# Patient Record
Sex: FEMALE | Race: WHITE | Employment: FULL TIME | URBAN - METROPOLITAN AREA
[De-identification: names, ages, dates, MRNs, and addresses within clinical notes are randomized per-mention and may not be internally consistent; named-entity substitution may affect disease eponyms.]

---

## 2017-05-04 ENCOUNTER — HOSPITAL ENCOUNTER (OUTPATIENT)
Dept: SPEECH THERAPY | Age: 57
Setting detail: THERAPIES SERIES
Discharge: HOME | End: 2017-05-04
Payer: COMMERCIAL

## 2017-05-04 ENCOUNTER — HOSPITAL ENCOUNTER (OUTPATIENT)
Dept: OCCUPATIONAL THERAPY | Age: 57
Setting detail: THERAPIES SERIES
Discharge: HOME | End: 2017-05-04
Payer: COMMERCIAL

## 2017-05-04 PROCEDURE — 96105 ASSESSMENT OF APHASIA: CPT

## 2017-05-04 PROCEDURE — 97166 OT EVAL MOD COMPLEX 45 MIN: CPT

## 2017-05-04 PROCEDURE — 97530 THERAPEUTIC ACTIVITIES: CPT

## 2017-05-04 ASSESSMENT — PAIN DESCRIPTION - DESCRIPTORS: DESCRIPTORS: SQUEEZING

## 2017-05-04 ASSESSMENT — PAIN DESCRIPTION - ORIENTATION: ORIENTATION: LEFT

## 2017-05-04 ASSESSMENT — 9 HOLE PEG TEST
TESTTIME_SECONDS: 38
TEST_RESULT: FUNCTIONAL
TEST_RESULT: IMPAIRED
TESTTIME_SECONDS: 25

## 2017-05-04 ASSESSMENT — PAIN SCALES - GENERAL: PAINLEVEL_OUTOF10: 2

## 2017-05-04 ASSESSMENT — PAIN DESCRIPTION - LOCATION: LOCATION: ARM

## 2017-05-04 ASSESSMENT — PAIN DESCRIPTION - PAIN TYPE: TYPE: ACUTE PAIN

## 2017-05-09 ENCOUNTER — HOSPITAL ENCOUNTER (OUTPATIENT)
Dept: OCCUPATIONAL THERAPY | Age: 57
Setting detail: THERAPIES SERIES
Discharge: HOME | End: 2017-05-09
Payer: COMMERCIAL

## 2017-05-09 ENCOUNTER — HOSPITAL ENCOUNTER (OUTPATIENT)
Dept: PHYSICAL THERAPY | Age: 57
Setting detail: THERAPIES SERIES
Discharge: HOME | End: 2017-05-09
Payer: COMMERCIAL

## 2017-05-09 ENCOUNTER — HOSPITAL ENCOUNTER (OUTPATIENT)
Dept: SPEECH THERAPY | Age: 57
Setting detail: THERAPIES SERIES
Discharge: HOME | End: 2017-05-09
Payer: COMMERCIAL

## 2017-05-09 PROCEDURE — 97162 PT EVAL MOD COMPLEX 30 MIN: CPT

## 2017-05-09 PROCEDURE — 92507 TX SP LANG VOICE COMM INDIV: CPT

## 2017-05-09 PROCEDURE — 97110 THERAPEUTIC EXERCISES: CPT

## 2017-05-09 PROCEDURE — 97116 GAIT TRAINING THERAPY: CPT

## 2017-05-09 PROCEDURE — 97530 THERAPEUTIC ACTIVITIES: CPT

## 2017-05-09 ASSESSMENT — PAIN SCALES - GENERAL: PAINLEVEL_OUTOF10: 8

## 2017-05-09 ASSESSMENT — PAIN DESCRIPTION - PAIN TYPE: TYPE: NEUROPATHIC PAIN

## 2017-05-11 ENCOUNTER — HOSPITAL ENCOUNTER (OUTPATIENT)
Dept: OCCUPATIONAL THERAPY | Age: 57
Setting detail: THERAPIES SERIES
Discharge: HOME | End: 2017-05-11
Payer: COMMERCIAL

## 2017-05-11 ENCOUNTER — HOSPITAL ENCOUNTER (OUTPATIENT)
Dept: PHYSICAL THERAPY | Age: 57
Setting detail: THERAPIES SERIES
Discharge: HOME | End: 2017-05-11
Payer: COMMERCIAL

## 2017-05-11 PROCEDURE — 97110 THERAPEUTIC EXERCISES: CPT

## 2017-05-11 PROCEDURE — 97116 GAIT TRAINING THERAPY: CPT

## 2017-05-11 PROCEDURE — 97530 THERAPEUTIC ACTIVITIES: CPT

## 2017-05-11 PROCEDURE — 97112 NEUROMUSCULAR REEDUCATION: CPT

## 2017-05-16 ENCOUNTER — HOSPITAL ENCOUNTER (OUTPATIENT)
Dept: PHYSICAL THERAPY | Age: 57
Setting detail: THERAPIES SERIES
Discharge: HOME | End: 2017-05-16
Payer: COMMERCIAL

## 2017-05-16 ENCOUNTER — HOSPITAL ENCOUNTER (OUTPATIENT)
Dept: SPEECH THERAPY | Age: 57
Setting detail: THERAPIES SERIES
Discharge: HOME | End: 2017-05-16
Payer: COMMERCIAL

## 2017-05-16 ENCOUNTER — HOSPITAL ENCOUNTER (OUTPATIENT)
Dept: OCCUPATIONAL THERAPY | Age: 57
Setting detail: THERAPIES SERIES
Discharge: HOME | End: 2017-05-16
Payer: COMMERCIAL

## 2017-05-16 PROCEDURE — 97530 THERAPEUTIC ACTIVITIES: CPT

## 2017-05-16 PROCEDURE — 97116 GAIT TRAINING THERAPY: CPT

## 2017-05-16 PROCEDURE — 97110 THERAPEUTIC EXERCISES: CPT

## 2017-05-16 PROCEDURE — 97112 NEUROMUSCULAR REEDUCATION: CPT

## 2017-05-16 PROCEDURE — 92507 TX SP LANG VOICE COMM INDIV: CPT

## 2017-05-16 ASSESSMENT — PAIN DESCRIPTION - PAIN TYPE: TYPE: ACUTE PAIN

## 2017-05-16 ASSESSMENT — PAIN DESCRIPTION - LOCATION: LOCATION: BACK

## 2017-05-16 ASSESSMENT — PAIN SCALES - GENERAL: PAINLEVEL_OUTOF10: 4

## 2017-05-18 ENCOUNTER — HOSPITAL ENCOUNTER (OUTPATIENT)
Dept: PHYSICAL THERAPY | Age: 57
Setting detail: THERAPIES SERIES
Discharge: HOME | End: 2017-05-18
Payer: COMMERCIAL

## 2017-05-18 ENCOUNTER — HOSPITAL ENCOUNTER (OUTPATIENT)
Dept: OCCUPATIONAL THERAPY | Age: 57
Setting detail: THERAPIES SERIES
Discharge: HOME | End: 2017-05-18
Payer: COMMERCIAL

## 2017-05-18 PROCEDURE — 97530 THERAPEUTIC ACTIVITIES: CPT

## 2017-05-18 PROCEDURE — 97110 THERAPEUTIC EXERCISES: CPT

## 2017-05-18 PROCEDURE — 97112 NEUROMUSCULAR REEDUCATION: CPT

## 2017-05-18 ASSESSMENT — PAIN SCALES - GENERAL: PAINLEVEL_OUTOF10: 7

## 2017-05-30 ENCOUNTER — HOSPITAL ENCOUNTER (OUTPATIENT)
Dept: OCCUPATIONAL THERAPY | Age: 57
Setting detail: THERAPIES SERIES
Discharge: HOME | End: 2017-05-30
Payer: COMMERCIAL

## 2017-05-30 ENCOUNTER — HOSPITAL ENCOUNTER (OUTPATIENT)
Dept: SPEECH THERAPY | Age: 57
Setting detail: THERAPIES SERIES
Discharge: HOME | End: 2017-05-30
Payer: COMMERCIAL

## 2017-05-30 ENCOUNTER — HOSPITAL ENCOUNTER (OUTPATIENT)
Dept: PHYSICAL THERAPY | Age: 57
Setting detail: THERAPIES SERIES
Discharge: HOME | End: 2017-05-30
Payer: COMMERCIAL

## 2017-05-30 PROCEDURE — 97110 THERAPEUTIC EXERCISES: CPT

## 2017-05-30 PROCEDURE — 92507 TX SP LANG VOICE COMM INDIV: CPT

## 2017-05-30 PROCEDURE — 97116 GAIT TRAINING THERAPY: CPT

## 2017-05-30 PROCEDURE — 97112 NEUROMUSCULAR REEDUCATION: CPT

## 2017-05-30 PROCEDURE — 97530 THERAPEUTIC ACTIVITIES: CPT

## 2017-05-30 ASSESSMENT — PAIN SCALES - GENERAL: PAINLEVEL_OUTOF10: 1

## 2017-06-01 ENCOUNTER — HOSPITAL ENCOUNTER (OUTPATIENT)
Dept: OCCUPATIONAL THERAPY | Age: 57
Setting detail: THERAPIES SERIES
Discharge: HOME | End: 2017-06-01
Payer: COMMERCIAL

## 2017-06-01 ENCOUNTER — HOSPITAL ENCOUNTER (OUTPATIENT)
Dept: PHYSICAL THERAPY | Age: 57
Setting detail: THERAPIES SERIES
Discharge: HOME | End: 2017-06-01
Payer: COMMERCIAL

## 2017-06-01 PROCEDURE — 97110 THERAPEUTIC EXERCISES: CPT

## 2017-06-01 PROCEDURE — 97116 GAIT TRAINING THERAPY: CPT

## 2017-06-01 PROCEDURE — 97112 NEUROMUSCULAR REEDUCATION: CPT

## 2017-06-01 PROCEDURE — 97530 THERAPEUTIC ACTIVITIES: CPT

## 2017-06-06 ENCOUNTER — HOSPITAL ENCOUNTER (OUTPATIENT)
Dept: PHYSICAL THERAPY | Age: 57
Setting detail: THERAPIES SERIES
Discharge: HOME | End: 2017-06-06
Payer: COMMERCIAL

## 2017-06-06 ENCOUNTER — HOSPITAL ENCOUNTER (OUTPATIENT)
Dept: OCCUPATIONAL THERAPY | Age: 57
Setting detail: THERAPIES SERIES
Discharge: HOME | End: 2017-06-06
Payer: COMMERCIAL

## 2017-06-06 ENCOUNTER — HOSPITAL ENCOUNTER (OUTPATIENT)
Dept: SPEECH THERAPY | Age: 57
Setting detail: THERAPIES SERIES
Discharge: HOME | End: 2017-06-06
Payer: COMMERCIAL

## 2017-06-06 PROCEDURE — 97530 THERAPEUTIC ACTIVITIES: CPT

## 2017-06-06 PROCEDURE — 97110 THERAPEUTIC EXERCISES: CPT

## 2017-06-06 PROCEDURE — 97035 APP MDLTY 1+ULTRASOUND EA 15: CPT

## 2017-06-06 PROCEDURE — 92507 TX SP LANG VOICE COMM INDIV: CPT

## 2017-06-06 PROCEDURE — 97112 NEUROMUSCULAR REEDUCATION: CPT

## 2017-06-06 ASSESSMENT — PAIN DESCRIPTION - LOCATION
LOCATION: GENERALIZED
LOCATION: GENERALIZED

## 2017-06-06 ASSESSMENT — PAIN DESCRIPTION - DESCRIPTORS: DESCRIPTORS: ACHING

## 2017-06-06 ASSESSMENT — PAIN DESCRIPTION - PAIN TYPE
TYPE: ACUTE PAIN
TYPE: ACUTE PAIN

## 2017-06-06 ASSESSMENT — 9 HOLE PEG TEST
TESTTIME_SECONDS: 20
TESTTIME_SECONDS: 26

## 2017-06-08 ENCOUNTER — HOSPITAL ENCOUNTER (OUTPATIENT)
Dept: OCCUPATIONAL THERAPY | Age: 57
Setting detail: THERAPIES SERIES
Discharge: HOME | End: 2017-06-08
Payer: COMMERCIAL

## 2017-06-08 ENCOUNTER — HOSPITAL ENCOUNTER (OUTPATIENT)
Dept: PHYSICAL THERAPY | Age: 57
Setting detail: THERAPIES SERIES
Discharge: HOME | End: 2017-06-08
Payer: COMMERCIAL

## 2017-06-08 PROCEDURE — 97530 THERAPEUTIC ACTIVITIES: CPT

## 2017-06-08 PROCEDURE — 97116 GAIT TRAINING THERAPY: CPT

## 2017-06-08 PROCEDURE — 97110 THERAPEUTIC EXERCISES: CPT

## 2017-06-08 PROCEDURE — 97112 NEUROMUSCULAR REEDUCATION: CPT

## 2017-06-08 PROCEDURE — 97035 APP MDLTY 1+ULTRASOUND EA 15: CPT

## 2017-06-13 ENCOUNTER — HOSPITAL ENCOUNTER (OUTPATIENT)
Dept: OCCUPATIONAL THERAPY | Age: 57
Setting detail: THERAPIES SERIES
Discharge: HOME | End: 2017-06-13
Payer: COMMERCIAL

## 2017-06-13 ENCOUNTER — HOSPITAL ENCOUNTER (OUTPATIENT)
Dept: SPEECH THERAPY | Age: 57
Setting detail: THERAPIES SERIES
Discharge: HOME | End: 2017-06-13
Payer: COMMERCIAL

## 2017-06-13 ENCOUNTER — HOSPITAL ENCOUNTER (OUTPATIENT)
Dept: PHYSICAL THERAPY | Age: 57
Setting detail: THERAPIES SERIES
Discharge: HOME | End: 2017-06-13
Payer: COMMERCIAL

## 2017-06-13 PROCEDURE — 97035 APP MDLTY 1+ULTRASOUND EA 15: CPT

## 2017-06-13 PROCEDURE — 92507 TX SP LANG VOICE COMM INDIV: CPT

## 2017-06-13 PROCEDURE — 97110 THERAPEUTIC EXERCISES: CPT

## 2017-06-13 PROCEDURE — 97530 THERAPEUTIC ACTIVITIES: CPT

## 2017-06-13 PROCEDURE — 97116 GAIT TRAINING THERAPY: CPT

## 2017-06-13 ASSESSMENT — PAIN SCALES - GENERAL: PAINLEVEL_OUTOF10: 2

## 2017-06-15 ENCOUNTER — HOSPITAL ENCOUNTER (OUTPATIENT)
Dept: OCCUPATIONAL THERAPY | Age: 57
Setting detail: THERAPIES SERIES
Discharge: HOME | End: 2017-06-15
Payer: COMMERCIAL

## 2017-06-15 ENCOUNTER — HOSPITAL ENCOUNTER (OUTPATIENT)
Dept: PHYSICAL THERAPY | Age: 57
Setting detail: THERAPIES SERIES
Discharge: HOME | End: 2017-06-15
Payer: COMMERCIAL

## 2017-06-15 PROCEDURE — 97035 APP MDLTY 1+ULTRASOUND EA 15: CPT

## 2017-06-15 PROCEDURE — 97110 THERAPEUTIC EXERCISES: CPT

## 2017-06-15 PROCEDURE — 97116 GAIT TRAINING THERAPY: CPT

## 2017-06-15 PROCEDURE — 97530 THERAPEUTIC ACTIVITIES: CPT

## 2017-06-15 PROCEDURE — 97112 NEUROMUSCULAR REEDUCATION: CPT

## 2017-06-15 ASSESSMENT — 9 HOLE PEG TEST: TEST_RESULT: IMPAIRED

## 2017-06-15 ASSESSMENT — PAIN SCALES - GENERAL: PAINLEVEL_OUTOF10: 2

## 2017-06-20 ENCOUNTER — HOSPITAL ENCOUNTER (OUTPATIENT)
Dept: OCCUPATIONAL THERAPY | Age: 57
Setting detail: THERAPIES SERIES
Discharge: HOME | End: 2017-06-20
Payer: COMMERCIAL

## 2017-06-20 ENCOUNTER — HOSPITAL ENCOUNTER (OUTPATIENT)
Dept: PHYSICAL THERAPY | Age: 57
Setting detail: THERAPIES SERIES
Discharge: HOME | End: 2017-06-20
Payer: COMMERCIAL

## 2017-06-20 PROCEDURE — 97110 THERAPEUTIC EXERCISES: CPT

## 2017-06-20 PROCEDURE — 97035 APP MDLTY 1+ULTRASOUND EA 15: CPT

## 2017-06-20 PROCEDURE — 97112 NEUROMUSCULAR REEDUCATION: CPT

## 2017-06-20 PROCEDURE — 97530 THERAPEUTIC ACTIVITIES: CPT

## 2017-06-20 ASSESSMENT — PAIN SCALES - GENERAL
PAINLEVEL_OUTOF10: 1
PAINLEVEL_OUTOF10: 3

## 2017-06-20 ASSESSMENT — PAIN DESCRIPTION - ORIENTATION: ORIENTATION: LEFT

## 2017-06-27 ENCOUNTER — HOSPITAL ENCOUNTER (OUTPATIENT)
Dept: SPEECH THERAPY | Age: 57
Setting detail: THERAPIES SERIES
Discharge: HOME | End: 2017-06-27
Payer: COMMERCIAL

## 2017-06-27 ENCOUNTER — HOSPITAL ENCOUNTER (OUTPATIENT)
Dept: OCCUPATIONAL THERAPY | Age: 57
Setting detail: THERAPIES SERIES
Discharge: HOME | End: 2017-06-27
Payer: COMMERCIAL

## 2017-06-27 ENCOUNTER — HOSPITAL ENCOUNTER (OUTPATIENT)
Dept: PHYSICAL THERAPY | Age: 57
Setting detail: THERAPIES SERIES
Discharge: HOME | End: 2017-06-27
Payer: COMMERCIAL

## 2017-06-27 PROCEDURE — 97035 APP MDLTY 1+ULTRASOUND EA 15: CPT

## 2017-06-27 PROCEDURE — 97110 THERAPEUTIC EXERCISES: CPT

## 2017-06-27 PROCEDURE — 97112 NEUROMUSCULAR REEDUCATION: CPT

## 2017-06-27 PROCEDURE — 97530 THERAPEUTIC ACTIVITIES: CPT

## 2017-06-27 PROCEDURE — 97116 GAIT TRAINING THERAPY: CPT

## 2017-06-27 PROCEDURE — 92507 TX SP LANG VOICE COMM INDIV: CPT

## 2017-06-27 ASSESSMENT — PAIN SCALES - GENERAL
PAINLEVEL_OUTOF10: 4
PAINLEVEL_OUTOF10: 0

## 2017-06-29 ENCOUNTER — HOSPITAL ENCOUNTER (OUTPATIENT)
Dept: PHYSICAL THERAPY | Age: 57
Setting detail: THERAPIES SERIES
Discharge: HOME | End: 2017-06-29
Payer: COMMERCIAL

## 2017-06-29 ENCOUNTER — HOSPITAL ENCOUNTER (OUTPATIENT)
Dept: OCCUPATIONAL THERAPY | Age: 57
Setting detail: THERAPIES SERIES
Discharge: HOME | End: 2017-06-29
Payer: COMMERCIAL

## 2017-06-29 PROCEDURE — 97530 THERAPEUTIC ACTIVITIES: CPT

## 2017-06-29 PROCEDURE — 97035 APP MDLTY 1+ULTRASOUND EA 15: CPT

## 2017-06-29 PROCEDURE — 97110 THERAPEUTIC EXERCISES: CPT

## 2017-06-29 PROCEDURE — 97116 GAIT TRAINING THERAPY: CPT

## 2017-07-03 ENCOUNTER — HOSPITAL ENCOUNTER (OUTPATIENT)
Dept: OCCUPATIONAL THERAPY | Age: 57
Setting detail: THERAPIES SERIES
Discharge: HOME | End: 2017-07-03
Payer: COMMERCIAL

## 2017-07-03 ENCOUNTER — HOSPITAL ENCOUNTER (OUTPATIENT)
Dept: PHYSICAL THERAPY | Age: 57
Setting detail: THERAPIES SERIES
Discharge: HOME | End: 2017-07-03
Payer: COMMERCIAL

## 2017-07-03 PROCEDURE — 97530 THERAPEUTIC ACTIVITIES: CPT

## 2017-07-03 PROCEDURE — 97110 THERAPEUTIC EXERCISES: CPT

## 2017-07-03 PROCEDURE — 97116 GAIT TRAINING THERAPY: CPT

## 2017-07-03 ASSESSMENT — 9 HOLE PEG TEST
TESTTIME_SECONDS: 17
TEST_RESULT: IMPAIRED
TESTTIME_SECONDS: 24

## 2017-07-03 ASSESSMENT — PAIN SCALES - GENERAL
PAINLEVEL_OUTOF10: 0
PAINLEVEL_OUTOF10: 1

## 2017-07-06 ENCOUNTER — HOSPITAL ENCOUNTER (OUTPATIENT)
Dept: SPEECH THERAPY | Age: 57
Setting detail: THERAPIES SERIES
Discharge: HOME | End: 2017-07-06
Payer: COMMERCIAL

## 2017-07-06 ENCOUNTER — HOSPITAL ENCOUNTER (OUTPATIENT)
Dept: OCCUPATIONAL THERAPY | Age: 57
Setting detail: THERAPIES SERIES
Discharge: HOME | End: 2017-07-06
Payer: COMMERCIAL

## 2017-07-06 ENCOUNTER — HOSPITAL ENCOUNTER (OUTPATIENT)
Dept: PHYSICAL THERAPY | Age: 57
Setting detail: THERAPIES SERIES
Discharge: HOME | End: 2017-07-06
Payer: COMMERCIAL

## 2017-07-06 PROCEDURE — 97110 THERAPEUTIC EXERCISES: CPT

## 2017-07-06 PROCEDURE — 97116 GAIT TRAINING THERAPY: CPT

## 2017-07-06 PROCEDURE — 97112 NEUROMUSCULAR REEDUCATION: CPT

## 2017-07-06 PROCEDURE — 92507 TX SP LANG VOICE COMM INDIV: CPT

## 2017-07-06 PROCEDURE — 97530 THERAPEUTIC ACTIVITIES: CPT

## 2017-07-06 PROCEDURE — 97035 APP MDLTY 1+ULTRASOUND EA 15: CPT

## 2017-07-06 ASSESSMENT — PAIN SCALES - GENERAL: PAINLEVEL_OUTOF10: 0

## 2017-07-11 ENCOUNTER — HOSPITAL ENCOUNTER (OUTPATIENT)
Dept: PHYSICAL THERAPY | Age: 57
Setting detail: THERAPIES SERIES
Discharge: HOME | End: 2017-07-11
Payer: COMMERCIAL

## 2017-07-11 ENCOUNTER — HOSPITAL ENCOUNTER (OUTPATIENT)
Dept: OCCUPATIONAL THERAPY | Age: 57
Setting detail: THERAPIES SERIES
Discharge: HOME | End: 2017-07-11
Payer: COMMERCIAL

## 2017-07-11 PROCEDURE — 97110 THERAPEUTIC EXERCISES: CPT

## 2017-07-11 PROCEDURE — 97112 NEUROMUSCULAR REEDUCATION: CPT

## 2017-07-11 PROCEDURE — 97116 GAIT TRAINING THERAPY: CPT

## 2017-07-11 PROCEDURE — 97035 APP MDLTY 1+ULTRASOUND EA 15: CPT

## 2017-07-11 PROCEDURE — 97530 THERAPEUTIC ACTIVITIES: CPT

## 2017-07-11 ASSESSMENT — PAIN SCALES - GENERAL: PAINLEVEL_OUTOF10: 0

## 2017-07-13 ENCOUNTER — HOSPITAL ENCOUNTER (OUTPATIENT)
Dept: SPEECH THERAPY | Age: 57
Setting detail: THERAPIES SERIES
Discharge: HOME | End: 2017-07-13
Payer: COMMERCIAL

## 2017-07-13 ENCOUNTER — HOSPITAL ENCOUNTER (OUTPATIENT)
Dept: OCCUPATIONAL THERAPY | Age: 57
Setting detail: THERAPIES SERIES
Discharge: HOME | End: 2017-07-13
Payer: COMMERCIAL

## 2017-07-13 ENCOUNTER — HOSPITAL ENCOUNTER (OUTPATIENT)
Dept: PHYSICAL THERAPY | Age: 57
Setting detail: THERAPIES SERIES
Discharge: HOME | End: 2017-07-13
Payer: COMMERCIAL

## 2017-07-13 PROCEDURE — 97035 APP MDLTY 1+ULTRASOUND EA 15: CPT

## 2017-07-13 PROCEDURE — 92507 TX SP LANG VOICE COMM INDIV: CPT

## 2017-07-13 PROCEDURE — 97530 THERAPEUTIC ACTIVITIES: CPT

## 2017-07-13 PROCEDURE — 97110 THERAPEUTIC EXERCISES: CPT

## 2017-07-13 PROCEDURE — 97112 NEUROMUSCULAR REEDUCATION: CPT

## 2017-07-13 PROCEDURE — 97116 GAIT TRAINING THERAPY: CPT

## 2017-07-13 ASSESSMENT — PAIN SCALES - GENERAL: PAINLEVEL_OUTOF10: 3

## 2017-07-18 ENCOUNTER — HOSPITAL ENCOUNTER (OUTPATIENT)
Dept: PHYSICAL THERAPY | Age: 57
Setting detail: THERAPIES SERIES
Discharge: HOME | End: 2017-07-18
Payer: COMMERCIAL

## 2017-07-18 ENCOUNTER — HOSPITAL ENCOUNTER (OUTPATIENT)
Dept: OCCUPATIONAL THERAPY | Age: 57
Setting detail: THERAPIES SERIES
Discharge: HOME | End: 2017-07-18
Payer: COMMERCIAL

## 2017-07-18 PROCEDURE — 97530 THERAPEUTIC ACTIVITIES: CPT

## 2017-07-18 PROCEDURE — 97112 NEUROMUSCULAR REEDUCATION: CPT

## 2017-07-18 PROCEDURE — 97116 GAIT TRAINING THERAPY: CPT

## 2017-07-18 PROCEDURE — 97110 THERAPEUTIC EXERCISES: CPT

## 2017-07-18 PROCEDURE — 97035 APP MDLTY 1+ULTRASOUND EA 15: CPT

## 2017-07-18 ASSESSMENT — PAIN SCALES - GENERAL: PAINLEVEL_OUTOF10: 4

## 2017-07-20 ENCOUNTER — HOSPITAL ENCOUNTER (OUTPATIENT)
Dept: SPEECH THERAPY | Age: 57
Setting detail: THERAPIES SERIES
Discharge: HOME | End: 2017-07-20
Payer: COMMERCIAL

## 2017-07-20 ENCOUNTER — APPOINTMENT (OUTPATIENT)
Dept: PHYSICAL THERAPY | Age: 57
End: 2017-07-20
Payer: COMMERCIAL

## 2017-07-20 ENCOUNTER — HOSPITAL ENCOUNTER (OUTPATIENT)
Dept: OCCUPATIONAL THERAPY | Age: 57
Setting detail: THERAPIES SERIES
Discharge: HOME | End: 2017-07-20
Payer: COMMERCIAL

## 2017-07-20 PROCEDURE — 97140 MANUAL THERAPY 1/> REGIONS: CPT

## 2017-07-20 PROCEDURE — 92507 TX SP LANG VOICE COMM INDIV: CPT

## 2017-07-20 PROCEDURE — 97530 THERAPEUTIC ACTIVITIES: CPT

## 2017-07-20 PROCEDURE — 97035 APP MDLTY 1+ULTRASOUND EA 15: CPT

## 2017-07-20 PROCEDURE — 97110 THERAPEUTIC EXERCISES: CPT

## 2017-07-25 ENCOUNTER — HOSPITAL ENCOUNTER (OUTPATIENT)
Dept: SPEECH THERAPY | Age: 57
Setting detail: THERAPIES SERIES
Discharge: HOME | End: 2017-07-25
Payer: COMMERCIAL

## 2017-07-25 ENCOUNTER — HOSPITAL ENCOUNTER (OUTPATIENT)
Dept: OCCUPATIONAL THERAPY | Age: 57
Setting detail: THERAPIES SERIES
Discharge: HOME | End: 2017-07-25
Payer: COMMERCIAL

## 2017-07-25 ENCOUNTER — APPOINTMENT (OUTPATIENT)
Dept: PHYSICAL THERAPY | Age: 57
End: 2017-07-25
Payer: COMMERCIAL

## 2017-07-25 PROCEDURE — 97110 THERAPEUTIC EXERCISES: CPT

## 2017-07-25 PROCEDURE — 97530 THERAPEUTIC ACTIVITIES: CPT

## 2017-07-25 PROCEDURE — 92507 TX SP LANG VOICE COMM INDIV: CPT

## 2017-07-25 PROCEDURE — 97035 APP MDLTY 1+ULTRASOUND EA 15: CPT

## 2017-07-25 ASSESSMENT — PAIN SCALES - GENERAL: PAINLEVEL_OUTOF10: 3

## 2017-07-27 ENCOUNTER — APPOINTMENT (OUTPATIENT)
Dept: PHYSICAL THERAPY | Age: 57
End: 2017-07-27
Payer: COMMERCIAL

## 2017-07-27 ENCOUNTER — HOSPITAL ENCOUNTER (OUTPATIENT)
Dept: OCCUPATIONAL THERAPY | Age: 57
Setting detail: THERAPIES SERIES
Discharge: HOME | End: 2017-07-27
Payer: COMMERCIAL

## 2017-07-27 PROCEDURE — 97530 THERAPEUTIC ACTIVITIES: CPT

## 2017-07-27 PROCEDURE — 97110 THERAPEUTIC EXERCISES: CPT

## 2017-07-27 PROCEDURE — 97035 APP MDLTY 1+ULTRASOUND EA 15: CPT

## 2017-07-31 ENCOUNTER — HOSPITAL ENCOUNTER (OUTPATIENT)
Dept: OCCUPATIONAL THERAPY | Age: 57
Setting detail: THERAPIES SERIES
Discharge: HOME | End: 2017-07-31
Payer: COMMERCIAL

## 2017-07-31 PROCEDURE — 97110 THERAPEUTIC EXERCISES: CPT

## 2017-07-31 PROCEDURE — 97530 THERAPEUTIC ACTIVITIES: CPT

## 2022-08-01 ENCOUNTER — INITIAL CONSULT (OUTPATIENT)
Dept: PAIN MANAGEMENT | Age: 62
End: 2022-08-01
Payer: COMMERCIAL

## 2022-08-01 VITALS
BODY MASS INDEX: 34.96 KG/M2 | HEIGHT: 62 IN | TEMPERATURE: 97.6 F | WEIGHT: 190 LBS | SYSTOLIC BLOOD PRESSURE: 118 MMHG | DIASTOLIC BLOOD PRESSURE: 68 MMHG | HEART RATE: 103 BPM | OXYGEN SATURATION: 97 %

## 2022-08-01 DIAGNOSIS — G81.94 LEFT HEMIPARESIS (HCC): Primary | ICD-10-CM

## 2022-08-01 DIAGNOSIS — M46.1 SACROILIITIS, NOT ELSEWHERE CLASSIFIED (HCC): ICD-10-CM

## 2022-08-01 DIAGNOSIS — M47.817 LUMBOSACRAL SPONDYLOSIS WITHOUT MYELOPATHY: ICD-10-CM

## 2022-08-01 PROCEDURE — 99203 OFFICE O/P NEW LOW 30 MIN: CPT | Performed by: PAIN MEDICINE

## 2022-08-01 RX ORDER — INSULIN HUMAN 500 [IU]/ML
INJECTION, SOLUTION SUBCUTANEOUS
COMMUNITY
Start: 2022-07-22

## 2022-08-01 RX ORDER — BLOOD-GLUCOSE TRANSMITTER
EACH MISCELLANEOUS
COMMUNITY
Start: 2022-07-05

## 2022-08-01 RX ORDER — SEMAGLUTIDE 2.68 MG/ML
INJECTION, SOLUTION SUBCUTANEOUS
COMMUNITY
Start: 2022-05-19

## 2022-08-01 RX ORDER — BLOOD-GLUCOSE SENSOR
EACH MISCELLANEOUS
COMMUNITY
Start: 2022-07-05

## 2022-08-01 RX ORDER — BACLOFEN 10 MG/1
TABLET ORAL
COMMUNITY
Start: 2022-07-01

## 2022-08-01 RX ORDER — OLMESARTAN MEDOXOMIL, AMLODIPINE AND HYDROCHLOROTHIAZIDE TABLET 40/5/12.5 MG 40; 5; 12.5 MG/1; MG/1; MG/1
TABLET ORAL
COMMUNITY
Start: 2022-01-11

## 2022-08-01 RX ORDER — BIOTIN 10 MG
1 TABLET ORAL
COMMUNITY

## 2022-08-01 RX ORDER — IBUPROFEN 200 MG
200 TABLET ORAL EVERY 6 HOURS PRN
COMMUNITY

## 2022-08-01 RX ORDER — DOXEPIN HYDROCHLORIDE 10 MG/1
CAPSULE ORAL
COMMUNITY
Start: 2022-06-29

## 2022-08-01 RX ORDER — EVOLOCUMAB 140 MG/ML
INJECTION, SOLUTION SUBCUTANEOUS
COMMUNITY
Start: 2022-07-14

## 2022-08-01 RX ORDER — PREGABALIN 150 MG/1
CAPSULE ORAL
COMMUNITY
End: 2022-09-26 | Stop reason: SDUPTHER

## 2022-08-01 RX ORDER — ALBUTEROL SULFATE 90 UG/1
AEROSOL, METERED RESPIRATORY (INHALATION)
COMMUNITY
Start: 2022-06-02

## 2022-08-01 RX ORDER — CLOPIDOGREL BISULFATE 75 MG/1
75 TABLET ORAL DAILY
COMMUNITY
Start: 2022-03-28

## 2022-08-01 RX ORDER — ACETAMINOPHEN 500 MG
1500 TABLET ORAL DAILY PRN
COMMUNITY

## 2022-08-01 ASSESSMENT — ENCOUNTER SYMPTOMS
ALLERGIC/IMMUNOLOGIC NEGATIVE: 1
GASTROINTESTINAL NEGATIVE: 1
RESPIRATORY NEGATIVE: 1
EYES NEGATIVE: 1

## 2022-08-01 NOTE — PROGRESS NOTES
History of Present Illness     Patient Identification  Ace Nicole is a 63 y.o. female. Patient information was obtained from patient. Chief Complaint   Chief Complaint   Patient presents with    Back Pain       Patient presents with complaint of back pain left leg pain with h/o CVA has weakness and loss of Bladder controll difficult to say if this is from weakness to run to toilet. Had a fusion in Neck 2017, This is a result of no known injury. Onset of pain was 4 years ago and has been gradually worsening since. The pain is located in left lower back, described as aching and dull and rated as mild and moderate, with radiation, to the right foot. Symptoms include weakness. The patient denies numbness, incontinence, dysuria, hematuria. The patient denies other injuries. Care prior to arrival consisted of PT 2 years ago  with no relief. Past Medical History:   Diagnosis Date    Cerebrovascular disease     Stroke 2/2017 and 4/2017    Neuropathy     Type 2 diabetes mellitus without complication ([facility] [address]      History reviewed. No pertinent family history. Current Outpatient Medications   Medication Sig Dispense Refill    acetaminophen (TYLENOL) 500 MG tablet Take 1,500 mg by mouth daily as needed      albuterol sulfate HFA (PROVENTIL;VENTOLIN;PROAIR) 108 (90 Base) MCG/ACT inhaler       baclofen (LIORESAL) 10 MG tablet       Biotin 10 MG tablet Take 1 tablet by mouth      clopidogrel (PLAVIX) 75 MG tablet Take 75 mg by mouth in the morning. Continuous Blood Gluc Sensor (DEXCOM G6 SENSOR) MISC       Continuous Blood Gluc Transmit (DEXCOM G6 TRANSMITTER) MISC       cyanocobalamin 1000 MCG tablet Take 1,000 mcg by mouth in the morning. doxepin (SINEQUAN) 10 MG capsule TAKE 1 CAPSULE BY MOUTH AT BEDTIME FOR 30 DAYS      REPATHA SURECLICK 974 MG/ML SOAJ Inject 140 mg subcutaneously every 2 weeks.  called the ins she can only get so many pens in an allocted time she is over the limit--next available fill is 4/7      Evolocumab 140 MG/ML SOAJ Inject 140 mg into the skin      ibuprofen (ADVIL;MOTRIN) 200 MG tablet Take 200 mg by mouth every 6 hours as needed      insulin regular human (HUMULIN R U-500 KWIKPEN) 500 UNIT/ML SOPN concentrated injection pen Inject 55 units with breakfast and 45 units with dinner (max 100 units/day)      olmesartan-amLODIPine-HCTZ 40-5-12.5 MG TABS       OZEMPIC, 2 MG/DOSE, 8 MG/3ML SOPN       pregabalin (LYRICA) 150 MG capsule Take by mouth. No current facility-administered medications for this visit. Allergies   Allergen Reactions    Meperidine Shortness Of Breath     nausea      2,4-D Dimethylamine (Amisol)     Atorvastatin Hives and Other (See Comments)     fatigue  Patient takes Pravastatin at home      Benazepril Other (See Comments)    Clonidine Other (See Comments)    Dapagliflozin Other (See Comments)     palpatations    Egg White     Ezetimibe      Other reaction(s): GI Upset    Meperidine Hcl      Other reaction(s): Vomiting    Propoxyphene Hives    Canagliflozin Rash     yeast infection    Morphine Nausea And Vomiting and Hives     Nausea. Patient takes Vicodin at home with no problem         Review of Systems  Review of Systems   Constitutional: Negative. HENT: Negative. Eyes: Negative. Respiratory: Negative. Asthama   Cardiovascular: Negative. High  blood pressure   Gastrointestinal: Negative. Endocrine: Negative. Genitourinary: Negative. Musculoskeletal: Negative. Skin: Negative. Allergic/Immunologic: Negative. Neurological: Negative. 3 TIAS and Left hemiparesis. Hematological: Negative. Psychiatric/Behavioral: Negative. All other systems reviewed and are negative. Physical Exam     /68   Pulse (!) 103   Temp 97.6 °F (36.4 °C)   Ht 5' 2\" (1.575 m)   Wt 190 lb (86.2 kg)   SpO2 97%   BMI 34.75 kg/m²   Physical Exam  Vitals and nursing note reviewed.    Constitutional:       Appearance: Normal appearance. HENT:      Head: Normocephalic. Right Ear: Ear canal normal.      Left Ear: Ear canal normal.      Nose: Nose normal.      Mouth/Throat:      Mouth: Mucous membranes are moist.   Eyes:      Extraocular Movements: Extraocular movements intact. Conjunctiva/sclera: Conjunctivae normal.      Pupils: Pupils are equal, round, and reactive to light. Cardiovascular:      Rate and Rhythm: Normal rate and regular rhythm. Pulses: Normal pulses. Heart sounds: Normal heart sounds. Pulmonary:      Effort: Pulmonary effort is normal.      Breath sounds: Normal breath sounds. Abdominal:      General: Abdomen is flat. Bowel sounds are normal.      Palpations: Abdomen is soft. Musculoskeletal:         General: Normal range of motion. Cervical back: Normal range of motion and neck supple. Comments: Good Gait able to walk on Toes and heels, Tender facets ans severe tenderness on left SI Joint concordant with her pain, Mild pain on SLRs pain on Left gainslins,   Skin:     General: Skin is warm. Capillary Refill: Capillary refill takes less than 2 seconds. Neurological:      General: No focal deficit present. Mental Status: She is alert. Mental status is at baseline. She is disoriented. Psychiatric:         Mood and Affect: Mood normal.         Behavior: Behavior normal.         Thought Content: Thought content normal.         Judgment: Judgment normal.         Plan     Patient presents with complaint of back pain left leg pain with h/o CVA has weakness and loss of Bladder controll difficult to say if this is from weakness to run to toilet. Had a fusion in Neck 2017, This is a result of no known injury. Onset of pain was 4 years ago and has been gradually worsening since. The pain is located in left lower back, described as aching and dull and rated as mild and moderate, with radiation, to the right foot. Symptoms include weakness.   The patient denies numbness, incontinence, dysuria, hematuria. The patient denies other injuries. Care prior to arrival consisted of PT 2 years ago  with no relief. Plan on PT and Xrays show sclerosis of posterior elements of Lumbar spine and SI Joints. and possible SI Joint injection left side.

## 2022-08-22 ENCOUNTER — HOSPITAL ENCOUNTER (OUTPATIENT)
Dept: PHYSICAL THERAPY | Age: 62
Setting detail: THERAPIES SERIES
Discharge: HOME | End: 2022-08-22
Payer: COMMERCIAL

## 2022-08-22 PROCEDURE — 97162 PT EVAL MOD COMPLEX 30 MIN: CPT

## 2022-08-22 ASSESSMENT — PAIN SCALES - GENERAL: PAINLEVEL_OUTOF10: 5

## 2022-08-22 ASSESSMENT — PAIN DESCRIPTION - ORIENTATION: ORIENTATION: LEFT;RIGHT

## 2022-08-22 ASSESSMENT — PAIN DESCRIPTION - DESCRIPTORS: DESCRIPTORS: SHARP;SHOOTING;THROBBING

## 2022-08-22 ASSESSMENT — PAIN - FUNCTIONAL ASSESSMENT: PAIN_FUNCTIONAL_ASSESSMENT: PREVENTS OR INTERFERES WITH ALL ACTIVE AND SOME PASSIVE ACTIVITIES

## 2022-08-22 ASSESSMENT — PAIN DESCRIPTION - LOCATION: LOCATION: BACK

## 2022-08-22 NOTE — PROGRESS NOTES
[facility] PHYSICAL [facility]ERAP[facility]   EVALUATION            Physical Therapy: Initial Evaluation    Patient: Edin Dominguez (50 y.o.     female)   Examination Date: 2022   :  1960 ;    Confirmed: Yes MRN: [id number]  CSN: [id number]   Insurance: Payor: [facility] / Plan: BCBS - [place] PPO / Product Type: *No Product type* /   Insurance ID: [id number] - ([facility]) Secondary Insurance (if applicable):    Referring Physician: Colleen Palma MD      PCP: Ana Mitchell MD Visits to Date/Visits Approved:  /  (BMN)    No Show/Cancelled Appts: 0 / 0     Medical Diagnosis: Hemiplegia, unspecified affecting left nondominant side [G81.94]  Sacroiliitis, not elsewhere classified [M46.1]  Spondylosis without myelopathy or radiculopathy, lumbosacral region [M47.817]    Treatment Diagnosis: LBP with functional strength and balance deficits.      PERTINENT MEDICAL HISTORY   Patient Assessed for Rehabilitation Services: Yes       Medical History: Chart Reviewed: Yes   Past Medical History:   Diagnosis Date    Cerebrovascular disease     Stroke 2017 and 2017    Chronic pain     Depression     Diabetes ([facility] [address]     Headache     Hypertension     Neuropathy     Osteoarthritis     Type 2 diabetes mellitus without complication ([facility] [address]      Surgical History:   Past Surgical History:   Procedure Laterality Date    SPINE SURGERY         Medications:   Current Outpatient Medications:     acetaminophen (TYLENOL) 500 MG tablet, Take 1,500 mg by mouth daily as needed, Disp: , Rfl:     albuterol sulfate HFA (PROVENTIL;VENTOLIN;PROAIR) 108 (90 Base) MCG/ACT inhaler, , Disp: , Rfl:     baclofen (LIORESAL) 10 MG tablet, , Disp: , Rfl:     Biotin 10 MG tablet, Take 1 tablet by mouth, Disp: , Rfl:     clopidogrel (PLAVIX) 75 MG tablet, Take 75 mg by mouth in the morning., Disp: , Rfl:     Continuous Blood Gluc Sensor (DEXCOM G6 SENSOR) MISC, , Disp: , Rfl:     Continuous Blood Gluc Transmit (DEXCOM G6 TRANSMITTER) MISC, , Disp: , Rfl:     cyanocobalamin 1000 MCG tablet, Take 1,000 mcg by mouth in the morning., Disp: , Rfl:     doxepin (SINEQUAN) 10 MG capsule, TAKE 1 CAPSULE BY MOUTH AT BEDTIME FOR 30 DAYS, Disp: , Rfl:     REPATHA SURECLICK 667 MG/ML SOAJ, Inject 140 mg subcutaneously every 2 weeks. called the ins she can only get so many pens in an allocted time she is over the limit--next available fill is 4/7, Disp: , Rfl:     Evolocumab 140 MG/ML SOAJ, Inject 140 mg into the skin, Disp: , Rfl:     ibuprofen (ADVIL;MOTRIN) 200 MG tablet, Take 200 mg by mouth every 6 hours as needed, Disp: , Rfl:     insulin regular human (HUMULIN R U-500 KWIKPEN) 500 UNIT/ML SOPN concentrated injection pen, Inject 55 units with breakfast and 45 units with dinner (max 100 units/day), Disp: , Rfl:     olmesartan-amLODIPine-HCTZ 40-5-12.5 MG TABS, , Disp: , Rfl:     OZEMPIC, 2 MG/DOSE, 8 MG/3ML SOPN, , Disp: , Rfl:     pregabalin (LYRICA) 150 MG capsule, Take by mouth., Disp: , Rfl:   Allergies: Meperidine; 2,4-d dimethylamine (amisol); Atorvastatin; Benazepril; Clonidine; Dapagliflozin; Egg white; Ezetimibe; Meperidine hcl; Propoxyphene; Canagliflozin; and Morphine      SUBJECTIVE EXAMINATION     History obtained from[de-identified] Patient, Chart Review,      Family/Caregiver Present: No    Subjective History: Onset Date: 08/05/22  Subjective: Pt reports never recovering to the functional extent she would like, Was told she was at her max function and she switched doctors. Had injections with some improvements. Uses a rollator at times and wheelchair for vacation. Does not use cane due to shoulder pain. Not using rollator this date into clinic. Pt reports wanting to have a \"transmitor\" stimulator in spine (something she found on the internet) and needs therapy to qualify. Pt reports getting a chair lift installed recently due to crawling up the stairs and unable to move to a one level environment. Pt reports recently recovering from covid.   Additional Pertinent Hx (if applicable): CVA 8792, HTN, OA, DM, asthma, cervical fusion 2014, R RC 2009, R bicep tendon repair 2011,  MI 2016   Prior diagnostic testing[de-identified] X-ray (7/22 arthritis)  Previous treatments prior to current episode?: Outpatient PT, Injections  Any changes to allergies, medications, or have you had any medical procedures/ER visits since your last visit?: No       Pain Screening    Pain Screening  Patient Currently in Pain: Yes  Pain Assessment: 0-10  Pain Level: 5  Best Pain Level: 2  Worst Pain Level: 10  Pain Location: Back  Pain Orientation: Left, Right  Pain Radiating Towards: radiates to B LE alternating, middle and big toes. NT and neuropathy.   Pain Descriptors: Ancil Guardian, Shooting, Throbbing  Functional Pain Assessment: Prevents or interferes with all active and some passive activities  Aggravating factors: Standing, Sitting  Pain Management/Relieving Factors: Ice, Rest, Laying supine    Functional Status    Social History:    Social History  Lives With: Spouse  Home Layout: Two level, Multi-level  Home Access: Stairs to enter with rails  Entrance Stairs - Number of Steps: 3 nonrecip with B handrails  Bathroom Shower/Tub: Tub/Shower unit, Shower chair without back  Home Equipment: [employer], Cane, Rollator (stair chair)    Occupation/Interests:   Occupation: Retired  Type of Occupation: [address] [place] [address] Street: [employer]    Prior Level of Function:   75% 2019 was able to work          Current Level of Function:   50% General   Sleeping Tolerance: disturbed Limited t couple hours  Bed Mobility: WFL  Picking Up Light Object: guarded with supervision  Driving: limited  Recreational Activities: limited, pool    ADL Assistance: Independent ( usually stands by for showering for needs)  Homemaking Assistance: Independent  Homemaking Responsibilities: Yes  Ambulation Assistance: Independent  Transfer Assistance: Independent  Active : Yes ( usually drives)  Mode of Transportation: Car         OBJECTIVE EXAMINATION     Review of Systems:  Vision: Within Functional Limits (glasses)  Hearing: Within functional limits    VBI Screening / Lumbar Screening:    Bowel/bladder disturbances: Yes (DM related per pt urgency urine and bowel)  Saddle anesthesia: No  Unexplained weight loss: No  Severe motor weakness: No  Stumbling or giving way while walking: Yes  Unrelenting pain at night: Yes (neuropathy pain)    Observations:   General Observations  Description: Fwd head, flexed trunk, Rounded shoulders, genu recurvatum    Palpation:   Lumbar Spine Palpation: hypersensitive L>R lumbar paraspinal    Mobility:       Ambulation  Surface: carpet  Device: No Device  Assistance: Independent  Quality of Gait: furniture walks  Gait Deviations: Slow Linda, Decreased step length  Distance: 40' fatigues quickly  Stairs/Curb  Stairs?: Yes  Stairs  # Steps : 1  Stairs Height: 6\"  Device: No Device  Assistance: Supervision      Balance Screen:   Balance  Sitting - Static: Good  Sitting - Dynamic: Fair  Standing - Static: Fair  Standing - Dynamic: Poor  Single Stance R Leg: unalbe without holding on  Single Stance L Leg: unable without holding on  Comments: falls and near falls reported    Neuro Screen: Sensation  Overall Sensation Status: WFL (Mild decrease on L LE medial calf)  Lower Quarter Deep Tendon Reflex (DTR)  Right Quadriceps (L3-4):  Diminished  Left Quadriceps (L3-4):  Diminished    Left AROM  Right AROM         AROM LLE (degrees)  L Hip Flexion 0-125: 40  L Hip Extension 0-10: 5  L Hip ABduction 0-45: 20  L Hip External Rotation 0-45: 30  L Hip Internal Rotation 0-45: 25    AROM RLE (degrees)  R Hip Flexion 0-125: 40  R Hip Extension 0-10: 5  R Hip ABduction 0-45: 20  R Hip External Rotation 0-45: 25  R Hip Internal Rotation 0-45: 30          Left Strength  Right Strength         Strength LLE  L Hip Flexion: 3/5  L Hip Extension: 3/5  L Hip ABduction: 3/5  L Hip Internal Rotation: 3-/5, 3/5  L Hip External Rotation: 3-/5, 3/5  L Knee Flexion: 3-/5  L Knee Extension: 3/5    Strength RLE  R Hip Flexion: 3+/5, 3/5  R Hip Extension: 3+/5  R Hip ABduction: 3+/5  R Hip Internal Rotation: 3-/5, 3/5  R Hip External Rotation: 3-/5, 3/5  R Knee Flexion: 3-/5  R Knee Extension: 3/5       Lumbar Assessment     AROM Lumbar Spine   Lumbar spine general AROM: Flex 25%, Ext 25% with pain, SB 50%        Trunk Strength     Trunk Strength  Lower abdominals: Poor     Muscle Length/Flexibility:   Muscle Length LE  90/90 SLR (Hamstring Tightness): Hamstring flexibility -12°R, -15°L at 90/90 hip/knee position. Joint Mobility:        Special Tests:   Special Tests Lumbar Spine  BRITTA Test: R (+), L (-)  Prone Knee Bend Test: R (+), L (+)  Slump Test: L (+), R (-)  Forward Bend Test: (+)  Special Tests for Hip  Scour (OA, Labrum): R (+), L (+)    Outcomes Score:  Exam: Smith balance score 25/56 and Mod Oswestry 22/50=56% functional       Treatment:    Exercises:   Exercises  Exercise 1: ham curl with band or prone*  Exercise 2: hip ext prone*  Exercise 3: bridge*  Exercise 4: bike*  Exercise 5: abdominal machine*  Exercise 6: balance/gait drills*  Treatment Reasoning  Limitations addressed: Mobility, Strength, Flexibility, Posture, Pain modulation  Functional ability(s) targeted: Bed mobility, Ambulating community distances  Modalities:Modalities:  (CP/HP, estim if tolerated*)        *Indicates exercise,modality, or manual techniques to be initiated when appropriate       ASSESSMENT     Impression: Assessment: The pt's impairments currently limit functional abilities by 44% including her abilities to walk, balance, perform recreational activities, and perform household/work related duties without pain or limitations. Skilled PT required to address about deficits to improve over function and return to prior level of function.     Body Structures, Functions, Activity Limitations Requiring Skilled Therapeutic Intervention: Decreased functional mobility , Decreased ADL status, Decreased ROM, Decreased balance, Decreased posture, Increased pain, Decreased strength, Decreased sensation    Statement of Medical Necessity: Physical Therapy is both indicated and medically necessary as outlined in the POC to increase the likelihood of meeting the functionally related goals stated below. Patient's Activity Tolerance: Patient tolerated evaluation without incident      Patient's rehabilitation potential/prognosis is considered to be: Fair    Factors which may impact rehabilitation potential include: Medical co-morbidities, Impaired previous level of function     Patient Education: Goals, PT Role, Plan of Care, Evaluative findings, Insurance, Home Exercise Program      GOALS   Patient Goal(s): Patient goals : Decrease pain and improve function    Short Term Goals Completed by 3 weeks Goal Status   The pt will demonstrate improved postural awareness requiring <25% VC's with exercises New   Decrease Lumbar pain 50% to assist with improved functional gains. New     Long Term Goals Completed by 6 weeks Goal Status   Indep HEP for symptom management New   Pt demo improved overall function by reporting greater than 70% per functional survey score New   Pain-free Lumbar AROM to WNL allowing an increase in ADL tolerance.  New   Improve B LE strength 4-/5 to allow patient to improve stair climbing and bed mobility with less UE assist. New   Pt demo improved overall balance by improving greater than 5-10 points on Smith score New        TREATMENT PLAN       Requires PT Follow-Up: Yes    Treatment may include any combination of the following: Strengthening, ROM, Balance training, Functional mobility training, Transfer training, Manual Therapy - Joint Manipulation, Manual Therapy - Soft Tissue Mobilization, Neuromuscular re-education, Stair training, Gait training, Modalities, Home exercise program     Frequency / Duration:  Patient to be seen 2 times per week for 4-6 weeks  Plan Comment:               Eval Complexity:   Decision Making: Medium Complexity  History: Personal Factors and/or Comorbidities Impacting POC: Medium  History: CVA 2017, HTN, OA, DM, asthma, cervical fusion 2014, R RC 2009, R bicep tendon repair 2011, MI 2016  Examination of body system(s) including body structures and functions, activity limitations, and/or participation restrictions: Medium  Exam: Smith balance score 25/56 and Mod Oswestry 22/50=56% functional  Clinical Presentation: High  Clinical Presentation: unstable    POST-PAIN     Pain Rating (0-10 pain scale):   no change/10  Location and pain description same as pre-treatment unless indicated. Action: [x] NA  [] Call Physician  [] Perform HEP  [] Meds as prescribed    Evaluation and patient rights have been reviewed and patient agrees with plan of care. Yes  [x]  No  []   Explain:     Fernanda Fall Risk Assessment  Risk Factor Scale  Score   History of Falls [] Yes  [x] No 25  0 25   Secondary Diagnosis [x] Yes  [] No 15  0 15   Ambulatory Aid [] Furniture  [x] Crutches/cane/walker  [] None/bedrest/wheelchair/nurse 30  15  0 15   IV/Heparin Lock [] Yes  [x] No 20  0 0   Gait/Transferring [x] Impaired  [] Weak  [] Normal/bedrest/immobile 20  10  0 20   Mental Status [] Forgets limitations  [x] Oriented to own ability 15  0 0      Total:75     Based on the Assessment score: check the appropriate box.   []  No intervention needed   Low =   Score of 0-24  []  Use standard prevention interventions Moderate =  Score of 24-44   [] Discuss fall prevention strategies   [] Indicate moderate falls risk on eval  [x]  Use high risk prevention interventions High = Score of 45 and higher   [x] Discuss fall prevention strategies   [x] Provide supervision during treatment time      Minutes:  PT Individual Minutes  Time In: 0915  Time Out: 0950  Minutes: 35     Procedure Minutes:35 min Eval   Electronically signed by Tom Dunn PT on 8/22/22 at 4:31 PM

## 2022-08-22 NOTE — PROGRESS NOTES
[address]  [address]      ZGVIZ:[phone]    [] Certification  [] Recertification [x]  Plan of Care  [] Progress Note [] Discharge      Referring Provider: Wilfred Vargas MD       From:  Tasneem Wasserman, PT   Patient: Dave Payne (63 y.o. female) : 1960 Date: 2022   Medical Diagnosis: Hemiplegia, unspecified affecting left nondominant side [G81.94]  Sacroiliitis, not elsewhere classified [M46.1]  Spondylosis without myelopathy or radiculopathy, lumbosacral region [M47.817]    Treatment Diagnosis: LBP with functional strength and balance deficits. Progress Report Period from:  2022  to 2022    Visits to Date: 1 No Show: 0 Cancelled Appts: 0    OBJECTIVE:   Short Term Goals - Time Frame for Short term goals: 3 weeks    Goals Current/Discharge status  Status   Short term goal 1: The pt will demonstrate improved postural awareness requiring <25% VC's with exercises  General Observations  Description: Fwd head, flexed trunk, Rounded shoulders, genu recurvatum  STG Goal 1 Status[de-identified] New   Short term goal 2: Decrease Lumbar pain 50% to assist with improved functional gains. Pain Screening  Patient Currently in Pain: Yes  Pain Assessment: 0-10  Pain Level: 5  Best Pain Level: 2  Worst Pain Level: 10  Pain Location: Back  Pain Orientation: Left, Right  Pain Radiating Towards: radiates to B LE alternating, middle and big toes. NT and neuropathy.   Pain Descriptors: Saba Ends, Shooting, Throbbing  Functional Pain Assessment: Prevents or interferes with all active and some passive activities  Aggravating factors: Standing, Sitting  Pain Management/Relieving Factors: Ice, Rest, Laying supine   STG Goal 2 Status[de-identified] New        Long Term Goals - Time Frame for Long term goals : 6 weeks  Goals Current/ Discharge status Met   Long term goal 1: Indep HEP for symptom management Needs Written HEP initiated  for Bend Test: (+)  Special Tests for Hip  Scour (OA, Labrum): R (+), L (+)     PT Education: Goals;PT Role;Plan of Care;Evaluative findings; Insurance;Home Exercise Program    PLAN: [x] Evaluate and Treat  Frequency/Duration:  Plan Frequency: 2  Plan weeks: 4-6  Current Treatment Recommendations: Strengthening, ROM, Balance training, Functional mobility training, Transfer training, Manual Therapy - Joint Manipulation, Manual Therapy - Soft Tissue Mobilization, Neuromuscular re-education, Stair training, Gait training, Modalities, Home exercise program            Precautions: High Risk falls                  Patient Status:[x] Continue/ Initiate plan of Care     [] Discharge PT. Recommend pt continue with HEP. [] Additional visits requested, Please re-certify for additional visits:        Signature: Electronically signed by Nicolas Andrade PT on 8/22/22 at 10:18 AM EDT      If you have any questions or concerns, please don't hesitate to call. Thank you for your referral.    I have reviewed this plan of care and certify a need for medically necessary rehabilitation services.     Physician Signature:__________________________________________________________  Date:  Please sign and return

## 2022-08-25 ENCOUNTER — HOSPITAL ENCOUNTER (OUTPATIENT)
Dept: PHYSICAL THERAPY | Age: 62
Setting detail: THERAPIES SERIES
Discharge: HOME | End: 2022-08-25
Payer: COMMERCIAL

## 2022-08-25 PROCEDURE — 97110 THERAPEUTIC EXERCISES: CPT

## 2022-08-25 ASSESSMENT — PAIN SCALES - GENERAL: PAINLEVEL_OUTOF10: 2

## 2022-08-25 ASSESSMENT — PAIN DESCRIPTION - ORIENTATION: ORIENTATION: LEFT;RIGHT

## 2022-08-25 ASSESSMENT — PAIN DESCRIPTION - LOCATION: LOCATION: BACK

## 2022-08-25 ASSESSMENT — PAIN DESCRIPTION - DESCRIPTORS: DESCRIPTORS: SHARP;SHOOTING

## 2022-08-29 ENCOUNTER — OFFICE VISIT (OUTPATIENT)
Dept: PAIN MANAGEMENT | Age: 62
End: 2022-08-29
Payer: COMMERCIAL

## 2022-08-29 VITALS
OXYGEN SATURATION: 96 % | DIASTOLIC BLOOD PRESSURE: 72 MMHG | HEART RATE: 98 BPM | TEMPERATURE: 98 F | WEIGHT: 185 LBS | BODY MASS INDEX: 34.04 KG/M2 | SYSTOLIC BLOOD PRESSURE: 126 MMHG | HEIGHT: 62 IN

## 2022-08-29 DIAGNOSIS — M47.817 LUMBOSACRAL SPONDYLOSIS WITHOUT MYELOPATHY: ICD-10-CM

## 2022-08-29 DIAGNOSIS — G81.94 LEFT HEMIPARESIS (HCC): Primary | ICD-10-CM

## 2022-08-29 DIAGNOSIS — M46.1 SACROILIITIS, NOT ELSEWHERE CLASSIFIED (HCC): ICD-10-CM

## 2022-08-29 PROCEDURE — 99213 OFFICE O/P EST LOW 20 MIN: CPT | Performed by: PAIN MEDICINE

## 2022-08-29 NOTE — PROGRESS NOTES
History of Present Illness     Patient Identification  Arabella Ayala is a 63 y.o. female. Patient information was obtained from patient. Chief Complaint   Chief Complaint   Patient presents with    Back Pain       Patient presents with complaint of back pain left leg pain with h/o CVA has weakness and loss of Bladder controll difficult to say if this is from weakness to run to toilet. Had a fusion in Neck 2017, This is a result of no known injury. Onset of pain was 4 years ago and has been gradually worsening since. The pain is located in left lower back, described as aching and dull and rated as mild and moderate, with radiation, to the right foot. Symptoms include weakness. The patient denies numbness, incontinence, dysuria, hematuria. The patient denies other injuries. Care prior to arrival consisted of PT 2 years ago  with no relief. Past Medical History:   Diagnosis Date    Cerebrovascular disease     Stroke 2/2017 and 4/2017    Neuropathy     Type 2 diabetes mellitus without complication ([facility] [address]      History reviewed. No pertinent family history. Current Outpatient Medications   Medication Sig Dispense Refill    acetaminophen (TYLENOL) 500 MG tablet Take 1,500 mg by mouth daily as needed      albuterol sulfate HFA (PROVENTIL;VENTOLIN;PROAIR) 108 (90 Base) MCG/ACT inhaler       baclofen (LIORESAL) 10 MG tablet       Biotin 10 MG tablet Take 1 tablet by mouth      clopidogrel (PLAVIX) 75 MG tablet Take 75 mg by mouth in the morning. Continuous Blood Gluc Sensor (DEXCOM G6 SENSOR) MISC       Continuous Blood Gluc Transmit (DEXCOM G6 TRANSMITTER) MISC       cyanocobalamin 1000 MCG tablet Take 1,000 mcg by mouth in the morning. doxepin (SINEQUAN) 10 MG capsule TAKE 1 CAPSULE BY MOUTH AT BEDTIME FOR 30 DAYS      REPATHA SURECLICK 502 MG/ML SOAJ Inject 140 mg subcutaneously every 2 weeks.  called the ins she can only get so many pens in an allocted time she is over the limit--next available fill is 4/7      Evolocumab 140 MG/ML SOAJ Inject 140 mg into the skin      ibuprofen (ADVIL;MOTRIN) 200 MG tablet Take 200 mg by mouth every 6 hours as needed      insulin regular human (HUMULIN R U-500 KWIKPEN) 500 UNIT/ML SOPN concentrated injection pen Inject 55 units with breakfast and 45 units with dinner (max 100 units/day)      olmesartan-amLODIPine-HCTZ 40-5-12.5 MG TABS       OZEMPIC, 2 MG/DOSE, 8 MG/3ML SOPN       pregabalin (LYRICA) 150 MG capsule Take by mouth. No current facility-administered medications for this visit. Allergies   Allergen Reactions    Meperidine Shortness Of Breath     nausea      2,4-D Dimethylamine (Amisol)     Atorvastatin Hives and Other (See Comments)     fatigue  Patient takes Pravastatin at home      Benazepril Other (See Comments)    Clonidine Other (See Comments)    Dapagliflozin Other (See Comments)     palpatations    Egg White     Ezetimibe      Other reaction(s): GI Upset    Meperidine Hcl      Other reaction(s): Vomiting    Propoxyphene Hives    Canagliflozin Rash     yeast infection    Morphine Nausea And Vomiting and Hives     Nausea. Patient takes Vicodin at home with no problem         Review of Systems  Review of Systems   Constitutional: Negative. HENT: Negative. Eyes: Negative. Respiratory: Negative. Asthama   Cardiovascular: Negative. High  blood pressure   Gastrointestinal: Negative. Endocrine: Negative. Genitourinary: Negative. Musculoskeletal: Negative. Skin: Negative. Allergic/Immunologic: Negative. Neurological: Negative. 3 TIAS and Left hemiparesis. Hematological: Negative. Psychiatric/Behavioral: Negative. All other systems reviewed and are negative. Physical Exam     /68   Pulse (!) 103   Temp 97.6 °F (36.4 °C)   Ht 5' 2\" (1.575 m)   Wt 190 lb (86.2 kg)   SpO2 97%   BMI 34.75 kg/m²   Physical Exam  Vitals and nursing note reviewed.    Constitutional:       Appearance: Normal appearance. HENT:      Head: Normocephalic. Right Ear: Ear canal normal.      Left Ear: Ear canal normal.      Nose: Nose normal.      Mouth/Throat:      Mouth: Mucous membranes are moist.   Eyes:      Extraocular Movements: Extraocular movements intact. Conjunctiva/sclera: Conjunctivae normal.      Pupils: Pupils are equal, round, and reactive to light. Cardiovascular:      Rate and Rhythm: Normal rate and regular rhythm. Pulses: Normal pulses. Heart sounds: Normal heart sounds. Pulmonary:      Effort: Pulmonary effort is normal.      Breath sounds: Normal breath sounds. Abdominal:      General: Abdomen is flat. Bowel sounds are normal.      Palpations: Abdomen is soft. Musculoskeletal:         General: Normal range of motion. Cervical back: Normal range of motion and neck supple. Comments: Good Gait able to walk on Toes and heels, Tender facets ans severe tenderness on left SI Joint concordant with her pain, Mild pain on SLRs pain on Left gainslins,   Skin:     General: Skin is warm. Capillary Refill: Capillary refill takes less than 2 seconds. Neurological:      General: No focal deficit present. Mental Status: She is alert. Mental status is at baseline. She is oriented x 3   Psychiatric:         Mood and Affect: Mood normal.         Behavior: Behavior normal.         Thought Content: Thought content normal.         Judgment: Judgment normal.         Plan     Patient presents with complaint of back pain left leg pain with h/o CVA has weakness and loss of Bladder controll difficult to say if this is from weakness to run to toilet. Had a fusion in Neck 2017, This is a result of no known injury. Onset of pain was 4 years ago and has been gradually worsening since. The pain is located in left lower back, described as aching and dull and rated as mild and moderate, with radiation, to the right foot. Symptoms include weakness.   The patient denies numbness, incontinence, dysuria, hematuria. The patient denies other injuries. Care prior to arrival consisted of PT 2 years ago  with no relief. Plan on PT and Xrays show sclerosis of posterior elements of Lumbar spine and SI Joints. Had Covid last 2 weeks not able to Continue PT. Has schedule for tomorrow and Friday, Will plan on SI Joint injection.

## 2022-08-30 ENCOUNTER — HOSPITAL ENCOUNTER (OUTPATIENT)
Dept: PHYSICAL THERAPY | Age: 62
Setting detail: THERAPIES SERIES
Discharge: HOME | End: 2022-08-30
Payer: COMMERCIAL

## 2022-08-30 PROCEDURE — 97110 THERAPEUTIC EXERCISES: CPT

## 2022-08-30 PROCEDURE — G0283 ELEC STIM OTHER THAN WOUND: HCPCS

## 2022-08-30 ASSESSMENT — PAIN SCALES - GENERAL: PAINLEVEL_OUTOF10: 5

## 2022-08-30 ASSESSMENT — PAIN DESCRIPTION - DESCRIPTORS: DESCRIPTORS: SHARP;SHOOTING

## 2022-08-30 ASSESSMENT — PAIN DESCRIPTION - LOCATION: LOCATION: BACK

## 2022-08-30 ASSESSMENT — PAIN DESCRIPTION - ORIENTATION: ORIENTATION: RIGHT;LEFT

## 2022-08-30 NOTE — PROGRESS NOTES
[address]:[phone]  Treatment Note        Date: 2022  Patient: Phil Garcia  : 1960   Confirmed: Yes  MRN: [id number]  Referring Provider: Isma Aguilar MD  Medical Diagnosis: Hemiplegia, unspecified affecting left nondominant side [G81.94]  Sacroiliitis, not elsewhere classified [M46.1]  Spondylosis without myelopathy or radiculopathy, lumbosacral region [M47.817]    Treatment Diagnosis: LBP with functional strength and balance deficits. Visit Information:  Insurance: Payor: [employer] / Plan: BCBS - [place] PPO / Product Type: *No Product type* /   PT Visit Information  Onset Date: 22  Total # of Visits Approved:  (BMN)  Total # of Visits to Date: 3  No Show: 0  Canceled Appointment: 0  Progress Note Counter: 3/12    Subjective Information:  Subjective: Pt reports increased pain after last visit and has lasted through today. She wants to decrease to 1x/week because she feels she cannot handle twice a week. Wasn't able to do her exercises at home because she was in so much pain. HEP Compliance:  [x] Good [] Fair [] Poor [] Reports not doing due to:    Pain Screening  Patient Currently in Pain: Yes  Pain Assessment: 0-10  Pain Level: 5  Pain Location: Back  Pain Orientation: Right, Left  Pain Radiating Towards: L>R hip  Pain Descriptors: [place] Josselyn, Shooting    Treatment:  Exercises:  Exercises  Exercise 1: seated ex's: ham curl with RTB, hip abd with RTB, hip add with ball, TA iso, TA march, hip ext into small ball, TA LAQ x 10 ea (2 sets of 5 reps with march and LAQ)  Exercise 20: HEP: Bridges, prone hamstring curls, hip extension  Treatment Reasoning  Limitations addressed:  Mobility, Strength, Flexibility, Posture, Pain modulation  Functional ability(s) targeted: Bed mobility, Ambulating community distances      Modalities:  Electric stimulation, unattended (CPT L2770973) /  (Medicare)  Patient Position: Seated  E-stim location: Right, Left, Low back, Hip (3 pads on L LB/Hip, 1 on R)  E-stim type: Interferential (IFC)  Untimed (minutes): 10      *Indicates exercise, modality, or manual techniques to be initiated when appropriate    Strength: [x] NT  [] MMT completed:      ROM: [x] NT  [] ROM measurements:    Assessment: Body Structures, Functions, Activity Limitations Requiring Skilled Therapeutic Intervention: Decreased functional mobility , Decreased ADL status, Decreased ROM, Decreased balance, Decreased posture, Increased pain, Decreased strength, Decreased sensation  Assessment: Modified therex due to poor tolerance post first visit. Performed all ex's in seated position today with good tolerance overall. More challenged with ex's on L LE vs R LE. Applied E-stim to offer pain relief. Pt left clinic with less pain than when she arrived. Pt would like to keep her PT appointment with us on Thursday. Treatment Diagnosis: LBP with functional strength and balance deficits. Therapy Prognosis: Fair      Post-Pain Assessment:       Pain Rating (0-10 pain scale):   \"definitely better\"/10   Location and pain description same as pre-treatment unless indicated. Action: [] NA   [] Perform HEP  [x] Meds as prescribed  [x] Modalities as prescribed   [] Call Physician     GOALS   Patient Goal(s): Patient goals : Decrease pain and improve function    Short Term Goals Completed by 3 weeks Goal Status   STG 1 The pt will demonstrate improved postural awareness requiring <25% VC's with exercises In progress   STG 2 Decrease Lumbar pain 50% to assist with improved functional gains. In progress       Long Term Goals Completed by 6 weeks Goal Status   LTG 1 Indep HEP for symptom management In progress   LTG 2 Pt demo improved overall function by reporting greater than 70% per functional survey score In progress   LTG 3 Pain-free Lumbar AROM to WNL allowing an increase in ADL tolerance.  In progress   LTG 4 Improve B LE strength 4-/5 to allow patient to improve stair climbing and bed mobility with less UE assist. In progress   LTG 5 Pt demo improved overall balance by improving greater than 5-10 points on Smith score In progress       Plan:  Frequency/Duration:  Plan  Plan Frequency: 2  Plan weeks: 4-6  Current Treatment Recommendations: Strengthening, ROM, Balance training, Functional mobility training, Transfer training, Manual Therapy - Joint Manipulation, Manual Therapy - Soft Tissue Mobilization, Neuromuscular re-education, Stair training, Gait training, Modalities, Home exercise program  Pt to continue current HEP. See objective section for any therapeutic exercise changes, additions or modifications this date.     Therapy Time:  PT Individual Minutes  Time In: 1500  Time Out: 0937  Minutes: 45  Timed Code Treatment Minutes: 30 Minutes  Procedure Minutes: E-stim x 10'   Timed Activity Minutes Units   Ther Ex 30 2     Electronically signed by Karan Mckeon PTA on 8/30/22 at 3:22 PM EDT

## 2022-09-01 ENCOUNTER — APPOINTMENT (OUTPATIENT)
Dept: PHYSICAL THERAPY | Age: 62
End: 2022-09-01
Payer: COMMERCIAL

## 2022-09-02 ENCOUNTER — TELEPHONE (OUTPATIENT)
Dept: PAIN MANAGEMENT | Age: 62
End: 2022-09-02

## 2022-09-02 NOTE — TELEPHONE ENCOUNTER
BENEFITS: BILATERAL SI JOINT INJ      Insurance: [place]  Phone: [phone]  Contact Name: Atif Merchant  Effective Date: 10/1/2021     Plan year: YES-CALENDAR  Deductible: 569.00      Deductible Met: 569.00  Allowed/benefits paid at: 80% AFTER DEDUCTIBLE  OOP: 2284.00 MET $1178.01  Freq Limits: 27096--BASED ON MEDICAL NECESSITY  Prior Auth Requirement: NO AUTH REQUIRED    Notes: NO PRE-EX CLAUSE    Call Reference #: [id number]    Time of call: 2:00PM

## 2022-09-02 NOTE — TELEPHONE ENCOUNTER
BENEFITS: BILATERAL SI JOINT INJ    Insurance: [place]  Phone: [phone]  Contact Name: Avery Mullen  Effective Date: 10.1.2021     Plan year: YES-CALENDAR  Deductible: 579.00      Deductible Met: 579.00  Allowed/benefits paid at: 80% AFTER DEDUCTIBLE  OOP: 2325.00 MET $1178.01  Freq Limits: 27096--BASED ON MEDICAL NECESSITY  Prior Auth Requirement: NO AUTH REQUIRED    Notes: NO PRE-EX CLAUSE    Call Reference #: [id number]    Time of call: 11:05AM

## 2022-09-07 ENCOUNTER — HOSPITAL ENCOUNTER (OUTPATIENT)
Dept: PHYSICAL THERAPY | Age: 62
Setting detail: THERAPIES SERIES
Discharge: HOME | End: 2022-09-07
Payer: COMMERCIAL

## 2022-09-07 PROCEDURE — G0283 ELEC STIM OTHER THAN WOUND: HCPCS

## 2022-09-07 PROCEDURE — 97110 THERAPEUTIC EXERCISES: CPT

## 2022-09-07 ASSESSMENT — PAIN SCALES - GENERAL: PAINLEVEL_OUTOF10: 5

## 2022-09-07 ASSESSMENT — PAIN DESCRIPTION - ORIENTATION: ORIENTATION: RIGHT;LEFT;LOWER

## 2022-09-07 ASSESSMENT — PAIN DESCRIPTION - DESCRIPTORS: DESCRIPTORS: SHARP;SHOOTING

## 2022-09-07 ASSESSMENT — PAIN DESCRIPTION - LOCATION: LOCATION: BACK

## 2022-09-07 NOTE — PROGRESS NOTES
[address]  WUYFL:[phone]  Treatment Note        Date: 2022  Patient: Clive Hewitt  : 1960   Confirmed: Yes  MRN: [id number]  Referring Provider: Mable Razo MD  Medical Diagnosis: Hemiplegia, unspecified affecting left nondominant side [G81.94]  Sacroiliitis, not elsewhere classified [M46.1]  Spondylosis without myelopathy or radiculopathy, lumbosacral region [M47.817]    Treatment Diagnosis: LBP with functional strength and balance deficits. Visit Information:  Insurance: Payor: [employer] / Plan: BCBS - [place] PPO / Product Type: *No Product type* /   PT Visit Information  Onset Date: 22  PT Insurance Information: BCBS  Total # of Visits Approved:  (BMN)  Total # of Visits to Date: 4  No Show: 0  Canceled Appointment: 0  Progress Note Counter:     Subjective Information:  Subjective: Pt reports increased shooting pains down both legs for two days after the last therapy visit even with modifying the exercises. Notes swelling in both feet/ankles. Getting a lumbar injection tomorrow. HEP Compliance:  [x] Good [] Fair [] Poor [] Reports not doing due to:    Pain Screening  Patient Currently in Pain: Yes  Pain Assessment: 0-10  Pain Level: 5  Pain Location: Back  Pain Orientation: Right, Left, Lower  Pain Descriptors: Sharp, Shooting    Treatment:  Exercises:  Exercises  Exercise 1: seated ex's: ham curl with RTB, hip abd with RTB, hip add with ball, TA march, hip ext into small ball, TA LAQ x 10 ea (2 sets of 5 reps with march and LAQ)  Exercise 20: HEP: Bridges, prone hamstring curls, hip extension  Treatment Reasoning  Limitations addressed:  Mobility, Strength, Flexibility, Posture, Pain modulation  Functional ability(s) targeted: Bed mobility, Ambulating community distances    Modalities:  Electric stimulation, unattended (CPT 22 [id number]) /  (Medicare)  Patient Position: Seated  E-stim location: Right, Left, Low back, Hip (3 pads on L LB/Hip, 1 on R)  E-stim type: Interferential (IFC)  Untimed (minutes): 10      *Indicates exercise, modality, or manual techniques to be initiated when appropriate    Strength: [x] NT  [] MMT completed:      ROM: [x] NT  [] ROM measurements:      Assessment: Body Structures, Functions, Activity Limitations Requiring Skilled Therapeutic Intervention: Decreased functional mobility , Decreased ADL status, Decreased ROM, Decreased balance, Decreased posture, Increased pain, Decreased strength, Decreased sensation  Assessment: Performed all ex's in seated position with good tolerance overall. More challenged with ex's on L LE vs R LE. Applied E-stim to offer pain relief. Treatment Diagnosis: LBP with functional strength and balance deficits. Therapy Prognosis: Fair      Post-Pain Assessment:       Pain Rating (0-10 pain scale):   3/10   Location and pain description same as pre-treatment unless indicated. Action: [] NA   [x] Perform HEP  [x] Meds as prescribed  [x] Modalities as prescribed   [] Call Physician     GOALS   Patient Goal(s): Patient goals : Decrease pain and improve function    Short Term Goals Completed by 3 weeks Goal Status   STG 1 The pt will demonstrate improved postural awareness requiring <25% VC's with exercises In progress   STG 2 Decrease Lumbar pain 50% to assist with improved functional gains. In progress       Long Term Goals Completed by 6 weeks Goal Status   LTG 1 Indep HEP for symptom management In progress   LTG 2 Pt demo improved overall function by reporting greater than 70% per functional survey score In progress   LTG 3 Pain-free Lumbar AROM to WNL allowing an increase in ADL tolerance.  In progress   LTG 4 Improve B LE strength 4-/5 to allow patient to improve stair climbing and bed mobility with less UE assist. In progress   LTG 5 Pt demo improved overall balance by improving greater than 5-10 points on Smith score In progress Plan:  Frequency/Duration:  Plan  Plan Frequency: 2  Plan weeks: 4-6  Current Treatment Recommendations: Strengthening, ROM, Balance training, Functional mobility training, Transfer training, Manual Therapy - Joint Manipulation, Manual Therapy - Soft Tissue Mobilization, Neuromuscular re-education, Stair training, Gait training, Modalities, Home exercise program  Plan Comment: Pt requests to decrease to 1x/wk due to poor tolreance to 2x/wk  Pt to continue current HEP. See objective section for any therapeutic exercise changes, additions or modifications this date.     Therapy Time:  PT Individual Minutes  Time In: 4948  Time Out: 8704  Minutes: 37  Timed Code Treatment Minutes: 24 Minutes  Procedure Minutes: E-stim x 10'   Timed Activity Minutes Units   Ther Ex 24 2     Electronically signed by Amena Barrios PTA on 9/7/22 at 3:30 PM EDT

## 2022-09-08 ENCOUNTER — PROCEDURE VISIT (OUTPATIENT)
Dept: PAIN MANAGEMENT | Age: 62
End: 2022-09-08
Payer: COMMERCIAL

## 2022-09-08 DIAGNOSIS — M46.1 SACROILIITIS, NOT ELSEWHERE CLASSIFIED (HCC): ICD-10-CM

## 2022-09-08 PROCEDURE — 27096 INJECT SACROILIAC JOINT: CPT | Performed by: PAIN MEDICINE

## 2022-09-08 RX ORDER — LIDOCAINE HYDROCHLORIDE 10 MG/ML
10 INJECTION, SOLUTION EPIDURAL; INFILTRATION; INTRACAUDAL; PERINEURAL ONCE
Status: COMPLETED | OUTPATIENT
Start: 2022-09-08 | End: 2022-09-08

## 2022-09-08 RX ORDER — BUPIVACAINE HYDROCHLORIDE 5 MG/ML
30 INJECTION, SOLUTION EPIDURAL; INTRACAUDAL ONCE
Status: COMPLETED | OUTPATIENT
Start: 2022-09-08 | End: 2022-09-08

## 2022-09-08 RX ORDER — TRIAMCINOLONE ACETONIDE 40 MG/ML
40 INJECTION, SUSPENSION INTRA-ARTICULAR; INTRAMUSCULAR ONCE
Status: COMPLETED | OUTPATIENT
Start: 2022-09-08 | End: 2022-09-08

## 2022-09-08 RX ADMIN — BUPIVACAINE HYDROCHLORIDE 150 MG: 5 INJECTION, SOLUTION EPIDURAL; INTRACAUDAL at 14:39

## 2022-09-08 RX ADMIN — LIDOCAINE HYDROCHLORIDE 10 MG: 10 INJECTION, SOLUTION EPIDURAL; INFILTRATION; INTRACAUDAL; PERINEURAL at 14:39

## 2022-09-08 RX ADMIN — TRIAMCINOLONE ACETONIDE 40 MG: 40 INJECTION, SUSPENSION INTRA-ARTICULAR; INTRAMUSCULAR at 14:38

## 2022-09-08 NOTE — PROGRESS NOTES
History of Present Illness     Patient Identification  Heaven Love is a 63 y.o. female. Patient information was obtained from patient. Chief Complaint   Chief Complaint   Patient presents with    Back Pain       Patient presents with complaint of back pain left leg pain with h/o CVA has weakness and loss of Bladder controll difficult to say if this is from weakness to run to toilet. Here for bilateral SI Joint injections. Had a fusion in Neck 2017, This is a result of no known injury. Onset of pain was 4 years ago and has been gradually worsening since. The pain is located in left lower back, described as aching and dull and rated as mild and moderate, with radiation, to the right foot. Symptoms include weakness. The patient denies numbness, incontinence, dysuria, hematuria. The patient denies other injuries. Care prior to arrival consisted of PT 2 years ago  with no relief. Past Medical History:   Diagnosis Date    Cerebrovascular disease     Stroke 2/2017 and 4/2017    Neuropathy     Type 2 diabetes mellitus without complication ([facility] [address]      History reviewed. No pertinent family history. Current Outpatient Medications   Medication Sig Dispense Refill    acetaminophen (TYLENOL) 500 MG tablet Take 1,500 mg by mouth daily as needed      albuterol sulfate HFA (PROVENTIL;VENTOLIN;PROAIR) 108 (90 Base) MCG/ACT inhaler       baclofen (LIORESAL) 10 MG tablet       Biotin 10 MG tablet Take 1 tablet by mouth      clopidogrel (PLAVIX) 75 MG tablet Take 75 mg by mouth in the morning. Continuous Blood Gluc Sensor (DEXCOM G6 SENSOR) MISC       Continuous Blood Gluc Transmit (DEXCOM G6 TRANSMITTER) MISC       cyanocobalamin 1000 MCG tablet Take 1,000 mcg by mouth in the morning. doxepin (SINEQUAN) 10 MG capsule TAKE 1 CAPSULE BY MOUTH AT BEDTIME FOR 30 DAYS      REPATHA SURECLICK 582 MG/ML SOAJ Inject 140 mg subcutaneously every 2 weeks.  called the ins she can only get so many pens in an allocted time she is over the limit--next available fill is 4/7      Evolocumab 140 MG/ML SOAJ Inject 140 mg into the skin      ibuprofen (ADVIL;MOTRIN) 200 MG tablet Take 200 mg by mouth every 6 hours as needed      insulin regular human (HUMULIN R U-500 KWIKPEN) 500 UNIT/ML SOPN concentrated injection pen Inject 55 units with breakfast and 45 units with dinner (max 100 units/day)      olmesartan-amLODIPine-HCTZ 40-5-12.5 MG TABS       OZEMPIC, 2 MG/DOSE, 8 MG/3ML SOPN       pregabalin (LYRICA) 150 MG capsule Take by mouth. No current facility-administered medications for this visit. Allergies   Allergen Reactions    Meperidine Shortness Of Breath     nausea      2,4-D Dimethylamine (Amisol)     Atorvastatin Hives and Other (See Comments)     fatigue  Patient takes Pravastatin at home      Benazepril Other (See Comments)    Clonidine Other (See Comments)    Dapagliflozin Other (See Comments)     palpatations    Egg White     Ezetimibe      Other reaction(s): GI Upset    Meperidine Hcl      Other reaction(s): Vomiting    Propoxyphene Hives    Canagliflozin Rash     yeast infection    Morphine Nausea And Vomiting and Hives     Nausea. Patient takes Vicodin at home with no problem         Review of Systems  Review of Systems   Constitutional: Negative. HENT: Negative. Eyes: Negative. Respiratory: Negative. Asthama   Cardiovascular: Negative. High  blood pressure   Gastrointestinal: Negative. Endocrine: Negative. Genitourinary: Negative. Musculoskeletal: Negative. Skin: Negative. Allergic/Immunologic: Negative. Neurological: Negative. 3 TIAS and Left hemiparesis. Hematological: Negative. Psychiatric/Behavioral: Negative. All other systems reviewed and are negative.      Physical Exam     /68   Pulse (!) 103   Temp 97.6 °F (36.4 °C)   Ht 5' 2\" (1.575 m)   Wt 190 lb (86.2 kg)   SpO2 97%   BMI 34.75 kg/m²   Physical Exam  Vitals and nursing note reviewed. Constitutional:       Appearance: Normal appearance. HENT:      Head: Normocephalic. Right Ear: Ear canal normal.      Left Ear: Ear canal normal.      Nose: Nose normal.      Mouth/Throat:      Mouth: Mucous membranes are moist.   Eyes:      Extraocular Movements: Extraocular movements intact. Conjunctiva/sclera: Conjunctivae normal.      Pupils: Pupils are equal, round, and reactive to light. Cardiovascular:      Rate and Rhythm: Normal rate and regular rhythm. Pulses: Normal pulses. Heart sounds: Normal heart sounds. Pulmonary:      Effort: Pulmonary effort is normal.      Breath sounds: Normal breath sounds. Abdominal:      General: Abdomen is flat. Bowel sounds are normal.      Palpations: Abdomen is soft. Musculoskeletal:         General: Normal range of motion. Cervical back: Normal range of motion and neck supple. Comments: Good Gait able to walk on Toes and heels, Tender facets ans severe tenderness on left SI Joint concordant with her pain, Mild pain on SLRs pain on Left gainslins,   Skin:     General: Skin is warm. Capillary Refill: Capillary refill takes less than 2 seconds. Neurological:      General: No focal deficit present. Mental Status: She is alert. Mental status is at baseline. She is oriented x 3   Psychiatric:         Mood and Affect: Mood normal.         Behavior: Behavior normal.         Thought Content: Thought content normal.         Judgment: Judgment normal.         Plan     Bilateral Sacroiliac Joint injections: Discussed Sacroiliac joint injection procedure, risks and complications, Questions and concerns addressed, informed consent obtained.  Prone pojistion on Edita table, Skin sites marked for needle placement using Fluroscopy, Skin prepped with Betadine, anesthetised each side with 1cc 1% Lidocaine, 23 G styletted needle advanced into inferior aspect of SI Joints both sides, Injected 1cc 0.5% Bupivacaine and 20mg Kenalog

## 2022-09-14 ENCOUNTER — HOSPITAL ENCOUNTER (OUTPATIENT)
Dept: PHYSICAL THERAPY | Age: 62
Setting detail: THERAPIES SERIES
Discharge: HOME | End: 2022-09-14
Payer: COMMERCIAL

## 2022-09-14 PROCEDURE — 97110 THERAPEUTIC EXERCISES: CPT

## 2022-09-14 PROCEDURE — G0283 ELEC STIM OTHER THAN WOUND: HCPCS

## 2022-09-14 ASSESSMENT — PAIN SCALES - GENERAL: PAINLEVEL_OUTOF10: 2

## 2022-09-14 ASSESSMENT — PAIN DESCRIPTION - DESCRIPTORS: DESCRIPTORS: ACHING;SHARP;SHOOTING

## 2022-09-14 ASSESSMENT — PAIN DESCRIPTION - LOCATION: LOCATION: BACK

## 2022-09-14 ASSESSMENT — PAIN DESCRIPTION - ORIENTATION: ORIENTATION: RIGHT;LEFT;LOWER

## 2022-09-14 NOTE — PROGRESS NOTES
[address] [facility], [facility] [address]  EFSRN:[id number]  Treatment Note        Date: 2022  Patient: Gustavo Clarke  : 1960   Confirmed: Yes  MRN: [id number]  Referring Provider: Everardo Rogers MD  Medical Diagnosis: Hemiplegia, unspecified affecting left nondominant side [G81.94]  Sacroiliitis, not elsewhere classified [M46.1]  Spondylosis without myelopathy or radiculopathy, lumbosacral region [M47.817]   Treatment Diagnosis: LBP with functional strength and balance deficits. Visit Information:  Insurance: Payor: Rod Baca / Plan: BCBS - [place] PPO / Product Type: *No Product type* /   PT Visit Information  Onset Date: 22  PT Insurance Information: BCBS  Total # of Visits Approved:  (BMN)  Total # of Visits to Date: 5  No Show: 0  Canceled Appointment: 0  Progress Note Counter:     Subjective Information:  Subjective: Pt states that her pain is much more tolerable this week since recent lumbar injections. Was able to tolerate sitting through [facility]. Still deals with the neuropathy but knows she will always have that. HEP Compliance:  [x] Good [] Fair [] Poor [] Reports not doing due to:    Pain Screening  Patient Currently in Pain: Yes  Pain Assessment: 0-10  Pain Level: 2  Pain Location: Back  Pain Orientation: Right, Left, Lower  Pain Descriptors: Aching, Sharp, Shooting    Treatment:  Exercises:  Exercises  Exercise 1: seated ex's: ham curl with RTB, hip abd with RTB, hip add with ball, TA march R x 10 / L x 7, hip ext into small ball, TA LAQ x 15 ea (2 sets of 5 reps with march and LAQ)  Exercise 2: seated TA shl ext/rows with RTB x 10 ea  Exercise 20: HEP: Bridges, prone hamstring curls, hip extension  Treatment Reasoning  Limitations addressed:  Mobility, Strength, Flexibility, Posture, Pain modulation  Functional ability(s) targeted: Bed mobility, Ambulating community distances    Modalities:  Electric stimulation, unattended (CPT [id number]) /  (Medicare)  Patient Position: Seated  E-stim location: Right, Left, Low back, Hip (3 pads on L LB/Hip, 1 on R)  E-stim type: Interferential (IFC)  Untimed (minutes): 10      *Indicates exercise, modality, or manual techniques to be initiated when appropriate      Strength: [x] NT  [] MMT completed:      ROM: [x] NT  [] ROM measurements:      Assessment: Body Structures, Functions, Activity Limitations Requiring Skilled Therapeutic Intervention: Decreased functional mobility , Decreased ADL status, Decreased ROM, Decreased balance, Decreased posture, Increased pain, Decreased strength, Decreased sensation  Assessment: Pt performed all ex's in seated position with good tolerance overall. More challenged with ex's on L LE vs R LE. Added seated tband rows/ext this visit. Treatment Diagnosis: LBP with functional strength and balance deficits. Therapy Prognosis: Fair      Post-Pain Assessment:       Pain Rating (0-10 pain scale):   1/10   Location and pain description same as pre-treatment unless indicated. Action: [x] NA   [] Perform HEP  [] Meds as prescribed  [] Modalities as prescribed   [] Call Physician     GOALS   Patient Goal(s): Patient goals : Decrease pain and improve function    Short Term Goals Completed by 3 weeks Goal Status   STG 1 The pt will demonstrate improved postural awareness requiring <25% VC's with exercises In progress   STG 2 Decrease Lumbar pain 50% to assist with improved functional gains. In progress       Long Term Goals Completed by 6 weeks Goal Status   LTG 1 Indep HEP for symptom management In progress   LTG 2 Pt demo improved overall function by reporting greater than 70% per functional survey score In progress   LTG 3 Pain-free Lumbar AROM to WNL allowing an increase in ADL tolerance.  In progress   LTG 4 Improve B LE strength 4-/5 to allow patient to improve stair climbing and bed mobility with less UE assist. In progress   LTG 5 Pt demo improved overall balance by improving greater than 5-10 points on Smith score In progress       Plan:  Frequency/Duration:  Plan  Plan Frequency: 2  Plan weeks: 4-6  Current Treatment Recommendations: Strengthening, ROM, Balance training, Functional mobility training, Transfer training, Manual Therapy - Joint Manipulation, Manual Therapy - Soft Tissue Mobilization, Neuromuscular re-education, Stair training, Gait training, Modalities, Home exercise program  Plan Comment: Pt requests to decrease to 1x/wk due to poor tolerance to 2x/wk  Pt to continue current HEP. See objective section for any therapeutic exercise changes, additions or modifications this date.     Therapy Time:  PT Individual Minutes  Time In: 4613  Time Out: 2397  Minutes: 42  Timed Code Treatment Minutes: 32 Minutes  Procedure Minutes: E-stim x 10'   Timed Activity Minutes Units   Ther Ex 32 2     Electronically signed by Jesi Plascencia PTA on 9/14/22 at 3:07 PM EDT

## 2022-09-21 ENCOUNTER — HOSPITAL ENCOUNTER (OUTPATIENT)
Dept: PHYSICAL THERAPY | Age: 62
Setting detail: THERAPIES SERIES
Discharge: HOME | End: 2022-09-21
Payer: COMMERCIAL

## 2022-09-21 PROCEDURE — G0283 ELEC STIM OTHER THAN WOUND: HCPCS

## 2022-09-21 PROCEDURE — 97110 THERAPEUTIC EXERCISES: CPT

## 2022-09-21 ASSESSMENT — PAIN DESCRIPTION - ORIENTATION: ORIENTATION: RIGHT;LEFT;LOWER

## 2022-09-21 ASSESSMENT — PAIN DESCRIPTION - LOCATION: LOCATION: BACK

## 2022-09-21 ASSESSMENT — PAIN DESCRIPTION - DESCRIPTORS: DESCRIPTORS: SHARP;SHOOTING

## 2022-09-21 ASSESSMENT — PAIN SCALES - GENERAL: PAINLEVEL_OUTOF10: 4

## 2022-09-21 NOTE — PROGRESS NOTES
[address]  SRRKF:[phone]  Treatment Note        Date: 2022  [facility]  : 1960   Confirmed: Yes  MRN: [id number]  Referring Provider: Mable Razo MD  Medical Diagnosis: Hemiplegia, unspecified affecting left nondominant side [G81.94]  Sacroiliitis, not elsewhere classified [M46.1]  Spondylosis without myelopathy or radiculopathy, lumbosacral region [M47.817]   Treatment Diagnosis: LBP with functional strength and balance deficits. Visit Information:  Insurance: Payor: Correlsenselalo Bhandari / Plan: BCBS - [place] PPO / Product Type: *No Product type* /   PT Visit Information  Onset Date: 22  PT Insurance Information: BCBS  Total # of Visits Approved:  (BMN)  Total # of Visits to Date: 6  No Show: 0  Canceled Appointment: 0  Progress Note Counter:     Subjective Information:  Subjective: Pt was a little more sore after the last visit but not too bad. The neuropathy in her feet is worse than her back right now. No longer gettting the constant ache. It's now an occasional sharp, shooting electical shock pain. HEP Compliance:  [x] Good [] Fair [] Poor [] Reports not doing due to:    Pain Screening  Patient Currently in Pain: Yes  Pain Assessment: 0-10  Pain Level: 4  Pain Location: Back  Pain Orientation: Right, Left, Lower  Pain Descriptors: Sharp, Shooting (occasional sharp shoot electrical pain)    Treatment:  Exercises:  Exercises  Exercise 1: seated ex's: ham curl with RTB, hip abd with RTB, hip add with ball, TA march R x 10 / L x 7, hip ext into small ball, TA LAQ x 15 ea  Exercise 2: seated TA shl ext/rows with RTB x 10 ea  Exercise 20: HEP: Bridges, prone hamstring curls, hip extension  Treatment Reasoning  Limitations addressed:  Mobility, Strength, Flexibility, Posture, Pain modulation  Functional ability(s) targeted: Bed mobility, Ambulating community distances      Modalities:  Electric stimulation, unattended (CPT R1497833) /  (Medicare)  Patient Position: Seated  E-stim location: Right, Left, Low back, Hip (3 pads on L LB/Hip, 1 on R)  E-stim type: Interferential (IFC)  Untimed (minutes): 10      *Indicates exercise, modality, or manual techniques to be initiated when appropriate      Strength: [x] NT  [] MMT completed:      ROM: [x] NT  [] ROM measurements:      Assessment: Body Structures, Functions, Activity Limitations Requiring Skilled Therapeutic Intervention: Decreased functional mobility , Decreased ADL status, Decreased ROM, Decreased balance, Decreased posture, Increased pain, Decreased strength, Decreased sensation  Assessment: Pt performed all ex's in seated position with good tolerance overall. More challenged with ex's on L LE vs R LE. Added seated tband rows/ext this visit. Treatment Diagnosis: LBP with functional strength and balance deficits. Therapy Prognosis: Fair      Post-Pain Assessment:       Pain Rating (0-10 pain scale):   2/10   Location and pain description same as pre-treatment unless indicated. Action: [x] NA   [] Perform HEP  [] Meds as prescribed  [] Modalities as prescribed   [] Call Physician     GOALS   Patient Goal(s): Patient goals : Decrease pain and improve function    Short Term Goals Completed by 3 weeks Goal Status   STG 1 The pt will demonstrate improved postural awareness requiring <25% VC's with exercises In progress   STG 2 Decrease Lumbar pain 50% to assist with improved functional gains. In progress       Long Term Goals Completed by 6 weeks Goal Status   LTG 1 Indep HEP for symptom management In progress   LTG 2 Pt demo improved overall function by reporting greater than 70% per functional survey score In progress   LTG 3 Pain-free Lumbar AROM to WNL allowing an increase in ADL tolerance.  In progress   LTG 4 Improve B LE strength 4-/5 to allow patient to improve stair climbing and bed mobility with less UE assist. In progress   LTG 5 Pt demo improved overall balance by improving greater than 5-10 points on Smith score In progress       Plan:  Frequency/Duration:  Plan  Plan Frequency: 2  Plan weeks: 4-6  Current Treatment Recommendations: Strengthening, ROM, Balance training, Functional mobility training, Transfer training, Manual Therapy - Joint Manipulation, Manual Therapy - Soft Tissue Mobilization, Neuromuscular re-education, Stair training, Gait training, Modalities, Home exercise program  Plan Comment: Pt requests to decrease to 1x/wk due to poor tolerance to 2x/wk  Pt to continue current HEP. See objective section for any therapeutic exercise changes, additions or modifications this date.     Therapy Time:  PT Individual Minutes  Time In: 0071  Time Out: 2235  Minutes: 50  Timed Code Treatment Minutes: 38 Minutes  Procedure Minutes: E-stim x 10'   Timed Activity Minutes Units   Ther Ex 38 3     Electronically signed by Ruben Boyd PTA on 9/21/22 at 3:16 PM EDT

## 2022-09-26 ENCOUNTER — OFFICE VISIT (OUTPATIENT)
Dept: PAIN MANAGEMENT | Age: 62
End: 2022-09-26
Payer: COMMERCIAL

## 2022-09-26 VITALS
WEIGHT: 185 LBS | DIASTOLIC BLOOD PRESSURE: 70 MMHG | BODY MASS INDEX: 34.04 KG/M2 | SYSTOLIC BLOOD PRESSURE: 118 MMHG | TEMPERATURE: 98.1 F | HEIGHT: 62 IN

## 2022-09-26 DIAGNOSIS — M47.817 LUMBOSACRAL SPONDYLOSIS WITHOUT MYELOPATHY: ICD-10-CM

## 2022-09-26 DIAGNOSIS — M46.1 SACROILIITIS, NOT ELSEWHERE CLASSIFIED (HCC): Primary | ICD-10-CM

## 2022-09-26 DIAGNOSIS — G81.94 LEFT HEMIPARESIS (HCC): ICD-10-CM

## 2022-09-26 PROCEDURE — 99213 OFFICE O/P EST LOW 20 MIN: CPT | Performed by: PAIN MEDICINE

## 2022-09-26 RX ORDER — PREGABALIN 150 MG/1
150 CAPSULE ORAL 2 TIMES DAILY
Qty: 60 CAPSULE | Refills: 3 | Status: SHIPPED | OUTPATIENT
Start: 2022-09-26 | End: 2022-11-02

## 2022-09-26 NOTE — PROGRESS NOTES
History of Present Illness     Patient Identification  David Calix is a 63 y.o. female. Patient information was obtained from patient. Chief Complaint   Chief Complaint   Patient presents with    Back Pain       Patient presents with complaint of back pain left leg pain with h/o CVA has weakness and loss of Bladder controll difficult to say if this is from weakness to run to toilet. Had bilateral SI Joint injections has \" A Lot Of Relief\" 70% relief, has shooting pain going to her Toes. .Had a fusion in Neck 2017, This is a result of no known injury. Onset of pain was 4 years ago and has been gradually worsening since. The pain is located in left lower back, described as aching and dull and rated as mild and moderate, with radiation, to the right foot. Symptoms include weakness. The patient denies numbness, incontinence, dysuria, hematuria. The patient denies other injuries. Care prior to arrival consisted of PT 2 years ago  with no relief. Past Medical History:   Diagnosis Date    Cerebrovascular disease     Stroke 2/2017 and 4/2017    Neuropathy     Type 2 diabetes mellitus without complication ([facility] [address]      History reviewed. No pertinent family history. Current Outpatient Medications   Medication Sig Dispense Refill    acetaminophen (TYLENOL) 500 MG tablet Take 1,500 mg by mouth daily as needed      albuterol sulfate HFA (PROVENTIL;VENTOLIN;PROAIR) 108 (90 Base) MCG/ACT inhaler       baclofen (LIORESAL) 10 MG tablet       Biotin 10 MG tablet Take 1 tablet by mouth      clopidogrel (PLAVIX) 75 MG tablet Take 75 mg by mouth in the morning. Continuous Blood Gluc Sensor (DEXCOM G6 SENSOR) MISC       Continuous Blood Gluc Transmit (DEXCOM G6 TRANSMITTER) MISC       cyanocobalamin 1000 MCG tablet Take 1,000 mcg by mouth in the morning.       doxepin (SINEQUAN) 10 MG capsule TAKE 1 CAPSULE BY MOUTH AT BEDTIME FOR 30 DAYS      REPATHA SURECLICK 278 MG/ML SOAJ Inject 140 mg subcutaneously every 2 weeks. called the ins she can only get so many pens in an allocted time she is over the limit--next available fill is 4/7      Evolocumab 140 MG/ML SOAJ Inject 140 mg into the skin      ibuprofen (ADVIL;MOTRIN) 200 MG tablet Take 200 mg by mouth every 6 hours as needed      insulin regular human (HUMULIN R U-500 KWIKPEN) 500 UNIT/ML SOPN concentrated injection pen Inject 55 units with breakfast and 45 units with dinner (max 100 units/day)      olmesartan-amLODIPine-HCTZ 40-5-12.5 MG TABS       OZEMPIC, 2 MG/DOSE, 8 MG/3ML SOPN       pregabalin (LYRICA) 150 MG capsule Take by mouth. No current facility-administered medications for this visit. Allergies   Allergen Reactions    Meperidine Shortness Of Breath     nausea      2,4-D Dimethylamine (Amisol)     Atorvastatin Hives and Other (See Comments)     fatigue  Patient takes Pravastatin at home      Benazepril Other (See Comments)    Clonidine Other (See Comments)    Dapagliflozin Other (See Comments)     palpatations    Egg White     Ezetimibe      Other reaction(s): GI Upset    Meperidine Hcl      Other reaction(s): Vomiting    Propoxyphene Hives    Canagliflozin Rash     yeast infection    Morphine Nausea And Vomiting and Hives     Nausea. Patient takes Vicodin at home with no problem         Review of Systems  Review of Systems   Constitutional: Negative. HENT: Negative. Eyes: Negative. Respiratory: Negative. Asthama   Cardiovascular: Negative. High  blood pressure   Gastrointestinal: Negative. Endocrine: Negative. Genitourinary: Negative. Musculoskeletal: Negative. Skin: Negative. Allergic/Immunologic: Negative. Neurological: Negative. 3 TIAS and Left hemiparesis. Hematological: Negative. Psychiatric/Behavioral: Negative. All other systems reviewed and are negative.      Physical Exam     /68   Pulse (!) 103   Temp 97.6 °F (36.4 °C)   Ht 5' 2\" (1.575 m)   Wt 190 lb (86.2 kg)   SpO2 97%   BMI 34.75 kg/m²   Physical Exam  Vitals and nursing note reviewed. Constitutional:       Appearance: Normal appearance. HENT:      Head: Normocephalic. Right Ear: Ear canal normal.      Left Ear: Ear canal normal.      Nose: Nose normal.      Mouth/Throat:      Mouth: Mucous membranes are moist.   Eyes:      Extraocular Movements: Extraocular movements intact. Conjunctiva/sclera: Conjunctivae normal.      Pupils: Pupils are equal, round, and reactive to light. Cardiovascular:      Rate and Rhythm: Normal rate and regular rhythm. Pulses: Normal pulses. Heart sounds: Normal heart sounds. Pulmonary:      Effort: Pulmonary effort is normal.      Breath sounds: Normal breath sounds. Abdominal:      General: Abdomen is flat. Bowel sounds are normal.      Palpations: Abdomen is soft. Musculoskeletal:         General: Normal range of motion. Cervical back: Normal range of motion and neck supple. Comments: Good Gait able to walk on Toes and heels, Tender facets and  moderate tenderness on bilateral SI Joints concordant with her pain, Severe pain on SLRs pain on Left gainslins,   Skin:     General: Skin is warm. Capillary Refill: Capillary refill takes less than 2 seconds. Neurological:      General: No focal deficit present. Mental Status: She is alert. Mental status is at baseline. She is oriented x 3   Psychiatric:         Mood and Affect: Mood normal.         Behavior: Behavior normal.         Thought Content: Thought content normal.         Judgment: Judgment normal.         Plan     Patient presents with complaint of back pain left leg pain with h/o CVA has weakness and loss of Bladder controll difficult to say if this is from weakness to run to toilet. Had bilateral SI Joint injections has \" A Lot Of Relief\" 70% relief, has shooting pain going to her Toes. .Had a fusion in Neck 2017, This is a result of no known injury.  Onset of pain was 4 years ago and has been gradually worsening since. The pain is located in left lower back, described as aching and dull and rated as mild and moderate, with radiation, to the right foot. Symptoms include weakness. The patient denies numbness, incontinence, dysuria, hematuria. The patient denies other injuries. Care prior to arrival consisted of PT 2 years ago  with no relief.

## 2022-09-27 ENCOUNTER — HOSPITAL ENCOUNTER (OUTPATIENT)
Dept: PHYSICAL THERAPY | Age: 62
Setting detail: THERAPIES SERIES
Discharge: HOME | End: 2022-09-27
Payer: COMMERCIAL

## 2022-09-27 ENCOUNTER — TELEPHONE (OUTPATIENT)
Dept: PAIN MANAGEMENT | Age: 62
End: 2022-09-27

## 2022-09-27 PROCEDURE — 97110 THERAPEUTIC EXERCISES: CPT

## 2022-09-27 PROCEDURE — G0283 ELEC STIM OTHER THAN WOUND: HCPCS

## 2022-09-27 ASSESSMENT — PAIN SCALES - GENERAL: PAINLEVEL_OUTOF10: 4

## 2022-09-27 ASSESSMENT — PAIN DESCRIPTION - ORIENTATION: ORIENTATION: RIGHT;LEFT;LOWER

## 2022-09-27 ASSESSMENT — PAIN DESCRIPTION - LOCATION: LOCATION: BACK

## 2022-09-27 ASSESSMENT — PAIN DESCRIPTION - DESCRIPTORS: DESCRIPTORS: SHARP;SHOOTING;ACHING

## 2022-09-27 NOTE — PROGRESS NOTES
[address] [place] [address]  SOFCV:[phone]  Treatment Note        Date: 2022  Patient: Tigre Fuentes  : 1960   Confirmed: Yes  MRN: [id number]  Referring Provider: Naveen Madrigal MD  Medical Diagnosis: Hemiplegia, unspecified affecting left nondominant side [G81.94]  Sacroiliitis, not elsewhere classified [M46.1]  Spondylosis without myelopathy or radiculopathy, lumbosacral region [M47.817]   Treatment Diagnosis: LBP with functional strength and balance deficits. Visit Information:  Insurance: Payor: Kermit Core / Plan: BCBS - [place] PPO / Product Type: *No Product type* /   PT Visit Information  Onset Date: 22  PT Insurance Information: BCBS  Total # of Visits Approved:  (BMN)  Total # of Visits to Date: 7  No Show: 0  Canceled Appointment: 0  Progress Note Counter:     Subjective Information:  Subjective: Pt was doing really good since the injection but had a flare up of pain after the doctor was checking her legs. Still gets occasional sharp, shooting electical shock pain down both legs into feet. HEP Compliance:  [x] Good [] Fair [] Poor [] Reports not doing due to:    Pain Screening  Patient Currently in Pain: Yes  Pain Assessment: 0-10  Pain Level: 4  Pain Location: Back  Pain Orientation: Right, Left, Lower  Pain Descriptors: Sharp, Shooting, Aching (constant ache in back, occasional shootnig down legs)    Treatment:  Exercises:  Exercises  Exercise 1: seated ex's: ham curl with RTB, hip abd with RTB, hip add with ball, TA march x 10, hip ext into small ball, TA LAQ x 15 ea (L LAQ x 10)  Exercise 2: seated TA shl ext/rows with RTB x 10 ea  Exercise 3: seated TA diagonal pulls with RTB x 10 ea  Exercise 20: HEP: Bridges, prone hamstring curls, hip extension  Treatment Reasoning  Limitations addressed:  Mobility, Strength, Flexibility, Posture, Pain modulation  Functional ability(s) targeted: Bed mobility, Ambulating community distances    Modalities:  Electric stimulation, unattended (CPT [id number]) /  (Medicare)  Patient Position: Seated  E-stim location: Right, Left, Low back, Hip (3 pads on L LB/Hip, 1 on R)  E-stim type: Interferential (IFC)  Untimed (minutes): 10      *Indicates exercise, modality, or manual techniques to be initiated when appropriate      Strength: [x] NT  [] MMT completed:      ROM: [x] NT  [] ROM measurements:    Assessment: Body Structures, Functions, Activity Limitations Requiring Skilled Therapeutic Intervention: Decreased functional mobility , Decreased ADL status, Decreased ROM, Decreased balance, Decreased posture, Increased pain, Decreased strength, Decreased sensation  Assessment: Pt performed gentle seated LE/core stab therex. Unable to complete LAQ on L LE d/t increased shooting pain in L LE into arch of foot. Added TA diagonal pulls with tband to work core. Treatment Diagnosis: LBP with functional strength and balance deficits. Therapy Prognosis: Fair      Post-Pain Assessment:       Pain Rating (0-10 pain scale):   0/10   Location and pain description same as pre-treatment unless indicated. Action: [x] NA   [] Perform HEP  [] Meds as prescribed  [] Modalities as prescribed   [] Call Physician     GOALS   Patient Goal(s): Patient goals : Decrease pain and improve function    Short Term Goals Completed by 3 weeks Goal Status   STG 1 The pt will demonstrate improved postural awareness requiring <25% VC's with exercises In progress   STG 2 Decrease Lumbar pain 50% to assist with improved functional gains. In progress       Long Term Goals Completed by 6 weeks Goal Status   LTG 1 Indep HEP for symptom management In progress   LTG 2 Pt demo improved overall function by reporting greater than 70% per functional survey score In progress   LTG 3 Pain-free Lumbar AROM to WNL allowing an increase in ADL tolerance.  In progress   LTG 4 Improve B LE strength 4-/5 to allow patient to improve stair climbing and bed mobility with less UE assist. In progress   LTG 5 Pt demo improved overall balance by improving greater than 5-10 points on Smith score In progress       Plan:  Frequency/Duration:  Plan  Plan Frequency: 2  Plan weeks: 4-6  Current Treatment Recommendations: Strengthening, ROM, Balance training, Functional mobility training, Transfer training, Manual Therapy - Joint Manipulation, Manual Therapy - Soft Tissue Mobilization, Neuromuscular re-education, Stair training, Gait training, Modalities, Home exercise program  Plan Comment: Pt requests to decrease to 1x/wk due to poor tolerance to 2x/wk  Pt to continue current HEP. See objective section for any therapeutic exercise changes, additions or modifications this date.     Therapy Time:  PT Individual Minutes  Time In: 0132  Time Out: 1510  Minutes: 35  Timed Code Treatment Minutes: 25 Minutes  Procedure Minutes: E-stim x 10'   Timed Activity Minutes Units   Ther Ex 25 2     Electronically signed by Larissa Duron PTA on 9/27/22 at 3:07 PM EDT

## 2022-10-05 ENCOUNTER — APPOINTMENT (OUTPATIENT)
Dept: PHYSICAL THERAPY | Age: 62
End: 2022-10-05
Payer: COMMERCIAL

## 2022-10-12 ENCOUNTER — HOSPITAL ENCOUNTER (OUTPATIENT)
Dept: PHYSICAL THERAPY | Age: 62
Setting detail: THERAPIES SERIES
Discharge: HOME | End: 2022-10-12
Payer: COMMERCIAL

## 2022-10-12 PROCEDURE — 97110 THERAPEUTIC EXERCISES: CPT

## 2022-10-12 PROCEDURE — G0283 ELEC STIM OTHER THAN WOUND: HCPCS

## 2022-10-12 ASSESSMENT — PAIN SCALES - GENERAL: PAINLEVEL_OUTOF10: 5

## 2022-10-12 ASSESSMENT — PAIN DESCRIPTION - DESCRIPTORS: DESCRIPTORS: ACHING;SHARP;SHOOTING

## 2022-10-12 ASSESSMENT — PAIN DESCRIPTION - ORIENTATION: ORIENTATION: LEFT;RIGHT;LOWER

## 2022-10-12 ASSESSMENT — PAIN DESCRIPTION - LOCATION: LOCATION: BACK

## 2022-10-12 NOTE — PROGRESS NOTES
[address]  XDVIK:[phone]  Treatment Note        Date: 10/12/2022  Patient: Savana Proctor  : 1960   Confirmed: Yes  MRN: [id number]  Referring Provider: Rosa Weber MD  Medical Diagnosis: Hemiplegia, unspecified affecting left nondominant side [G81.94]  Sacroiliitis, not elsewhere classified [M46.1]  Spondylosis without myelopathy or radiculopathy, lumbosacral region [M47.817]   Treatment Diagnosis: LBP with functional strength and balance deficits. Visit Information:  Insurance: Payor: Lm Chiang / Plan: BC - [place] PPO / Product Type: *No Product type* /   PT Visit Information  Onset Date: 22  PT Insurance Information: BCBS  Total # of Visits Approved:  (BMN)  Total # of Visits to Date: 8  No Show: 0  Canceled Appointment: 0  Progress Note Counter:     Subjective Information:  Subjective: Pt was on vacation last week and had to be pushed in the wheelchair a lot because walking was too painful. Reports increased LBP today. HEP Compliance:  [x] Good [] Fair [] Poor [] Reports not doing due to:    Pain Screening  Patient Currently in Pain: Yes  Pain Assessment: 0-10  Pain Level: 5  Pain Location: Back  Pain Orientation: Left, Right, Lower  Pain Radiating Towards: L>R hip  Pain Descriptors: Aching, Sharp, Shooting    Treatment:  Exercises:  Exercises  Exercise 1: seated ex's: ham curl with RTB x 15 (L x 6), hip abd RTB x 15, hip add with ball x 15 , TA march x 15 (L x 12), hip ext into small ball x 15, TA LAQ x 15 ea (LBx 10)  Exercise 2: seated TA shl ext/rows with RTB x 10 ea  Exercise 3: seated TA diagonal pulls with RTB x 10 (L x 4)  Exercise 20: HEP: Bridges, prone hamstring curls, hip extension  Treatment Reasoning  Limitations addressed:  Mobility, Strength, Flexibility, Posture, Pain modulation  Functional ability(s) targeted: Bed mobility, Ambulating community distances      Modalities:  Electric stimulation, unattended (CPT [id number]) /  (Medicare)  Patient Position: Seated  E-stim location: Right, Left, Low back, Hip (3 pads on L LB/Hip, 1 on R)  E-stim type: Interferential (IFC)  Untimed (minutes): 10      *Indicates exercise, modality, or manual techniques to be initiated when appropriate      Assessment: Body Structures, Functions, Activity Limitations Requiring Skilled Therapeutic Intervention: Decreased functional mobility , Decreased ADL status, Decreased ROM, Decreased balance, Decreased posture, Increased pain, Decreased strength, Decreased sensation  Assessment: Pt performed gentle seated LE/core stab therex. Modified with decreased reps on L LE due to pain. Treatment Diagnosis: LBP with functional strength and balance deficits. Therapy Prognosis: Fair      Post-Pain Assessment:       Pain Rating (0-10 pain scale):   5/10 (L hip/LE)  Location and pain description same as pre-treatment unless indicated. Action: [] NA   [x] Perform HEP  [x] Meds as prescribed  [x] Modalities as prescribed   [] Call Physician     GOALS   Patient Goal(s): Patient Goals : Decrease pain and improve function    Short Term Goals Completed by 3 weeks Goal Status   STG 1 The pt will demonstrate improved postural awareness requiring <25% VC's with exercises In progress   STG 2 Decrease Lumbar pain 50% to assist with improved functional gains. In progress       Long Term Goals Completed by 6 weeks Goal Status   LTG 1 Indep HEP for symptom management In progress   LTG 2 Pt demo improved overall function by reporting greater than 70% per functional survey score In progress   LTG 3 Pain-free Lumbar AROM to WNL allowing an increase in ADL tolerance.  In progress   LTG 4 Improve B LE strength 4-/5 to allow patient to improve stair climbing and bed mobility with less UE assist. In progress   LTG 5 Pt demo improved overall balance by improving greater than 5-10 points on Smith score In progress       Plan:  Frequency/Duration:  Plan  Plan Frequency: 2  Plan weeks: 4-6  Current Treatment Recommendations: Strengthening, ROM, Balance training, Functional mobility training, Transfer training, Manual Therapy - Joint Manipulation, Manual Therapy - Soft Tissue Mobilization, Neuromuscular re-education, Stair training, Gait training, Modalities, Home exercise program  Additional Comments: Pt requests to decrease to 1x/wk due to poor tolerance to 2x/wk  Pt to continue current HEP. See objective section for any therapeutic exercise changes, additions or modifications this date.     Therapy Time:  PT Individual Minutes  Time In: 1978  Time Out: 6206  Minutes: 40  Timed Code Treatment Minutes: 30 Minutes  Procedure Minutes: E-stim x 10'   Timed Activity Minutes Units   Ther Ex 30 2     Electronically signed by David Mcnally PTA on 10/12/22 at 3:14 PM EDT

## 2022-10-17 ENCOUNTER — TELEPHONE (OUTPATIENT)
Dept: PAIN MANAGEMENT | Age: 62
End: 2022-10-17

## 2022-10-17 NOTE — TELEPHONE ENCOUNTER
BENEFITS: RIGHT L4-5, LEFT S1 TFESI    Insurance: [employer]  Phone: [phone]  Contact Name: Gertrude Duverney  Effective Date: 10.1.2021     Plan year: YES-CALENDAR  Deductible: 5779.00      Deductible Met: 579.00  Allowed/benefits paid at: 80% AFTER DEDUCTIBLE  OOP: 2325.00 MET $717.56  Freq Limits: 80277 & 64484--BASED ON MEDICAL NECESSITY  Prior Auth Requirement: NO AUTH REQUIRED    Notes: NO PRE-EX CLAUSE    Call Reference #: [id number]    Time of call: 2:20PM

## 2022-10-19 ENCOUNTER — HOSPITAL ENCOUNTER (OUTPATIENT)
Dept: PHYSICAL THERAPY | Age: 62
Setting detail: THERAPIES SERIES
Discharge: HOME | End: 2022-10-19
Payer: COMMERCIAL

## 2022-10-19 PROCEDURE — G0283 ELEC STIM OTHER THAN WOUND: HCPCS

## 2022-10-19 PROCEDURE — 97110 THERAPEUTIC EXERCISES: CPT

## 2022-10-19 ASSESSMENT — PAIN DESCRIPTION - ORIENTATION: ORIENTATION: LEFT;RIGHT;LOWER

## 2022-10-19 ASSESSMENT — PAIN DESCRIPTION - DESCRIPTORS: DESCRIPTORS: ACHING;SHARP;SHOOTING

## 2022-10-19 ASSESSMENT — PAIN DESCRIPTION - LOCATION: LOCATION: BACK

## 2022-10-19 ASSESSMENT — PAIN SCALES - GENERAL: PAINLEVEL_OUTOF10: 5

## 2022-10-19 NOTE — PROGRESS NOTES
[address], [address] [place] [address]  MLAWF:[phone]  Treatment Note        Date: 10/19/2022  Patient: Rosamaria Vasquez  : 1960   Confirmed: Yes  MRN: [id number]  Referring Provider: Samson Caller, MD  Medical Diagnosis: Hemiplegia, unspecified affecting left nondominant side [G81.94]  Sacroiliitis, not elsewhere classified [M46.1]  Spondylosis without myelopathy or radiculopathy, lumbosacral region [M47.817]   Treatment Diagnosis: LBP with functional strength and balance deficits. Visit Information:  Insurance: Payor: Diane Stoddard / Plan: BCBS - [place] PPO / Product Type: *No Product type* /   PT Visit Information  Onset Date: 22  PT Insurance Information: BCBS  Total # of Visits Approved:  (BMN)  Total # of Visits to Date: 9  No Show: 0  Canceled Appointment: 0  Progress Note Counter:     Subjective Information:  Subjective: Pt having a lot of low back pain today. Getting the nerve block tomorrow. HEP Compliance:  [x] Good [] Fair [] Poor [] Reports not doing due to:    Pain Screening  Patient Currently in Pain: Yes  Pain Assessment: 0-10  Pain Level: 5 (with meds and icy-hot)  Pain Location: Back  Pain Orientation: Left, Right, Lower  Pain Descriptors: Aching, Sharp, Shooting    Treatment:  Exercises:  Exercises  Exercise 1: seated ex's: ham curl with RTB x 15 (L x 6), hip abd RTB x 15, hip add with ball x 15 , TA march x 15 (L x 10), hip ext into small ball x 15, TA LAQ x 10 (L x 6)  Exercise 2: seated TA shl ext/rows with RTB x 10 ea  Exercise 3: seated TA diagonal pulls with RTB L 7 , R 5  Exercise 4: bike x 5 minutes (seat 1)  Exercise 20: HEP: Bridges, prone hamstring curls, hip extension  Treatment Reasoning  Limitations addressed:  Mobility, Strength, Flexibility, Posture, Pain modulation  Functional ability(s) targeted: Bed mobility, Ambulating community distances      Modalities:  Electric stimulation, unattended (CPT 22 791612) /  (Medicare)  Patient Position: Seated  E-stim location: Right, Left, Low back, Hip (3 pads on L LB/Hip, 1 on R)  E-stim type: Interferential (IFC)  Untimed (minutes): 10      *Indicates exercise, modality, or manual techniques to be initiated when appropriate    Objective Measures:   LTG 1 Current Status[de-identified] Pt reports compliance with HEP      Assessment: Body Structures, Functions, Activity Limitations Requiring Skilled Therapeutic Intervention: Decreased functional mobility , Decreased ADL status, Decreased ROM, Decreased balance, Decreased posture, Increased pain, Decreased strength, Decreased sensation  Assessment: Pt continues to be very limited on reps and modified as needed. Resumed stationary bike with good tolerance. Concluded with E-stim for pain relief. Treatment Diagnosis: LBP with functional strength and balance deficits. Therapy Prognosis: Fair      Post-Pain Assessment:       Pain Rating (0-10 pain scale):   2/10   Location and pain description same as pre-treatment unless indicated. Action: [x] NA   [] Perform HEP  [] Meds as prescribed  [] Modalities as prescribed   [] Call Physician     GOALS   Patient Goal(s): Patient Goals : Decrease pain and improve function    Short Term Goals Completed by 3 weeks Goal Status   STG 1 The pt will demonstrate improved postural awareness requiring <25% VC's with exercises In progress   STG 2 Decrease Lumbar pain 50% to assist with improved functional gains. In progress       Long Term Goals Completed by 6 weeks Goal Status   LTG 1 Indep HEP for symptom management In progress   LTG 2 Pt demo improved overall function by reporting greater than 70% per functional survey score In progress   LTG 3 Pain-free Lumbar AROM to WNL allowing an increase in ADL tolerance.  In progress   LTG 4 Improve B LE strength 4-/5 to allow patient to improve stair climbing and bed mobility with less UE assist. In progress   LTG 5 Pt demo improved overall balance by improving greater than 5-10 points on Smith score In progress       Plan:  Frequency/Duration:  Plan  Plan Frequency: 2  Plan weeks: 4-6  Current Treatment Recommendations: Strengthening, ROM, Balance training, Functional mobility training, Transfer training, Manual Therapy - Joint Manipulation, Manual Therapy - Soft Tissue Mobilization, Neuromuscular re-education, Stair training, Gait training, Modalities, Home exercise program  Additional Comments: Pt requests to decrease to 1x/wk due to poor tolerance to 2x/wk  Pt to continue current HEP. See objective section for any therapeutic exercise changes, additions or modifications this date.     Therapy Time:  PT Individual Minutes  Time In: 3550  Time Out: 1526  Minutes: 50  Timed Code Treatment Minutes: 38 Minutes  Procedure Minutes: E-stim x 10'   Timed Activity Minutes Units   Ther Ex 38 3     Electronically signed by Mati Rosa PTA on 10/19/22 at 3:12 PM EDT

## 2022-10-20 ENCOUNTER — PROCEDURE VISIT (OUTPATIENT)
Dept: PAIN MANAGEMENT | Age: 62
End: 2022-10-20
Payer: COMMERCIAL

## 2022-10-20 DIAGNOSIS — M54.16 LUMBAR RADICULOPATHY: Primary | ICD-10-CM

## 2022-10-20 PROCEDURE — 64484 NJX AA&/STRD TFRM EPI L/S EA: CPT | Performed by: PAIN MEDICINE

## 2022-10-20 PROCEDURE — 64483 NJX AA&/STRD TFRM EPI L/S 1: CPT | Performed by: PAIN MEDICINE

## 2022-10-20 RX ORDER — TRIAMCINOLONE ACETONIDE 40 MG/ML
40 INJECTION, SUSPENSION INTRA-ARTICULAR; INTRAMUSCULAR ONCE
Status: COMPLETED | OUTPATIENT
Start: 2022-10-20 | End: 2022-10-20

## 2022-10-20 RX ORDER — LIDOCAINE HYDROCHLORIDE 10 MG/ML
10 INJECTION, SOLUTION EPIDURAL; INFILTRATION; INTRACAUDAL; PERINEURAL ONCE
Status: COMPLETED | OUTPATIENT
Start: 2022-10-20 | End: 2022-10-20

## 2022-10-20 RX ORDER — BUPIVACAINE HYDROCHLORIDE 2.5 MG/ML
30 INJECTION, SOLUTION INFILTRATION; PERINEURAL ONCE
Status: COMPLETED | OUTPATIENT
Start: 2022-10-20 | End: 2022-10-20

## 2022-10-20 RX ADMIN — BUPIVACAINE HYDROCHLORIDE 75 MG: 2.5 INJECTION, SOLUTION INFILTRATION; PERINEURAL at 11:19

## 2022-10-20 RX ADMIN — LIDOCAINE HYDROCHLORIDE 10 MG: 10 INJECTION, SOLUTION EPIDURAL; INFILTRATION; INTRACAUDAL; PERINEURAL at 11:20

## 2022-10-20 RX ADMIN — TRIAMCINOLONE ACETONIDE 40 MG: 40 INJECTION, SUSPENSION INTRA-ARTICULAR; INTRAMUSCULAR at 11:20

## 2022-10-20 NOTE — PROGRESS NOTES
History of Present Illness     Patient Identification  Prachi Garcia is a 63 y.o. female. Patient information was obtained from patient. Chief Complaint   Chief Complaint   Patient presents with    Back Pain       Patient presents For bilateral TFESIs for complaint of back pain left leg pain with h/o CVA has weakness and loss of Bladder controll difficult to say if this is from weakness to run to toilet. Stopped Plavix 2 weeks ago, Had bilateral SI Joint injections has \" A Lot Of Relief\" 70% relief, has shooting pain going to her Toes. Had a fusion in Neck 2017, This is a result of no known injury. Onset of pain was 4 years ago and has been gradually worsening since. The pain is located in left lower back, described as aching and dull and rated as mild and moderate, with radiation, to the right foot. Symptoms include weakness. The patient denies numbness, incontinence, dysuria, hematuria. The patient denies other injuries. Care prior to arrival consisted of PT 2 years ago  with no relief. Past Medical History:   Diagnosis Date    Cerebrovascular disease     Stroke 2/2017 and 4/2017    Neuropathy     Type 2 diabetes mellitus without complication ([facility] [address]      History reviewed. No pertinent family history. Current Outpatient Medications   Medication Sig Dispense Refill    acetaminophen (TYLENOL) 500 MG tablet Take 1,500 mg by mouth daily as needed      albuterol sulfate HFA (PROVENTIL;VENTOLIN;PROAIR) 108 (90 Base) MCG/ACT inhaler       baclofen (LIORESAL) 10 MG tablet       Biotin 10 MG tablet Take 1 tablet by mouth      clopidogrel (PLAVIX) 75 MG tablet Take 75 mg by mouth in the morning. Continuous Blood Gluc Sensor (DEXCOM G6 SENSOR) MISC       Continuous Blood Gluc Transmit (DEXCOM G6 TRANSMITTER) MISC       cyanocobalamin 1000 MCG tablet Take 1,000 mcg by mouth in the morning.       doxepin (SINEQUAN) 10 MG capsule TAKE 1 CAPSULE BY MOUTH AT BEDTIME FOR 30 DAYS      REPATHA SURECLICK 250 MG/ML SOAJ Inject 140 mg subcutaneously every 2 weeks. called the ins she can only get so many pens in an allocted time she is over the limit--next available fill is 4/7      Evolocumab 140 MG/ML SOAJ Inject 140 mg into the skin      ibuprofen (ADVIL;MOTRIN) 200 MG tablet Take 200 mg by mouth every 6 hours as needed      insulin regular human (HUMULIN R U-500 KWIKPEN) 500 UNIT/ML SOPN concentrated injection pen Inject 55 units with breakfast and 45 units with dinner (max 100 units/day)      olmesartan-amLODIPine-HCTZ 40-5-12.5 MG TABS       OZEMPIC, 2 MG/DOSE, 8 MG/3ML SOPN       pregabalin (LYRICA) 150 MG capsule Take by mouth. No current facility-administered medications for this visit. Allergies   Allergen Reactions    Meperidine Shortness Of Breath     nausea      2,4-D Dimethylamine (Amisol)     Atorvastatin Hives and Other (See Comments)     fatigue  Patient takes Pravastatin at home      Benazepril Other (See Comments)    Clonidine Other (See Comments)    Dapagliflozin Other (See Comments)     palpatations    Egg White     Ezetimibe      Other reaction(s): GI Upset    Meperidine Hcl      Other reaction(s): Vomiting    Propoxyphene Hives    Canagliflozin Rash     yeast infection    Morphine Nausea And Vomiting and Hives     Nausea. Patient takes Vicodin at home with no problem         Review of Systems  Review of Systems   Constitutional: Negative. HENT: Negative. Eyes: Negative. Respiratory: Negative. Asthama   Cardiovascular: Negative. High  blood pressure   Gastrointestinal: Negative. Endocrine: Negative. Genitourinary: Negative. Musculoskeletal: Negative. Skin: Negative. Allergic/Immunologic: Negative. Neurological: Negative. 3 TIAS and Left hemiparesis. Hematological: Negative. Psychiatric/Behavioral: Negative. All other systems reviewed and are negative.      Physical Exam     /68   Pulse (!) 103   Temp 97.6 °F (36.4 °C)   Ht 5' 2\" (1.575 m)   Wt 190 lb (86.2 kg)   SpO2 97%   BMI 34.75 kg/m²   Physical Exam  Vitals and nursing note reviewed. Constitutional:       Appearance: Normal appearance. HENT:      Head: Normocephalic. Right Ear: Ear canal normal.      Left Ear: Ear canal normal.      Nose: Nose normal.      Mouth/Throat:      Mouth: Mucous membranes are moist.   Eyes:      Extraocular Movements: Extraocular movements intact. Conjunctiva/sclera: Conjunctivae normal.      Pupils: Pupils are equal, round, and reactive to light. Cardiovascular:      Rate and Rhythm: Normal rate and regular rhythm. Pulses: Normal pulses. Heart sounds: Normal heart sounds. Pulmonary:      Effort: Pulmonary effort is normal.      Breath sounds: Normal breath sounds. Abdominal:      General: Abdomen is flat. Bowel sounds are normal.      Palpations: Abdomen is soft. Musculoskeletal:         General: Normal range of motion. Cervical back: Normal range of motion and neck supple. Comments: Good Gait able to walk on Toes and heels, Tender facets and  moderate tenderness on bilateral SI Joints concordant with her pain, Severe pain on SLRs pain on Left gainslins,   Skin:     General: Skin is warm. Capillary Refill: Capillary refill takes less than 2 seconds. Neurological:      General: No focal deficit present. Mental Status: She is alert. Mental status is at baseline. She is oriented x 3   Psychiatric:         Mood and Affect: Mood normal.         Behavior: Behavior normal.         Thought Content: Thought content normal.         Judgment: Judgment normal.         Plan      Procedure: Left LS1 Right L4-5 Transforaminal Epidural Steroid Injection Under Fluroscopic Guidance:. After Obtaining Consent, Pt was placed on Fluro table in Prone pojistion and Skin was prepped with Betadine,   Fluroscopy was used to yany the skin for needle placement , Skin was anesthetised with 3cc 1% Lidocaine.   25G 3,5inch was advanced towards Left S1 Right L4-5 Foramin, injection of 0.5ccs of omnipaque 180 highliterd the Left S1 and Rt L4-5 nerve roots with no paraesthesias, 20mg kenalog with 1cc 0.25% Bupivacaine was injected here with good relief. Pt allowed to recover and discharged home in stable condition.

## 2022-10-26 ENCOUNTER — HOSPITAL ENCOUNTER (OUTPATIENT)
Dept: PHYSICAL THERAPY | Age: 62
Setting detail: THERAPIES SERIES
Discharge: HOME | End: 2022-10-26
Payer: COMMERCIAL

## 2022-10-26 PROCEDURE — G0283 ELEC STIM OTHER THAN WOUND: HCPCS

## 2022-10-26 PROCEDURE — 97110 THERAPEUTIC EXERCISES: CPT

## 2022-10-26 ASSESSMENT — PAIN DESCRIPTION - ORIENTATION: ORIENTATION: RIGHT;LEFT;LOWER

## 2022-10-26 ASSESSMENT — PAIN SCALES - GENERAL: PAINLEVEL_OUTOF10: 7

## 2022-10-26 ASSESSMENT — PAIN DESCRIPTION - DESCRIPTORS: DESCRIPTORS: ACHING;SHARP;SHOOTING

## 2022-10-26 ASSESSMENT — PAIN DESCRIPTION - LOCATION: LOCATION: BACK

## 2022-10-26 NOTE — PROGRESS NOTES
[address] [place]  VJWWH:[phone]  Treatment Note        Date: 10/26/2022  Patient: Butch Bravo  : 1960   Confirmed: Yes  MRN: [id number]  Referring Provider: Charissa Steele MD  Medical Diagnosis: Hemiplegia, unspecified affecting left nondominant side [G81.94]  Sacroiliitis, not elsewhere classified [M46.1]  Spondylosis without myelopathy or radiculopathy, lumbosacral region [M47.817]   Treatment Diagnosis: LBP with functional strength and balance deficits. Visit Information:  Insurance: Payor: Mike Ochoa / Plan: BCBS - [place] PPO / Product Type: *No Product type* /   PT Visit Information  Onset Date: 22  PT Insurance Information: BCBS  Total # of Visits Approved:  (BMN)  Total # of Visits to Date: 10  No Show: 0  Canceled Appointment: 0  Progress Note Counter: 10/12    Subjective Information:  Subjective: Pt slipped in the shower an hour ago. She caught herself but tweaked her back. HEP Compliance:  [x] Good [] Fair [] Poor [] Reports not doing due to:    Pain Screening  Patient Currently in Pain: Yes  Pain Assessment: 0-10  Pain Level: 7  Pain Location: Back  Pain Orientation: Right, Left, Lower  Pain Descriptors: Aching, Sharp, Shooting    Treatment:  Exercises:  Exercises  Exercise 1: seated ex's: ham curl with RTB x 15 (L x 6), hip abd RTB x 10, hip add with ball x 10 , TA march x 10, hip ext into small ball x 10, TA LAQ x 10  Exercise 2: seated TA shl ext/rows with RTB x 10 ea  Exercise 3: seated TA diagonal pulls with RTB L 7 , R 5  Exercise 20: HEP: Bridges, prone hamstring curls, hip extension  Treatment Reasoning  Limitations addressed:  Mobility, Strength, Flexibility, Posture, Pain modulation  Functional ability(s) targeted: Bed mobility, Ambulating community distances    Modalities:  Electric stimulation, unattended (CPT 22 274985) /  (Medicare)  Patient Position: Seated  E-stim location: Right, Left, Low back, Hip (3 Recommendations: Strengthening, ROM, Balance training, Functional mobility training, Transfer training, Manual Therapy - Joint Manipulation, Manual Therapy - Soft Tissue Mobilization, Neuromuscular re-education, Stair training, Gait training, Modalities, Home exercise program  Additional Comments: Pt requests to decrease to 1x/wk due to poor tolerance to 2x/wk  Pt to continue current HEP. See objective section for any therapeutic exercise changes, additions or modifications this date.     Therapy Time:  PT Individual Minutes  Time In: 7602  Time Out: 1525  Minutes: 39  Timed Code Treatment Minutes: 29 Minutes  Procedure Minutes: E-stim x 10'   Timed Activity Minutes Units   Ther Ex 29 2     Electronically signed by Alivia Bond PTA on 10/26/22 at 4:06 PM EDT

## 2022-11-02 ENCOUNTER — HOSPITAL ENCOUNTER (OUTPATIENT)
Dept: PHYSICAL THERAPY | Age: 62
Setting detail: THERAPIES SERIES
Discharge: HOME | End: 2022-11-02
Payer: COMMERCIAL

## 2022-11-02 ENCOUNTER — OFFICE VISIT (OUTPATIENT)
Dept: PAIN MANAGEMENT | Age: 62
End: 2022-11-02
Payer: COMMERCIAL

## 2022-11-02 VITALS
WEIGHT: 185 LBS | HEART RATE: 87 BPM | HEIGHT: 62 IN | SYSTOLIC BLOOD PRESSURE: 88 MMHG | BODY MASS INDEX: 34.04 KG/M2 | DIASTOLIC BLOOD PRESSURE: 62 MMHG | OXYGEN SATURATION: 99 % | TEMPERATURE: 96.9 F

## 2022-11-02 DIAGNOSIS — M47.817 LUMBOSACRAL SPONDYLOSIS WITHOUT MYELOPATHY: ICD-10-CM

## 2022-11-02 PROCEDURE — 97140 MANUAL THERAPY 1/> REGIONS: CPT

## 2022-11-02 PROCEDURE — 97110 THERAPEUTIC EXERCISES: CPT

## 2022-11-02 PROCEDURE — 99213 OFFICE O/P EST LOW 20 MIN: CPT | Performed by: PAIN MEDICINE

## 2022-11-02 PROCEDURE — G0283 ELEC STIM OTHER THAN WOUND: HCPCS

## 2022-11-02 ASSESSMENT — PAIN SCALES - GENERAL: PAINLEVEL_OUTOF10: 4

## 2022-11-02 ASSESSMENT — PAIN DESCRIPTION - DESCRIPTORS: DESCRIPTORS: ACHING;SHARP;SHOOTING

## 2022-11-02 ASSESSMENT — PAIN DESCRIPTION - ORIENTATION: ORIENTATION: RIGHT;LEFT;LOWER

## 2022-11-02 ASSESSMENT — PAIN DESCRIPTION - LOCATION: LOCATION: BACK

## 2022-11-02 NOTE — PROGRESS NOTES
[address] [place]  UVZWQ:[phone]  Treatment Note        Date: 2022  Patient: Shun Stevensusing  : 1960   Confirmed: Yes  MRN: [id number]  Referring Provider: Georgie Chacon MD  Medical Diagnosis: Hemiplegia, unspecified affecting left nondominant side [G81.94]  Sacroiliitis, not elsewhere classified [M46.1]  Spondylosis without myelopathy or radiculopathy, lumbosacral region [M47.817]   Treatment Diagnosis: LBP with functional strength and balance deficits. Visit Information:  Insurance: Payor: Yanira Covarrubias / Plan: BC - [place] PPO / Product Type: *No Product type* /   PT Visit Information  Onset Date: 22  PT Insurance Information: BCBS  Total # of Visits Approved:  (BMN)  Total # of Visits to Date: 11  No Show: 0  Canceled Appointment: 0  Progress Note Counter:     Subjective Information:  Subjective: Pt had a f/u with pain mangement this morning and was given info on a spinal cord stimulator which pt has been wanting for a while now. Pt feels much better since starting therapy with imporved balance, strength, and overall decreased pain. Pt traveling to [place] for the next two weeks and feels safer now. HEP Compliance:  [x] Good [] Fair [] Poor [] Reports not doing due to:    Pain Screening  Patient Currently in Pain: Yes  Pain Assessment: 0-10  Pain Level: 4  Pain Location: Back  Pain Orientation: Right, Left, Lower  Pain Descriptors: Aching, Sharp, Shooting    Treatment:  Exercises:  Exercises  Exercise 1: seated ex's: ham curl with RTB x 15 (L x 10), hip abd RTB x 15 hip add with ball x 15, TA march x 10, hip ext into small ball x 15 TA LAQ x 15  Exercise 2: seated TA shl ext/rows with RTB x 15 ea  Exercise 3: seated TA diagonal pulls with RTB L 15 , R 12  Exercise 20: HEP: Bridges, prone hamstring curls, hip extension  Treatment Reasoning  Limitations addressed:  Mobility, Strength, Flexibility, Posture, Pain modulation  Functional ability(s) targeted: Bed mobility, Ambulating community distances    Manual:   Manual Therapy  Other: ROM / MMT / Smith test x 15 min      Modalities:  Electric stimulation, unattended (CPT 22 002814) /  (Medicare)  Patient Position: Seated  E-stim location: Right, Left, Low back, Hip (3 pads on L LB/Hip, 1 on R)  E-stim type: Interferential (IFC)  Untimed (minutes): 10      *Indicates exercise, modality, or manual techniques to be initiated when appropriate    Objective Measures:   STG 1 Current Status[de-identified] Pt demonstrates good postural awareness with exercises with no VC's needed  STG 2 Current Status[de-identified] Pt reports improved lumbar pain by 50%    LTG 1 Current Status[de-identified] Written HEP provided for symptom management and is independent  LTG 2 Current Status[de-identified] MODI15/50=70% functional  LTG 3 Current Status[de-identified] Lumbar AROM WNL in all directions  LTG 4 Current Status[de-identified] L LE strength 4-/5 , R LE strength 4/5  LTG 5 Current Status[de-identified] Smith score = 53 (28 point improvement)      Assessment: Body Structures, Functions, Activity Limitations Requiring Skilled Therapeutic Intervention: Decreased functional mobility , Decreased ADL status, Decreased ROM, Decreased balance, Decreased posture, Increased pain, Decreased strength, Decreased sensation  Assessment: Pt has benefited from lumbar therapy with improvements in ROM, strength, balance, and function. Pt is doing well with daily activities and is independent with HEP. Treatment Diagnosis: LBP with functional strength and balance deficits. Therapy Prognosis: Fair      Post-Pain Assessment:       Pain Rating (0-10 pain scale):   0/10   Location and pain description same as pre-treatment unless indicated.    Action: [x] NA   [] Perform HEP  [] Meds as prescribed  [] Modalities as prescribed   [] Call Physician     GOALS   Patient Goal(s): Patient Goals : Decrease pain and improve function    Short Term Goals Completed by 3 weeks Goal Status   STG 1 The pt will demonstrate improved postural awareness requiring <25% VC's with exercises Met   STG 2 Decrease Lumbar pain 50% to assist with improved functional gains. Met       Long Term Goals Completed by 6 weeks Goal Status   LTG 1 Indep HEP for symptom management Met   LTG 2 Pt demo improved overall function by reporting greater than 70% per functional survey score Met   LTG 3 Pain-free Lumbar AROM to WNL allowing an increase in ADL tolerance. Met   LTG 4 Improve B LE strength 4-/5 to allow patient to improve stair climbing and bed mobility with less UE assist. Met   LTG 5 Pt demo improved overall balance by improving greater than 5-10 points on Smith score Met       Plan:  Frequency/Duration:  Plan  Additional Comments: DC from PT  Pt to continue current HEP. See objective section for any therapeutic exercise changes, additions or modifications this date.     Therapy Time:  PT Individual Minutes  Time In: 6605  Time Out: 1150  Minutes: 53  Timed Code Treatment Minutes: 43 Minutes  Procedure Minutes: E-stim x 10'   Timed Activity Minutes Units   Ther Ex 28 2   Manual  15 1     Electronically signed by Delvis Zhao PTA on 11/2/22 at 11:58 AM EDT

## 2022-11-02 NOTE — PROGRESS NOTES
[address]  [address]      JZWLN:[phone]    [] Certification  [] Recertification []  Plan of Care  [] Progress Note [x] Discharge      Referring Provider: Carlos Alberto Marlow MD    From:  Jaswant Fall, PT     Patient: Darin Officer (63 y.o. female) : 1960 Date: 2022   Medical Diagnosis: Hemiplegia, unspecified affecting left nondominant side [G81.94]  Sacroiliitis, not elsewhere classified [M46.1]  Spondylosis without myelopathy or radiculopathy, lumbosacral region [M47.817]    Treatment Diagnosis: LBP with functional strength and balance deficits. Progress Report Period from: 2022  to 2022    Visits to Date: 11 No Show: 0 Cancelled Appts: 0    OBJECTIVE:   Short Term Goals - Time Frame for Short Term Goals: 3 weeks    Goals Current/Discharge status  Status   Short Term Goal 1: The pt will demonstrate improved postural awareness requiring <25% VC's with exercises  STG 1 Current Status[de-identified] Pt demonstrates good postural awareness with exercises with no VC's needed   Met   Short Term Goal 2: Decrease Lumbar pain 50% to assist with improved functional gains. STG 2 Current Status[de-identified] Pt reports improved lumbar pain by 50%   Met       Long Term Goals - Time Frame for Long Term Goals : 6 weeks  Goals Current/ Discharge status Status   Long Term Goal 1: Indep HEP for symptom management LTG 1 Current Status[de-identified] Written HEP provided for symptom management and is independent   Met   Long Term Goal 2: Pt demo improved overall function by reporting greater than 70% per functional survey score LTG 2 Current Status[de-identified] MODI15/50=70% functional   Met   Long Term Goal 3: Pain-free Lumbar AROM to WNL allowing an increase in ADL tolerance.  LTG 3 Current Status[de-identified] Lumbar AROM WNL in all directions   Met   Long Term Goal 4: Improve B LE strength 4-/5 to allow patient to improve stair climbing and bed mobility with less UE assist. LTG 4 Current Status[de-identified] L LE strength 4-/5 , R LE strength 4/5   Met   Long Term Goal 5: Pt demo improved overall balance by improving greater than 5-10 points on Smith score LTG 5 Current Status[de-identified] Smith score = 53 (28 point improvement)   Met       Body Structures, Functions, Activity Limitations Requiring Skilled Therapeutic Intervention: Decreased functional mobility , Decreased ADL status, Decreased ROM, Decreased balance, Decreased posture, Increased pain, Decreased strength, Decreased sensation  Assessment: Pt has benefited from lumbar therapy with improvements in ROM, strength, balance, and function. Pt has met all goals. Pt is doing well with daily activities and is independent with HEP. Therapy Prognosis: Fair      PLAN: [x] Discharge   Frequency/Duration:  Additional Comments: DC from PT                         Patient Status:[] Continue/ Initiate plan of Care     [x] Discharge PT. Recommend pt continue with HEP. [] Additional visits requested, Please re-certify for additional visits:     [] Hold     Objective information provided by: Electronically signed by Francis Bear PTA on 11/2/22 at 12:00 PM EDT        Signature: Electronically signed by Deb Wagner PT on 11/2/2022 at 3:31 PM      If you have any questions or concerns, please don't hesitate to call. Thank you for your referral.    I have reviewed this plan of care and certify a need for medically necessary rehabilitation services.     Physician Signature:__________________________________________________________  Date:  Please sign and return

## 2022-11-02 NOTE — PROGRESS NOTES
History of Present Illness     Patient Identification  Marysol King is a 63 y.o. female. Patient information was obtained from patient. Chief Complaint   Chief Complaint   Patient presents with    Back Pain       Patient presents For follow bilateral TFESIs for complaint of back pain left leg pain had good relief Rt side had good relief with first shot on left , today over all she is feeling about 50% better,  with h/o CVA has weakness and loss of Bladder controll difficult to say if this is from weakness to run to toilet. Stopped Plavix 2 weeks ago, Had bilateral SI Joint injections has \" A Lot Of Relief\" 70% relief, has shooting pain going to her Toes. Had a fusion in Neck 2017, This is a result of no known injury. Onset of pain was 4 years ago and has been gradually worsening since. The pain is located in left lower back, described as aching and dull and rated as mild and moderate, with radiation, to the right foot. Symptoms include weakness. The patient denies numbness, incontinence, dysuria, hematuria. The patient denies other injuries. Care prior to arrival consisted of PT 2 years ago  with no relief. Past Medical History:   Diagnosis Date    Cerebrovascular disease     Stroke 2/2017 and 4/2017    Neuropathy     Type 2 diabetes mellitus without complication ([facility] [address]      History reviewed. No pertinent family history. Current Outpatient Medications   Medication Sig Dispense Refill    acetaminophen (TYLENOL) 500 MG tablet Take 1,500 mg by mouth daily as needed      albuterol sulfate HFA (PROVENTIL;VENTOLIN;PROAIR) 108 (90 Base) MCG/ACT inhaler       baclofen (LIORESAL) 10 MG tablet       Biotin 10 MG tablet Take 1 tablet by mouth      clopidogrel (PLAVIX) 75 MG tablet Take 75 mg by mouth in the morning.       Continuous Blood Gluc Sensor (DEXCOM G6 SENSOR) MISC       Continuous Blood Gluc Transmit (DEXCOM G6 TRANSMITTER) MISC       cyanocobalamin 1000 MCG tablet Take 1,000 mcg by mouth in the morning. doxepin (SINEQUAN) 10 MG capsule TAKE 1 CAPSULE BY MOUTH AT BEDTIME FOR 30 DAYS      REPATHA SURECLICK 313 MG/ML SOAJ Inject 140 mg subcutaneously every 2 weeks. called the ins she can only get so many pens in an allocted time she is over the limit--next available fill is 4/7      Evolocumab 140 MG/ML SOAJ Inject 140 mg into the skin      ibuprofen (ADVIL;MOTRIN) 200 MG tablet Take 200 mg by mouth every 6 hours as needed      insulin regular human (HUMULIN R U-500 KWIKPEN) 500 UNIT/ML SOPN concentrated injection pen Inject 55 units with breakfast and 45 units with dinner (max 100 units/day)      olmesartan-amLODIPine-HCTZ 40-5-12.5 MG TABS       OZEMPIC, 2 MG/DOSE, 8 MG/3ML SOPN       pregabalin (LYRICA) 150 MG capsule Take by mouth. No current facility-administered medications for this visit. Allergies   Allergen Reactions    Meperidine Shortness Of Breath     nausea      2,4-D Dimethylamine (Amisol)     Atorvastatin Hives and Other (See Comments)     fatigue  Patient takes Pravastatin at home      Benazepril Other (See Comments)    Clonidine Other (See Comments)    Dapagliflozin Other (See Comments)     palpatations    Egg White     Ezetimibe      Other reaction(s): GI Upset    Meperidine Hcl      Other reaction(s): Vomiting    Propoxyphene Hives    Canagliflozin Rash     yeast infection    Morphine Nausea And Vomiting and Hives     Nausea. Patient takes Vicodin at home with no problem         Review of Systems  Review of Systems   Constitutional: Negative. HENT: Negative. Eyes: Negative. Respiratory: Negative. Asthama   Cardiovascular: Negative. High  blood pressure   Gastrointestinal: Negative. Endocrine: Negative. Genitourinary: Negative. Musculoskeletal: Negative. Skin: Negative. Allergic/Immunologic: Negative. Neurological: Negative. 3 TIAS and Left hemiparesis. Hematological: Negative. Psychiatric/Behavioral: Negative.      All other systems reviewed and are negative. Physical Exam     /68   Pulse (!) 103   Temp 97.6 °F (36.4 °C)   Ht 5' 2\" (1.575 m)   Wt 190 lb (86.2 kg)   SpO2 97%   BMI 34.75 kg/m²   Physical Exam  Vitals and nursing note reviewed. Constitutional:       Appearance: Normal appearance. HENT:      Head: Normocephalic. Right Ear: Ear canal normal.      Left Ear: Ear canal normal.      Nose: Nose normal.      Mouth/Throat:      Mouth: Mucous membranes are moist.   Eyes:      Extraocular Movements: Extraocular movements intact. Conjunctiva/sclera: Conjunctivae normal.      Pupils: Pupils are equal, round, and reactive to light. Cardiovascular:      Rate and Rhythm: Normal rate and regular rhythm. Pulses: Normal pulses. Heart sounds: Normal heart sounds. Pulmonary:      Effort: Pulmonary effort is normal.      Breath sounds: Normal breath sounds. Abdominal:      General: Abdomen is flat. Bowel sounds are normal.      Palpations: Abdomen is soft. Musculoskeletal:         General: Normal range of motion. Cervical back: Normal range of motion and neck supple. Comments: Good Gait able to walk on Toes and heels, Tender facets and  moderate tenderness on bilateral SI Joints concordant with her pain, Severe pain on SLRs pain on Left gainslins,   Skin:     General: Skin is warm. Capillary Refill: Capillary refill takes less than 2 seconds. Neurological:      General: No focal deficit present. Mental Status: She is alert. Mental status is at baseline. She is oriented x 3   Psychiatric:         Mood and Affect: Mood normal.         Behavior: Behavior normal.         Thought Content: Thought content normal.         Judgment: Judgment normal.         Plan     Discussed a stim Trial will follow up with her decision.

## 2022-11-21 ENCOUNTER — OFFICE VISIT (OUTPATIENT)
Dept: PAIN MANAGEMENT | Age: 62
End: 2022-11-21
Payer: COMMERCIAL

## 2022-11-21 VITALS
TEMPERATURE: 98.2 F | SYSTOLIC BLOOD PRESSURE: 88 MMHG | HEIGHT: 62 IN | BODY MASS INDEX: 34.04 KG/M2 | WEIGHT: 185 LBS | DIASTOLIC BLOOD PRESSURE: 68 MMHG

## 2022-11-21 DIAGNOSIS — M54.16 LUMBAR RADICULOPATHY: ICD-10-CM

## 2022-11-21 DIAGNOSIS — M46.1 SACROILIITIS, NOT ELSEWHERE CLASSIFIED (HCC): ICD-10-CM

## 2022-11-21 DIAGNOSIS — G81.94 LEFT HEMIPARESIS (HCC): Primary | ICD-10-CM

## 2022-11-21 DIAGNOSIS — M47.817 LUMBOSACRAL SPONDYLOSIS WITHOUT MYELOPATHY: ICD-10-CM

## 2022-11-21 DIAGNOSIS — E08.42 DIABETIC POLYNEUROPATHY ASSOCIATED WITH DIABETES MELLITUS DUE TO UNDERLYING CONDITION (HCC): ICD-10-CM

## 2022-11-21 PROCEDURE — 99213 OFFICE O/P EST LOW 20 MIN: CPT | Performed by: PAIN MEDICINE

## 2022-11-21 NOTE — PROGRESS NOTES
History of Present Illness     Patient Identification  Juaquin Sellers is a 63 y.o. female. Patient information was obtained from patient. Chief Complaint   Chief Complaint   Patient presents with    Back Pain       Patient presents For follow bilateral TFESIs for complaint of back pain left leg pain had good relief Rt side had good relief with first shot on left , today over all she is feeling about 50% better,  with h/o CVA has weakness and loss of Bladder controll difficult to say if this is from weakness to run to toilet. Stopped Plavix 2 weeks ago, Had bilateral SI Joint injections has \" A Lot Of Relief\" 70% relief, has shooting pain going to her Toes. Had a fusion in Neck 2017, This is a result of no known injury. Onset of pain was 4 years ago and has been gradually worsening since. The pain is located in left lower back, described as aching and dull and rated as mild and moderate, with radiation, to the right foot. Symptoms include weakness. The patient denies numbness, incontinence, dysuria, hematuria. The patient denies other injuries. Care prior to arrival consisted of PT 2 years ago  with no relief. Pt is asking  for a stim trial.    Past Medical History:   Diagnosis Date    Cerebrovascular disease     Stroke 2/2017 and 4/2017    Neuropathy     Type 2 diabetes mellitus without complication ([facility] [address]      History reviewed. No pertinent family history. Current Outpatient Medications   Medication Sig Dispense Refill    acetaminophen (TYLENOL) 500 MG tablet Take 1,500 mg by mouth daily as needed      albuterol sulfate HFA (PROVENTIL;VENTOLIN;PROAIR) 108 (90 Base) MCG/ACT inhaler       baclofen (LIORESAL) 10 MG tablet       Biotin 10 MG tablet Take 1 tablet by mouth      clopidogrel (PLAVIX) 75 MG tablet Take 75 mg by mouth in the morning.       Continuous Blood Gluc Sensor (DEXCOM G6 SENSOR) MISC       Continuous Blood Gluc Transmit (DEXCOM G6 TRANSMITTER) MISC       cyanocobalamin 1000 MCG tablet Take 1,000 mcg by mouth in the morning. doxepin (SINEQUAN) 10 MG capsule TAKE 1 CAPSULE BY MOUTH AT BEDTIME FOR 30 DAYS      REPATHA SURECLICK 998 MG/ML SOAJ Inject 140 mg subcutaneously every 2 weeks. called the ins she can only get so many pens in an allocted time she is over the limit--next available fill is 4/7      Evolocumab 140 MG/ML SOAJ Inject 140 mg into the skin      ibuprofen (ADVIL;MOTRIN) 200 MG tablet Take 200 mg by mouth every 6 hours as needed      insulin regular human (HUMULIN R U-500 KWIKPEN) 500 UNIT/ML SOPN concentrated injection pen Inject 55 units with breakfast and 45 units with dinner (max 100 units/day)      olmesartan-amLODIPine-HCTZ 40-5-12.5 MG TABS       OZEMPIC, 2 MG/DOSE, 8 MG/3ML SOPN       pregabalin (LYRICA) 150 MG capsule Take by mouth. No current facility-administered medications for this visit. Allergies   Allergen Reactions    Meperidine Shortness Of Breath     nausea      2,4-D Dimethylamine (Amisol)     Atorvastatin Hives and Other (See Comments)     fatigue  Patient takes Pravastatin at home      Benazepril Other (See Comments)    Clonidine Other (See Comments)    Dapagliflozin Other (See Comments)     palpatations    Egg White     Ezetimibe      Other reaction(s): GI Upset    Meperidine Hcl      Other reaction(s): Vomiting    Propoxyphene Hives    Canagliflozin Rash     yeast infection    Morphine Nausea And Vomiting and Hives     Nausea. Patient takes Vicodin at home with no problem         Review of Systems  Review of Systems   Constitutional: Negative. HENT: Negative. Eyes: Negative. Respiratory: Negative. Asthama   Cardiovascular: Negative. High  blood pressure   Gastrointestinal: Negative. Endocrine: Negative. Genitourinary: Negative. Musculoskeletal: Negative. Skin: Negative. Allergic/Immunologic: Negative. Neurological: Negative. 3 TIAS and Left hemiparesis. Hematological: Negative. Psychiatric/Behavioral: Negative. All other systems reviewed and are negative. Physical Exam     /68   Pulse (!) 103   Temp 97.6 °F (36.4 °C)   Ht 5' 2\" (1.575 m)   Wt 190 lb (86.2 kg)   SpO2 97%   BMI 34.75 kg/m²   Physical Exam  Vitals and nursing note reviewed. Constitutional:       Appearance: Normal appearance. HENT:      Head: Normocephalic. Right Ear: Ear canal normal.      Left Ear: Ear canal normal.      Nose: Nose normal.      Mouth/Throat:      Mouth: Mucous membranes are moist.   Eyes:      Extraocular Movements: Extraocular movements intact. Conjunctiva/sclera: Conjunctivae normal.      Pupils: Pupils are equal, round, and reactive to light. Cardiovascular:      Rate and Rhythm: Normal rate and regular rhythm. Pulses: Normal pulses. Heart sounds: Normal heart sounds. Pulmonary:      Effort: Pulmonary effort is normal.      Breath sounds: Normal breath sounds. Abdominal:      General: Abdomen is flat. Bowel sounds are normal.      Palpations: Abdomen is soft. Musculoskeletal:         General: Normal range of motion. Cervical back: Normal range of motion and neck supple. Comments: Good Gait able to walk on Toes and heels, Tender facets and  moderate tenderness on bilateral SI Joints concordant with her pain, Severe pain on SLRs pain on Left gainslins,   Skin:     General: Skin is warm. Capillary Refill: Capillary refill takes less than 2 seconds. Neurological:      General: No focal deficit present. Mental Status: She is alert. Mental status is at baseline. She is oriented x 3   Psychiatric:         Mood and Affect: Mood normal.         Behavior: Behavior normal.         Thought Content: Thought content normal.         Judgment: Judgment normal.         Plan     Discussed a stim Trial wants the same will get a psych eval and follow.

## 2022-12-07 ENCOUNTER — TELEPHONE (OUTPATIENT)
Dept: NEUROSURGERY | Age: 62
End: 2022-12-07

## 2022-12-07 DIAGNOSIS — M54.16 LUMBAR RADICULITIS: Primary | ICD-10-CM

## 2022-12-07 NOTE — TELEPHONE ENCOUNTER
PT ALREADY HAD THE EVALUATION. IT IS SCANNED INTO [facility] AND IS READY FOR REVIEW WHEN NEEDED. NO ADDITIONAL EVAL NEEDS TO BE COMPLETED.

## 2022-12-07 NOTE — TELEPHONE ENCOUNTER
PATIENTS PSYCH EVAL WAS RECEIVED FROM [facility]. SCANNED INTO MED[facility] IN CHART.

## 2022-12-19 ENCOUNTER — OFFICE VISIT (OUTPATIENT)
Dept: PAIN MANAGEMENT | Age: 62
End: 2022-12-19
Payer: COMMERCIAL

## 2022-12-19 VITALS
SYSTOLIC BLOOD PRESSURE: 118 MMHG | DIASTOLIC BLOOD PRESSURE: 78 MMHG | WEIGHT: 187 LBS | TEMPERATURE: 97.6 F | HEIGHT: 62 IN | BODY MASS INDEX: 34.41 KG/M2

## 2022-12-19 DIAGNOSIS — M46.1 SACROILIITIS, NOT ELSEWHERE CLASSIFIED (HCC): ICD-10-CM

## 2022-12-19 DIAGNOSIS — E08.42 DIABETIC POLYNEUROPATHY ASSOCIATED WITH DIABETES MELLITUS DUE TO UNDERLYING CONDITION (HCC): ICD-10-CM

## 2022-12-19 DIAGNOSIS — G81.94 LEFT HEMIPARESIS (HCC): Primary | ICD-10-CM

## 2022-12-19 DIAGNOSIS — M54.16 LUMBAR RADICULOPATHY: ICD-10-CM

## 2022-12-19 DIAGNOSIS — M47.817 LUMBOSACRAL SPONDYLOSIS WITHOUT MYELOPATHY: ICD-10-CM

## 2022-12-19 PROCEDURE — 99213 OFFICE O/P EST LOW 20 MIN: CPT | Performed by: PAIN MEDICINE

## 2022-12-19 RX ORDER — PREGABALIN 150 MG/1
150 CAPSULE ORAL 2 TIMES DAILY
Qty: 60 CAPSULE | Refills: 3 | Status: SHIPPED | OUTPATIENT
Start: 2022-12-19 | End: 2023-01-16

## 2022-12-19 NOTE — PROGRESS NOTES
History of Present Illness     Patient Identification  Elana Maldonado is a 63 y.o. female. Patient information was obtained from patient. Chief Complaint   Chief Complaint   Patient presents with    Back Pain       Patient presents For follow bilateral TFESIs for complaint of back pain left leg pain had good relief Rt side had good relief with first shot on left , today over all she is feeling about 50% better,  with h/o CVA has weakness and loss of Bladder controll difficult to say if this is from weakness to run to toilet. Stopped Plavix 2 weeks ago, Had bilateral SI Joint injections has \" A Lot Of Relief\" 70% relief, has shooting pain going to her Toes. Had a fusion in Neck 2017, This is a result of no known injury. Onset of pain was 4 years ago and has been gradually worsening since. The pain is located in left lower back, described as aching and dull and rated as mild and moderate, with radiation, to the right foot. Symptoms include weakness. The patient denies numbness, incontinence, dysuria, hematuria. The patient denies other injuries. Care prior to arrival consisted of PT 2 years ago  with no relief. Pt is asking  for a stim trial.    Past Medical History:   Diagnosis Date    Cerebrovascular disease     Stroke 2/2017 and 4/2017    Neuropathy     Type 2 diabetes mellitus without complication ([facility] [address]      History reviewed. No pertinent family history. Current Outpatient Medications   Medication Sig Dispense Refill    acetaminophen (TYLENOL) 500 MG tablet Take 1,500 mg by mouth daily as needed      albuterol sulfate HFA (PROVENTIL;VENTOLIN;PROAIR) 108 (90 Base) MCG/ACT inhaler       baclofen (LIORESAL) 10 MG tablet       Biotin 10 MG tablet Take 1 tablet by mouth      clopidogrel (PLAVIX) 75 MG tablet Take 75 mg by mouth in the morning.       Continuous Blood Gluc Sensor (DEXCOM G6 SENSOR) MISC       Continuous Blood Gluc Transmit (DEXCOM G6 TRANSMITTER) MISC       cyanocobalamin 1000 MCG tablet Take 1,000 mcg by mouth in the morning. doxepin (SINEQUAN) 10 MG capsule TAKE 1 CAPSULE BY MOUTH AT BEDTIME FOR 30 DAYS      REPATHA SURECLICK 439 MG/ML SOAJ Inject 140 mg subcutaneously every 2 weeks. called the ins she can only get so many pens in an allocted time she is over the limit--next available fill is 4/7      Evolocumab 140 MG/ML SOAJ Inject 140 mg into the skin      ibuprofen (ADVIL;MOTRIN) 200 MG tablet Take 200 mg by mouth every 6 hours as needed      insulin regular human (HUMULIN R U-500 KWIKPEN) 500 UNIT/ML SOPN concentrated injection pen Inject 55 units with breakfast and 45 units with dinner (max 100 units/day)      olmesartan-amLODIPine-HCTZ 40-5-12.5 MG TABS       OZEMPIC, 2 MG/DOSE, 8 MG/3ML SOPN       pregabalin (LYRICA) 150 MG capsule Take by mouth. No current facility-administered medications for this visit. Allergies   Allergen Reactions    Meperidine Shortness Of Breath     nausea      2,4-D Dimethylamine (Amisol)     Atorvastatin Hives and Other (See Comments)     fatigue  Patient takes Pravastatin at home      Benazepril Other (See Comments)    Clonidine Other (See Comments)    Dapagliflozin Other (See Comments)     palpatations    Egg White     Ezetimibe      Other reaction(s): GI Upset    Meperidine Hcl      Other reaction(s): Vomiting    Propoxyphene Hives    Canagliflozin Rash     yeast infection    Morphine Nausea And Vomiting and Hives     Nausea. Patient takes Vicodin at home with no problem         Review of Systems  Review of Systems   Constitutional: Negative. HENT: Negative. Eyes: Negative. Respiratory: Negative. Asthama   Cardiovascular: Negative. High  blood pressure   Gastrointestinal: Negative. Endocrine: Negative. Genitourinary: Negative. Musculoskeletal: Negative. Skin: Negative. Allergic/Immunologic: Negative. Neurological: Negative. 3 TIAS and Left hemiparesis. Hematological: Negative. Psychiatric/Behavioral: Negative. All other systems reviewed and are negative. Physical Exam     /68   Pulse (!) 103   Temp 97.6 °F (36.4 °C)   Ht 5' 2\" (1.575 m)   Wt 190 lb (86.2 kg)   SpO2 97%   BMI 34.75 kg/m²   Physical Exam  Vitals and nursing note reviewed. Constitutional:       Appearance: Normal appearance. HENT:      Head: Normocephalic. Right Ear: Ear canal normal.      Left Ear: Ear canal normal.      Nose: Nose normal.      Mouth/Throat:      Mouth: Mucous membranes are moist.   Eyes:      Extraocular Movements: Extraocular movements intact. Conjunctiva/sclera: Conjunctivae normal.      Pupils: Pupils are equal, round, and reactive to light. Cardiovascular:      Rate and Rhythm: Normal rate and regular rhythm. Pulses: Normal pulses. Heart sounds: Normal heart sounds. Pulmonary:      Effort: Pulmonary effort is normal.      Breath sounds: Normal breath sounds. Abdominal:      General: Abdomen is flat. Bowel sounds are normal.      Palpations: Abdomen is soft. Musculoskeletal:         General: Normal range of motion. Cervical back: Normal range of motion and neck supple. Comments: Good Gait able to walk on Toes and heels, Tender facets and  moderate tenderness on bilateral SI Joints concordant with her pain, Severe pain on SLRs pain on Left gainslins,   Skin:     General: Skin is warm. Capillary Refill: Capillary refill takes less than 2 seconds. Neurological:      General: No focal deficit present. Mental Status: She is alert. Mental status is at baseline. She is oriented x 3   Psychiatric:         Mood and Affect: Mood normal.         Behavior: Behavior normal.         Thought Content:  Thought content normal.         Judgment: Judgment normal.         Plan     Discussed a stim Trial wants the same Has a favourable psych eval Plan on stim trial.

## 2023-01-11 ENCOUNTER — PREP FOR PROCEDURE (OUTPATIENT)
Dept: NEUROSURGERY | Age: 63
End: 2023-01-11

## 2023-01-11 DIAGNOSIS — M47.817 LUMBOSACRAL SPONDYLOSIS WITHOUT MYELOPATHY: Primary | ICD-10-CM

## 2023-01-12 PROBLEM — M47.817 LUMBOSACRAL SPONDYLOSIS WITHOUT MYELOPATHY: Status: ACTIVE | Noted: 2023-01-12

## 2023-02-06 RX ORDER — ACETAMINOPHEN 325 MG/1
1000 TABLET ORAL ONCE
Status: CANCELLED | OUTPATIENT
Start: 2023-02-06 | End: 2023-02-06

## 2023-02-07 ENCOUNTER — TELEPHONE (OUTPATIENT)
Dept: NEUROSURGERY | Age: 63
End: 2023-02-07

## 2023-02-07 ENCOUNTER — HOSPITAL ENCOUNTER (OUTPATIENT)
Dept: PREADMISSION TESTING | Age: 63
Discharge: HOME | End: 2023-02-11
Payer: COMMERCIAL

## 2023-02-07 VITALS
OXYGEN SATURATION: 98 % | WEIGHT: 188.6 LBS | HEART RATE: 88 BPM | BODY MASS INDEX: 33.42 KG/M2 | SYSTOLIC BLOOD PRESSURE: 122 MMHG | RESPIRATION RATE: 16 BRPM | HEIGHT: 63 IN | DIASTOLIC BLOOD PRESSURE: 62 MMHG | TEMPERATURE: 98.3 F

## 2023-02-07 LAB
ANION GAP SERPL CALCULATED.3IONS-SCNC: 11 MEQ/L (ref 9–15)
APTT: 26.8 SEC (ref 24.4–36.8)
BASOPHILS ABSOLUTE: 0 K/UL (ref 0–0.2)
BASOPHILS RELATIVE PERCENT: 0.4 %
BUN BLDV-MCNC: 11 MG/DL (ref 8–23)
CALCIUM SERPL-MCNC: 9.6 MG/DL (ref 8.5–9.9)
CHLORIDE BLD-SCNC: 95 MEQ/L (ref 95–107)
CO2: 30 MEQ/L (ref 20–31)
CREAT SERPL-MCNC: 0.53 MG/DL (ref 0.5–0.9)
EOSINOPHILS ABSOLUTE: 0.3 K/UL (ref 0–0.7)
EOSINOPHILS RELATIVE PERCENT: 3.3 %
GFR SERPL CREATININE-BSD FRML MDRD: >60 ML/MIN/{1.73_M2}
GLUCOSE BLD-MCNC: 127 MG/DL (ref 70–99)
HCT VFR BLD CALC: 41.1 % (ref 37–47)
HEMOGLOBIN: 14.3 G/DL (ref 12–16)
INR BLD: 1
LYMPHOCYTES ABSOLUTE: 3.1 K/UL (ref 1–4.8)
LYMPHOCYTES RELATIVE PERCENT: 31.4 %
MCH RBC QN AUTO: 31.2 PG (ref 27–31.3)
MCHC RBC AUTO-ENTMCNC: 34.8 % (ref 33–37)
MCV RBC AUTO: 89.8 FL (ref 79.4–94.8)
MONOCYTES ABSOLUTE: 0.7 K/UL (ref 0.2–0.8)
MONOCYTES RELATIVE PERCENT: 7.4 %
NEUTROPHILS ABSOLUTE: 5.7 K/UL (ref 1.4–6.5)
NEUTROPHILS RELATIVE PERCENT: 57.5 %
PDW BLD-RTO: 12.7 % (ref 11.5–14.5)
PLATELET # BLD: 384 K/UL (ref 130–400)
POTASSIUM SERPL-SCNC: 4 MEQ/L (ref 3.4–4.9)
PROTHROMBIN TIME: 12.8 SEC (ref 12.3–14.9)
RBC # BLD: 4.57 M/UL (ref 4.2–5.4)
SODIUM BLD-SCNC: 136 MEQ/L (ref 135–144)
WBC # BLD: 9.8 K/UL (ref 4.8–10.8)

## 2023-02-07 PROCEDURE — 85025 COMPLETE CBC W/AUTO DIFF WBC: CPT

## 2023-02-07 PROCEDURE — 85610 PROTHROMBIN TIME: CPT

## 2023-02-07 PROCEDURE — 80048 BASIC METABOLIC PNL TOTAL CA: CPT

## 2023-02-07 PROCEDURE — 85730 THROMBOPLASTIN TIME PARTIAL: CPT

## 2023-02-07 PROCEDURE — 93005 ELECTROCARDIOGRAM TRACING: CPT | Performed by: FAMILY MEDICINE

## 2023-02-07 RX ORDER — LANOLIN ALCOHOL/MO/W.PET/CERES
3 CREAM (GRAM) TOPICAL DAILY
COMMUNITY

## 2023-02-07 ASSESSMENT — ENCOUNTER SYMPTOMS
COUGH: 0
DIARRHEA: 0
WHEEZING: 0
PHOTOPHOBIA: 0
SHORTNESS OF BREATH: 0
CHEST TIGHTNESS: 0
APNEA: 0
EYE DISCHARGE: 0
NAUSEA: 0
BLOOD IN STOOL: 0
EYE ITCHING: 0
ABDOMINAL DISTENTION: 0
BACK PAIN: 1
TROUBLE SWALLOWING: 0
CHOKING: 0
SINUS PRESSURE: 0
EYE PAIN: 0
FACIAL SWELLING: 0
ABDOMINAL PAIN: 0
CONSTIPATION: 0
ALLERGIC/IMMUNOLOGIC NEGATIVE: 1
SINUS PAIN: 0
VOMITING: 0
SORE THROAT: 0

## 2023-02-07 NOTE — TELEPHONE ENCOUNTER
SURGERY DATE:  2/14 - SPINAL CORD STIM TRIAL     CALL RECD @ 1:05PM FROM PAT NURSE LETTING US KNOW THAT \" pt does not want blood or blood products in an emergency\" during the surgery. This is documented in her chart, but they wanted Dr. Iman Multani to be aware.

## 2023-02-07 NOTE — H&P
PRE ADMISSION TESTING    HISTORY AND PHYSICAL EXAM    PATIENT NAME:  Thierry Sullivan    YOB: 1960  MRN:  [id number]  SERVICE DATE:  2/7/2023     Primary Care Physician: Alan Curran MD   Surgeon: Dr. Rochelle Tirado:  lumbosacral spondylosis without myelopathy    The patient is a 63 y.o. female with significant past medical history of DM, HLD, HTN, hx of several strokes who presents for a preoperative consultation at the request of surgeon, Dr. Sandi Vargas, who plans on performing spinal cord stimulator trial under MAC anesthesia on 2/14/2023 at [facility] [place]. The current problem began approximately in 2016 when she went to the physician due to back pain and she couldn't move well. She had an MRI and it showed arthritis but no other dx was made nor any further tx plans. She later had a stroke and it affected her left side. She then went back to the physician who tried multiple medications but nothing worked and she was told that she would have to live with it. She was referred to pain management after going to a new doctor (Dr. Jaswant Jarrett) who tried injections and therapy but it did not work. She had some injections with pain management  when she was established which did not relieve any pain. Now she is going to trial the SCS. Patient has hx of strokes, started in Feb 2017 when her left leg was numb and she fell when she completely lost control of her leg and couldn't hold her weight. Her  started noticing some facial changes so he took her to the ER where they administered TPA. She had another major stroke in April 2017. She reports some minor ones occurring before the initial major one and then after. Approx a total of 5 strokes. She does have some mild deficits to her left arm/leg which does get worse as the day progresses. She has previously seen cardiology, Dr. Radha Davidson at [place].  Patient has appointment 2/9/23 for regular follow up and she requested a copy of her EKG to take to her appointment. She is under the care of endocrinology due to her DM dx. Known Anesthesia Problems: None    Patient Objection to Receiving Blood Products: YES-pt does not want blood or blood products in an emergency  *blood bank, surgery schedulers and Dr. Baljit Marie of  of DVT/PE: No    Medical/Cardiac Clearance Needed: Not Required    ALLERGIES: Meperidine; 2,4-d dimethylamine (amisol); Atorvastatin; Benazepril; Clonidine; Dapagliflozin; Darvon [propoxyphene]; Egg white; Ezetimibe;  Meperidine hcl; Canagliflozin; and Morphine    PAST MEDICAL HISTORY:    Past Medical History:   Diagnosis Date    Asthma     CAD (coronary artery disease)     previous MI    Cerebral artery occlusion with cerebral infarction ([facility] [address]     Cerebrovascular disease     Stroke 2/2017 and 4/2017    Chronic pain     Depression     Diabetes ([facility] [address]     Headache     Hyperlipidemia     Hypertension     Neuropathy     Osteoarthritis     Type 2 diabetes mellitus without complication ([facility] [address]      PAST SURGICAL HISTORY:    Past Surgical History:   Procedure Laterality Date    APPENDECTOMY      removed at age 27    BACK SURGERY      neck fusion w/placement of disc in december 2014    BICEPS TENDON REPAIR Right     BLADDER SUSPENSION      bladder sling and oopherectomy (unilateral) approx 15 yrs ago    COLONOSCOPY      ENDOSCOPY, COLON, DIAGNOSTIC      ROTATOR CUFF REPAIR Right     SPINE SURGERY       FAMILY HISTORY:    Family History   Problem Relation Age of Onset    Hypertension Mother     Cancer Mother     Diabetes Mother     Heart Disease Mother     Diabetes Father     Arthritis Father      SOCIAL HISTORY:    Social History     Socioeconomic History    Marital status:      Spouse name: Not on file    Number of children: Not on file    Years of education: Not on file    Highest education level: Not on file   Occupational History    Not on file   Tobacco Use    Smoking status: Former     Packs/day: 1.00 Years: 20.00     Pack years: 20.00     Types: Cigarettes     Quit date: 26     Years since quittin.1    Smokeless tobacco: Never   Vaping Use    Vaping Use: Never used   Substance and Sexual Activity    Alcohol use: Yes     Comment: maybe 1x week    Drug use: Never    Sexual activity: Not on file   Other Topics Concern    Not on file   Social History Narrative    Not on file     Social Determinants of Health     Financial Resource Strain: Not on file   Food Insecurity: Not on file   Transportation Needs: Not on file   Physical Activity: Not on file   Stress: Not on file   Social Connections: Not on file   Intimate Partner Violence: Not on file   Housing Stability: Not on file     MEDICATIONS:   Prior to Admission medications    Medication Sig Start Date End Date Taking? Authorizing Provider   Multiple Vitamin (MULTIVITAMIN ADULT PO) Take by mouth   Yes Historical Provider, MD   APPLE CIDER VINEGAR PO Take by mouth   Yes Historical Provider, MD   Nutritional Supplements (KETO PO) Take by mouth supplement   Yes Historical Provider, MD   melatonin 3 MG TABS tablet Take 3 mg by mouth daily   Yes Historical Provider, MD   pregabalin (LYRICA) 150 MG capsule Take 1 capsule by mouth 2 times daily for 28 days.  22  Yehuda Menjivar MD   acetaminophen (TYLENOL) 500 MG tablet Take 1,500 mg by mouth daily as needed    Historical Provider, MD   albuterol sulfate HFA (PROVENTIL;VENTOLIN;PROAIR) 108 (90 Base) MCG/ACT inhaler  22   Historical Provider, MD   baclofen (LIORESAL) 10 MG tablet  22   Historical Provider, MD   Biotin 10 MG tablet Take 1 tablet by mouth    Historical Provider, MD   clopidogrel (PLAVIX) 75 MG tablet Take 75 mg by mouth in the morning. 3/28/22   Historical Provider, MD   Continuous Blood Gluc Sensor (DEXCOM G6 SENSOR) [address] [facility]  22   Historical Provider, MD   Continuous Blood Gluc Transmit (DEXCOM G6 TRANSMITTER) [address] [facility]  22   Historical Provider, MD   cyanocobalamin 1000 MCG tablet Take 1,000 mcg by mouth in the morning. Historical Provider, MD   doxepin (SINEQUAN) 10 MG capsule TAKE 1 CAPSULE BY MOUTH AT BEDTIME FOR 30 DAYS 6/29/22   Historical Provider, MD GABINO ELLIOTT 338 MG/ML SOAJ Inject 140 mg subcutaneously every 2 weeks. called the ins she can only get so many pens in an allocted time she is over the limit--next available fill is 4/7 7/14/22   Historical Provider, MD   Evolocumab 140 MG/ML SOAJ Inject 140 mg into the skin  Patient not taking: Reported on 2/7/2023 3/28/22 3/28/23  Historical Provider, MD   ibuprofen (ADVIL;MOTRIN) 200 MG tablet Take 200 mg by mouth every 6 hours as needed    Historical Provider, MD   insulin regular human (HUMULIN R U-500 KWIKPEN) 500 UNIT/ML SOPN concentrated injection pen Inject 55 units with breakfast and 45 units with dinner (max 100 units/day) 7/22/22   Historical Provider, MD   olmesartan-amLODIPine-HCTZ 40-5-12.5 MG TABS  1/11/22   Historical Provider, MD   OZEMPIC, 2 MG/DOSE, 8 MG/3ML SOPN  5/19/22   Historical Provider, MD         REVIEW OF SYSTEM:   Review of Systems   Constitutional:  Negative for activity change, appetite change, chills, fatigue and fever. HENT:  Positive for congestion (hx of congestion). Negative for dental problem, ear discharge, ear pain, facial swelling, hearing loss, mouth sores, nosebleeds, sinus pressure, sinus pain, sneezing, sore throat, tinnitus and trouble swallowing. Eyes:  Negative for photophobia, pain, discharge, itching and visual disturbance. Wears prescription glasses   Respiratory:  Negative for apnea, cough, choking, chest tightness, shortness of breath and wheezing. Hx of asthma   Cardiovascular:  Negative for chest pain, palpitations and leg swelling. Gastrointestinal:  Negative for abdominal distention, abdominal pain, blood in stool, constipation, diarrhea, nausea and vomiting. Endocrine: Negative for cold intolerance and heat intolerance.    Genitourinary:  Negative for decreased urine volume, difficulty urinating, dysuria, flank pain, frequency, hematuria, pelvic pain, urgency and vaginal pain. Musculoskeletal:  Positive for arthralgias, back pain, gait problem (on occasion will have to walk with an assistive device) and neck stiffness (hx of fusion). Negative for joint swelling, myalgias and neck pain. Skin:  Negative for rash and wound. Allergic/Immunologic: Negative. Neurological:  Negative for dizziness, tremors, seizures, syncope, speech difficulty, weakness, light-headedness, numbness and headaches. Hematological:  Bruises/bleeds easily. Psychiatric/Behavioral: Negative. OBJECTIVE  PHYSICAL EXAM:   Physical Exam  Vitals reviewed. Constitutional:       General: She is not in acute distress. Appearance: Normal appearance. She is not ill-appearing, toxic-appearing or diaphoretic. HENT:      Head: Normocephalic. Right Ear: Tympanic membrane, ear canal and external ear normal. There is no impacted cerumen. Left Ear: Tympanic membrane, ear canal and external ear normal. There is no impacted cerumen. Nose: Nose normal. No congestion or rhinorrhea. Mouth/Throat:      Mouth: Mucous membranes are moist.      Pharynx: Oropharynx is clear. No oropharyngeal exudate or posterior oropharyngeal erythema. Eyes:      General:         Right eye: No discharge. Left eye: No discharge. Extraocular Movements: Extraocular movements intact. Conjunctiva/sclera: Conjunctivae normal.      Pupils: Pupils are equal, round, and reactive to light. Comments: Wears prescription glasses. Cardiovascular:      Rate and Rhythm: Normal rate and regular rhythm. Pulses: Normal pulses. Heart sounds: Normal heart sounds. Pulmonary:      Effort: Pulmonary effort is normal. No respiratory distress. Breath sounds: Normal breath sounds. No stridor. No wheezing, rhonchi or rales. Chest:      Chest wall: No tenderness. Abdominal:      General: Abdomen is flat. Bowel sounds are normal. There is no distension. Palpations: Abdomen is soft. There is no mass. Tenderness: There is no abdominal tenderness. There is no guarding or rebound. Hernia: No hernia is present. Genitourinary:     Rectum: Normal.   Musculoskeletal:         General: No swelling, tenderness, deformity or signs of injury. Normal range of motion. Cervical back: Normal range of motion and neck supple. No rigidity or tenderness. Right lower leg: No edema. Left lower leg: No edema. Comments: Some weakness to left upper and lower extremity from past stroke   Lymphadenopathy:      Cervical: No cervical adenopathy. Skin:     General: Skin is warm and dry. Capillary Refill: Capillary refill takes less than 2 seconds. Coloration: Skin is not jaundiced or pale. Findings: No bruising, erythema, lesion or rash. Neurological:      General: No focal deficit present. Mental Status: She is alert and oriented to person, place, and time. Cranial Nerves: No cranial nerve deficit. Sensory: No sensory deficit. Motor: No weakness. Coordination: Coordination normal.      Gait: Gait normal.   Psychiatric:         Mood and Affect: Mood normal.         Behavior: Behavior normal.         Thought Content: Thought content normal.         Judgment: Judgment normal.        /62   Pulse 88   Temp 98.3 °F (36.8 °C) (Temporal)   Resp 16   Ht 5' 2.5\" (1.588 m)   Wt 188 lb 9.6 oz (85.5 kg)   SpO2 98%   BMI 33.95 kg/m²       Diagnostic tests reviewed for today's visit:      IMAGING:  No results found.     ASSESSMENT  Patient Active Problem List   Diagnosis    Lumbosacral spondylosis without myelopathy         Plan:  Preoperative workup as follows: CBC w/diff, BMP, PT/INR, EKG  2.   Scheduled for: spinal cord stimulator trial    SIGNATURE: PAM Soler - CNP  DATE: February 7, 2023

## 2023-02-08 LAB
EKG ATRIAL RATE: 87 BPM
EKG P AXIS: 50 DEGREES
EKG P-R INTERVAL: 160 MS
EKG Q-T INTERVAL: 406 MS
EKG QRS DURATION: 106 MS
EKG QTC CALCULATION (BAZETT): 488 MS
EKG R AXIS: -28 DEGREES
EKG T AXIS: 62 DEGREES
EKG VENTRICULAR RATE: 87 BPM

## 2023-02-08 PROCEDURE — 93010 ELECTROCARDIOGRAM REPORT: CPT | Performed by: INTERNAL MEDICINE

## 2023-02-09 RX ORDER — SODIUM CHLORIDE 0.9 % (FLUSH) 0.9 %
5-40 SYRINGE (ML) INJECTION PRN
Status: CANCELLED | OUTPATIENT
Start: 2023-02-14

## 2023-02-09 RX ORDER — SODIUM CHLORIDE 0.9 % (FLUSH) 0.9 %
5-40 SYRINGE (ML) INJECTION EVERY 12 HOURS SCHEDULED
Status: CANCELLED | OUTPATIENT
Start: 2023-02-14

## 2023-02-09 RX ORDER — SODIUM CHLORIDE 9 MG/ML
INJECTION, SOLUTION INTRAVENOUS PRN
Status: CANCELLED | OUTPATIENT
Start: 2023-02-14

## 2023-02-13 ENCOUNTER — ANESTHESIA EVENT (OUTPATIENT)
Dept: OPERATING ROOM | Age: 63
End: 2023-02-13
Payer: COMMERCIAL

## 2023-02-14 ENCOUNTER — APPOINTMENT (OUTPATIENT)
Dept: GENERAL RADIOLOGY | Age: 63
End: 2023-02-14
Attending: PAIN MEDICINE
Payer: COMMERCIAL

## 2023-02-14 ENCOUNTER — HOSPITAL ENCOUNTER (OUTPATIENT)
Age: 63
Setting detail: OUTPATIENT SURGERY
Discharge: HOME | End: 2023-02-14
Attending: PAIN MEDICINE | Admitting: PAIN MEDICINE
Payer: COMMERCIAL

## 2023-02-14 ENCOUNTER — ANESTHESIA (OUTPATIENT)
Dept: OPERATING ROOM | Age: 63
End: 2023-02-14
Payer: COMMERCIAL

## 2023-02-14 VITALS
RESPIRATION RATE: 20 BRPM | BODY MASS INDEX: 33.42 KG/M2 | OXYGEN SATURATION: 100 % | HEIGHT: 63 IN | SYSTOLIC BLOOD PRESSURE: 139 MMHG | HEART RATE: 94 BPM | WEIGHT: 188.6 LBS | TEMPERATURE: 98 F | DIASTOLIC BLOOD PRESSURE: 74 MMHG

## 2023-02-14 DIAGNOSIS — M47.817 LUMBOSACRAL SPONDYLOSIS WITHOUT MYELOPATHY: ICD-10-CM

## 2023-02-14 LAB
GLUCOSE BLD-MCNC: 204 MG/DL (ref 70–99)
PERFORMED ON: ABNORMAL

## 2023-02-14 PROCEDURE — 2580000003 HC RX 258: Performed by: PAIN MEDICINE

## 2023-02-14 PROCEDURE — 3600000004 HC SURGERY LEVEL 4 BASE: Performed by: PAIN MEDICINE

## 2023-02-14 PROCEDURE — 3209999900 FLUORO FOR SURGICAL PROCEDURES

## 2023-02-14 PROCEDURE — 7100000010 HC PHASE II RECOVERY - FIRST 15 MIN: Performed by: PAIN MEDICINE

## 2023-02-14 PROCEDURE — 7100000011 HC PHASE II RECOVERY - ADDTL 15 MIN: Performed by: PAIN MEDICINE

## 2023-02-14 PROCEDURE — C1897 LEAD, NEUROSTIM TEST KIT: HCPCS | Performed by: PAIN MEDICINE

## 2023-02-14 PROCEDURE — 2709999900 HC NON-CHARGEABLE SUPPLY: Performed by: PAIN MEDICINE

## 2023-02-14 PROCEDURE — 2720000010 HC SURG SUPPLY STERILE: Performed by: PAIN MEDICINE

## 2023-02-14 PROCEDURE — 3700000000 HC ANESTHESIA ATTENDED CARE: Performed by: PAIN MEDICINE

## 2023-02-14 PROCEDURE — 3600000014 HC SURGERY LEVEL 4 ADDTL 15MIN: Performed by: PAIN MEDICINE

## 2023-02-14 PROCEDURE — 6360000002 HC RX W HCPCS: Performed by: PAIN MEDICINE

## 2023-02-14 PROCEDURE — 3700000001 HC ADD 15 MINUTES (ANESTHESIA): Performed by: PAIN MEDICINE

## 2023-02-14 PROCEDURE — 2500000003 HC RX 250 WO HCPCS: Performed by: PAIN MEDICINE

## 2023-02-14 DEVICE — KIT LD L60CM 1X8 COMP TRL SCRN FOR SACR NRV STIM VECTRIS: Type: IMPLANTABLE DEVICE | Site: BACK | Status: FUNCTIONAL

## 2023-02-14 RX ORDER — SODIUM CHLORIDE 0.9 % (FLUSH) 0.9 %
5-40 SYRINGE (ML) INJECTION PRN
Status: CANCELLED | OUTPATIENT
Start: 2023-02-14

## 2023-02-14 RX ORDER — BACLOFEN 10 MG/1
TABLET ORAL
COMMUNITY

## 2023-02-14 RX ORDER — DIPHENHYDRAMINE HYDROCHLORIDE 50 MG/ML
12.5 INJECTION INTRAMUSCULAR; INTRAVENOUS
Status: CANCELLED | OUTPATIENT
Start: 2023-02-14 | End: 2023-02-15

## 2023-02-14 RX ORDER — ONDANSETRON 2 MG/ML
4 INJECTION INTRAMUSCULAR; INTRAVENOUS
Status: CANCELLED | OUTPATIENT
Start: 2023-02-14 | End: 2023-02-15

## 2023-02-14 RX ORDER — METOCLOPRAMIDE HYDROCHLORIDE 5 MG/ML
10 INJECTION INTRAMUSCULAR; INTRAVENOUS
Status: CANCELLED | OUTPATIENT
Start: 2023-02-14 | End: 2023-02-15

## 2023-02-14 RX ORDER — LIDOCAINE HYDROCHLORIDE 10 MG/ML
INJECTION, SOLUTION EPIDURAL; INFILTRATION; INTRACAUDAL; PERINEURAL PRN
Status: DISCONTINUED | OUTPATIENT
Start: 2023-02-14 | End: 2023-02-14 | Stop reason: ALTCHOICE

## 2023-02-14 RX ORDER — SODIUM CHLORIDE 0.9 % (FLUSH) 0.9 %
5-40 SYRINGE (ML) INJECTION EVERY 12 HOURS SCHEDULED
Status: DISCONTINUED | OUTPATIENT
Start: 2023-02-14 | End: 2023-02-14 | Stop reason: HOSPADM

## 2023-02-14 RX ORDER — SODIUM CHLORIDE 9 MG/ML
INJECTION, SOLUTION INTRAVENOUS PRN
Status: DISCONTINUED | OUTPATIENT
Start: 2023-02-14 | End: 2023-02-14 | Stop reason: HOSPADM

## 2023-02-14 RX ORDER — SODIUM CHLORIDE 0.9 % (FLUSH) 0.9 %
SYRINGE (ML) INJECTION PRN
Status: DISCONTINUED | OUTPATIENT
Start: 2023-02-14 | End: 2023-02-14 | Stop reason: ALTCHOICE

## 2023-02-14 RX ORDER — OXCARBAZEPINE 150 MG/1
150 TABLET, FILM COATED ORAL 2 TIMES DAILY
COMMUNITY

## 2023-02-14 RX ORDER — SODIUM CHLORIDE 0.9 % (FLUSH) 0.9 %
5-40 SYRINGE (ML) INJECTION EVERY 12 HOURS SCHEDULED
Status: CANCELLED | OUTPATIENT
Start: 2023-02-14

## 2023-02-14 RX ORDER — ESOMEPRAZOLE MAGNESIUM 40 MG/1
CAPSULE, DELAYED RELEASE ORAL
COMMUNITY
Start: 2013-04-24

## 2023-02-14 RX ORDER — SODIUM CHLORIDE 0.9 % (FLUSH) 0.9 %
5-40 SYRINGE (ML) INJECTION PRN
Status: DISCONTINUED | OUTPATIENT
Start: 2023-02-14 | End: 2023-02-14 | Stop reason: HOSPADM

## 2023-02-14 RX ORDER — LIDOCAINE HYDROCHLORIDE 10 MG/ML
1 INJECTION, SOLUTION EPIDURAL; INFILTRATION; INTRACAUDAL; PERINEURAL
Status: DISCONTINUED | OUTPATIENT
Start: 2023-02-14 | End: 2023-02-14 | Stop reason: HOSPADM

## 2023-02-14 RX ORDER — CEFAZOLIN SODIUM IN 0.9 % NACL 2 G/100 ML
2000 PLASTIC BAG, INJECTION (ML) INTRAVENOUS ONCE
Status: COMPLETED | OUTPATIENT
Start: 2023-02-14 | End: 2023-02-14

## 2023-02-14 RX ORDER — SODIUM CHLORIDE 9 MG/ML
25 INJECTION, SOLUTION INTRAVENOUS PRN
Status: CANCELLED | OUTPATIENT
Start: 2023-02-14

## 2023-02-14 RX ADMIN — SODIUM CHLORIDE: 9 INJECTION, SOLUTION INTRAVENOUS at 08:44

## 2023-02-14 RX ADMIN — Medication 2000 MG: at 07:28

## 2023-02-14 RX ADMIN — SODIUM CHLORIDE 1000 ML: 9 INJECTION, SOLUTION INTRAVENOUS at 06:47

## 2023-02-14 ASSESSMENT — PAIN DESCRIPTION - DESCRIPTORS: DESCRIPTORS: PATIENT UNABLE TO DESCRIBE

## 2023-02-14 ASSESSMENT — PAIN - FUNCTIONAL ASSESSMENT
PAIN_FUNCTIONAL_ASSESSMENT: PREVENTS OR INTERFERES WITH ALL ACTIVE AND SOME PASSIVE ACTIVITIES
PAIN_FUNCTIONAL_ASSESSMENT: 0-10

## 2023-02-14 NOTE — PROGRESS NOTES
Disch inst given and explained to pt, up to bathroom voided QS steady gait, stimulator rep done with pt , son here to

## 2023-02-14 NOTE — OP NOTE
Operative Note      Patient: Clive Hewitt  YOB: 1960  MRN: [id number]    Date of Procedure: 2/14/2023    Pre-Op Diagnosis: Lumbosacral spondylosis without myelopathy [M47.817]    Post-Op Diagnosis: Same       Procedure(s):  SPINAL CORD STIMULATOR TRIAL 2 leads    Surgeon(s):  Cornelius Zayas MD    Assistant:   First Assistant: Laxmi Alexander    Anesthesia: Monitor Anesthesia Care    Estimated Blood Loss (mL): Minimal    Complications: None    Specimens:   * No specimens in log *    Implants:  Implant Name Type Inv. Item Serial No.  Lot No. LRB No. Used Action   KIT LD L60CM 1X8 COMP TRL SCRN FOR [employer] NRV STIM VECTRIS - [id number]  KIT LD L60CM 1X8 COMP TRL SCRN FOR [employer] NRV STIM [address] [employer] [id number] N/A 1 Implanted   KIT LD L60CM 1X8 COMP TRL SCRN FOR [employer]V STIM [employer] - [id number]  KIT LD L60CM 1X8 COMP TRL SCRN FOR [employer]V STIM [address] [employer]WD [id number] N/A 1 Implanted         Drains: * No LDAs found *    Findings: None    Detailed Description of Procedure:   Patient explained of procedure, Risks and complications explained. Questions and concerns addressed. Pt taken to OR3 and placed in Prone pojistion on OR table,Skin prepped with Chloraprep and surgically drapped. Fluroscope surgically drapped and brought on to the field. Skin marked for needle placement with Fluoroscopy and anesthetized with about 15cc 1% lidocaine in 2 spots. An extra long Tuhoy needle advanced under fluoroscopy towards L2-3, 1.2 interlaminar epidural spaces in AP and Lateral views. 2 8 lead electrodes advanced unde fluroscopy towards T7 level and after stimulation pulled down up to T8 level, Needles withdrawn leaving the leads in place under fluroscopy, leads connected to external stimulator and attached to skin using Steristrips and opsite, She was then taken to pacu and trained to use the stim and discharged home in stable condition.     Electronically signed by Truman Felty Rose Durbin MD on 2/14/2023 at 9:07 AM

## 2023-02-14 NOTE — DISCHARGE INSTRUCTIONS
Follow Dr. Janie Colbert instructions, Diet, activity and Meds as Before. Follow up in Office in 1 week  Please call and make appointment. .  Please come to office or ER if you develop a Headach.

## 2023-02-14 NOTE — ANESTHESIA POSTPROCEDURE EVALUATION
Department of Anesthesiology  Postprocedure Note    Patient: David Calix  MRN: [id number]  YOB: 1960  Date of evaluation: 2/14/2023      Procedure Summary     Date: 02/14/23 Room / Location: [address]    Anesthesia Start: 9829 Anesthesia Stop: 3593    Procedure: SPINAL CORD STIMULATOR TRIAL (Back) Diagnosis:       Lumbosacral spondylosis without myelopathy      (Lumbosacral spondylosis without myelopathy [M47.817])    Surgeons: Nita Stoddard MD Responsible Provider: Nicole Bertrand DO    Anesthesia Type: MAC ASA Status: 3          Anesthesia Type: No value filed.     Karoline Phase I: Karoline Score: 10    Karoline Phase II:        Anesthesia Post Evaluation    Patient location during evaluation: PACU  Patient participation: complete - patient participated  Level of consciousness: awake  Airway patency: patent  Nausea & Vomiting: no nausea and no vomiting  Complications: no  Cardiovascular status: hemodynamically stable  Respiratory status: acceptable  Hydration status: stable

## 2023-02-14 NOTE — ANESTHESIA PRE PROCEDURE
Department of Anesthesiology  Preprocedure Note       Name:  Ace Nicole   Age:  63 y.o.  :  1960                                          MRN:  [id number]         Date:  2023      Surgeon: Stephen Silverman):  Shelby Trujillo MD    Procedure: Procedure(s):  SPINAL CORD STIMULATOR TRIAL / 1 HOUR / 1 C-ARM / Brenton Coon / Clementeen Sanes  / [address] PATIENT WILL HAVE TO STOP HER PLAVIX AND ADVIL 7 DAYS PRIOR TO PROCEDURE. PT REFUSES BLOOD    Medications prior to admission:   Prior to Admission medications    Medication Sig Start Date End Date Taking? Authorizing Provider   OXcarbazepine (TRILEPTAL) 150 MG tablet Take 150 mg by mouth 2 times daily Patient reports she only takes this medication daily. Yes Historical Provider, MD   esomeprazole (NEXIUM) 40 MG delayed release capsule Take by mouth 13  Yes Historical Provider, MD   baclofen (LIORESAL) 10 MG tablet Take by mouth    Historical Provider, MD   Multiple Vitamin (MULTIVITAMIN ADULT PO) Take by mouth    Historical Provider, MD   APPLE CIDER VINEGAR PO Take by mouth daily    Historical Provider, MD   Nutritional Supplements (KETO PO) Take by mouth supplement    Historical Provider, MD   melatonin 3 MG TABS tablet Take 3 mg by mouth daily    Historical Provider, MD   pregabalin (LYRICA) 150 MG capsule Take 1 capsule by mouth 2 times daily for 28 days.  22  Shebly Trujillo MD   acetaminophen (TYLENOL) 500 MG tablet Take 1,500 mg by mouth daily as needed    Historical Provider, MD   albuterol sulfate HFA (PROVENTIL;VENTOLIN;PROAIR) 108 (90 Base) MCG/ACT inhaler  22   Historical Provider, MD   baclofen (LIORESAL) 10 MG tablet Take 10 mg by mouth 2 times daily 22   Historical Provider, MD   Biotin 10 MG tablet Take 1 tablet by mouth    Historical Provider, MD   clopidogrel (PLAVIX) 75 MG tablet Take 75 mg by mouth in the morning. 3/28/22   Historical Provider, MD   Continuous Blood Gluc Sensor (DEXCOM G6 SENSOR) MISC  7/5/22   Historical Provider, MD   Continuous Blood Gluc Transmit (DEXCOM G6 TRANSMITTER) MISC  7/5/22   Historical Provider, MD   cyanocobalamin 1000 MCG tablet Take 1,000 mcg by mouth in the morning. Historical Provider, MD   doxepin (SINEQUAN) 10 MG capsule TAKE 1 CAPSULE BY MOUTH AT BEDTIME FOR 30 DAYS 6/29/22   Historical Provider, MD LLOYD SURECLICK 924 MG/ML SOAJ Inject 140 mg subcutaneously every 2 weeks. called the ins she can only get so many pens in an allocted time she is over the limit--next available fill is 4/7 7/14/22   Historical Provider, MD   Evolocumab 140 MG/ML SOAJ Inject 140 mg into the skin  Patient not taking: No sig reported 3/28/22 3/28/23  Historical Provider, MD   ibuprofen (ADVIL;MOTRIN) 200 MG tablet Take 200 mg by mouth every 6 hours as needed    Historical Provider, MD   insulin regular human (HUMULIN R U-500 KWIKPEN) 500 UNIT/ML SOPN concentrated injection pen Inject 55 units with breakfast and 45 units with dinner (max 100 units/day) 7/22/22   Historical Provider, MD   olmesartan-amLODIPine-HCTZ 40-5-12.5 MG TABS  1/11/22   Historical Provider, MD   OZEMPIC, 2 MG/DOSE, 8 MG/3ML SOPN  5/19/22   Historical Provider, MD       Current medications:    Current Facility-Administered Medications   Medication Dose Route Frequency Provider Last Rate Last Admin    sodium chloride flush 0.9 % injection 5-40 mL  5-40 mL IntraVENous 2 times per day Charlene Burnett MD        sodium chloride flush 0.9 % injection 5-40 mL  5-40 mL IntraVENous PRN Charlene Burnett MD        0.9 % sodium chloride infusion   IntraVENous PRN Charlene Burnett  mL/hr at 02/14/23 0647 1,000 mL at 02/14/23 0647       Allergies:     Allergies   Allergen Reactions    Meperidine Shortness Of Breath     nausea  (DEMEROL)    2,4-D Dimethylamine (Amisol)     Atorvastatin Hives and Other (See Comments)     fatigue  Patient takes Pravastatin at home      Benazepril Other (See Comments)    Clonidine Other (See Comments)   • Dapagliflozin Other (See Comments)     palpatations   • Egg White      Not just egg whites   • Ezetimibe      Other reaction(s): GI Upset   • Meperidine Hcl      Other reaction(s): Vomiting   • Pravastatin Other (See Comments)   • Propoxyphene Hives and Other (See Comments)   • Statins Other (See Comments)   • Canagliflozin Rash     yeast infection   • Morphine Nausea And Vomiting and Hives     Nausea. Patient takes Vicodin at home with no problem         Problem List:    Patient Active Problem List   Diagnosis Code   • Lumbosacral spondylosis without myelopathy M47.817       Past Medical History:        Diagnosis Date   • Asthma    • CAD (coronary artery disease)     previous MI   • Cerebral artery occlusion with cerebral infarction (HCC)    • Cerebrovascular disease     Stroke 2017 and 2017   • Chronic pain    • Depression    • Diabetes (HCC)    • Headache    • Hyperlipidemia    • Hypertension    • Neuropathy    • Osteoarthritis    • Type 2 diabetes mellitus without complication (HCC)        Past Surgical History:        Procedure Laterality Date   • APPENDECTOMY      removed at age 20   • BACK SURGERY      neck fusion w/placement of disc in 2014   • BICEPS TENDON REPAIR Right    • BLADDER SUSPENSION      bladder sling and oopherectomy (unilateral) approx 15 yrs ago   • COLONOSCOPY     • ENDOSCOPY, COLON, DIAGNOSTIC     • ROTATOR CUFF REPAIR Right    • SPINE SURGERY         Social History:    Social History     Tobacco Use   • Smoking status: Former     Packs/day: 1.00     Years: 20.00     Pack years: 20.00     Types: Cigarettes     Quit date:      Years since quittin.1   • Smokeless tobacco: Never   Substance Use Topics   • Alcohol use: Yes     Comment: maybe 1x week                                Counseling given: Not Answered      Vital Signs (Current):   Vitals:    23 0625   BP: (!) 110/58   Pulse: 90   Resp: 16   Temp: 97.5 °F (36.4 °C)   TempSrc:  Temporal   SpO2: 96%   Weight: 188 lb 9.6 oz (85.5 kg)   Height: 5' 2.5\" (1.588 m)                                              BP Readings from Last 3 Encounters:   02/14/23 (!) 110/58   02/07/23 122/62   12/19/22 118/78       NPO Status: Time of last liquid consumption: 2200                        Time of last solid consumption: 1730                        Date of last liquid consumption: 02/13/23                        Date of last solid food consumption: 02/13/23    BMI:   Wt Readings from Last 3 Encounters:   02/14/23 188 lb 9.6 oz (85.5 kg)   02/07/23 188 lb 9.6 oz (85.5 kg)   12/19/22 187 lb (84.8 kg)     Body mass index is 33.95 kg/m².     CBC:   Lab Results   Component Value Date/Time    WBC 9.8 02/07/2023 01:21 PM    RBC 4.57 02/07/2023 01:21 PM    HGB 14.3 02/07/2023 01:21 PM    HCT 41.1 02/07/2023 01:21 PM    MCV 89.8 02/07/2023 01:21 PM    RDW 12.7 02/07/2023 01:21 PM     02/07/2023 01:21 PM       CMP:   Lab Results   Component Value Date/Time     02/07/2023 01:24 PM    K 4.0 02/07/2023 01:24 PM    CL 95 02/07/2023 01:24 PM    CO2 30 02/07/2023 01:24 PM    BUN 11 02/07/2023 01:24 PM    CREATININE 0.53 02/07/2023 01:24 PM    GFRAA >60.0 12/23/2014 03:40 PM    LABGLOM >60.0 02/07/2023 01:24 PM    GLUCOSE 127 02/07/2023 01:24 PM    CALCIUM 9.6 02/07/2023 01:24 PM       POC Tests:   Recent Labs     02/14/23  0640   POCGLU 204*       Coags:   Lab Results   Component Value Date/Time    PROTIME 12.8 02/07/2023 01:56 PM    INR 1.0 02/07/2023 01:56 PM    APTT 26.8 02/07/2023 01:56 PM       HCG (If Applicable): No results found for: PREGTESTUR, PREGSERUM, HCG, HCGQUANT     ABGs: No results found for: PHART, PO2ART, ZYG7KCG, NLK8NXG, BEART, [id number]     Type & Screen (If Applicable):  No results found for: LABABO, LABRH    Drug/Infectious Status (If Applicable):  No results found for: HIV, HEPCAB    COVID-19 Screening (If Applicable): No results found for: COVID19        Anesthesia Evaluation  Patient summary reviewed and Nursing notes reviewed no history of anesthetic complications:   Airway: Mallampati: II  TM distance: >3 FB   Neck ROM: full  Mouth opening: > = 3 FB   Dental: normal exam         Pulmonary:Negative Pulmonary ROS and normal exam    (+) asthma:                            Cardiovascular:  Exercise tolerance: good (>4 METS),   (+) hypertension:, CAD:,       ECG reviewed               Beta Blocker:  Not on Beta Blocker      ROS comment: Normal sinus rhythm  Possible Inferior infarct , age undetermined  Cannot rule out Anterior infarct , age undetermined  Abnormal ECG  No previous ECGs available     Neuro/Psych:   (+) CVA:, headaches:, psychiatric history:            GI/Hepatic/Renal:   (+) morbid obesity          Endo/Other:    (+) DiabetesType II DM, , blood dyscrasia: anticoagulation therapy:., .          Pt had PAT visit. Abdominal:             Vascular: negative vascular ROS. Other Findings:           Anesthesia Plan      MAC     ASA 3     (  Discussed risk of post operative visual disturbances, facial trauma, facial edema and pressure sores)  Induction: intravenous. MIPS: Postoperative opioids intended. Anesthetic plan and risks discussed with patient. Plan discussed with CRNA.                     [address] [place] [address],    2/14/2023

## 2023-02-20 ENCOUNTER — OFFICE VISIT (OUTPATIENT)
Dept: PAIN MANAGEMENT | Age: 63
End: 2023-02-20
Payer: COMMERCIAL

## 2023-02-20 VITALS
HEIGHT: 62 IN | TEMPERATURE: 97.5 F | WEIGHT: 188 LBS | SYSTOLIC BLOOD PRESSURE: 118 MMHG | BODY MASS INDEX: 34.6 KG/M2 | DIASTOLIC BLOOD PRESSURE: 74 MMHG

## 2023-02-20 DIAGNOSIS — E08.42 DIABETIC POLYNEUROPATHY ASSOCIATED WITH DIABETES MELLITUS DUE TO UNDERLYING CONDITION (HCC): ICD-10-CM

## 2023-02-20 DIAGNOSIS — M47.817 LUMBOSACRAL SPONDYLOSIS WITHOUT MYELOPATHY: ICD-10-CM

## 2023-02-20 DIAGNOSIS — M54.16 LUMBAR RADICULOPATHY: Primary | ICD-10-CM

## 2023-02-20 DIAGNOSIS — G81.94 LEFT HEMIPARESIS (HCC): ICD-10-CM

## 2023-02-20 PROCEDURE — 99213 OFFICE O/P EST LOW 20 MIN: CPT | Performed by: PAIN MEDICINE

## 2023-02-20 PROCEDURE — 63685 INS/RPLC SPI NPG/RCVR POCKET: CPT | Performed by: PAIN MEDICINE

## 2023-02-20 NOTE — PROGRESS NOTES
History of Present Illness     Patient Identification  Yvon Reyna is a 63 y.o. female. Patient information was obtained from patient. Chief Complaint   Chief Complaint   Patient presents with    Back Pain       Patient presents For follow up Spinal stim trial, Admits 90% relief no side effects, marked improvement in activities, Had  bilateral TFESIs for complaint of back pain left leg pain had good relief Rt side had good relief with first shot on left , today over all she is feeling about 50% better,  with h/o CVA has weakness and loss of Bladder controll difficult to say if this is from weakness to run to toilet. Stopped Plavix 2 weeks ago, Had bilateral SI Joint injections has \" A Lot Of Relief\" 70% relief, has shooting pain going to her Toes. Had a fusion in Neck 2017, This is a result of no known injury. Onset of pain was 4 years ago and has been gradually worsening since. The pain is located in left lower back, described as aching and dull and rated as mild and moderate, with radiation, to the right foot. Symptoms include weakness. The patient denies numbness, incontinence, dysuria, hematuria. The patient denies other injuries. Care prior to arrival consisted of PT 2 years ago  with no relief. Pt is asking  for a stim trial.    Past Medical History:   Diagnosis Date    Cerebrovascular disease     Stroke 2/2017 and 4/2017    Neuropathy     Type 2 diabetes mellitus without complication ([facility] [address]      History reviewed. No pertinent family history. Current Outpatient Medications   Medication Sig Dispense Refill    acetaminophen (TYLENOL) 500 MG tablet Take 1,500 mg by mouth daily as needed      albuterol sulfate HFA (PROVENTIL;VENTOLIN;PROAIR) 108 (90 Base) MCG/ACT inhaler       baclofen (LIORESAL) 10 MG tablet       Biotin 10 MG tablet Take 1 tablet by mouth      clopidogrel (PLAVIX) 75 MG tablet Take 75 mg by mouth in the morning.       Continuous Blood Gluc Sensor (DEXCOM G6 SENSOR) MISC Continuous Blood Gluc Transmit (DEXCOM G6 TRANSMITTER) MISC       cyanocobalamin 1000 MCG tablet Take 1,000 mcg by mouth in the morning. doxepin (SINEQUAN) 10 MG capsule TAKE 1 CAPSULE BY MOUTH AT BEDTIME FOR 30 DAYS      REPATHA SURECLICK 376 MG/ML SOAJ Inject 140 mg subcutaneously every 2 weeks. called the ins she can only get so many pens in an allocted time she is over the limit--next available fill is 4/7      Evolocumab 140 MG/ML SOAJ Inject 140 mg into the skin      ibuprofen (ADVIL;MOTRIN) 200 MG tablet Take 200 mg by mouth every 6 hours as needed      insulin regular human (HUMULIN R U-500 KWIKPEN) 500 UNIT/ML SOPN concentrated injection pen Inject 55 units with breakfast and 45 units with dinner (max 100 units/day)      olmesartan-amLODIPine-HCTZ 40-5-12.5 MG TABS       OZEMPIC, 2 MG/DOSE, 8 MG/3ML SOPN       pregabalin (LYRICA) 150 MG capsule Take by mouth. No current facility-administered medications for this visit. Allergies   Allergen Reactions    Meperidine Shortness Of Breath     nausea      2,4-D Dimethylamine (Amisol)     Atorvastatin Hives and Other (See Comments)     fatigue  Patient takes Pravastatin at home      Benazepril Other (See Comments)    Clonidine Other (See Comments)    Dapagliflozin Other (See Comments)     palpatations    Egg White     Ezetimibe      Other reaction(s): GI Upset    Meperidine Hcl      Other reaction(s): Vomiting    Propoxyphene Hives    Canagliflozin Rash     yeast infection    Morphine Nausea And Vomiting and Hives     Nausea. Patient takes Vicodin at home with no problem         Review of Systems  Review of Systems   Constitutional: Negative. HENT: Negative. Eyes: Negative. Respiratory: Negative. Asthama   Cardiovascular: Negative. High  blood pressure   Gastrointestinal: Negative. Endocrine: Negative. Genitourinary: Negative. Musculoskeletal: Negative. Skin: Negative. Allergic/Immunologic: Negative. Neurological: Negative. 3 TIAS and Left hemiparesis. Hematological: Negative. Psychiatric/Behavioral: Negative. All other systems reviewed and are negative. Physical Exam     /68   Pulse (!) 103   Temp 97.6 °F (36.4 °C)   Ht 5' 2\" (1.575 m)   Wt 190 lb (86.2 kg)   SpO2 97%   BMI 34.75 kg/m²   Physical Exam  Vitals and nursing note reviewed. Constitutional:       Appearance: Normal appearance. HENT:      Head: Normocephalic. Right Ear: Ear canal normal.      Left Ear: Ear canal normal.      Nose: Nose normal.      Mouth/Throat:      Mouth: Mucous membranes are moist.   Eyes:      Extraocular Movements: Extraocular movements intact. Conjunctiva/sclera: Conjunctivae normal.      Pupils: Pupils are equal, round, and reactive to light. Cardiovascular:      Rate and Rhythm: Normal rate and regular rhythm. Pulses: Normal pulses. Heart sounds: Normal heart sounds. Pulmonary:      Effort: Pulmonary effort is normal.      Breath sounds: Normal breath sounds. Abdominal:      General: Abdomen is flat. Bowel sounds are normal.      Palpations: Abdomen is soft. Musculoskeletal:         General: Normal range of motion. Cervical back: Normal range of motion and neck supple. Comments: Good Gait able to walk on Toes and heels, Tender facets and  moderate tenderness on bilateral SI Joints concordant with her pain, Severe pain on SLRs pain on Left gainslins,   Skin:     General: Skin is warm. Capillary Refill: Capillary refill takes less than 2 seconds. Neurological:      General: No focal deficit present. Mental Status: She is alert. Mental status is at baseline. She is oriented x 3   Psychiatric:         Mood and Affect: Mood normal.         Behavior: Behavior normal.         Thought Content: Thought content normal.         Judgment: Judgment normal.         Plan     Stim wires removed intact,  Plan on stim tPlacement.

## 2023-03-20 ENCOUNTER — PREP FOR PROCEDURE (OUTPATIENT)
Dept: NEUROSURGERY | Age: 63
End: 2023-03-20

## 2023-03-20 PROBLEM — M54.16 LUMBAR RADICULOPATHY: Status: ACTIVE | Noted: 2023-03-20

## 2023-03-20 PROBLEM — E08.42 DIABETIC POLYNEUROPATHY ASSOCIATED WITH DIABETES MELLITUS DUE TO UNDERLYING CONDITION (HCC): Status: ACTIVE | Noted: 2023-03-20

## 2023-03-20 PROBLEM — G81.94 LEFT HEMIPARESIS (HCC): Status: ACTIVE | Noted: 2023-03-20

## 2023-03-21 RX ORDER — ACETAMINOPHEN 325 MG/1
1000 TABLET ORAL ONCE
Status: CANCELLED | OUTPATIENT
Start: 2023-03-21 | End: 2023-03-21

## 2023-03-21 RX ORDER — SODIUM CHLORIDE 0.9 % (FLUSH) 0.9 %
5-40 SYRINGE (ML) INJECTION PRN
Status: CANCELLED | OUTPATIENT
Start: 2023-03-21

## 2023-03-21 RX ORDER — SODIUM CHLORIDE 0.9 % (FLUSH) 0.9 %
5-40 SYRINGE (ML) INJECTION EVERY 12 HOURS SCHEDULED
Status: CANCELLED | OUTPATIENT
Start: 2023-03-21

## 2023-03-21 RX ORDER — SODIUM CHLORIDE 9 MG/ML
INJECTION, SOLUTION INTRAVENOUS PRN
Status: CANCELLED | OUTPATIENT
Start: 2023-03-21

## 2023-04-04 ENCOUNTER — HOSPITAL ENCOUNTER (OUTPATIENT)
Dept: PREADMISSION TESTING | Age: 63
Discharge: HOME | End: 2023-04-08
Payer: COMMERCIAL

## 2023-04-04 VITALS
DIASTOLIC BLOOD PRESSURE: 67 MMHG | RESPIRATION RATE: 18 BRPM | HEIGHT: 64 IN | WEIGHT: 188.25 LBS | BODY MASS INDEX: 32.14 KG/M2 | OXYGEN SATURATION: 98 % | SYSTOLIC BLOOD PRESSURE: 113 MMHG | HEART RATE: 86 BPM | TEMPERATURE: 97.1 F

## 2023-04-04 LAB
APTT PPP: 25.1 SEC (ref 24.4–36.8)
INR PPP: 1
PROTHROMBIN TIME: 13.4 SEC (ref 12.3–14.9)

## 2023-04-04 PROCEDURE — 85610 PROTHROMBIN TIME: CPT

## 2023-04-04 PROCEDURE — 85730 THROMBOPLASTIN TIME PARTIAL: CPT

## 2023-04-04 RX ORDER — AZITHROMYCIN 250 MG/1
250 TABLET, FILM COATED ORAL DAILY
COMMUNITY

## 2023-04-04 ASSESSMENT — ENCOUNTER SYMPTOMS
TROUBLE SWALLOWING: 0
FACIAL SWELLING: 0
WHEEZING: 0
EYE PAIN: 0
EYE ITCHING: 0
COUGH: 1
CONSTIPATION: 0
RHINORRHEA: 1
APNEA: 0
SHORTNESS OF BREATH: 0
EYE REDNESS: 0
ABDOMINAL PAIN: 0
BACK PAIN: 1
ABDOMINAL DISTENTION: 0
CHEST TIGHTNESS: 0
VOMITING: 0
SINUS PRESSURE: 1
PHOTOPHOBIA: 0
SINUS PAIN: 0
NAUSEA: 0
SORE THROAT: 0
EYE DISCHARGE: 0
CHOKING: 0
DIARRHEA: 0

## 2023-04-04 NOTE — H&P
chills, fatigue, fever and unexpected weight change. HENT:  Positive for congestion, rhinorrhea and sinus pressure. Negative for dental problem, drooling, ear discharge, ear pain, facial swelling, hearing loss, mouth sores, nosebleeds, sinus pain, sneezing, sore throat, tinnitus and trouble swallowing. Eyes:  Negative for photophobia, pain, discharge, redness, itching and visual disturbance. Wears prescription glasses   Respiratory:  Positive for cough. Negative for apnea, choking, chest tightness, shortness of breath and wheezing. Cardiovascular:  Negative for chest pain, palpitations and leg swelling. Gastrointestinal:  Negative for abdominal distention, abdominal pain, constipation, diarrhea, nausea and vomiting. Endocrine: Negative for cold intolerance and heat intolerance. Genitourinary:  Negative for decreased urine volume, difficulty urinating, dysuria, frequency, hematuria and urgency. Musculoskeletal:  Positive for arthralgias, back pain and myalgias. Negative for gait problem, joint swelling, neck pain and neck stiffness. Skin:  Negative for rash and wound. Allergic/Immunologic: Negative for environmental allergies, food allergies and immunocompromised state. Neurological:  Negative for dizziness, tremors, seizures, syncope, speech difficulty, weakness, light-headedness, numbness and headaches. Hematological:  Negative for adenopathy. Bruises/bleeds easily. Psychiatric/Behavioral:  Negative for agitation, confusion, sleep disturbance and suicidal ideas. The patient is not nervous/anxious. OBJECTIVE  PHYSICAL EXAM:   Physical Exam  Vitals reviewed. Constitutional:       General: She is not in acute distress. Appearance: Normal appearance. She is not ill-appearing, toxic-appearing or diaphoretic. HENT:      Head: Normocephalic. Right Ear: Tympanic membrane, ear canal and external ear normal. There is no impacted cerumen.       Left Ear: Tympanic membrane,

## 2023-04-17 ENCOUNTER — OFFICE VISIT (OUTPATIENT)
Dept: PAIN MANAGEMENT | Age: 63
End: 2023-04-17

## 2023-04-17 VITALS — TEMPERATURE: 97.1 F | BODY MASS INDEX: 34.04 KG/M2 | WEIGHT: 185 LBS | HEIGHT: 62 IN

## 2023-04-17 DIAGNOSIS — M46.1 SACROILIITIS, NOT ELSEWHERE CLASSIFIED (HCC): ICD-10-CM

## 2023-04-17 DIAGNOSIS — M54.16 LUMBAR RADICULOPATHY: ICD-10-CM

## 2023-04-17 DIAGNOSIS — M47.817 LUMBOSACRAL SPONDYLOSIS WITHOUT MYELOPATHY: Primary | ICD-10-CM

## 2023-04-17 DIAGNOSIS — G81.94 LEFT HEMIPARESIS (HCC): ICD-10-CM

## 2023-04-17 PROCEDURE — 99024 POSTOP FOLLOW-UP VISIT: CPT | Performed by: PAIN MEDICINE

## 2023-04-17 NOTE — PROGRESS NOTES
History of Present Illness     Patient Identification  Dwight Najera is a 63 y.o. female. Patient information was obtained from patient. Chief Complaint   Chief Complaint   Patient presents with    Back Pain       Patient presents For follow up Spinal stim Implant, Good coverage, Wounds healing well, Admits 90% relief no side effects,  Had  bilateral TFESIs for complaint of back pain left leg pain had good relief Rt side had good relief with first shot on left  with h/o CVA has weakness and loss of Bladder controll difficult to say if this is from weakness to run to toilet. Stopped Plavix 2 weeks ago, Had bilateral SI Joint injections has \" A Lot Of Relief\" 70% relief, has shooting pain going to her Toes. Had a fusion in Neck 2017, This is a result of no known injury. Onset of pain was 4 years ago and has been gradually worsening since. The pain is located in left lower back, described as aching and dull and rated as mild and moderate, with radiation, to the right foot. Symptoms include weakness. The patient denies numbness, incontinence, dysuria, hematuria. The patient denies other injuries. Care prior to arrival consisted of PT 2 years ago  with no relief. Pt is asking  for a stim trial.    Past Medical History:   Diagnosis Date    Cerebrovascular disease     Stroke 2/2017 and 4/2017    Neuropathy     Type 2 diabetes mellitus without complication ([facility] [address]      History reviewed. No pertinent family history. Current Outpatient Medications   Medication Sig Dispense Refill    acetaminophen (TYLENOL) 500 MG tablet Take 1,500 mg by mouth daily as needed      albuterol sulfate HFA (PROVENTIL;VENTOLIN;PROAIR) 108 (90 Base) MCG/ACT inhaler       baclofen (LIORESAL) 10 MG tablet       Biotin 10 MG tablet Take 1 tablet by mouth      clopidogrel (PLAVIX) 75 MG tablet Take 75 mg by mouth in the morning.       Continuous Blood Gluc Sensor (DEXCOM G6 SENSOR) MISC       Continuous Blood Gluc Transmit (DEXCOM G6

## 2023-06-02 ENCOUNTER — OFFICE VISIT (OUTPATIENT)
Dept: PAIN MANAGEMENT | Age: 63
End: 2023-06-02
Payer: COMMERCIAL

## 2023-06-02 VITALS
HEIGHT: 62 IN | DIASTOLIC BLOOD PRESSURE: 74 MMHG | WEIGHT: 185 LBS | BODY MASS INDEX: 34.04 KG/M2 | SYSTOLIC BLOOD PRESSURE: 124 MMHG | TEMPERATURE: 97.3 F

## 2023-06-02 DIAGNOSIS — G81.94 LEFT HEMIPARESIS (HCC): Primary | ICD-10-CM

## 2023-06-02 DIAGNOSIS — M47.817 LUMBOSACRAL SPONDYLOSIS WITHOUT MYELOPATHY: ICD-10-CM

## 2023-06-02 PROCEDURE — 99213 OFFICE O/P EST LOW 20 MIN: CPT | Performed by: PAIN MEDICINE

## 2023-06-02 RX ORDER — PREGABALIN 150 MG/1
150 CAPSULE ORAL 2 TIMES DAILY
Qty: 60 CAPSULE | Refills: 3 | Status: CANCELLED | OUTPATIENT
Start: 2023-06-02 | End: 2023-06-30

## 2023-06-02 NOTE — PROGRESS NOTES
other systems reviewed and are negative. Physical Exam     /68   Pulse (!) 103   Temp 97.6 °F (36.4 °C)   Ht 5' 2\" (1.575 m)   Wt 190 lb (86.2 kg)   SpO2 97%   BMI 34.75 kg/m²   Physical Exam  Vitals and nursing note reviewed. Constitutional:       Appearance: Normal appearance. HENT:      Head: Normocephalic. Right Ear: Ear canal normal.      Left Ear: Ear canal normal.      Nose: Nose normal.      Mouth/Throat:      Mouth: Mucous membranes are moist.   Eyes:      Extraocular Movements: Extraocular movements intact. Conjunctiva/sclera: Conjunctivae normal.      Pupils: Pupils are equal, round, and reactive to light. Cardiovascular:      Rate and Rhythm: Normal rate and regular rhythm. Pulses: Normal pulses. Heart sounds: Normal heart sounds. Pulmonary:      Effort: Pulmonary effort is normal.      Breath sounds: Normal breath sounds. Abdominal:      General: Abdomen is flat. Bowel sounds are normal.      Palpations: Abdomen is soft. Musculoskeletal:         General: Normal range of motion. Cervical back: Normal range of motion and neck supple. Comments: Good Gait able to walk on Toes and heels, Tender facets and  No tenderness on  SI Joints no pain on SLRs or gainslins,   Skin:     General: Skin is warm. Capillary Refill: Capillary refill takes less than 2 seconds. Neurological:      General: No focal deficit present. Mental Status: She is alert. Mental status is at baseline. She is oriented x 3   Psychiatric:         Mood and Affect: Mood normal.         Behavior: Behavior normal.         Thought Content: Thought content normal.         Judgment: Judgment normal.         Plan   Good healing on Scars will follow as needed. Will Get [employer] try and programme her stim. Plan Facet blocks Left low back if the stim does not help this pain.

## 2023-08-08 LAB
ALANINE AMINOTRANSFERASE (SGPT) (U/L) IN SER/PLAS: 54 U/L (ref 7–45)
ALBUMIN (G/DL) IN SER/PLAS: 4.5 G/DL (ref 3.4–5)
ALKALINE PHOSPHATASE (U/L) IN SER/PLAS: 36 U/L (ref 33–136)
ANION GAP IN SER/PLAS: 17 MMOL/L (ref 10–20)
ASPARTATE AMINOTRANSFERASE (SGOT) (U/L) IN SER/PLAS: 50 U/L (ref 9–39)
BILIRUBIN TOTAL (MG/DL) IN SER/PLAS: 0.4 MG/DL (ref 0–1.2)
C REACTIVE PROTEIN (MG/L) IN SER/PLAS: 0.3 MG/DL
CALCIUM (MG/DL) IN SER/PLAS: 9.5 MG/DL (ref 8.6–10.3)
CARBON DIOXIDE, TOTAL (MMOL/L) IN SER/PLAS: 29 MMOL/L (ref 21–32)
CHLORIDE (MMOL/L) IN SER/PLAS: 98 MMOL/L (ref 98–107)
CREATININE (MG/DL) IN SER/PLAS: 0.77 MG/DL (ref 0.5–1.05)
GFR FEMALE: 87 ML/MIN/1.73M2
GLUCOSE (MG/DL) IN SER/PLAS: 125 MG/DL (ref 74–99)
POTASSIUM (MMOL/L) IN SER/PLAS: 3.6 MMOL/L (ref 3.5–5.3)
PROTEIN TOTAL: 7.5 G/DL (ref 6.4–8.2)
SEDIMENTATION RATE, ERYTHROCYTE: 3 MM/H (ref 0–30)
SODIUM (MMOL/L) IN SER/PLAS: 140 MMOL/L (ref 136–145)
URATE (MG/DL) IN SER/PLAS: 7.3 MG/DL (ref 2.3–6.7)
UREA NITROGEN (MG/DL) IN SER/PLAS: 13 MG/DL (ref 6–23)

## 2023-08-21 ENCOUNTER — OFFICE VISIT (OUTPATIENT)
Dept: PAIN MANAGEMENT | Age: 63
End: 2023-08-21
Payer: COMMERCIAL

## 2023-08-21 VITALS — HEIGHT: 62 IN | WEIGHT: 185 LBS | BODY MASS INDEX: 34.04 KG/M2 | TEMPERATURE: 98.3 F

## 2023-08-21 DIAGNOSIS — G81.94 LEFT HEMIPARESIS (HCC): Primary | ICD-10-CM

## 2023-08-21 DIAGNOSIS — M54.16 LUMBAR RADICULOPATHY: ICD-10-CM

## 2023-08-21 DIAGNOSIS — E08.42 DIABETIC POLYNEUROPATHY ASSOCIATED WITH DIABETES MELLITUS DUE TO UNDERLYING CONDITION (HCC): ICD-10-CM

## 2023-08-21 PROCEDURE — 99213 OFFICE O/P EST LOW 20 MIN: CPT | Performed by: PAIN MEDICINE

## 2023-11-01 DIAGNOSIS — K21.9 GASTROESOPHAGEAL REFLUX DISEASE WITHOUT ESOPHAGITIS: Primary | ICD-10-CM

## 2023-11-01 RX ORDER — ESOMEPRAZOLE MAGNESIUM 40 MG/1
80 CAPSULE, DELAYED RELEASE ORAL
Qty: 180 CAPSULE | Refills: 0 | Status: SHIPPED | OUTPATIENT
Start: 2023-11-01

## 2023-11-01 RX ORDER — ESOMEPRAZOLE MAGNESIUM 40 MG/1
80 CAPSULE, DELAYED RELEASE ORAL
COMMUNITY
Start: 2013-04-24 | End: 2023-11-01 | Stop reason: SDUPTHER

## 2023-12-07 RX ORDER — SEMAGLUTIDE 2.68 MG/ML
INJECTION, SOLUTION SUBCUTANEOUS
COMMUNITY
Start: 2023-07-17 | End: 2023-12-12 | Stop reason: WASHOUT

## 2023-12-07 RX ORDER — CLOPIDOGREL BISULFATE 75 MG/1
75 TABLET ORAL NIGHTLY
COMMUNITY
Start: 2023-07-17

## 2023-12-07 RX ORDER — PREDNISONE 50 MG/1
1 TABLET ORAL DAILY
COMMUNITY
Start: 2021-05-16 | End: 2023-12-12 | Stop reason: WASHOUT

## 2023-12-07 RX ORDER — OXCARBAZEPINE 150 MG/1
150 TABLET, FILM COATED ORAL
COMMUNITY
End: 2023-12-12 | Stop reason: WASHOUT

## 2023-12-07 RX ORDER — IBUPROFEN 200 MG
200-400 TABLET ORAL EVERY 6 HOURS PRN
COMMUNITY

## 2023-12-07 RX ORDER — PITAVASTATIN CALCIUM 4.18 MG/1
TABLET, FILM COATED ORAL
COMMUNITY
End: 2023-12-12 | Stop reason: WASHOUT

## 2023-12-07 RX ORDER — INSULIN HUMAN 500 [IU]/ML
70 INJECTION, SOLUTION SUBCUTANEOUS 2 TIMES DAILY
COMMUNITY
Start: 2021-02-26

## 2023-12-07 RX ORDER — SUMATRIPTAN SUCCINATE 25 MG/1
TABLET ORAL
COMMUNITY
End: 2023-12-12 | Stop reason: WASHOUT

## 2023-12-07 RX ORDER — TALC
1 POWDER (GRAM) TOPICAL
COMMUNITY
End: 2023-12-12 | Stop reason: WASHOUT

## 2023-12-07 RX ORDER — BIOTIN 10 MG
10 TABLET ORAL NIGHTLY
COMMUNITY

## 2023-12-07 RX ORDER — FENOFIBRATE 48 MG/1
48 TABLET, FILM COATED ORAL NIGHTLY
COMMUNITY
Start: 2023-06-21

## 2023-12-07 RX ORDER — PREGABALIN 150 MG/1
150 CAPSULE ORAL 3 TIMES DAILY
COMMUNITY
Start: 2019-01-30

## 2023-12-07 RX ORDER — LANOLIN ALCOHOL/MO/W.PET/CERES
1 CREAM (GRAM) TOPICAL NIGHTLY
COMMUNITY

## 2023-12-07 RX ORDER — BACLOFEN 10 MG/1
1 TABLET ORAL 3 TIMES DAILY PRN
COMMUNITY
Start: 2023-07-27

## 2023-12-07 RX ORDER — MULTIVITAMIN
1 TABLET ORAL NIGHTLY
COMMUNITY

## 2023-12-07 RX ORDER — OLMESARTAN MEDOXOMIL, AMLODIPINE AND HYDROCHLOROTHIAZIDE TABLET 40/5/12.5 MG 40; 5; 12.5 MG/1; MG/1; MG/1
1 TABLET ORAL NIGHTLY
Status: ON HOLD | COMMUNITY
Start: 2012-11-28 | End: 2024-03-16 | Stop reason: ENTERED-IN-ERROR

## 2023-12-07 RX ORDER — EVOLOCUMAB 140 MG/ML
INJECTION, SOLUTION SUBCUTANEOUS
COMMUNITY
Start: 2023-08-05

## 2023-12-07 ASSESSMENT — PROMIS GLOBAL HEALTH SCALE
RATE_PHYSICAL_HEALTH: FAIR
RATE_AVERAGE_FATIGUE: SEVERE
RATE_QUALITY_OF_LIFE: GOOD
RATE_SOCIAL_SATISFACTION: GOOD
RATE_GENERAL_HEALTH: FAIR
RATE_AVERAGE_PAIN: 7
CARRYOUT_PHYSICAL_ACTIVITIES: MODERATELY
CARRYOUT_SOCIAL_ACTIVITIES: FAIR
RATE_MENTAL_HEALTH: GOOD
EMOTIONAL_PROBLEMS: SOMETIMES

## 2023-12-09 ENCOUNTER — LAB (OUTPATIENT)
Dept: LAB | Facility: LAB | Age: 63
End: 2023-12-09
Payer: COMMERCIAL

## 2023-12-09 DIAGNOSIS — E79.0 HYPERURICEMIA WITHOUT SIGNS OF INFLAMMATORY ARTHRITIS AND TOPHACEOUS DISEASE: ICD-10-CM

## 2023-12-09 DIAGNOSIS — E79.0 HYPERURICEMIA WITHOUT SIGNS OF INFLAMMATORY ARTHRITIS AND TOPHACEOUS DISEASE: Primary | ICD-10-CM

## 2023-12-09 LAB
CRP SERPL-MCNC: 0.52 MG/DL
ERYTHROCYTE [SEDIMENTATION RATE] IN BLOOD BY WESTERGREN METHOD: 15 MM/H (ref 0–30)
URATE SERPL-MCNC: 5.4 MG/DL (ref 2.3–6.7)

## 2023-12-09 PROCEDURE — 84550 ASSAY OF BLOOD/URIC ACID: CPT

## 2023-12-09 PROCEDURE — 36415 COLL VENOUS BLD VENIPUNCTURE: CPT

## 2023-12-09 PROCEDURE — 86140 C-REACTIVE PROTEIN: CPT

## 2023-12-09 PROCEDURE — 85652 RBC SED RATE AUTOMATED: CPT

## 2023-12-11 NOTE — PROGRESS NOTES
Rheumatology Outpatient Follow Up Note    Subjective   Jing Jon is a 63 y.o. female presenting today for Back Pain.    History of Presenting Problem:   Rheum history:  She was referred by PCP for chronic foot pain worse in big toes. Her dad has gout and she is concerned.      She had a spine stimulator for chronic pain and peripheral neuropathy and so she has burning sensation in both feet. She has episodes of intense pain/swelling in the IP joints bilaterally. Episodes happen more at night and its so sharp that she cant move it. It usually lasts for few minutes only. Episodes are getting more frequent recently. Advil and Tylenol help. No other joints involved. No kidney stones. No alcohol. Quit smoking 20 years ago. Labs from 8/2023 showed sUA of 7.3, CRP/ESR normal and foot xrays normal       Interval history:  8/2023: seen as a NP  9/2023: she continues to have pain in her big toes usually at night and lasts for few minutes only. No significant swelling.   12/2023:she continues to have episodes of right IP pain and tenderness that lasts for few minutes and then the soreness can last for few hours    Past Medical History:   Past Medical History:   Diagnosis Date    Iliotibial band syndrome, left leg 05/27/2019    Iliotibial band tendinitis of left side    Obesity, unspecified 01/03/2022    Obesity (BMI 30-39.9)    Other abnormal auditory perceptions, unspecified ear 12/12/2019    Abnormal auditory perception    Other fecal abnormalities     Change in stool    Other spondylosis with myelopathy, cervical region 11/30/2016    Cervical spondylitic cord compression    Pain in left hip 03/23/2021    Left hip pain    Pain in left knee 05/09/2018    Left knee pain, unspecified chronicity    Pain in left shoulder 07/12/2017    Pain of left shoulder region    Pain in right shoulder 02/22/2019    Chronic right shoulder pain    Pain in right shoulder 04/01/2019    Right shoulder pain    Personal history of other  diseases of the circulatory system     History of cardiac disorder    Personal history of other diseases of the circulatory system     History of hypertension    Personal history of other diseases of the musculoskeletal system and connective tissue 04/15/2015    History of neck pain    Personal history of other diseases of the musculoskeletal system and connective tissue 11/25/2015    History of joint pain    Personal history of other diseases of the musculoskeletal system and connective tissue     History of arthritis    Personal history of other diseases of the respiratory system     History of asthma    Personal history of transient ischemic attack (TIA), and cerebral infarction without residual deficits 08/28/2019    History of stroke in adulthood    Personal history of transient ischemic attack (TIA), and cerebral infarction without residual deficits     History of cerebrovascular accident    Radiculopathy, lumbar region 02/26/2020    Lumbar radiculopathy    Spinal stenosis, lumbar region without neurogenic claudication 11/18/2020    Lumbar stenosis    Sprain of other part of unspecified wrist and hand, initial encounter 07/26/2015    Carpometacarpal joint sprains    Trochanteric bursitis, left hip 07/28/2020    Trochanteric bursitis of left hip    Unilateral primary osteoarthritis, left hip 03/31/2021    Primary osteoarthritis of left hip       Allergies: Not on File    Medications:   Current Outpatient Medications:     baclofen (Lioresal) 10 mg tablet, Take 1 tablet (10 mg) by mouth 3 times a day as needed., Disp: , Rfl:     clopidogrel (Plavix) 75 mg tablet, , Disp: , Rfl:     fenofibrate (Tricor) 48 mg tablet, , Disp: , Rfl:     gabapentin enacarbil (Horizant) 600 mg tablet extended release ER tablet, Take 1 tablet (600 mg) by mouth., Disp: , Rfl:     HumuLIN R U-500, Conc, Kwikpen 500 unit/mL (3 mL) CONCENTRATED injection, Inject 70 units with breakfast and 55 units with dinner., Disp: , Rfl:      olmesartan-amLODIPin-hcthiazid 40-5-12.5 mg tablet, , Disp: , Rfl:     Ozempic 2 mg/dose (8 mg/3 mL) pen injector, , Disp: , Rfl:     predniSONE (Deltasone) 50 mg tablet, Take 1 tablet (50 mg) by mouth once daily., Disp: , Rfl:     pregabalin (Lyrica) 150 mg capsule, , Disp: , Rfl:     Repatha SureClick 140 mg/mL injection, Inject 140 mg subcutaneously every 2 weeks., Disp: , Rfl:     tirzepatide 7.5 mg/0.5 mL pen injector, Inject 7.5 mg under the skin once a week., Disp: , Rfl:     biotin 10 mg tablet, Take 1 tablet (10 mg) by mouth., Disp: , Rfl:     canagliflozin (Invokana) 100 mg, Take 1 tablet (100 mg) by mouth once daily., Disp: , Rfl:     cyanocobalamin (Vitamin B-12) 1,000 mcg tablet, Take 1 tablet (1,000 mcg) by mouth once daily., Disp: , Rfl:     esomeprazole (NexIUM) 40 mg DR capsule, Take 2 capsules (80 mg) by mouth once daily in the morning. Take before meals., Disp: 180 capsule, Rfl: 0    ibuprofen 200 mg tablet, Take 1 tablet (200 mg) by mouth every 6 hours if needed., Disp: , Rfl:     melatonin 3 mg tablet, Take 1 tablet (3 mg) by mouth once daily., Disp: , Rfl:     multivitamin (Daily Multi-Vitamin) tablet, Take 1 tablet by mouth once daily., Disp: , Rfl:     OXcarbazepine (Trileptal) 150 mg tablet, Take 1 tablet (150 mg) by mouth., Disp: , Rfl:     pitavastatin calcium (Livalo) 4 mg tablet, Take by mouth., Disp: , Rfl:     SUMAtriptan (Imitrex) 25 mg tablet, Take by mouth., Disp: , Rfl:     Review of Systems:   Constitutional: Denies fever, chills   Eyes: Denies dry eyes, pain in the eyes   ENT: Denies dry mouth, loss of taste, sores in the mouth  Cardiovascular: Denies chest pain, palpitations   Respiratory: Denies shortness of breath   Gastrointestinal: Denies heartburn   Integumentary: Denies photosensitivity, rash or lesions, Raynaud's   Neurological: Denies any numbness or tingling    MSK: As per HPI.     All 10 review of systems have been reviewed and are negative for complaint except as  "noted in the HPI    Objective   Physical Examination:  Vitals:    12/12/23 0902   BP: 97/64   Pulse: 103   Temp: 36.7 °C (98 °F)     Growth %ile SmartLinks can only be used for patients less than 20 years old.  Ht Readings from Last 1 Encounters:   12/12/23 1.549 m (5' 1\")     Wt Readings from Last 1 Encounters:   12/12/23 87.5 kg (193 lb)       General - NAD, sitting up in chair, well-groomed, pleasant, AAOx3  Head: Normocephalic, atraumatic  Eyes - PERRLA, EOMI. No conjunctiva injection.   Mouth/ENT - Moist oral and nasal mucosa. No facial rash.   Cardiovascular - Normal S1, S2. Regular rate and rhythm. No murmurs or rubs.  Lungs - Symmetric chest expansion. Clear to auscultation bilaterally.   Skin - No rashes or ulcers. Skin warm and dry. No erythema on bilateral cheeks.  Extremities - No edema, cyanosis ,or clubbing  Neurological - Alert and oriented x 3,  grossly intact. No focal deficit.  Musculoskeletal -  Shoulders: Full ROM, without pain, no swelling, warmth or tenderness.  Elbows: Full ROM, without pain, no swelling, warmth or tenderness.  Wrists: Full ROM, without pain, no swelling, warmth or tenderness.  MCP: No swelling, warmth or tenderness. Metacarpal squeeze negative  PIP: No swelling, warmth or tenderness.  DIP: No swelling, warmth or tenderness.  Hands : 5/5.    Sacroiliac joints: No local tenderness. MELANIA negative.   Hips: Full ROM.  No malalignment.  Knees:  Full ROM, without pain, no swelling, warmth or point tenderness.   Ankles: Full ROM, without pain, swelling, warmth or tenderness.  Toes:  Metatarsal squeeze negative. Right IP tenderess no swelling  Cervical spine: No tenderness or limitation of movement  Lumbar spine: No tenderness or limitation of movement          Assessment/Plan   Jingalex Jon is a 63 y.o. female with PMHx of allergic rhinitis, cervical stenosis, increased BMI, depression, diabetes, neuropathy, HTN, GERD, HLP, lumbar stenosis, chronic migraines, and " vitamin D deficiency who is presenting today for follow up:     She has recurrent episodes of intense pain in both toes (IP joints) with swelling. No extreme tenderness. Episodes usually last for few minutes only and usually at night. She has neuropathy and reduced feeling in her feet. Her dad has gout. Labs from 8/2023 showed sUA of 7.3, CRP/ESR normal and R foot xrays normal. I explained to the patient that her symptoms are atypical for gout and that neuropathy could be contributing. Repeat uric acid in 12/2022 is 5.4 and ESR/CRP normal. She was having an acute flare up (toe pain and ?swelling)  so we will repeat labs in 2 weeks. She will discuss these symptoms with her podiatrist.  RTC 3 months and continue to monitor for new symptoms and uric acid levels. Consider treatment if level is >9 or CKD or recurrent episodes.          The assessment and plan, risk and benefits were discussed with the patient. All of the patients questions were answered and patient agrees to the plan.      Richmond Mijares MD  Clinical   Department of Rheumatology   [facility]

## 2023-12-12 ENCOUNTER — OFFICE VISIT (OUTPATIENT)
Dept: PRIMARY CARE | Facility: CLINIC | Age: 63
End: 2023-12-12
Payer: COMMERCIAL

## 2023-12-12 ENCOUNTER — OFFICE VISIT (OUTPATIENT)
Dept: RHEUMATOLOGY | Facility: CLINIC | Age: 63
End: 2023-12-12
Payer: COMMERCIAL

## 2023-12-12 VITALS
HEIGHT: 61 IN | HEART RATE: 103 BPM | SYSTOLIC BLOOD PRESSURE: 97 MMHG | WEIGHT: 193 LBS | DIASTOLIC BLOOD PRESSURE: 64 MMHG | BODY MASS INDEX: 36.44 KG/M2 | TEMPERATURE: 98 F

## 2023-12-12 VITALS
DIASTOLIC BLOOD PRESSURE: 70 MMHG | HEART RATE: 99 BPM | BODY MASS INDEX: 34.02 KG/M2 | SYSTOLIC BLOOD PRESSURE: 112 MMHG | TEMPERATURE: 98 F | WEIGHT: 192 LBS | OXYGEN SATURATION: 98 % | HEIGHT: 63 IN

## 2023-12-12 DIAGNOSIS — I10 HTN (HYPERTENSION), BENIGN: ICD-10-CM

## 2023-12-12 DIAGNOSIS — L84 CALLUS OF FOOT: ICD-10-CM

## 2023-12-12 DIAGNOSIS — E79.0 HYPERURICEMIA: ICD-10-CM

## 2023-12-12 DIAGNOSIS — Z79.4 TYPE 2 DIABETES MELLITUS WITH DIABETIC POLYNEUROPATHY, WITH LONG-TERM CURRENT USE OF INSULIN (MULTI): ICD-10-CM

## 2023-12-12 DIAGNOSIS — Z00.00 ENCOUNTER FOR WELLNESS EXAMINATION IN ADULT: Primary | ICD-10-CM

## 2023-12-12 DIAGNOSIS — E66.9 OBESITY (BMI 30-39.9): ICD-10-CM

## 2023-12-12 DIAGNOSIS — E53.8 B12 DEFICIENCY: ICD-10-CM

## 2023-12-12 DIAGNOSIS — M25.50 POLYARTHRALGIA: Primary | ICD-10-CM

## 2023-12-12 DIAGNOSIS — E11.42 TYPE 2 DIABETES MELLITUS WITH DIABETIC POLYNEUROPATHY, WITH LONG-TERM CURRENT USE OF INSULIN (MULTI): ICD-10-CM

## 2023-12-12 DIAGNOSIS — M79.672 LEFT FOOT PAIN: ICD-10-CM

## 2023-12-12 PROCEDURE — 3078F DIAST BP <80 MM HG: CPT | Performed by: FAMILY MEDICINE

## 2023-12-12 PROCEDURE — 90750 HZV VACC RECOMBINANT IM: CPT | Performed by: FAMILY MEDICINE

## 2023-12-12 PROCEDURE — 1036F TOBACCO NON-USER: CPT | Performed by: FAMILY MEDICINE

## 2023-12-12 PROCEDURE — 99214 OFFICE O/P EST MOD 30 MIN: CPT | Performed by: FAMILY MEDICINE

## 2023-12-12 PROCEDURE — 1036F TOBACCO NON-USER: CPT | Performed by: STUDENT IN AN ORGANIZED HEALTH CARE EDUCATION/TRAINING PROGRAM

## 2023-12-12 PROCEDURE — 3074F SYST BP LT 130 MM HG: CPT | Performed by: FAMILY MEDICINE

## 2023-12-12 PROCEDURE — G0446 INTENS BEHAVE THER CARDIO DX: HCPCS | Performed by: FAMILY MEDICINE

## 2023-12-12 PROCEDURE — 99396 PREV VISIT EST AGE 40-64: CPT | Performed by: FAMILY MEDICINE

## 2023-12-12 PROCEDURE — G0296 VISIT TO DETERM LDCT ELIG: HCPCS | Performed by: FAMILY MEDICINE

## 2023-12-12 PROCEDURE — 90471 IMMUNIZATION ADMIN: CPT | Performed by: FAMILY MEDICINE

## 2023-12-12 PROCEDURE — 99214 OFFICE O/P EST MOD 30 MIN: CPT | Performed by: STUDENT IN AN ORGANIZED HEALTH CARE EDUCATION/TRAINING PROGRAM

## 2023-12-12 RX ORDER — BLOOD-GLUCOSE TRANSMITTER
EACH MISCELLANEOUS
COMMUNITY
Start: 2023-07-24 | End: 2023-12-12 | Stop reason: WASHOUT

## 2023-12-12 RX ORDER — BLOOD-GLUCOSE SENSOR
EACH MISCELLANEOUS
Status: ON HOLD | COMMUNITY
Start: 2023-10-11 | End: 2024-03-16 | Stop reason: ENTERED-IN-ERROR

## 2023-12-12 RX ORDER — ALBUTEROL SULFATE 90 UG/1
2 AEROSOL, METERED RESPIRATORY (INHALATION) EVERY 4 HOURS PRN
COMMUNITY
Start: 2014-03-27

## 2023-12-12 RX ORDER — ALBUTEROL SULFATE 1.25 MG/3ML
1.25 SOLUTION RESPIRATORY (INHALATION) EVERY 4 HOURS PRN
COMMUNITY
Start: 2018-07-22 | End: 2024-04-08 | Stop reason: SDUPTHER

## 2023-12-12 RX ORDER — BLOOD-GLUCOSE,RECEIVER,CONT
EACH MISCELLANEOUS
Status: ON HOLD | COMMUNITY
Start: 2023-10-11 | End: 2024-03-16 | Stop reason: ENTERED-IN-ERROR

## 2023-12-12 RX ORDER — DOXEPIN HYDROCHLORIDE 10 MG/1
CAPSULE ORAL NIGHTLY
COMMUNITY
Start: 2022-06-29 | End: 2024-03-14 | Stop reason: WASHOUT

## 2023-12-12 RX ORDER — DEXTROMETHORPHAN POLISTIREX 30 MG/5 ML
SUSPENSION, EXTENDED RELEASE 12 HR ORAL
COMMUNITY
Start: 2022-08-12 | End: 2023-12-12 | Stop reason: WASHOUT

## 2023-12-12 RX ORDER — ACETAMINOPHEN 500 MG
500-1000 TABLET ORAL EVERY 8 HOURS PRN
COMMUNITY

## 2023-12-12 ASSESSMENT — PATIENT HEALTH QUESTIONNAIRE - PHQ9
1. LITTLE INTEREST OR PLEASURE IN DOING THINGS: NOT AT ALL
SUM OF ALL RESPONSES TO PHQ9 QUESTIONS 1 AND 2: 0
2. FEELING DOWN, DEPRESSED OR HOPELESS: NOT AT ALL

## 2023-12-12 ASSESSMENT — PAIN SCALES - GENERAL: PAINLEVEL: 5

## 2023-12-18 ENCOUNTER — ANCILLARY PROCEDURE (OUTPATIENT)
Dept: RADIOLOGY | Facility: CLINIC | Age: 63
End: 2023-12-18
Payer: COMMERCIAL

## 2023-12-18 ENCOUNTER — LAB (OUTPATIENT)
Dept: LAB | Facility: LAB | Age: 63
End: 2023-12-18
Payer: COMMERCIAL

## 2023-12-18 DIAGNOSIS — E11.42 TYPE 2 DIABETES MELLITUS WITH DIABETIC POLYNEUROPATHY, WITH LONG-TERM CURRENT USE OF INSULIN (MULTI): ICD-10-CM

## 2023-12-18 DIAGNOSIS — M79.672 LEFT FOOT PAIN: ICD-10-CM

## 2023-12-18 DIAGNOSIS — Z00.00 ENCOUNTER FOR WELLNESS EXAMINATION IN ADULT: ICD-10-CM

## 2023-12-18 DIAGNOSIS — E79.0 HYPERURICEMIA: ICD-10-CM

## 2023-12-18 DIAGNOSIS — E53.8 B12 DEFICIENCY: ICD-10-CM

## 2023-12-18 DIAGNOSIS — I10 HTN (HYPERTENSION), BENIGN: ICD-10-CM

## 2023-12-18 DIAGNOSIS — Z79.4 TYPE 2 DIABETES MELLITUS WITH DIABETIC POLYNEUROPATHY, WITH LONG-TERM CURRENT USE OF INSULIN (MULTI): ICD-10-CM

## 2023-12-18 DIAGNOSIS — M25.50 POLYARTHRALGIA: ICD-10-CM

## 2023-12-18 LAB
25(OH)D3 SERPL-MCNC: 33 NG/ML (ref 30–100)
ALBUMIN SERPL BCP-MCNC: 4.4 G/DL (ref 3.4–5)
ALP SERPL-CCNC: 45 U/L (ref 33–136)
ALT SERPL W P-5'-P-CCNC: 28 U/L (ref 7–45)
ANION GAP SERPL CALC-SCNC: 13 MMOL/L (ref 10–20)
AST SERPL W P-5'-P-CCNC: 18 U/L (ref 9–39)
BASOPHILS # BLD AUTO: 0.08 X10*3/UL (ref 0–0.1)
BASOPHILS NFR BLD AUTO: 0.7 %
BILIRUB SERPL-MCNC: 0.3 MG/DL (ref 0–1.2)
BUN SERPL-MCNC: 14 MG/DL (ref 6–23)
CALCIUM SERPL-MCNC: 9.6 MG/DL (ref 8.6–10.3)
CHLORIDE SERPL-SCNC: 96 MMOL/L (ref 98–107)
CO2 SERPL-SCNC: 31 MMOL/L (ref 21–32)
CREAT SERPL-MCNC: 0.68 MG/DL (ref 0.5–1.05)
CREAT UR-MCNC: 126.3 MG/DL (ref 20–320)
CRP SERPL-MCNC: 0.35 MG/DL
EOSINOPHIL # BLD AUTO: 0.35 X10*3/UL (ref 0–0.7)
EOSINOPHIL NFR BLD AUTO: 3.1 %
ERYTHROCYTE [DISTWIDTH] IN BLOOD BY AUTOMATED COUNT: 12.5 % (ref 11.5–14.5)
ERYTHROCYTE [SEDIMENTATION RATE] IN BLOOD BY WESTERGREN METHOD: 8 MM/H (ref 0–30)
EST. AVERAGE GLUCOSE BLD GHB EST-MCNC: 166 MG/DL
GFR SERPL CREATININE-BSD FRML MDRD: >90 ML/MIN/1.73M*2
GLUCOSE SERPL-MCNC: 193 MG/DL (ref 74–99)
HBA1C MFR BLD: 7.4 %
HCT VFR BLD AUTO: 43.3 % (ref 36–46)
HGB BLD-MCNC: 14.5 G/DL (ref 12–16)
IMM GRANULOCYTES # BLD AUTO: 0.03 X10*3/UL (ref 0–0.7)
IMM GRANULOCYTES NFR BLD AUTO: 0.3 % (ref 0–0.9)
LYMPHOCYTES # BLD AUTO: 3.49 X10*3/UL (ref 1.2–4.8)
LYMPHOCYTES NFR BLD AUTO: 31.3 %
MCH RBC QN AUTO: 30.5 PG (ref 26–34)
MCHC RBC AUTO-ENTMCNC: 33.5 G/DL (ref 32–36)
MCV RBC AUTO: 91 FL (ref 80–100)
MICROALBUMIN UR-MCNC: 18.3 MG/L
MICROALBUMIN/CREAT UR: 14.5 UG/MG CREAT
MONOCYTES # BLD AUTO: 0.85 X10*3/UL (ref 0.1–1)
MONOCYTES NFR BLD AUTO: 7.6 %
NEUTROPHILS # BLD AUTO: 6.34 X10*3/UL (ref 1.2–7.7)
NEUTROPHILS NFR BLD AUTO: 57 %
NRBC BLD-RTO: 0 /100 WBCS (ref 0–0)
PLATELET # BLD AUTO: 430 X10*3/UL (ref 150–450)
POTASSIUM SERPL-SCNC: 4.5 MMOL/L (ref 3.5–5.3)
PROT SERPL-MCNC: 7.1 G/DL (ref 6.4–8.2)
RBC # BLD AUTO: 4.76 X10*6/UL (ref 4–5.2)
SODIUM SERPL-SCNC: 135 MMOL/L (ref 136–145)
TSH SERPL-ACNC: 1.15 MIU/L (ref 0.44–3.98)
URATE SERPL-MCNC: 5.2 MG/DL (ref 2.3–6.7)
WBC # BLD AUTO: 11.1 X10*3/UL (ref 4.4–11.3)

## 2023-12-18 PROCEDURE — 83036 HEMOGLOBIN GLYCOSYLATED A1C: CPT

## 2023-12-18 PROCEDURE — 82570 ASSAY OF URINE CREATININE: CPT

## 2023-12-18 PROCEDURE — 85652 RBC SED RATE AUTOMATED: CPT

## 2023-12-18 PROCEDURE — 73630 X-RAY EXAM OF FOOT: CPT | Mod: LT

## 2023-12-18 PROCEDURE — 85025 COMPLETE CBC W/AUTO DIFF WBC: CPT

## 2023-12-18 PROCEDURE — 36415 COLL VENOUS BLD VENIPUNCTURE: CPT

## 2023-12-18 PROCEDURE — 80053 COMPREHEN METABOLIC PANEL: CPT

## 2023-12-18 PROCEDURE — 82043 UR ALBUMIN QUANTITATIVE: CPT

## 2023-12-18 PROCEDURE — 73630 X-RAY EXAM OF FOOT: CPT | Mod: LEFT SIDE | Performed by: RADIOLOGY

## 2023-12-18 PROCEDURE — 84550 ASSAY OF BLOOD/URIC ACID: CPT

## 2023-12-18 PROCEDURE — 84443 ASSAY THYROID STIM HORMONE: CPT

## 2023-12-18 PROCEDURE — 86140 C-REACTIVE PROTEIN: CPT

## 2023-12-18 PROCEDURE — 82306 VITAMIN D 25 HYDROXY: CPT

## 2023-12-19 ENCOUNTER — TELEPHONE (OUTPATIENT)
Dept: PRIMARY CARE | Facility: CLINIC | Age: 63
End: 2023-12-19
Payer: COMMERCIAL

## 2023-12-19 NOTE — TELEPHONE ENCOUNTER
----- Message from Gage Jean DO sent at 12/19/2023  8:44 AM EST -----  Patient's blood work is stable.  Hemoglobin A1c is stable at 7.4 and obviously would like to try and get a little bit lower than that.  If we need to focus on some medical weight loss we could also do that the patient should focus on 1400-calorie diet.  Kidney function, liver function, thyroid uric acid were all normal as well as blood counts

## 2023-12-20 ENCOUNTER — TELEPHONE (OUTPATIENT)
Dept: PRIMARY CARE | Facility: CLINIC | Age: 63
End: 2023-12-20
Payer: COMMERCIAL

## 2023-12-20 NOTE — TELEPHONE ENCOUNTER
----- Message from Gage Jean DO sent at 12/20/2023  9:38 AM EST -----  Patient's x-ray with mild degenerative changes no fracture

## 2023-12-21 ENCOUNTER — APPOINTMENT (OUTPATIENT)
Dept: RADIOLOGY | Facility: CLINIC | Age: 63
End: 2023-12-21
Payer: COMMERCIAL

## 2023-12-27 ENCOUNTER — LAB (OUTPATIENT)
Dept: LAB | Facility: LAB | Age: 63
End: 2023-12-27
Payer: COMMERCIAL

## 2023-12-27 ENCOUNTER — TELEPHONE (OUTPATIENT)
Dept: PRIMARY CARE | Facility: CLINIC | Age: 63
End: 2023-12-27

## 2023-12-27 ENCOUNTER — ANCILLARY PROCEDURE (OUTPATIENT)
Dept: RADIOLOGY | Facility: CLINIC | Age: 63
End: 2023-12-27
Payer: COMMERCIAL

## 2023-12-27 DIAGNOSIS — Z00.00 ENCOUNTER FOR WELLNESS EXAMINATION IN ADULT: ICD-10-CM

## 2023-12-27 DIAGNOSIS — E53.8 B12 DEFICIENCY: ICD-10-CM

## 2023-12-27 DIAGNOSIS — E11.42 TYPE 2 DIABETES MELLITUS WITH DIABETIC POLYNEUROPATHY, WITH LONG-TERM CURRENT USE OF INSULIN (MULTI): ICD-10-CM

## 2023-12-27 DIAGNOSIS — I10 HTN (HYPERTENSION), BENIGN: ICD-10-CM

## 2023-12-27 DIAGNOSIS — Z79.4 TYPE 2 DIABETES MELLITUS WITH DIABETIC POLYNEUROPATHY, WITH LONG-TERM CURRENT USE OF INSULIN (MULTI): ICD-10-CM

## 2023-12-27 LAB
CHOLEST SERPL-MCNC: 115 MG/DL (ref 0–199)
CHOLESTEROL/HDL RATIO: 2.9
HDLC SERPL-MCNC: 39.5 MG/DL
LDLC SERPL CALC-MCNC: ABNORMAL MG/DL
NON HDL CHOLESTEROL: 76 MG/DL (ref 0–149)
TRIGL SERPL-MCNC: 411 MG/DL (ref 0–149)
VLDL: ABNORMAL

## 2023-12-27 PROCEDURE — 36415 COLL VENOUS BLD VENIPUNCTURE: CPT

## 2023-12-27 PROCEDURE — 80061 LIPID PANEL: CPT

## 2023-12-27 PROCEDURE — 75571 CT HRT W/O DYE W/CA TEST: CPT

## 2023-12-28 ENCOUNTER — TELEPHONE (OUTPATIENT)
Dept: PRIMARY CARE | Facility: CLINIC | Age: 63
End: 2023-12-28
Payer: COMMERCIAL

## 2023-12-28 NOTE — TELEPHONE ENCOUNTER
----- Message from Gage Jean DO sent at 12/28/2023 12:40 PM EST -----  Patient's calcium score test was a total of 80.  She has mild deposits in 1 vessel.  A score of 80 represents the lowest risk of 0.4 %/year which is only 4% risk in the next decade.  We can simply repeat this test in about 5 years

## 2024-01-04 ENCOUNTER — ANCILLARY PROCEDURE (OUTPATIENT)
Dept: RADIOLOGY | Facility: CLINIC | Age: 64
End: 2024-01-04
Payer: COMMERCIAL

## 2024-01-04 ENCOUNTER — OFFICE VISIT (OUTPATIENT)
Dept: ORTHOPEDIC SURGERY | Facility: CLINIC | Age: 64
End: 2024-01-04
Payer: COMMERCIAL

## 2024-01-04 VITALS — BODY MASS INDEX: 34.02 KG/M2 | HEIGHT: 63 IN | WEIGHT: 192 LBS

## 2024-01-04 DIAGNOSIS — M25.511 ACUTE PAIN OF RIGHT SHOULDER: ICD-10-CM

## 2024-01-04 PROCEDURE — 73030 X-RAY EXAM OF SHOULDER: CPT | Mod: RIGHT SIDE | Performed by: RADIOLOGY

## 2024-01-04 PROCEDURE — 99213 OFFICE O/P EST LOW 20 MIN: CPT | Performed by: PHYSICIAN ASSISTANT

## 2024-01-04 PROCEDURE — 20610 DRAIN/INJ JOINT/BURSA W/O US: CPT | Performed by: PHYSICIAN ASSISTANT

## 2024-01-04 PROCEDURE — 2500000005 HC RX 250 GENERAL PHARMACY W/O HCPCS: Performed by: PHYSICIAN ASSISTANT

## 2024-01-04 PROCEDURE — 2500000004 HC RX 250 GENERAL PHARMACY W/ HCPCS (ALT 636 FOR OP/ED): Performed by: PHYSICIAN ASSISTANT

## 2024-01-04 PROCEDURE — 73030 X-RAY EXAM OF SHOULDER: CPT | Mod: RT

## 2024-01-04 PROCEDURE — 1036F TOBACCO NON-USER: CPT | Performed by: PHYSICIAN ASSISTANT

## 2024-01-04 RX ORDER — TRIAMCINOLONE ACETONIDE 40 MG/ML
40 INJECTION, SUSPENSION INTRA-ARTICULAR; INTRAMUSCULAR
Status: COMPLETED | OUTPATIENT
Start: 2024-01-04 | End: 2024-01-04

## 2024-01-04 RX ORDER — LIDOCAINE HYDROCHLORIDE 10 MG/ML
4 INJECTION INFILTRATION; PERINEURAL
Status: COMPLETED | OUTPATIENT
Start: 2024-01-04 | End: 2024-01-04

## 2024-01-04 RX ADMIN — TRIAMCINOLONE ACETONIDE 40 MG: 400 INJECTION, SUSPENSION INTRA-ARTICULAR; INTRAMUSCULAR at 14:05

## 2024-01-04 RX ADMIN — LIDOCAINE HYDROCHLORIDE 4 ML: 10 INJECTION, SOLUTION INFILTRATION; PERINEURAL at 14:05

## 2024-01-04 ASSESSMENT — PAIN SCALES - GENERAL: PAINLEVEL_OUTOF10: 8

## 2024-01-04 ASSESSMENT — PAIN - FUNCTIONAL ASSESSMENT: PAIN_FUNCTIONAL_ASSESSMENT: 0-10

## 2024-01-04 ASSESSMENT — PAIN DESCRIPTION - DESCRIPTORS: DESCRIPTORS: SHARP

## 2024-01-04 NOTE — PROGRESS NOTES
"63-year-old female presents to clinic today with complaints of right shoulder pain.  She has been experiencing right shoulder pain for a long period of time however most recently over the past 2 months it has gotten worse as well as last week after having an x-ray she had a T lift her arm in a weird position in which she heard some \"tearing\".  She has had a lot of pain and discomfort associated with any movement of the right shoulder since then.  She has tried a lot of different treatments including over-the-counter medications, topicals, ice without any improvement.    Patient's self reported past medical history, medications, allergies, surgical history, family and social history as well as a 10 point review of systems has been documented in the new patient intake form and scanned into the patient's electronic medical record. Pertinent findings are documented in the HPI.    Physical Examination Findings:  Constitutional: Appears well-developed and well-nourished.  Head: Normocephalic and atraumatic.  Eyes: Pupils are equal and round.  Cardiovascular: Intact distal pulses.   Respiratory: Effort normal. No respiratory distress.  Neurologic: Alert and oriented to person, place, and time.  Skin: Skin is warm and dry.  Hematologic / Lymphatic: No lymphedema, lymphangitis.  Psychiatric: normal mood and affect. Behavior is normal.   Musculoskeletal: Right shoulder elevation 150 degrees associated with pain pain with terminal end range of motion.  Motor power in abduction 4+/5.  Light sensation is intact.    X-rays taken today of the right shoulder with mild degenerative changes, visible anchors from previous rotator cuff repair no acute findings    Impression: Right shoulder pain    Plan: We discussed her symptoms in detail today I discussed differential diagnosis including rotator cuff pathology.  At this time patient is requesting a steroid injection given she will be leaving for a cruise in 3 weeks.  The injection was " given today and tolerated well.  However if she does not get relief following this injection or notices continued recurrent pain I will recommend further evaluation with MRI.    Patient ID: Jing Jon is a 63 y.o. female.    L Inj/Asp: R subacromial bursa on 1/4/2024 2:05 PM  Details: 22 G needle, posterior approach  Medications: 40 mg triamcinolone acetonide 40 mg/mL; 4 mL lidocaine 10 mg/mL (1 %)  Procedure, treatment alternatives, risks and benefits explained, specific risks discussed. Immediately prior to procedure a time out was called to verify the correct patient, procedure, equipment, support staff and site/side marked as required.           Jaimie Santos PA-C  Department of Orthopaedic Surgery  [facility]    Dictation performed with the use of voice recognition software. Syntax and grammatical errors may exist.

## 2024-01-09 ENCOUNTER — TELEPHONE (OUTPATIENT)
Dept: ORTHOPEDIC SURGERY | Facility: HOSPITAL | Age: 64
End: 2024-01-09
Payer: COMMERCIAL

## 2024-01-09 NOTE — TELEPHONE ENCOUNTER
Copied from CRM #[id number]. Topic: Information Request - Get Appointment Information  >> Jan 9, 2024  9:18 AM Yvonne CARDOZA wrote:  Good morning,   Patient called in looking to speak with office because she got s cortisone injection on 1/4/2024. She was told that if the injectino did not work Jaimie Santos would order her an MRI. If there are any questions and concerns please feel free to reach out to patient at

## 2024-01-15 DIAGNOSIS — M25.511 RIGHT SHOULDER PAIN, UNSPECIFIED CHRONICITY: Primary | ICD-10-CM

## 2024-02-05 ENCOUNTER — APPOINTMENT (OUTPATIENT)
Dept: PRIMARY CARE | Facility: CLINIC | Age: 64
End: 2024-02-05
Payer: COMMERCIAL

## 2024-02-12 ENCOUNTER — HOSPITAL ENCOUNTER (OUTPATIENT)
Dept: RADIOLOGY | Facility: CLINIC | Age: 64
Discharge: HOME | End: 2024-02-12
Payer: COMMERCIAL

## 2024-02-12 DIAGNOSIS — M25.511 RIGHT SHOULDER PAIN, UNSPECIFIED CHRONICITY: ICD-10-CM

## 2024-02-15 ENCOUNTER — HOSPITAL ENCOUNTER (OUTPATIENT)
Dept: RADIOLOGY | Facility: CLINIC | Age: 64
Discharge: HOME | End: 2024-02-15
Payer: COMMERCIAL

## 2024-02-15 PROCEDURE — 73221 MRI JOINT UPR EXTREM W/O DYE: CPT | Mod: RIGHT SIDE | Performed by: RADIOLOGY

## 2024-02-15 PROCEDURE — 73221 MRI JOINT UPR EXTREM W/O DYE: CPT | Mod: RT

## 2024-02-19 ENCOUNTER — OFFICE VISIT (OUTPATIENT)
Dept: ORTHOPEDIC SURGERY | Facility: HOSPITAL | Age: 64
End: 2024-02-19
Payer: COMMERCIAL

## 2024-02-19 DIAGNOSIS — M19.019 SHOULDER ARTHRITIS: Primary | ICD-10-CM

## 2024-02-19 PROCEDURE — 99213 OFFICE O/P EST LOW 20 MIN: CPT | Performed by: ORTHOPAEDIC SURGERY

## 2024-02-19 PROCEDURE — 1036F TOBACCO NON-USER: CPT | Performed by: ORTHOPAEDIC SURGERY

## 2024-02-19 PROCEDURE — 20610 DRAIN/INJ JOINT/BURSA W/O US: CPT | Performed by: ORTHOPAEDIC SURGERY

## 2024-02-19 PROCEDURE — 2500000005 HC RX 250 GENERAL PHARMACY W/O HCPCS: Performed by: ORTHOPAEDIC SURGERY

## 2024-02-19 PROCEDURE — 2500000004 HC RX 250 GENERAL PHARMACY W/ HCPCS (ALT 636 FOR OP/ED): Performed by: ORTHOPAEDIC SURGERY

## 2024-02-19 RX ORDER — TRIAMCINOLONE ACETONIDE 40 MG/ML
40 INJECTION, SUSPENSION INTRA-ARTICULAR; INTRAMUSCULAR
Status: COMPLETED | OUTPATIENT
Start: 2024-02-19 | End: 2024-02-19

## 2024-02-19 RX ORDER — LIDOCAINE HYDROCHLORIDE 10 MG/ML
4 INJECTION INFILTRATION; PERINEURAL
Status: COMPLETED | OUTPATIENT
Start: 2024-02-19 | End: 2024-02-19

## 2024-02-19 RX ADMIN — LIDOCAINE HYDROCHLORIDE 4 ML: 10 INJECTION, SOLUTION INFILTRATION; PERINEURAL at 09:03

## 2024-02-19 RX ADMIN — TRIAMCINOLONE ACETONIDE 40 MG: 400 INJECTION, SUSPENSION INTRA-ARTICULAR; INTRAMUSCULAR at 09:03

## 2024-02-19 NOTE — PROGRESS NOTES
Patient is here for reevaluation of her right shoulder she had a subacromial injection which did not help but she had continued pain and had an MRI scan which disclosed some cuff tendinopathy but an intact rotator cuff with no tear and advanced degenerative changes of the glenohumeral joint.    The patient's pain pattern is consistent with this deep axillary and anterior.    MRI scan was reviewed in detail with the patient it shows significant arthritic degenerative changes of the glenohumeral joint and intact rotator cuff.    Physical exam today confirms painful arc of motion in the mid range and at the extremes of range of motion.  No crepitus motor power well-preserved.    Glenohumeral arthritis    We discussed options for further treatment.  Recommended repeat injection intra-articularly this time.  That was done today and tolerated well.  I have asked patient to call back next week.  We had a preliminary discussion regarding shoulder arthroplasty which may be indicated in the future.  I do not recommend surgical intervention yet.    This was dictated using voice recognition software and not corrected for grammatical or spelling errors.  L Inj/Asp: R glenohumeral on 2/19/2024 9:03 AM  Indications: pain  Details: 22 G needle, posterior approach  Medications: 40 mg triamcinolone acetonide 40 mg/mL; 4 mL lidocaine 10 mg/mL (1 %)  Procedure, treatment alternatives, risks and benefits explained, specific risks discussed. Consent was given by the patient.

## 2024-02-28 ENCOUNTER — OFFICE VISIT (OUTPATIENT)
Dept: ORTHOPEDIC SURGERY | Facility: HOSPITAL | Age: 64
End: 2024-02-28
Payer: COMMERCIAL

## 2024-02-28 DIAGNOSIS — M19.019 SHOULDER ARTHRITIS: Primary | ICD-10-CM

## 2024-02-28 PROCEDURE — 99212 OFFICE O/P EST SF 10 MIN: CPT | Performed by: ORTHOPAEDIC SURGERY

## 2024-02-28 PROCEDURE — 1036F TOBACCO NON-USER: CPT | Performed by: ORTHOPAEDIC SURGERY

## 2024-02-28 NOTE — PROGRESS NOTES
Patient here for follow-up on her right shoulder she continues to have pain in the right shoulder pain is in the deep axillary and anterior portion of the shoulder worse with usage.  Injection intra-articularly was not helpful.    Right shoulder exam significant for very good elevation to 160 degrees external rotation 45 internal rotation posterior iliac crest.  Pain at the extremes of range of motion    Glenohumeral arthritis    We discussed options for further treatment I recommend the patient consider glenohumeral arthroplasty the procedure would be: Right shoulder anatomic arthroplasty with biceps tenodesis.  We discussed the rehabilitation sequence the potential benefits possible risks and alternatives I recommend the patient wait at least 1 to 2 months more before having surgery because of the recent cortisone injection.  She is can contact her office to schedule the procedure accordingly.  She has been given literature about shoulder replacement to review today.    This was dictated using voice recognition software and not corrected for grammatical or spelling errors.

## 2024-03-03 NOTE — PROGRESS NOTES
Patient is here for annual wellness exam.  She is switching over from Dr. Galicia's care since he is retired.  She has an endocrinologist as well as a podiatrist.  Patient does have a cardiologist and has never had a calcium score test.  She does have a pulmonologist also from long history of smoking, however has not had any lung screening protocol.  She would like the bottom of her left foot looked at as well.  Continue working on healthy as well as also should drop something on her foot has been having some pain around the third through the fifth digit as well as also a substantial bruising and pain even just with walking the top left foot.  I will screen him patient also ACO risk of 6 diabetes, presents course for patient also is due for flu shot and shingles shot    2 weeks ago she dropped some heavy items on the left foot that bit of swelling bruising and pain is still pretty substantial.  She also has a callus to the bottom of her has been having some pain underneath the area when she dropped on her foot as well.    REVIEW OF SYSTEMS: 12 systems negative except for those mentioned in the HPI. Reviewed home risks with patient. Patient feels safe at home.    PHYSICAL EXAMINATION:   Constitutional: The patient is alert and oriented x3, in no acute  distress.  Eyes: Extraocular movements are intact. Pupils are equal and reactive to light  ENT: Bilateral TMs within normal limits. Nasal turbinates are within normal limits. Throat within normal limits.  Neck: Supple without lymphadenopathy or JVD.  Heart: Regular rate and rhythm without murmur, click, gallop, or rub.  Lungs: Clear to auscultation bilaterally. No rales, rhonchi, or  wheezing.  Abdomen: Soft, nontender, nondistended. Normoactive bowel sounds.   No palpable masses. Normal percussion  Musculoskeletal: 5/5 motor, upper and lower extremities.  Neurologic: Cranial nerves II through XII fully intact. +2/4 DTRs  in ankle and knee.  Psychiatric: Good judgment  and insight. Normal affect and mood. No cognitive defects noted.  Lymphatic: Negative as noted above.  Skin: Thickened callus on the ball of the left foot with blood blister underneath.  There is also some residual hematoma at the end of the third digit the left foot by the cuticle mild puffiness to the abrasion on the forefoot dorsally of the left foot.      Assessment:  Per EMR    Plan:  Patient with calcium score test both for high risk cardiac disease as well as also high risk for lung and screening.  This is discussed above.  Will update vaccinations.  Also patient with neuropathy diabetes insulin use high risk for infection and fracture will also have a stat x-ray of the left foot to make sure does not need to be addressed with her podiatrist or orthopedic and put her in a walking boot.  Also discussed corn pads or moleskin to help take the pressure off of her foot and allow healing  Discussed with the patient and reviewed annual wellness questionnaire form as well as cognitive deficits. Also discussed with the patient immunizations and risks for colonoscopy and other screening procedures    This dictation was created using Dragon dictation and may contain errors       awaiting radiology

## 2024-03-07 NOTE — PROGRESS NOTES
Rheumatology Outpatient Follow Up Note    Subjective   Jing Jon is a 63 y.o. female presenting today for Joint Pain.    History of Presenting Problem:   Rheum history:  She was referred by PCP for chronic foot pain worse in big toes. Her dad has gout and she is concerned.      She had a spine stimulator for chronic pain and peripheral neuropathy and so she has burning sensation in both feet. She has episodes of intense pain/swelling in the IP joints bilaterally. Episodes happen more at night and its so sharp that she cant move it. It usually lasts for few minutes only. Episodes are getting more frequent recently. Advil and Tylenol help. No other joints involved. No kidney stones. No alcohol. Quit smoking 20 years ago. Labs from 8/2023 showed sUA of 7.3, CRP/ESR normal and foot xrays normal       Interval history:  8/2023: seen as a NP  9/2023: she continues to have pain in her big toes usually at night and lasts for few minutes only. No significant swelling.   12/2023:she continues to have episodes of right IP pain and tenderness that lasts for few minutes and then the soreness can last for few hours  3/2024: she has been using  neuropathy cream she got OTC and its helping. No recent gout flares. She continues to be on Lyrica    Past Medical History:   Past Medical History:   Diagnosis Date    Allergic     Anemia     Anxiety     Arthritis     Asthma     CHF (congestive heart failure) (CMS/[facility])     Depression     Diabetes mellitus (CMS/[facility])     Disease of thyroid gland     GERD (gastroesophageal reflux disease)     Headache     Heart disease     Hypertension     Iliotibial band syndrome, left leg 05/27/2019    Iliotibial band tendinitis of left side    Myocardial infarction (CMS/[facility])     Obesity, unspecified 01/03/2022    Obesity (BMI 30-39.9)    Other abnormal auditory perceptions, unspecified ear 12/12/2019    Abnormal auditory perception    Other fecal abnormalities     Change in stool    Other  spondylosis with myelopathy, cervical region 11/30/2016    Cervical spondylitic cord compression    Pain in left hip 03/23/2021    Left hip pain    Pain in left knee 05/09/2018    Left knee pain, unspecified chronicity    Pain in left shoulder 07/12/2017    Pain of left shoulder region    Pain in right shoulder 02/22/2019    Chronic right shoulder pain    Pain in right shoulder 04/01/2019    Right shoulder pain    Personal history of other diseases of the circulatory system     History of cardiac disorder    Personal history of other diseases of the circulatory system     History of hypertension    Personal history of other diseases of the musculoskeletal system and connective tissue 04/15/2015    History of neck pain    Personal history of other diseases of the musculoskeletal system and connective tissue 11/25/2015    History of joint pain    Personal history of other diseases of the musculoskeletal system and connective tissue     History of arthritis    Personal history of other diseases of the respiratory system     History of asthma    Personal history of transient ischemic attack (TIA), and cerebral infarction without residual deficits 08/28/2019    History of stroke in adulthood    Personal history of transient ischemic attack (TIA), and cerebral infarction without residual deficits     History of cerebrovascular accident    Radiculopathy, lumbar region 02/26/2020    Lumbar radiculopathy    Spinal stenosis, lumbar region without neurogenic claudication 11/18/2020    Lumbar stenosis    Sprain of other part of unspecified wrist and hand, initial encounter 07/26/2015    Carpometacarpal joint sprains    Stroke (CMS/HCC)     Trochanteric bursitis, left hip 07/28/2020    Trochanteric bursitis of left hip    Unilateral primary osteoarthritis, left hip 03/31/2021    Primary osteoarthritis of left hip    Urinary tract infection        Allergies:   Allergies   Allergen Reactions    Meperidine Shortness of breath,  Unknown and Nausea/vomiting     Other reaction(s): Vomiting      nausea (DEMEROL)      nausea    Other Reaction(s): Vomiting    Other reaction(s): Vomiting    nausea   (DEMEROL)    Meperidine (Pf) Shortness of breath     nausea    Atorvastatin Hives, Other and Unknown     fatigue    fatigue   Patient takes Pravastatin at home    Benazepril Other and Unknown     Other Reaction(s): Other (See Comments), Unknown    Clonidine Other and Unknown     Other Reaction(s): Other (See Comments), Unknown    Dapagliflozin Other     Other Reaction(s): Other (See Comments), Other: See Comments      palpatations    palpatations    Egg Other and Unknown     Pt. Does not have allergy to things with eggs in it, only eggs by themselves.    Ezetimibe GI intolerance and GI Upset     Other reaction(s): GI Upset    Lovastatin Unknown    Propoxyphene Hives, Other and Unknown    Rosuvastatin Unknown    Simvastatin Unknown    Statins-Hmg-Coa Reductase Inhibitors Hives, Other and Unknown     Other Reaction(s): Other (See Comments), Unknown, Unknown      fatigue      fatigue Patient takes Pravastatin at home      Patient takes Pravastatin at home    Patient takes Pravastatin at home    Canagliflozin Rash     yeast infection    Morphine GI intolerance, GI Upset, Hives, Nausea And Vomiting and Unknown     Nausea. Patient takes Vicodin at home with no problem       Medications:   Current Outpatient Medications:     acetaminophen (Tylenol) 500 mg tablet, Take 3 tablets (1,500 mg) by mouth., Disp: , Rfl:     albuterol 1.25 mg/3 mL nebulizer solution, as needed., Disp: , Rfl:     albuterol 90 mcg/actuation inhaler, Inhale 2 puffs every 4 hours if needed., Disp: , Rfl:     APPLE CIDER VINEGAR ORAL, Take 2 tablets by mouth once daily., Disp: , Rfl:     baclofen (Lioresal) 10 mg tablet, Take 1 tablet (10 mg) by mouth 3 times a day as needed., Disp: , Rfl:     biotin 10 mg tablet, Take 1 tablet (10 mg) by mouth., Disp: , Rfl:     clopidogrel (Plavix) 75  mg tablet, once daily., Disp: , Rfl:     cyanocobalamin (Vitamin B-12) 1,000 mcg tablet, Take 1 tablet (1,000 mcg) by mouth once daily., Disp: , Rfl:     Dexcom G7  misc, , Disp: , Rfl:     Dexcom G7 Sensor device, , Disp: , Rfl:     doxepin (SINEquan) 10 mg capsule, once daily at bedtime., Disp: , Rfl:     esomeprazole (NexIUM) 40 mg DR capsule, Take 2 capsules (80 mg) by mouth once daily in the morning. Take before meals., Disp: 180 capsule, Rfl: 0    fenofibrate (Tricor) 48 mg tablet, once daily., Disp: , Rfl:     gabapentin enacarbil (Horizant) 600 mg tablet extended release ER tablet, Take 1 tablet (600 mg) by mouth., Disp: , Rfl:     HumuLIN R U-500, Conc, Kwikpen 500 unit/mL (3 mL) CONCENTRATED injection, Inject 70 units with breakfast and 55 units with dinner., Disp: , Rfl:     ibuprofen 200 mg tablet, Take 1 tablet (200 mg) by mouth every 6 hours if needed., Disp: , Rfl:     multivitamin (Daily Multi-Vitamin) tablet, Take 1 tablet by mouth once daily., Disp: , Rfl:     olmesartan-amLODIPin-hcthiazid 40-5-12.5 mg tablet, once daily., Disp: , Rfl:     pregabalin (Lyrica) 150 mg capsule, 3 times a day., Disp: , Rfl:     Repatha SureClick 140 mg/mL injection, Inject 140 mg subcutaneously every 2 weeks., Disp: , Rfl:     tirzepatide 7.5 mg/0.5 mL pen injector, Inject 7.5 mg under the skin once a week., Disp: , Rfl:     Review of Systems:   Constitutional: Denies fever, chills   Eyes: Denies dry eyes, pain in the eyes   ENT: Denies dry mouth, loss of taste, sores in the mouth  Cardiovascular: Denies chest pain, palpitations   Respiratory: Denies shortness of breath   Gastrointestinal: Denies heartburn   Integumentary: Denies photosensitivity, rash or lesions, Raynaud's   Neurological: Denies any numbness or tingling    MSK: As per HPI.     All 10 review of systems have been reviewed and are negative for complaint except as noted in the HPI    Objective   Physical Examination:  Vitals:    03/11/24 0942  "  BP: 113/73   Pulse: 103   Temp: 36.8 °C (98.2 °F)       Growth %ile SmartLinks can only be used for patients less than 20 years old.  Ht Readings from Last 1 Encounters:   03/11/24 1.6 m (5' 3\")     Wt Readings from Last 1 Encounters:   03/11/24 87.1 kg (192 lb)       General - NAD, sitting up in chair, well-groomed, pleasant, AAOx3  Head: Normocephalic, atraumatic  Eyes - PERRLA, EOMI. No conjunctiva injection.   Lungs - Symmetric chest expansion.  Skin - No rashes or ulcers. Skin warm and dry. No erythema on bilateral cheeks.  Extremities - No edema, cyanosis ,or clubbing  Neurological - Alert and oriented x 3,  grossly intact. No focal deficit.        Assessment/Plan   Jing Jon is a 63 y.o. female with PMHx of allergic rhinitis, cervical stenosis, increased BMI, depression, diabetes, neuropathy, HTN, GERD, HLP, lumbar stenosis, chronic migraines, and vitamin D deficiency who is presenting today for follow up:     She has recurrent episodes of intense pain in both toes (IP joints) with swelling. No extreme tenderness. Episodes usually last for few minutes only and usually at night. She has neuropathy and reduced feeling in her feet. Her dad has gout. Labs from 8/2023 showed sUA of 7.3, CRP/ESR normal and R foot xrays normal. I explained to the patient that her symptoms are atypical for gout and that neuropathy could be contributing. Repeat uric acid in 12/2022 is 5.4 and ESR/CRP normal. Lyrica for neuropathy reduced the symptoms.  RTC 3 months and continue to monitor for new symptoms and uric acid levels. Consider treatment if level is >9 or CKD or recurrent episodes. We will continue to monitor clinically       The assessment and plan, risk and benefits were discussed with the patient. All of the patients questions were answered and patient agrees to the plan.      Richmond Mijares MD  Clinical   Department of Rheumatology   [facility]    "

## 2024-03-11 ENCOUNTER — OFFICE VISIT (OUTPATIENT)
Dept: RHEUMATOLOGY | Facility: CLINIC | Age: 64
End: 2024-03-11
Payer: COMMERCIAL

## 2024-03-11 ENCOUNTER — LAB (OUTPATIENT)
Dept: LAB | Facility: LAB | Age: 64
End: 2024-03-11
Payer: COMMERCIAL

## 2024-03-11 VITALS
DIASTOLIC BLOOD PRESSURE: 73 MMHG | BODY MASS INDEX: 34.02 KG/M2 | WEIGHT: 192 LBS | TEMPERATURE: 98.2 F | HEART RATE: 103 BPM | HEIGHT: 63 IN | SYSTOLIC BLOOD PRESSURE: 113 MMHG

## 2024-03-11 DIAGNOSIS — M25.50 POLYARTHRALGIA: ICD-10-CM

## 2024-03-11 DIAGNOSIS — E79.0 HYPERURICEMIA: Primary | ICD-10-CM

## 2024-03-11 DIAGNOSIS — E79.0 HYPERURICEMIA: ICD-10-CM

## 2024-03-11 LAB
ALBUMIN SERPL BCP-MCNC: 4.4 G/DL (ref 3.4–5)
ALP SERPL-CCNC: 33 U/L (ref 33–136)
ALT SERPL W P-5'-P-CCNC: 32 U/L (ref 7–45)
ANION GAP SERPL CALC-SCNC: 16 MMOL/L (ref 10–20)
AST SERPL W P-5'-P-CCNC: 19 U/L (ref 9–39)
BILIRUB SERPL-MCNC: 0.3 MG/DL (ref 0–1.2)
BUN SERPL-MCNC: 11 MG/DL (ref 6–23)
CALCIUM SERPL-MCNC: 10.1 MG/DL (ref 8.6–10.6)
CHLORIDE SERPL-SCNC: 102 MMOL/L (ref 98–107)
CO2 SERPL-SCNC: 27 MMOL/L (ref 21–32)
CREAT SERPL-MCNC: 0.76 MG/DL (ref 0.5–1.05)
CRP SERPL-MCNC: 0.34 MG/DL
EGFRCR SERPLBLD CKD-EPI 2021: 89 ML/MIN/1.73M*2
ERYTHROCYTE [SEDIMENTATION RATE] IN BLOOD BY WESTERGREN METHOD: 11 MM/H (ref 0–30)
GLUCOSE SERPL-MCNC: 147 MG/DL (ref 74–99)
POTASSIUM SERPL-SCNC: 4.2 MMOL/L (ref 3.5–5.3)
PROT SERPL-MCNC: 6.9 G/DL (ref 6.4–8.2)
SODIUM SERPL-SCNC: 141 MMOL/L (ref 136–145)
URATE SERPL-MCNC: 7.2 MG/DL (ref 2.3–6.7)

## 2024-03-11 PROCEDURE — 3078F DIAST BP <80 MM HG: CPT | Performed by: STUDENT IN AN ORGANIZED HEALTH CARE EDUCATION/TRAINING PROGRAM

## 2024-03-11 PROCEDURE — 80053 COMPREHEN METABOLIC PANEL: CPT

## 2024-03-11 PROCEDURE — 84550 ASSAY OF BLOOD/URIC ACID: CPT

## 2024-03-11 PROCEDURE — 86140 C-REACTIVE PROTEIN: CPT

## 2024-03-11 PROCEDURE — 1036F TOBACCO NON-USER: CPT | Performed by: STUDENT IN AN ORGANIZED HEALTH CARE EDUCATION/TRAINING PROGRAM

## 2024-03-11 PROCEDURE — 3074F SYST BP LT 130 MM HG: CPT | Performed by: STUDENT IN AN ORGANIZED HEALTH CARE EDUCATION/TRAINING PROGRAM

## 2024-03-11 PROCEDURE — 36415 COLL VENOUS BLD VENIPUNCTURE: CPT

## 2024-03-11 PROCEDURE — 99214 OFFICE O/P EST MOD 30 MIN: CPT | Performed by: STUDENT IN AN ORGANIZED HEALTH CARE EDUCATION/TRAINING PROGRAM

## 2024-03-11 PROCEDURE — 85652 RBC SED RATE AUTOMATED: CPT

## 2024-03-12 ENCOUNTER — HOSPITAL ENCOUNTER (EMERGENCY)
Facility: HOSPITAL | Age: 64
Discharge: HOME | End: 2024-03-13
Attending: STUDENT IN AN ORGANIZED HEALTH CARE EDUCATION/TRAINING PROGRAM
Payer: COMMERCIAL

## 2024-03-12 ENCOUNTER — APPOINTMENT (OUTPATIENT)
Dept: RADIOLOGY | Facility: HOSPITAL | Age: 64
End: 2024-03-12
Payer: COMMERCIAL

## 2024-03-12 DIAGNOSIS — R10.9 ABDOMINAL PAIN, UNSPECIFIED ABDOMINAL LOCATION: Primary | ICD-10-CM

## 2024-03-12 LAB
ALBUMIN SERPL BCP-MCNC: 4.2 G/DL (ref 3.4–5)
ALP SERPL-CCNC: 29 U/L (ref 33–136)
ALT SERPL W P-5'-P-CCNC: 26 U/L (ref 7–45)
ANION GAP SERPL CALC-SCNC: 15 MMOL/L (ref 10–20)
AST SERPL W P-5'-P-CCNC: 16 U/L (ref 9–39)
BASOPHILS # BLD AUTO: 0.05 X10*3/UL (ref 0–0.1)
BASOPHILS NFR BLD AUTO: 0.4 %
BILIRUB SERPL-MCNC: 0.3 MG/DL (ref 0–1.2)
BUN SERPL-MCNC: 10 MG/DL (ref 6–23)
CALCIUM SERPL-MCNC: 9.3 MG/DL (ref 8.6–10.3)
CHLORIDE SERPL-SCNC: 101 MMOL/L (ref 98–107)
CO2 SERPL-SCNC: 26 MMOL/L (ref 21–32)
CREAT SERPL-MCNC: 0.67 MG/DL (ref 0.5–1.05)
EGFRCR SERPLBLD CKD-EPI 2021: >90 ML/MIN/1.73M*2
EOSINOPHIL # BLD AUTO: 0.14 X10*3/UL (ref 0–0.7)
EOSINOPHIL NFR BLD AUTO: 1.1 %
ERYTHROCYTE [DISTWIDTH] IN BLOOD BY AUTOMATED COUNT: 12.5 % (ref 11.5–14.5)
FLUAV RNA RESP QL NAA+PROBE: NOT DETECTED
FLUBV RNA RESP QL NAA+PROBE: NOT DETECTED
GLUCOSE BLD MANUAL STRIP-MCNC: 154 MG/DL (ref 74–99)
GLUCOSE SERPL-MCNC: 173 MG/DL (ref 74–99)
HCT VFR BLD AUTO: 40.6 % (ref 36–46)
HGB BLD-MCNC: 13.6 G/DL (ref 12–16)
IMM GRANULOCYTES # BLD AUTO: 0.04 X10*3/UL (ref 0–0.7)
IMM GRANULOCYTES NFR BLD AUTO: 0.3 % (ref 0–0.9)
LIPASE SERPL-CCNC: 112 U/L (ref 9–82)
LYMPHOCYTES # BLD AUTO: 2.66 X10*3/UL (ref 1.2–4.8)
LYMPHOCYTES NFR BLD AUTO: 21.2 %
MAGNESIUM SERPL-MCNC: 1.57 MG/DL (ref 1.6–2.4)
MCH RBC QN AUTO: 30 PG (ref 26–34)
MCHC RBC AUTO-ENTMCNC: 33.5 G/DL (ref 32–36)
MCV RBC AUTO: 89 FL (ref 80–100)
MONOCYTES # BLD AUTO: 0.87 X10*3/UL (ref 0.1–1)
MONOCYTES NFR BLD AUTO: 6.9 %
NEUTROPHILS # BLD AUTO: 8.76 X10*3/UL (ref 1.2–7.7)
NEUTROPHILS NFR BLD AUTO: 70.1 %
NRBC BLD-RTO: 0 /100 WBCS (ref 0–0)
PLATELET # BLD AUTO: 366 X10*3/UL (ref 150–450)
POTASSIUM SERPL-SCNC: 3.6 MMOL/L (ref 3.5–5.3)
PROT SERPL-MCNC: 7 G/DL (ref 6.4–8.2)
RBC # BLD AUTO: 4.54 X10*6/UL (ref 4–5.2)
RSV RNA RESP QL NAA+PROBE: NOT DETECTED
SARS-COV-2 RNA RESP QL NAA+PROBE: NOT DETECTED
SODIUM SERPL-SCNC: 138 MMOL/L (ref 136–145)
WBC # BLD AUTO: 12.5 X10*3/UL (ref 4.4–11.3)

## 2024-03-12 PROCEDURE — 83690 ASSAY OF LIPASE: CPT | Performed by: STUDENT IN AN ORGANIZED HEALTH CARE EDUCATION/TRAINING PROGRAM

## 2024-03-12 PROCEDURE — 96365 THER/PROPH/DIAG IV INF INIT: CPT | Mod: 59

## 2024-03-12 PROCEDURE — 99285 EMERGENCY DEPT VISIT HI MDM: CPT | Performed by: STUDENT IN AN ORGANIZED HEALTH CARE EDUCATION/TRAINING PROGRAM

## 2024-03-12 PROCEDURE — 2500000004 HC RX 250 GENERAL PHARMACY W/ HCPCS (ALT 636 FOR OP/ED): Performed by: STUDENT IN AN ORGANIZED HEALTH CARE EDUCATION/TRAINING PROGRAM

## 2024-03-12 PROCEDURE — 83735 ASSAY OF MAGNESIUM: CPT | Performed by: STUDENT IN AN ORGANIZED HEALTH CARE EDUCATION/TRAINING PROGRAM

## 2024-03-12 PROCEDURE — 85025 COMPLETE CBC W/AUTO DIFF WBC: CPT | Performed by: STUDENT IN AN ORGANIZED HEALTH CARE EDUCATION/TRAINING PROGRAM

## 2024-03-12 PROCEDURE — 82947 ASSAY GLUCOSE BLOOD QUANT: CPT

## 2024-03-12 PROCEDURE — 96372 THER/PROPH/DIAG INJ SC/IM: CPT

## 2024-03-12 PROCEDURE — 87637 SARSCOV2&INF A&B&RSV AMP PRB: CPT | Performed by: EMERGENCY MEDICINE

## 2024-03-12 PROCEDURE — 36415 COLL VENOUS BLD VENIPUNCTURE: CPT | Performed by: STUDENT IN AN ORGANIZED HEALTH CARE EDUCATION/TRAINING PROGRAM

## 2024-03-12 PROCEDURE — 84075 ASSAY ALKALINE PHOSPHATASE: CPT | Performed by: STUDENT IN AN ORGANIZED HEALTH CARE EDUCATION/TRAINING PROGRAM

## 2024-03-12 PROCEDURE — 82947 ASSAY GLUCOSE BLOOD QUANT: CPT | Mod: 59

## 2024-03-12 PROCEDURE — 74177 CT ABD & PELVIS W/CONTRAST: CPT

## 2024-03-12 PROCEDURE — 96366 THER/PROPH/DIAG IV INF ADDON: CPT

## 2024-03-12 PROCEDURE — 87637 SARSCOV2&INF A&B&RSV AMP PRB: CPT | Performed by: STUDENT IN AN ORGANIZED HEALTH CARE EDUCATION/TRAINING PROGRAM

## 2024-03-12 PROCEDURE — 2500000004 HC RX 250 GENERAL PHARMACY W/ HCPCS (ALT 636 FOR OP/ED)

## 2024-03-12 PROCEDURE — 96361 HYDRATE IV INFUSION ADD-ON: CPT

## 2024-03-12 PROCEDURE — 99284 EMERGENCY DEPT VISIT MOD MDM: CPT | Mod: 25

## 2024-03-12 RX ORDER — ONDANSETRON HYDROCHLORIDE 2 MG/ML
INJECTION, SOLUTION INTRAVENOUS
Status: COMPLETED
Start: 2024-03-12 | End: 2024-03-12

## 2024-03-12 RX ORDER — ONDANSETRON HYDROCHLORIDE 2 MG/ML
4 INJECTION, SOLUTION INTRAVENOUS ONCE
Status: COMPLETED | OUTPATIENT
Start: 2024-03-12 | End: 2024-03-12

## 2024-03-12 RX ORDER — MAGNESIUM SULFATE HEPTAHYDRATE 40 MG/ML
2 INJECTION, SOLUTION INTRAVENOUS ONCE
Status: COMPLETED | OUTPATIENT
Start: 2024-03-12 | End: 2024-03-13

## 2024-03-12 RX ADMIN — MAGNESIUM SULFATE HEPTAHYDRATE 2 G: 40 INJECTION, SOLUTION INTRAVENOUS at 23:54

## 2024-03-12 RX ADMIN — ONDANSETRON 4 MG: 2 INJECTION INTRAMUSCULAR; INTRAVENOUS at 21:45

## 2024-03-12 RX ADMIN — ONDANSETRON HYDROCHLORIDE 4 MG: 2 INJECTION, SOLUTION INTRAVENOUS at 21:45

## 2024-03-12 RX ADMIN — SODIUM CHLORIDE, POTASSIUM CHLORIDE, SODIUM LACTATE AND CALCIUM CHLORIDE 1000 ML: 600; 310; 30; 20 INJECTION, SOLUTION INTRAVENOUS at 22:15

## 2024-03-12 ASSESSMENT — COLUMBIA-SUICIDE SEVERITY RATING SCALE - C-SSRS
2. HAVE YOU ACTUALLY HAD ANY THOUGHTS OF KILLING YOURSELF?: NO
6. HAVE YOU EVER DONE ANYTHING, STARTED TO DO ANYTHING, OR PREPARED TO DO ANYTHING TO END YOUR LIFE?: NO
1. IN THE PAST MONTH, HAVE YOU WISHED YOU WERE DEAD OR WISHED YOU COULD GO TO SLEEP AND NOT WAKE UP?: NO

## 2024-03-12 ASSESSMENT — PAIN SCALES - GENERAL: PAINLEVEL_OUTOF10: 0 - NO PAIN

## 2024-03-12 ASSESSMENT — PAIN - FUNCTIONAL ASSESSMENT: PAIN_FUNCTIONAL_ASSESSMENT: 0-10

## 2024-03-12 NOTE — Clinical Note
Jing Jon was seen and treated in our emergency department on 3/12/2024.  She may return to work on 03/15/2024.       If you have any questions or concerns, please don't hesitate to call.      Devin Oliveira, DO

## 2024-03-13 ENCOUNTER — HOSPITAL ENCOUNTER (OUTPATIENT)
Facility: HOSPITAL | Age: 64
Setting detail: OUTPATIENT SURGERY
End: 2024-03-13
Attending: ORTHOPAEDIC SURGERY | Admitting: ORTHOPAEDIC SURGERY
Payer: COMMERCIAL

## 2024-03-13 VITALS
RESPIRATION RATE: 18 BRPM | BODY MASS INDEX: 35.33 KG/M2 | TEMPERATURE: 97.9 F | OXYGEN SATURATION: 98 % | SYSTOLIC BLOOD PRESSURE: 146 MMHG | DIASTOLIC BLOOD PRESSURE: 71 MMHG | HEIGHT: 62 IN | WEIGHT: 192 LBS | HEART RATE: 101 BPM

## 2024-03-13 DIAGNOSIS — M19.011 PRIMARY OSTEOARTHRITIS OF RIGHT SHOULDER: ICD-10-CM

## 2024-03-13 PROCEDURE — 96375 TX/PRO/DX INJ NEW DRUG ADDON: CPT

## 2024-03-13 PROCEDURE — 74177 CT ABD & PELVIS W/CONTRAST: CPT | Performed by: RADIOLOGY

## 2024-03-13 PROCEDURE — 2550000001 HC RX 255 CONTRASTS: Performed by: STUDENT IN AN ORGANIZED HEALTH CARE EDUCATION/TRAINING PROGRAM

## 2024-03-13 PROCEDURE — 2500000004 HC RX 250 GENERAL PHARMACY W/ HCPCS (ALT 636 FOR OP/ED): Performed by: STUDENT IN AN ORGANIZED HEALTH CARE EDUCATION/TRAINING PROGRAM

## 2024-03-13 RX ORDER — PROCHLORPERAZINE EDISYLATE 5 MG/ML
5 INJECTION INTRAMUSCULAR; INTRAVENOUS ONCE
Status: COMPLETED | OUTPATIENT
Start: 2024-03-13 | End: 2024-03-13

## 2024-03-13 RX ORDER — PROCHLORPERAZINE MALEATE 10 MG
10 TABLET ORAL 2 TIMES DAILY PRN
Qty: 10 TABLET | Refills: 0 | Status: SHIPPED | OUTPATIENT
Start: 2024-03-13 | End: 2024-03-14 | Stop reason: ALTCHOICE

## 2024-03-13 RX ORDER — ONDANSETRON HYDROCHLORIDE 2 MG/ML
4 INJECTION, SOLUTION INTRAVENOUS ONCE
Status: COMPLETED | OUTPATIENT
Start: 2024-03-13 | End: 2024-03-13

## 2024-03-13 RX ADMIN — IOHEXOL 75 ML: 350 INJECTION, SOLUTION INTRAVENOUS at 00:13

## 2024-03-13 RX ADMIN — ONDANSETRON 4 MG: 2 INJECTION INTRAMUSCULAR; INTRAVENOUS at 02:22

## 2024-03-13 RX ADMIN — PROCHLORPERAZINE EDISYLATE 5 MG: 5 INJECTION INTRAMUSCULAR; INTRAVENOUS at 03:02

## 2024-03-13 NOTE — ED PROVIDER NOTES
"HPI   Chief Complaint   Patient presents with    Flu Symptoms     Pt c/o nausea around 1800. Pt has been having \"hot and cold\" flashes. Pt states she has been having a runny nose as well.     Nausea       63-year-old female presenting to the ED for vomiting.  Patient states over the past few days she was at an [place] festival with her .  Today she woke with vomiting and has had more than 10 episodes of nonbloody vomiting throughout the day.  She has not been able to tolerate any p.o.  She also developed upper abdominal pain.  Denies any diarrhea, bloody stools, fevers or other new symptoms.  Of note patient is on Mounjaro and was supposed to increase her dose this week but has not yet taken her dose this week.                          Gladys Coma Scale Score: 15                     Patient History   Past Medical History:   Diagnosis Date    Allergic     Anemia     Anxiety     Arthritis     Asthma     CHF (congestive heart failure) (CMS/[facility])     Depression     Diabetes mellitus (CMS/[facility])     Disease of thyroid gland     GERD (gastroesophageal reflux disease)     Headache     Heart disease     Hypertension     Iliotibial band syndrome, left leg 05/27/2019    Iliotibial band tendinitis of left side    Myocardial infarction (CMS/[facility])     Obesity, unspecified 01/03/2022    Obesity (BMI 30-39.9)    Other abnormal auditory perceptions, unspecified ear 12/12/2019    Abnormal auditory perception    Other fecal abnormalities     Change in stool    Other spondylosis with myelopathy, cervical region 11/30/2016    Cervical spondylitic cord compression    Pain in left hip 03/23/2021    Left hip pain    Pain in left knee 05/09/2018    Left knee pain, unspecified chronicity    Pain in left shoulder 07/12/2017    Pain of left shoulder region    Pain in right shoulder 02/22/2019    Chronic right shoulder pain    Pain in right shoulder 04/01/2019    Right shoulder pain    Personal history of other diseases of the circulatory " system     History of cardiac disorder    Personal history of other diseases of the circulatory system     History of hypertension    Personal history of other diseases of the musculoskeletal system and connective tissue 04/15/2015    History of neck pain    Personal history of other diseases of the musculoskeletal system and connective tissue 11/25/2015    History of joint pain    Personal history of other diseases of the musculoskeletal system and connective tissue     History of arthritis    Personal history of other diseases of the respiratory system     History of asthma    Personal history of transient ischemic attack (TIA), and cerebral infarction without residual deficits 08/28/2019    History of stroke in adulthood    Personal history of transient ischemic attack (TIA), and cerebral infarction without residual deficits     History of cerebrovascular accident    Radiculopathy, lumbar region 02/26/2020    Lumbar radiculopathy    Spinal stenosis, lumbar region without neurogenic claudication 11/18/2020    Lumbar stenosis    Sprain of other part of unspecified wrist and hand, initial encounter 07/26/2015    Carpometacarpal joint sprains    Stroke (CMS/HCC)     Trochanteric bursitis, left hip 07/28/2020    Trochanteric bursitis of left hip    Unilateral primary osteoarthritis, left hip 03/31/2021    Primary osteoarthritis of left hip    Urinary tract infection      Past Surgical History:   Procedure Laterality Date    APPENDECTOMY      BACK SURGERY      CARDIAC CATHETERIZATION      CERVICAL FUSION  11/25/2015    Cervical Vertebral Fusion    NASAL SEPTUM SURGERY  11/25/2015    Nasal Septal Deviation Repair    OOPHORECTOMY  11/25/2015    Oophorectomy    OTHER SURGICAL HISTORY  11/01/2021    Ovarian surgery    OTHER SURGICAL HISTORY  11/01/2021    Complete colonoscopy    OTHER SURGICAL HISTORY  11/01/2021    Shoulder surgery    ROTATOR CUFF REPAIR  11/25/2015    Rotator Cuff Repair    SINUS SURGERY      SPINE  SURGERY      WISDOM TOOTH EXTRACTION       Family History   Problem Relation Name Age of Onset    Diabetes Mother Nirmala Stone     Hypertension Mother Nirmala Stone     Alcohol abuse Father Bandar Stone     Arthritis Father Bandar Stone     Diabetes Father Bandar Stone     Hypertension Father Bandar Stone     Heart disease Maternal Grandfather Imtiaz Jewell     Alcohol abuse Son Dipak Jon     Asthma Son Dipak Jon     Learning disabilities Son Dipak Jon     Asthma Son Ton Jon Jr.     Learning disabilities Son Ton Jon Jr.     Asthma Sister Ashley DeClemente     Cancer Sister Ashley DeClemente     Diabetes Sister Ashley DeClemente     Cancer Sister Afia Joseph     Cancer Brother Bandar Stone     Colon cancer Brother Bandar Stone     Hypertension Brother Bandar Stone      Social History     Tobacco Use    Smoking status: Former     Packs/day: 0.50     Years: 15.00     Additional pack years: 0.00     Total pack years: 7.50     Types: Cigarettes    Smokeless tobacco: Never   Vaping Use    Vaping Use: Never used   Substance Use Topics    Alcohol use: Not Currently     Comment: social drinker    Drug use: Never       Physical Exam   ED Triage Vitals [03/12/24 2057]   Temperature Heart Rate Respirations BP   36.6 °C (97.9 °F) (!) 107 20 161/87      Pulse Ox Temp Source Heart Rate Source Patient Position   99 % Temporal Monitor Sitting      BP Location FiO2 (%)     Left arm --       Physical Exam  Vitals and nursing note reviewed.   Constitutional:       General: She is not in acute distress.     Appearance: She is not ill-appearing or toxic-appearing.      Comments: Actively vomiting   HENT:      Head: Normocephalic and atraumatic.      Nose: Nose normal.      Mouth/Throat:      Mouth: Mucous membranes are dry.      Pharynx: Oropharynx is clear.   Eyes:      Extraocular Movements: Extraocular movements intact.      Conjunctiva/sclera: Conjunctivae normal.      Pupils: Pupils are  equal, round, and reactive to light.   Cardiovascular:      Rate and Rhythm: Regular rhythm. Tachycardia present.      Pulses: Normal pulses.      Heart sounds: Normal heart sounds.   Pulmonary:      Effort: Pulmonary effort is normal.      Breath sounds: Normal breath sounds.   Abdominal:      General: Bowel sounds are normal. There is no distension.      Palpations: Abdomen is soft.      Tenderness: There is abdominal tenderness (Generalized).   Musculoskeletal:         General: Normal range of motion.      Cervical back: Normal range of motion and neck supple.      Right lower leg: No edema.      Left lower leg: No edema.   Skin:     General: Skin is warm and dry.      Capillary Refill: Capillary refill takes less than 2 seconds.   Neurological:      General: No focal deficit present.      Mental Status: She is alert and oriented to person, place, and time. Mental status is at baseline.         ED Course & MDM        Medical Decision Making  64-year-old female presenting to the ED for vomiting and abdominal pain that started this morning.  Patient did start vomiting during my evaluation and was treated with Zofran and fluids.  Her labs show a mildly elevated lipase of 112, do not suspect pancreatitis as it is not 3 times the value of the upper limit of normal.  She does have a mild hypomagnesemia that was replaced, otherwise electrolytes are normal.  She does have a mild leukocytosis but suspect this is due to vomiting.  Viral swabs were negative.    CT abdomen pelvis was obtained to rule out obstructive versus infectious process.  Patient was signed out to the oncoming provider pending CT results and disposition after a p.o. challenge.        Procedure  Procedures     Rossana Bynum DO  Resident  03/13/24 0048

## 2024-03-14 ENCOUNTER — TELEMEDICINE (OUTPATIENT)
Dept: PRIMARY CARE | Facility: CLINIC | Age: 64
End: 2024-03-14
Payer: COMMERCIAL

## 2024-03-14 DIAGNOSIS — R11.2 NAUSEA AND VOMITING, UNSPECIFIED VOMITING TYPE: Primary | ICD-10-CM

## 2024-03-14 PROBLEM — I25.2 H/O NON-ST ELEVATION MYOCARDIAL INFARCTION (NSTEMI): Status: ACTIVE | Noted: 2024-03-14

## 2024-03-14 PROBLEM — K63.5 COLON POLYPS: Status: ACTIVE | Noted: 2024-03-14

## 2024-03-14 PROBLEM — M13.0 POLYARTHROPATHY: Status: ACTIVE | Noted: 2024-03-14

## 2024-03-14 PROBLEM — E04.2 NONTOXIC MULTINODULAR GOITER: Status: ACTIVE | Noted: 2018-06-05

## 2024-03-14 PROBLEM — I63.9 STROKE (MULTI): Status: ACTIVE | Noted: 2023-09-19

## 2024-03-14 PROBLEM — B02.23 SHINGLES (HERPES ZOSTER) POLYNEUROPATHY: Status: ACTIVE | Noted: 2024-03-14

## 2024-03-14 PROBLEM — I83.009 VENOUS STASIS ULCER WITH EDEMA OF LOWER LEG (MULTI): Status: ACTIVE | Noted: 2017-11-21

## 2024-03-14 PROBLEM — I83.899 VENOUS STASIS ULCER WITH EDEMA OF LOWER LEG (MULTI): Status: ACTIVE | Noted: 2017-11-21

## 2024-03-14 PROBLEM — R27.0 ATAXIA: Status: ACTIVE | Noted: 2024-03-14

## 2024-03-14 PROBLEM — J01.00 ACUTE MAXILLARY SINUSITIS: Status: ACTIVE | Noted: 2024-03-14

## 2024-03-14 PROBLEM — M47.10 SPINAL CORD COMPRESSION DUE TO DEGENERATIVE DISORDER OF SPINAL COLUMN: Status: ACTIVE | Noted: 2024-03-14

## 2024-03-14 PROBLEM — G25.81 RESTLESS LEG SYNDROME: Status: ACTIVE | Noted: 2023-09-20

## 2024-03-14 PROBLEM — M51.36 DEGENERATIVE DISC DISEASE, LUMBAR: Status: ACTIVE | Noted: 2023-09-20

## 2024-03-14 PROBLEM — M13.0 POLYARTHRITIS: Status: ACTIVE | Noted: 2024-03-14

## 2024-03-14 PROBLEM — G62.89 PERIPHERAL MOTOR NEUROPATHY: Status: ACTIVE | Noted: 2023-09-20

## 2024-03-14 PROBLEM — E88.810 METABOLIC SYNDROME: Status: ACTIVE | Noted: 2024-03-14

## 2024-03-14 PROBLEM — G89.29 CHRONIC PAIN OF RIGHT UPPER EXTREMITY: Status: ACTIVE | Noted: 2024-03-14

## 2024-03-14 PROBLEM — I05.9 MITRAL VALVE DISEASE: Status: ACTIVE | Noted: 2023-09-19

## 2024-03-14 PROBLEM — J45.909 ASTHMA (HHS-HCC): Status: ACTIVE | Noted: 2024-03-14

## 2024-03-14 PROBLEM — E08.42 DIABETIC POLYNEUROPATHY ASSOCIATED WITH DIABETES MELLITUS DUE TO UNDERLYING CONDITION (MULTI): Status: ACTIVE | Noted: 2023-03-20

## 2024-03-14 PROBLEM — Z86.79 HISTORY OF HYPERTENSION: Status: ACTIVE | Noted: 2024-03-14

## 2024-03-14 PROBLEM — M79.601 CHRONIC PAIN OF RIGHT UPPER EXTREMITY: Status: ACTIVE | Noted: 2024-03-14

## 2024-03-14 PROBLEM — F41.8 DEPRESSION WITH ANXIETY: Status: ACTIVE | Noted: 2024-03-14

## 2024-03-14 PROBLEM — M51.369 DEGENERATIVE DISC DISEASE, LUMBAR: Status: ACTIVE | Noted: 2023-09-20

## 2024-03-14 PROBLEM — E11.40 DIABETIC NEUROPATHY (MULTI): Status: ACTIVE | Noted: 2023-12-12

## 2024-03-14 PROBLEM — E78.5 HYPERLIPIDEMIA: Status: ACTIVE | Noted: 2024-03-14

## 2024-03-14 PROBLEM — E55.9 VITAMIN D DEFICIENCY: Status: ACTIVE | Noted: 2024-03-14

## 2024-03-14 PROBLEM — G89.29 CHRONIC PAIN: Status: ACTIVE | Noted: 2018-12-04

## 2024-03-14 PROBLEM — U07.1 COVID-19 VIRUS INFECTION: Status: ACTIVE | Noted: 2024-03-14

## 2024-03-14 PROBLEM — R06.02 SHORTNESS OF BREATH: Status: ACTIVE | Noted: 2024-03-14

## 2024-03-14 PROBLEM — D72.829 LEUKOCYTOSIS: Status: ACTIVE | Noted: 2024-03-14

## 2024-03-14 PROBLEM — I21.9 MYOCARDIAL INFARCTION (MULTI): Status: ACTIVE | Noted: 2024-03-14

## 2024-03-14 PROBLEM — M54.50 LUMBAR BACK PAIN: Status: ACTIVE | Noted: 2024-03-14

## 2024-03-14 PROBLEM — R60.9 VENOUS STASIS ULCER WITH EDEMA OF LOWER LEG (MULTI): Status: ACTIVE | Noted: 2017-11-21

## 2024-03-14 PROBLEM — R50.9 FEVER: Status: ACTIVE | Noted: 2024-03-14

## 2024-03-14 PROBLEM — M79.7 FIBROMYALGIA: Status: ACTIVE | Noted: 2024-03-14

## 2024-03-14 PROBLEM — N95.0 POSTMENOPAUSAL BLEEDING: Status: ACTIVE | Noted: 2023-09-19

## 2024-03-14 PROBLEM — E79.0 HYPERURICEMIA: Status: ACTIVE | Noted: 2024-03-14

## 2024-03-14 PROBLEM — R80.9 PROTEINURIA: Status: ACTIVE | Noted: 2024-03-14

## 2024-03-14 PROBLEM — G81.94 LEFT HEMIPARESIS (MULTI): Status: ACTIVE | Noted: 2023-03-20

## 2024-03-14 PROBLEM — M17.11 PATELLOFEMORAL ARTHRITIS OF RIGHT KNEE: Status: ACTIVE | Noted: 2024-03-14

## 2024-03-14 PROBLEM — M47.817 LUMBOSACRAL SPONDYLOSIS WITHOUT MYELOPATHY: Status: ACTIVE | Noted: 2023-01-12

## 2024-03-14 PROBLEM — J30.9 ALLERGIC RHINITIS: Status: ACTIVE | Noted: 2024-03-14

## 2024-03-14 PROBLEM — E11.65 UNCONTROLLED TYPE 2 DIABETES MELLITUS WITH HYPERGLYCEMIA (MULTI): Status: ACTIVE | Noted: 2022-06-15

## 2024-03-14 PROBLEM — M25.561 RIGHT KNEE PAIN: Status: ACTIVE | Noted: 2024-03-14

## 2024-03-14 PROBLEM — K21.9 GERD (GASTROESOPHAGEAL REFLUX DISEASE): Status: ACTIVE | Noted: 2024-03-14

## 2024-03-14 PROBLEM — L97.909 VENOUS STASIS ULCER WITH EDEMA OF LOWER LEG (MULTI): Status: ACTIVE | Noted: 2017-11-21

## 2024-03-14 PROBLEM — G43.909 MIGRAINE HEADACHE: Status: ACTIVE | Noted: 2024-03-14

## 2024-03-14 PROBLEM — I65.23 CALCIFICATION OF BOTH CAROTID ARTERIES: Status: ACTIVE | Noted: 2024-03-14

## 2024-03-14 PROBLEM — G45.9 TIA (TRANSIENT ISCHEMIC ATTACK): Status: ACTIVE | Noted: 2017-05-18

## 2024-03-14 PROBLEM — M25.50 ARTHRALGIA OF MULTIPLE JOINTS: Status: ACTIVE | Noted: 2023-12-18

## 2024-03-14 PROCEDURE — 1036F TOBACCO NON-USER: CPT | Performed by: INTERNAL MEDICINE

## 2024-03-14 PROCEDURE — 99442 PR PHYS/QHP TELEPHONE EVALUATION 11-20 MIN: CPT | Performed by: INTERNAL MEDICINE

## 2024-03-14 RX ORDER — ONDANSETRON 4 MG/1
4 TABLET, FILM COATED ORAL EVERY 12 HOURS PRN
Qty: 14 TABLET | Refills: 0 | Status: SHIPPED | OUTPATIENT
Start: 2024-03-14 | End: 2024-03-21

## 2024-03-14 NOTE — PROGRESS NOTES
Subjective   PHPI Pt is a pleasant 64 y.o. F with extensive PMHX who was evaluated during the phone call visit. Pt was seen at ED with the sx of nausea and vomiting (non bloody emesis) on 3/12/2024. Pt states that the does of Tirzepatide was recently increased. Pt states that her last subcutaneous injection was on 3/10/2024 and next day the sxs of nausea and vomiting developed. Pt had the extensive evaluation at ED and was discharge on compazine 10 mg PO BID for the nausea management. Pt states that this medication was nor very helpful and she would like to have the alternative tx option. During the clinical encounter pt denies fever, chills, no SOB, no chest pain/tightness, no abdominal pain, last normal BW a couple day ago pt is passing flatus,  no change of urination reported. Pt denies any other health concerns during this visit.  Sohx: reviewed  PMHx and Fhx: reviewed    Review of Systems  12 Systems have been reviewed as follows:   Constitutional: no fever, no chills  Eyes: no eyesight problems, no eye redness, no eye pain, no blurred vision  ENT: no ear pain or sore throat, no nasal discharge or congestion, no sinus pressure, no hearing loss  Cardiovascular: no chest pain or tightness, no SOB, the heart rate was not fast or slow  Respiratory: no cough, no dyspnea with exertion or with rest, no wheezing  Gastrointestinal: as mentioned at HPI  Genitourinary: no urine frequency, no dysuria, no urinary urgency, no urinary incontinence or retention  Musculoskeletal: no back pain, no arthralgia, no joint swelling or stiffness, no muscle weakness  Integumentary: no skin lesions or rash  Neurological: no headaches, no dizziness or fainting, no limb weakness  Psychiatric: no mood changes, no anxiety or depression, no suicidal thoughts  Hematologic/Lymphatic: no easy bruising or bleeding  Objective   There were no vitals taken for this visit.    Physical Exam  PE was not performed due to the phone setting of this  visit, but pt was able to speak in full sentences, alert, oriented, not confused.  Vitals: were not provided    Assessment/Plan   Nausea, vomiting, possible due to mediation  Hx as mentioned  The ED record was reviwed  Will start on zofran 4 mg PO BID x 7 days  Pt was advised to remain on clear liquid diet with the adequate hydration with podolite. Pt can slowly advance diet as tolerated  Pt was instructed to contact PCP/seek for the immediate medical attention if pt will have no improvement of the condition/worsening of the sxs/new sxs on the current treatment  Plan was d/c with the pt in details, verbalized understanding, agreed  Please follow up with PCP as planned or sooner if needed    A telephone visit (audio only) between the patient (at the originating site) and the provider (at the distant site) was utilized to provide this telehealth service.  Verbal consent was requested and obtained from the patient during a telehealth visit.

## 2024-03-16 ENCOUNTER — HOSPITAL ENCOUNTER (OUTPATIENT)
Facility: HOSPITAL | Age: 64
Setting detail: OBSERVATION
Discharge: HOME | DRG: 683 | End: 2024-03-17
Attending: EMERGENCY MEDICINE | Admitting: STUDENT IN AN ORGANIZED HEALTH CARE EDUCATION/TRAINING PROGRAM
Payer: COMMERCIAL

## 2024-03-16 ENCOUNTER — APPOINTMENT (OUTPATIENT)
Dept: CARDIOLOGY | Facility: HOSPITAL | Age: 64
DRG: 683 | End: 2024-03-16
Payer: COMMERCIAL

## 2024-03-16 ENCOUNTER — APPOINTMENT (OUTPATIENT)
Dept: RADIOLOGY | Facility: HOSPITAL | Age: 64
DRG: 683 | End: 2024-03-16
Payer: COMMERCIAL

## 2024-03-16 DIAGNOSIS — D32.9 MENINGIOMA (MULTI): ICD-10-CM

## 2024-03-16 DIAGNOSIS — N17.9 ACUTE KIDNEY INJURY (CMS-HCC): Primary | ICD-10-CM

## 2024-03-16 DIAGNOSIS — K21.9 GASTROESOPHAGEAL REFLUX DISEASE WITHOUT ESOPHAGITIS: ICD-10-CM

## 2024-03-16 DIAGNOSIS — R11.2 NAUSEA AND VOMITING, UNSPECIFIED VOMITING TYPE: ICD-10-CM

## 2024-03-16 DIAGNOSIS — R11.0 NAUSEA: ICD-10-CM

## 2024-03-16 LAB
ALBUMIN SERPL BCP-MCNC: 4.8 G/DL (ref 3.4–5)
ALP SERPL-CCNC: 35 U/L (ref 33–136)
ALT SERPL W P-5'-P-CCNC: 37 U/L (ref 7–45)
ANION GAP BLDV CALCULATED.4IONS-SCNC: 12 MMOL/L (ref 10–25)
ANION GAP SERPL CALC-SCNC: 16 MMOL/L (ref 10–20)
APPEARANCE UR: ABNORMAL
AST SERPL W P-5'-P-CCNC: 25 U/L (ref 9–39)
BACTERIA #/AREA URNS AUTO: ABNORMAL /HPF
BASE EXCESS BLDV CALC-SCNC: 1.8 MMOL/L (ref -2–3)
BASOPHILS # BLD AUTO: 0.08 X10*3/UL (ref 0–0.1)
BASOPHILS NFR BLD AUTO: 0.6 %
BILIRUB SERPL-MCNC: 0.3 MG/DL (ref 0–1.2)
BILIRUB UR STRIP.AUTO-MCNC: NEGATIVE MG/DL
BODY TEMPERATURE: ABNORMAL
BUN SERPL-MCNC: 24 MG/DL (ref 6–23)
CA-I BLDV-SCNC: 1.16 MMOL/L (ref 1.1–1.33)
CALCIUM SERPL-MCNC: 9.9 MG/DL (ref 8.6–10.3)
CARDIAC TROPONIN I PNL SERPL HS: 3 NG/L (ref 0–13)
CARDIAC TROPONIN I PNL SERPL HS: <3 NG/L (ref 0–13)
CHLORIDE BLDV-SCNC: 98 MMOL/L (ref 98–107)
CHLORIDE SERPL-SCNC: 98 MMOL/L (ref 98–107)
CHLORIDE UR-SCNC: 85 MMOL/L
CHLORIDE/CREATININE (MMOL/G) IN URINE: 87 MMOL/G CREAT (ref 38–318)
CO2 SERPL-SCNC: 28 MMOL/L (ref 21–32)
COLOR UR: YELLOW
CREAT SERPL-MCNC: 1.95 MG/DL (ref 0.5–1.05)
CREAT UR-MCNC: 28 MG/DL (ref 20–320)
CREAT UR-MCNC: 97.2 MG/DL (ref 20–320)
EGFRCR SERPLBLD CKD-EPI 2021: 29 ML/MIN/1.73M*2
EOSINOPHIL # BLD AUTO: 0.21 X10*3/UL (ref 0–0.7)
EOSINOPHIL NFR BLD AUTO: 1.5 %
ERYTHROCYTE [DISTWIDTH] IN BLOOD BY AUTOMATED COUNT: 13 % (ref 11.5–14.5)
GLUCOSE BLD MANUAL STRIP-MCNC: 103 MG/DL (ref 74–99)
GLUCOSE BLD MANUAL STRIP-MCNC: 142 MG/DL (ref 74–99)
GLUCOSE BLD MANUAL STRIP-MCNC: 175 MG/DL (ref 74–99)
GLUCOSE BLDV-MCNC: 178 MG/DL (ref 74–99)
GLUCOSE SERPL-MCNC: 174 MG/DL (ref 74–99)
GLUCOSE UR STRIP.AUTO-MCNC: NEGATIVE MG/DL
HCO3 BLDV-SCNC: 29.4 MMOL/L (ref 22–26)
HCT VFR BLD AUTO: 44.9 % (ref 36–46)
HCT VFR BLD EST: 45 % (ref 36–46)
HGB BLD-MCNC: 15.1 G/DL (ref 12–16)
HGB BLDV-MCNC: 14.9 G/DL (ref 12–16)
HYALINE CASTS #/AREA URNS AUTO: ABNORMAL /LPF
IMM GRANULOCYTES # BLD AUTO: 0.07 X10*3/UL (ref 0–0.7)
IMM GRANULOCYTES NFR BLD AUTO: 0.5 % (ref 0–0.9)
INHALED O2 CONCENTRATION: 21 %
KETONES UR STRIP.AUTO-MCNC: NEGATIVE MG/DL
LACTATE BLDV-SCNC: 1.7 MMOL/L (ref 0.4–2)
LACTATE SERPL-SCNC: 1.5 MMOL/L (ref 0.4–2)
LEUKOCYTE ESTERASE UR QL STRIP.AUTO: ABNORMAL
LIPASE SERPL-CCNC: 40 U/L (ref 9–82)
LYMPHOCYTES # BLD AUTO: 2.28 X10*3/UL (ref 1.2–4.8)
LYMPHOCYTES NFR BLD AUTO: 16.3 %
MAGNESIUM SERPL-MCNC: 2.08 MG/DL (ref 1.6–2.4)
MCH RBC QN AUTO: 30.3 PG (ref 26–34)
MCHC RBC AUTO-ENTMCNC: 33.6 G/DL (ref 32–36)
MCV RBC AUTO: 90 FL (ref 80–100)
MONOCYTES # BLD AUTO: 0.98 X10*3/UL (ref 0.1–1)
MONOCYTES NFR BLD AUTO: 7 %
MONONUCLEOSIS SCREEN: NEGATIVE
NEUTROPHILS # BLD AUTO: 10.35 X10*3/UL (ref 1.2–7.7)
NEUTROPHILS NFR BLD AUTO: 74.1 %
NITRITE UR QL STRIP.AUTO: NEGATIVE
NRBC BLD-RTO: 0 /100 WBCS (ref 0–0)
OXYHGB MFR BLDV: 32.7 % (ref 45–75)
PCO2 BLDV: 57 MM HG (ref 41–51)
PH BLDV: 7.32 PH (ref 7.33–7.43)
PH UR STRIP.AUTO: 5 [PH]
PHOSPHATE SERPL-MCNC: 4.4 MG/DL (ref 2.5–4.9)
PLATELET # BLD AUTO: 415 X10*3/UL (ref 150–450)
PO2 BLDV: 23 MM HG (ref 35–45)
POTASSIUM BLDV-SCNC: 3.6 MMOL/L (ref 3.5–5.3)
POTASSIUM SERPL-SCNC: 3.6 MMOL/L (ref 3.5–5.3)
POTASSIUM UR-SCNC: 14 MMOL/L
POTASSIUM/CREAT UR-RTO: 14 MMOL/G CREAT
PROT SERPL-MCNC: 7.5 G/DL (ref 6.4–8.2)
PROT UR STRIP.AUTO-MCNC: NEGATIVE MG/DL
RBC # BLD AUTO: 4.99 X10*6/UL (ref 4–5.2)
RBC # UR STRIP.AUTO: NEGATIVE /UL
RBC #/AREA URNS AUTO: ABNORMAL /HPF
SAO2 % BLDV: 33 % (ref 45–75)
SODIUM BLDV-SCNC: 136 MMOL/L (ref 136–145)
SODIUM SERPL-SCNC: 138 MMOL/L (ref 136–145)
SODIUM UR-SCNC: 45 MMOL/L
SODIUM UR-SCNC: 87 MMOL/L
SODIUM/CREAT UR-RTO: 161 MMOL/G CREAT
SODIUM/CREAT UR-RTO: 90 MMOL/G CREAT
SP GR UR STRIP.AUTO: 1.01
SQUAMOUS #/AREA URNS AUTO: ABNORMAL /HPF
UROBILINOGEN UR STRIP.AUTO-MCNC: <2 MG/DL
WBC # BLD AUTO: 14 X10*3/UL (ref 4.4–11.3)
WBC #/AREA URNS AUTO: ABNORMAL /HPF

## 2024-03-16 PROCEDURE — 70450 CT HEAD/BRAIN W/O DYE: CPT

## 2024-03-16 PROCEDURE — 84100 ASSAY OF PHOSPHORUS: CPT | Performed by: STUDENT IN AN ORGANIZED HEALTH CARE EDUCATION/TRAINING PROGRAM

## 2024-03-16 PROCEDURE — G0378 HOSPITAL OBSERVATION PER HR: HCPCS

## 2024-03-16 PROCEDURE — 84484 ASSAY OF TROPONIN QUANT: CPT | Performed by: EMERGENCY MEDICINE

## 2024-03-16 PROCEDURE — 83690 ASSAY OF LIPASE: CPT | Performed by: STUDENT IN AN ORGANIZED HEALTH CARE EDUCATION/TRAINING PROGRAM

## 2024-03-16 PROCEDURE — 1200000002 HC GENERAL ROOM WITH TELEMETRY DAILY

## 2024-03-16 PROCEDURE — 2500000001 HC RX 250 WO HCPCS SELF ADMINISTERED DRUGS (ALT 637 FOR MEDICARE OP)

## 2024-03-16 PROCEDURE — 84132 ASSAY OF SERUM POTASSIUM: CPT | Performed by: STUDENT IN AN ORGANIZED HEALTH CARE EDUCATION/TRAINING PROGRAM

## 2024-03-16 PROCEDURE — 93005 ELECTROCARDIOGRAM TRACING: CPT

## 2024-03-16 PROCEDURE — 81001 URINALYSIS AUTO W/SCOPE: CPT | Performed by: STUDENT IN AN ORGANIZED HEALTH CARE EDUCATION/TRAINING PROGRAM

## 2024-03-16 PROCEDURE — 96374 THER/PROPH/DIAG INJ IV PUSH: CPT

## 2024-03-16 PROCEDURE — 82947 ASSAY GLUCOSE BLOOD QUANT: CPT

## 2024-03-16 PROCEDURE — 96361 HYDRATE IV INFUSION ADD-ON: CPT

## 2024-03-16 PROCEDURE — 2500000004 HC RX 250 GENERAL PHARMACY W/ HCPCS (ALT 636 FOR OP/ED)

## 2024-03-16 PROCEDURE — 83930 ASSAY OF BLOOD OSMOLALITY: CPT | Mod: STJLAB

## 2024-03-16 PROCEDURE — 36415 COLL VENOUS BLD VENIPUNCTURE: CPT | Performed by: STUDENT IN AN ORGANIZED HEALTH CARE EDUCATION/TRAINING PROGRAM

## 2024-03-16 PROCEDURE — 70450 CT HEAD/BRAIN W/O DYE: CPT | Performed by: RADIOLOGY

## 2024-03-16 PROCEDURE — 99285 EMERGENCY DEPT VISIT HI MDM: CPT | Mod: 25

## 2024-03-16 PROCEDURE — 85025 COMPLETE CBC W/AUTO DIFF WBC: CPT | Performed by: STUDENT IN AN ORGANIZED HEALTH CARE EDUCATION/TRAINING PROGRAM

## 2024-03-16 PROCEDURE — 83605 ASSAY OF LACTIC ACID: CPT | Performed by: STUDENT IN AN ORGANIZED HEALTH CARE EDUCATION/TRAINING PROGRAM

## 2024-03-16 PROCEDURE — 83935 ASSAY OF URINE OSMOLALITY: CPT | Mod: STJLAB

## 2024-03-16 PROCEDURE — 2500000004 HC RX 250 GENERAL PHARMACY W/ HCPCS (ALT 636 FOR OP/ED): Performed by: STUDENT IN AN ORGANIZED HEALTH CARE EDUCATION/TRAINING PROGRAM

## 2024-03-16 PROCEDURE — 84300 ASSAY OF URINE SODIUM: CPT

## 2024-03-16 PROCEDURE — 82436 ASSAY OF URINE CHLORIDE: CPT

## 2024-03-16 PROCEDURE — 36415 COLL VENOUS BLD VENIPUNCTURE: CPT | Performed by: EMERGENCY MEDICINE

## 2024-03-16 PROCEDURE — 87086 URINE CULTURE/COLONY COUNT: CPT | Mod: STJLAB | Performed by: STUDENT IN AN ORGANIZED HEALTH CARE EDUCATION/TRAINING PROGRAM

## 2024-03-16 PROCEDURE — 86308 HETEROPHILE ANTIBODY SCREEN: CPT | Performed by: STUDENT IN AN ORGANIZED HEALTH CARE EDUCATION/TRAINING PROGRAM

## 2024-03-16 PROCEDURE — 83735 ASSAY OF MAGNESIUM: CPT | Performed by: STUDENT IN AN ORGANIZED HEALTH CARE EDUCATION/TRAINING PROGRAM

## 2024-03-16 PROCEDURE — 99285 EMERGENCY DEPT VISIT HI MDM: CPT | Performed by: EMERGENCY MEDICINE

## 2024-03-16 RX ORDER — OLMESARTAN MEDOXOMIL, AMLODIPINE AND HYDROCHLOROTHIAZIDE TABLET 40/5/12.5 MG 40; 5; 12.5 MG/1; MG/1; MG/1
1 TABLET ORAL NIGHTLY
COMMUNITY

## 2024-03-16 RX ORDER — TIRZEPATIDE 7.5 MG/.5ML
7.5 INJECTION, SOLUTION SUBCUTANEOUS
COMMUNITY
End: 2024-03-25 | Stop reason: ALTCHOICE

## 2024-03-16 RX ORDER — PREGABALIN 150 MG/1
150 CAPSULE ORAL 3 TIMES DAILY
Status: DISCONTINUED | OUTPATIENT
Start: 2024-03-16 | End: 2024-03-17 | Stop reason: HOSPADM

## 2024-03-16 RX ORDER — ONDANSETRON HYDROCHLORIDE 2 MG/ML
4 INJECTION, SOLUTION INTRAVENOUS ONCE
Status: COMPLETED | OUTPATIENT
Start: 2024-03-16 | End: 2024-03-16

## 2024-03-16 RX ORDER — LECITHIN 1200 MG
1200 CAPSULE ORAL NIGHTLY
COMMUNITY

## 2024-03-16 RX ORDER — GUAIFENESIN AND PHENYLEPHRINE HCL 400; 10 MG/1; MG/1
500 TABLET ORAL NIGHTLY
COMMUNITY

## 2024-03-16 RX ORDER — SODIUM CHLORIDE, SODIUM LACTATE, POTASSIUM CHLORIDE, CALCIUM CHLORIDE 600; 310; 30; 20 MG/100ML; MG/100ML; MG/100ML; MG/100ML
125 INJECTION, SOLUTION INTRAVENOUS CONTINUOUS
Status: DISCONTINUED | OUTPATIENT
Start: 2024-03-16 | End: 2024-03-17

## 2024-03-16 RX ORDER — HEPARIN SODIUM 5000 [USP'U]/ML
5000 INJECTION, SOLUTION INTRAVENOUS; SUBCUTANEOUS EVERY 8 HOURS
Status: DISCONTINUED | OUTPATIENT
Start: 2024-03-16 | End: 2024-03-17 | Stop reason: HOSPADM

## 2024-03-16 RX ORDER — INSULIN LISPRO 100 [IU]/ML
0-5 INJECTION, SOLUTION INTRAVENOUS; SUBCUTANEOUS EVERY 4 HOURS
Status: DISCONTINUED | OUTPATIENT
Start: 2024-03-16 | End: 2024-03-17 | Stop reason: HOSPADM

## 2024-03-16 RX ORDER — DEXTROSE 50 % IN WATER (D50W) INTRAVENOUS SYRINGE
12.5
Status: DISCONTINUED | OUTPATIENT
Start: 2024-03-16 | End: 2024-03-17 | Stop reason: HOSPADM

## 2024-03-16 RX ORDER — CLOPIDOGREL BISULFATE 75 MG/1
75 TABLET ORAL DAILY
Status: DISCONTINUED | OUTPATIENT
Start: 2024-03-16 | End: 2024-03-17 | Stop reason: HOSPADM

## 2024-03-16 RX ORDER — ONDANSETRON HYDROCHLORIDE 2 MG/ML
4 INJECTION, SOLUTION INTRAVENOUS EVERY 6 HOURS PRN
Status: DISCONTINUED | OUTPATIENT
Start: 2024-03-16 | End: 2024-03-17 | Stop reason: HOSPADM

## 2024-03-16 RX ORDER — DEXTROSE 50 % IN WATER (D50W) INTRAVENOUS SYRINGE
25
Status: DISCONTINUED | OUTPATIENT
Start: 2024-03-16 | End: 2024-03-17 | Stop reason: HOSPADM

## 2024-03-16 RX ORDER — POLYETHYLENE GLYCOL 3350 17 G/17G
17 POWDER, FOR SOLUTION ORAL DAILY
Status: DISCONTINUED | OUTPATIENT
Start: 2024-03-16 | End: 2024-03-17 | Stop reason: HOSPADM

## 2024-03-16 RX ORDER — BACLOFEN 10 MG/1
10 TABLET ORAL 3 TIMES DAILY PRN
Status: DISCONTINUED | OUTPATIENT
Start: 2024-03-16 | End: 2024-03-17 | Stop reason: HOSPADM

## 2024-03-16 RX ORDER — AMLODIPINE BESYLATE 5 MG/1
5 TABLET ORAL DAILY
Status: DISCONTINUED | OUTPATIENT
Start: 2024-03-16 | End: 2024-03-17 | Stop reason: HOSPADM

## 2024-03-16 RX ADMIN — CLOPIDOGREL BISULFATE 75 MG: 75 TABLET ORAL at 21:35

## 2024-03-16 RX ADMIN — HEPARIN SODIUM 5000 UNITS: 5000 INJECTION INTRAVENOUS; SUBCUTANEOUS at 16:52

## 2024-03-16 RX ADMIN — SODIUM CHLORIDE, POTASSIUM CHLORIDE, SODIUM LACTATE AND CALCIUM CHLORIDE 125 ML/HR: 600; 310; 30; 20 INJECTION, SOLUTION INTRAVENOUS at 18:45

## 2024-03-16 RX ADMIN — ONDANSETRON 4 MG: 2 INJECTION INTRAMUSCULAR; INTRAVENOUS at 13:19

## 2024-03-16 RX ADMIN — PREGABALIN 150 MG: 150 CAPSULE ORAL at 21:35

## 2024-03-16 RX ADMIN — ONDANSETRON 4 MG: 2 INJECTION INTRAMUSCULAR; INTRAVENOUS at 23:50

## 2024-03-16 RX ADMIN — AMLODIPINE BESYLATE 5 MG: 5 TABLET ORAL at 21:35

## 2024-03-16 RX ADMIN — SODIUM CHLORIDE, POTASSIUM CHLORIDE, SODIUM LACTATE AND CALCIUM CHLORIDE 1000 ML: 600; 310; 30; 20 INJECTION, SOLUTION INTRAVENOUS at 13:18

## 2024-03-16 RX ADMIN — SODIUM CHLORIDE, POTASSIUM CHLORIDE, SODIUM LACTATE AND CALCIUM CHLORIDE 1000 ML: 600; 310; 30; 20 INJECTION, SOLUTION INTRAVENOUS at 16:58

## 2024-03-16 RX ADMIN — HEPARIN SODIUM 5000 UNITS: 5000 INJECTION INTRAVENOUS; SUBCUTANEOUS at 23:47

## 2024-03-16 SDOH — SOCIAL STABILITY: SOCIAL INSECURITY: HAVE YOU HAD THOUGHTS OF HARMING ANYONE ELSE?: NO

## 2024-03-16 SDOH — SOCIAL STABILITY: SOCIAL INSECURITY: ARE THERE ANY APPARENT SIGNS OF INJURIES/BEHAVIORS THAT COULD BE RELATED TO ABUSE/NEGLECT?: NO

## 2024-03-16 SDOH — SOCIAL STABILITY: SOCIAL INSECURITY: DO YOU FEEL ANYONE HAS EXPLOITED OR TAKEN ADVANTAGE OF YOU FINANCIALLY OR OF YOUR PERSONAL PROPERTY?: NO

## 2024-03-16 SDOH — SOCIAL STABILITY: SOCIAL INSECURITY: DOES ANYONE TRY TO KEEP YOU FROM HAVING/CONTACTING OTHER FRIENDS OR DOING THINGS OUTSIDE YOUR HOME?: NO

## 2024-03-16 SDOH — SOCIAL STABILITY: SOCIAL INSECURITY: DO YOU FEEL UNSAFE GOING BACK TO THE PLACE WHERE YOU ARE LIVING?: NO

## 2024-03-16 SDOH — SOCIAL STABILITY: SOCIAL INSECURITY: WERE YOU ABLE TO COMPLETE ALL THE BEHAVIORAL HEALTH SCREENINGS?: YES

## 2024-03-16 SDOH — SOCIAL STABILITY: SOCIAL INSECURITY: ABUSE: ADULT

## 2024-03-16 SDOH — SOCIAL STABILITY: SOCIAL INSECURITY: ARE YOU OR HAVE YOU BEEN THREATENED OR ABUSED PHYSICALLY, EMOTIONALLY, OR SEXUALLY BY ANYONE?: NO

## 2024-03-16 SDOH — SOCIAL STABILITY: SOCIAL INSECURITY: HAS ANYONE EVER THREATENED TO HURT YOUR FAMILY OR YOUR PETS?: NO

## 2024-03-16 ASSESSMENT — ACTIVITIES OF DAILY LIVING (ADL)
GROOMING: INDEPENDENT
JUDGMENT_ADEQUATE_SAFELY_COMPLETE_DAILY_ACTIVITIES: YES
ADEQUATE_TO_COMPLETE_ADL: YES
TOILETING: INDEPENDENT
ASSISTIVE_DEVICE: WHEELCHAIR;WALKER
WALKS IN HOME: INDEPENDENT
PATIENT'S MEMORY ADEQUATE TO SAFELY COMPLETE DAILY ACTIVITIES?: YES
DRESSING YOURSELF: INDEPENDENT
HEARING - RIGHT EAR: FUNCTIONAL
FEEDING YOURSELF: INDEPENDENT
LACK_OF_TRANSPORTATION: NO
BATHING: INDEPENDENT
HEARING - LEFT EAR: FUNCTIONAL

## 2024-03-16 ASSESSMENT — ENCOUNTER SYMPTOMS
DIARRHEA: 1
CONSTIPATION: 0
NUMBNESS: 1
DYSURIA: 0
FEVER: 0
NECK PAIN: 1
NAUSEA: 1
CHILLS: 0
BLOOD IN STOOL: 0
DYSPHORIC MOOD: 0
SHORTNESS OF BREATH: 0
HEMATURIA: 0
WEAKNESS: 1

## 2024-03-16 ASSESSMENT — COGNITIVE AND FUNCTIONAL STATUS - GENERAL
CLIMB 3 TO 5 STEPS WITH RAILING: A LOT
DAILY ACTIVITIY SCORE: 24
DAILY ACTIVITIY SCORE: 24
STANDING UP FROM CHAIR USING ARMS: A LITTLE
WALKING IN HOSPITAL ROOM: A LITTLE
PATIENT BASELINE BEDBOUND: NO
MOBILITY SCORE: 19
WALKING IN HOSPITAL ROOM: A LITTLE
MOVING TO AND FROM BED TO CHAIR: A LITTLE
MOVING TO AND FROM BED TO CHAIR: A LITTLE
STANDING UP FROM CHAIR USING ARMS: A LITTLE
MOBILITY SCORE: 19
WALKING IN HOSPITAL ROOM: A LITTLE
DAILY ACTIVITIY SCORE: 24
MOBILITY SCORE: 19
STANDING UP FROM CHAIR USING ARMS: A LITTLE
MOVING TO AND FROM BED TO CHAIR: A LITTLE
CLIMB 3 TO 5 STEPS WITH RAILING: A LOT
CLIMB 3 TO 5 STEPS WITH RAILING: A LOT

## 2024-03-16 ASSESSMENT — COLUMBIA-SUICIDE SEVERITY RATING SCALE - C-SSRS
1. IN THE PAST MONTH, HAVE YOU WISHED YOU WERE DEAD OR WISHED YOU COULD GO TO SLEEP AND NOT WAKE UP?: NO
2. HAVE YOU ACTUALLY HAD ANY THOUGHTS OF KILLING YOURSELF?: NO
2. HAVE YOU ACTUALLY HAD ANY THOUGHTS OF KILLING YOURSELF?: NO
6. HAVE YOU EVER DONE ANYTHING, STARTED TO DO ANYTHING, OR PREPARED TO DO ANYTHING TO END YOUR LIFE?: NO
1. SINCE LAST CONTACT, HAVE YOU WISHED YOU WERE DEAD OR WISHED YOU COULD GO TO SLEEP AND NOT WAKE UP?: NO
6. HAVE YOU EVER DONE ANYTHING, STARTED TO DO ANYTHING, OR PREPARED TO DO ANYTHING TO END YOUR LIFE?: NO

## 2024-03-16 ASSESSMENT — LIFESTYLE VARIABLES
AUDIT-C TOTAL SCORE: 1
HOW OFTEN DO YOU HAVE 6 OR MORE DRINKS ON ONE OCCASION: NEVER
HOW OFTEN DO YOU HAVE A DRINK CONTAINING ALCOHOL: MONTHLY OR LESS
HAVE PEOPLE ANNOYED YOU BY CRITICIZING YOUR DRINKING: NO
SKIP TO QUESTIONS 9-10: 1
AUDIT-C TOTAL SCORE: 1
SUBSTANCE_ABUSE_PAST_12_MONTHS: NO
EVER FELT BAD OR GUILTY ABOUT YOUR DRINKING: NO
EVER HAD A DRINK FIRST THING IN THE MORNING TO STEADY YOUR NERVES TO GET RID OF A HANGOVER: NO
HOW MANY STANDARD DRINKS CONTAINING ALCOHOL DO YOU HAVE ON A TYPICAL DAY: 1 OR 2
PRESCIPTION_ABUSE_PAST_12_MONTHS: NO
HAVE YOU EVER FELT YOU SHOULD CUT DOWN ON YOUR DRINKING: NO

## 2024-03-16 ASSESSMENT — PAIN SCALES - GENERAL
PAINLEVEL_OUTOF10: 0 - NO PAIN
PAINLEVEL_OUTOF10: 0 - NO PAIN

## 2024-03-16 ASSESSMENT — PATIENT HEALTH QUESTIONNAIRE - PHQ9
1. LITTLE INTEREST OR PLEASURE IN DOING THINGS: NOT AT ALL
SUM OF ALL RESPONSES TO PHQ9 QUESTIONS 1 & 2: 0
2. FEELING DOWN, DEPRESSED OR HOPELESS: NOT AT ALL

## 2024-03-16 ASSESSMENT — PAIN - FUNCTIONAL ASSESSMENT
PAIN_FUNCTIONAL_ASSESSMENT: 0-10

## 2024-03-16 NOTE — H&P
History Of Present Illness  Jing Jon is a 64 y.o. female presenting with PMHx significant for IDDM2 c/b neuropathy with spinal stimulator, stroke with residual left sided decreased sensation weakness, HTN, HLD, MI no reported stents, asthma, depression, cervical stenosis, lumbar stenosis, chronic migraine, vitamin D deficiency, who is presenting for 4-5 days of nausea. The patient was initially seen on 3/12 in the ED for nausea, and at that she was treated with Zofran and IV fluids.  Lipase was elevated but was not 3 x upper limit. Viral swabs were negative.  CT A/P at that time was negative for acute abnormality, and she was discharged with Compazine.  She had a phone call visit with her PCP on 3/14 for continued nausea and was switched to Zofran which she states helped with her nausea. Yesterday she notes that she had no nausea and felt significantly better. She was able to tolerate PO, took her oral medications for the first time since symptoms began, and was able to tolerate a dinner of [employer] bean burritos.    Unfortunately, she woke up feeling disoriented after sleeping for longer than usual. She attempted to use the bathroom, but when she stood up from the toilet felt weak and hit her head on the window frame. No loss of consciousness, she remembers the entire episode, but she is on Plavix for a prior stroke. Nausea resumed, and she noted 5 episodes of non-bloody emesis and 1 episode of watery/loose diarrhea without any lucia blood. Symptoms improved with Zofran. No aggravating factors. No reported association of symptoms with PO intake. Denies any chest pain, shortness of breath on RA, constipation, dysuria, hematuria, dizziness, change in vision, new loss of sensation, change in hearing, or loss of consciousness. Her Mounjaro was recently increased, and last injection was on 3/10. No sick contacts.    To note, she reports having been seen prior by an Allergist for possible cheese allergy.  She does not drink dairy and avoids yellow cheese, but did consume cheese with her [employer].    PMHx: As Above  PSurgHx: Appendectomy, rotator cuff surgery, bicep tendon surgery, cervical fusion, left salpingoopherectomy, PCI  Meds: Acetaminophen, albuterol, baclofen, esomeprazole, fenofibrate, ibuprofen, Tribenzor, ondansetron, pregabalin, tirzepatide  Allergies: Demerol, Farxiga, Lipitor, morphine, propoxyphene, statins, Catapres, Crestor, Lotensin, Mevacor, Zocor, Invokana, Zetia  SHx: Quit smoking 30 years ago, ~17 years of 0.5 PPD.  Rare social drinker.  Denies any recreational drug use.  Lives at home with her , no pets.  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    CODE STATUS: Full code, requesting no blood products    ED Course  Vitals: /62, HR 86, RR 14, SpO2 98% on RA, T 36.3  Labs: CBC showed leukocytosis with WBC 14.0, no anemia.  CMP showed BUN/creatinine 24/1.95, otherwise unremarkable.  Lipase WNL.  Lactate WNL.  Magnesium WNL. Troponin negative x 2.  Mononucleosis screen negative.  VBG unremarkable.  UA showed trace leukocyte esterase, no nitrites, however 1+ bacteria and 3+ hyaline cast.  Imaging: CT head showed no evidence of acute cortical infarct or intracranial hemorrhage, likely extra-axial hyperdense mass in the left frontal lobe possible meningioma.  EKG showed NSR with sinus arrhythmia, rate 74, SD interval 190, QTc 479, no evidence of acute ischemic change, similar to prior EKG in May 2021.  Interventions:  Zofran ordered for nausea.  L LR bolus. MRI findings discussed with neurology, who recommended nonemergent MRI brain with and without contrast.      Surgical History  She has a past surgical history that includes Rotator cuff repair (11/25/2015); Cervical fusion (11/25/2015); Nasal septum surgery (11/25/2015); Oophorectomy (11/25/2015); Other surgical history (11/01/2021); Other surgical history (11/01/2021); Other surgical history (11/01/2021); Appendectomy; Back surgery;  Cardiac catheterization; Sinus surgery; Spine surgery; and Gerry tooth extraction.     Social History  She reports that she has quit smoking. Her smoking use included cigarettes. She has a 7.50 pack-year smoking history. She has never used smokeless tobacco. She reports that she does not currently use alcohol. She reports that she does not use drugs.    Family History  Family History   Problem Relation Name Age of Onset    Diabetes Mother Nirmala Stone     Hypertension Mother Nirmala Stone     Alcohol abuse Father Bandar Stone     Arthritis Father Bandar Stone     Diabetes Father Bandar Stone     Hypertension Father Bandar Stone     Heart disease Maternal Grandfather Imtiaz Jewell     Alcohol abuse Son Dpiak Jon     Asthma Son Dipak Jon     Learning disabilities Son Dipak Jon     Asthma Son Ton Jon Jr.     Learning disabilities Son Ton Jon Jr.     Asthma Sister Ashley DeClemente     Cancer Sister Ashley DeClemente     Diabetes Sister Ashley DeClemente     Cancer Sister Afia Joseph     Cancer Brother Bandar Stone     Colon cancer Brother Bandar Stone     Hypertension Brother Bandar Stone      Allergies  Demerol [meperidine], Farxiga [dapagliflozin], Morphine, Propoxyphene, Statins-hmg-coa reductase inhibitors, Catapres [clonidine hcl], Lotensin [benazepril], Egg, Invokana [canagliflozin], and Zetia [ezetimibe]    Review of Systems   Constitutional:  Negative for chills and fever.   HENT:  Negative for ear pain and hearing loss.    Eyes:  Negative for visual disturbance.   Respiratory:  Negative for shortness of breath.    Cardiovascular:  Negative for chest pain.   Gastrointestinal:  Positive for diarrhea and nausea. Negative for blood in stool and constipation.   Genitourinary:  Negative for dysuria and hematuria.   Musculoskeletal:  Positive for neck pain.   Skin:  Negative for rash.   Neurological:  Positive for weakness (Left-sided chronic) and numbness (Chronic).  Negative for syncope.   Psychiatric/Behavioral:  Negative for dysphoric mood.       Physical Exam  Constitutional:       General: She is not in acute distress.     Appearance: She is obese. She is not toxic-appearing.   HENT:      Head: Normocephalic and atraumatic.      Mouth/Throat:      Mouth: Mucous membranes are moist.      Pharynx: No posterior oropharyngeal erythema.   Eyes:      General: No scleral icterus.     Extraocular Movements: Extraocular movements intact.   Cardiovascular:      Rate and Rhythm: Normal rate and regular rhythm.      Heart sounds: No murmur heard.     No friction rub. No gallop.   Pulmonary:      Effort: Pulmonary effort is normal. No respiratory distress.      Breath sounds: No wheezing, rhonchi or rales.   Abdominal:      General: Abdomen is flat. There is no distension.      Palpations: Abdomen is soft.      Tenderness: There is no abdominal tenderness. There is no guarding.   Musculoskeletal:         General: No swelling or tenderness.      Cervical back: Neck supple. No tenderness.   Skin:     General: Skin is warm and dry.   Neurological:      Mental Status: She is alert and oriented to person, place, and time. Mental status is at baseline.      Comments: Sensation decreased in the left upper and left lower extremity, this is chronic from prior stroke.  Left lower extremity strength 4/5, this is also chronic from prior stroke.  Nausea is reproducible with rapid head movement from right to left, no visible nystagmus.   Psychiatric:         Mood and Affect: Mood normal.     Last Recorded Vitals  /75 (BP Location: Left arm, Patient Position: Standing)   Pulse 93   Temp 36.5 °C (97.7 °F)   Resp 16   Wt 84.4 kg (186 lb 1.1 oz)   SpO2 97%     Relevant Results  Scheduled medications  [START ON 3/17/2024] clopidogrel, 75 mg, oral, Daily  heparin (porcine), 5,000 Units, subcutaneous, q8h  insulin lispro, 0-5 Units, subcutaneous, q4h  [Held by provider] insulin regular, 40 Units,  subcutaneous, BID  lactated Ringer's, 1,000 mL, intravenous, Once  polyethylene glycol, 17 g, oral, Daily    Continuous medications  lactated Ringer's, 125 mL/hr    PRN medications  PRN medications: dextrose, dextrose, glucagon, ondansetron  Results for orders placed or performed during the hospital encounter of 03/16/24 (from the past 24 hour(s))   CBC and Auto Differential   Result Value Ref Range    WBC 14.0 (H) 4.4 - 11.3 x10*3/uL    nRBC 0.0 0.0 - 0.0 /100 WBCs    RBC 4.99 4.00 - 5.20 x10*6/uL    Hemoglobin 15.1 12.0 - 16.0 g/dL    Hematocrit 44.9 36.0 - 46.0 %    MCV 90 80 - 100 fL    MCH 30.3 26.0 - 34.0 pg    MCHC 33.6 32.0 - 36.0 g/dL    RDW 13.0 11.5 - 14.5 %    Platelets 415 150 - 450 x10*3/uL    Neutrophils % 74.1 40.0 - 80.0 %    Immature Granulocytes %, Automated 0.5 0.0 - 0.9 %    Lymphocytes % 16.3 13.0 - 44.0 %    Monocytes % 7.0 2.0 - 10.0 %    Eosinophils % 1.5 0.0 - 6.0 %    Basophils % 0.6 0.0 - 2.0 %    Neutrophils Absolute 10.35 (H) 1.20 - 7.70 x10*3/uL    Immature Granulocytes Absolute, Automated 0.07 0.00 - 0.70 x10*3/uL    Lymphocytes Absolute 2.28 1.20 - 4.80 x10*3/uL    Monocytes Absolute 0.98 0.10 - 1.00 x10*3/uL    Eosinophils Absolute 0.21 0.00 - 0.70 x10*3/uL    Basophils Absolute 0.08 0.00 - 0.10 x10*3/uL   Comprehensive metabolic panel   Result Value Ref Range    Glucose 174 (H) 74 - 99 mg/dL    Sodium 138 136 - 145 mmol/L    Potassium 3.6 3.5 - 5.3 mmol/L    Chloride 98 98 - 107 mmol/L    Bicarbonate 28 21 - 32 mmol/L    Anion Gap 16 10 - 20 mmol/L    Urea Nitrogen 24 (H) 6 - 23 mg/dL    Creatinine 1.95 (H) 0.50 - 1.05 mg/dL    eGFR 29 (L) >60 mL/min/1.73m*2    Calcium 9.9 8.6 - 10.3 mg/dL    Albumin 4.8 3.4 - 5.0 g/dL    Alkaline Phosphatase 35 33 - 136 U/L    Total Protein 7.5 6.4 - 8.2 g/dL    AST 25 9 - 39 U/L    Bilirubin, Total 0.3 0.0 - 1.2 mg/dL    ALT 37 7 - 45 U/L   Blood Gas Venous Full Panel   Result Value Ref Range    POCT pH, Venous 7.32 (L) 7.33 - 7.43 pH    POCT  pCO2, Venous 57 (H) 41 - 51 mm Hg    POCT pO2, Venous 23 (L) 35 - 45 mm Hg    POCT SO2, Venous 33 (L) 45 - 75 %    POCT Oxy Hemoglobin, Venous 32.7 (L) 45.0 - 75.0 %    POCT Hematocrit Calculated, Venous 45.0 36.0 - 46.0 %    POCT Sodium, Venous 136 136 - 145 mmol/L    POCT Potassium, Venous 3.6 3.5 - 5.3 mmol/L    POCT Chloride, Venous 98 98 - 107 mmol/L    POCT Ionized Calicum, Venous 1.16 1.10 - 1.33 mmol/L    POCT Glucose, Venous 178 (H) 74 - 99 mg/dL    POCT Lactate, Venous 1.7 0.4 - 2.0 mmol/L    POCT Base Excess, Venous 1.8 -2.0 - 3.0 mmol/L    POCT HCO3 Calculated, Venous 29.4 (H) 22.0 - 26.0 mmol/L    POCT Hemoglobin, Venous 14.9 12.0 - 16.0 g/dL    POCT Anion Gap, Venous 12.0 10.0 - 25.0 mmol/L    Patient Temperature      FiO2 21 %   Magnesium   Result Value Ref Range    Magnesium 2.08 1.60 - 2.40 mg/dL   Phosphorus   Result Value Ref Range    Phosphorus 4.4 2.5 - 4.9 mg/dL   Lipase   Result Value Ref Range    Lipase 40 9 - 82 U/L   Lactate   Result Value Ref Range    Lactate 1.5 0.4 - 2.0 mmol/L   Mononucleosis screen   Result Value Ref Range    Mononucleosis Screen Negative Negative   Troponin I, High Sensitivity, Initial   Result Value Ref Range    Troponin I, High Sensitivity 3 0 - 13 ng/L   Urinalysis with Reflex Culture and Microscopic   Result Value Ref Range    Color, Urine Yellow Straw, Yellow    Appearance, Urine Hazy (N) Clear    Specific Gravity, Urine 1.012 1.005 - 1.035    pH, Urine 5.0 5.0, 5.5, 6.0, 6.5, 7.0, 7.5, 8.0    Protein, Urine NEGATIVE NEGATIVE mg/dL    Glucose, Urine NEGATIVE NEGATIVE mg/dL    Blood, Urine NEGATIVE NEGATIVE    Ketones, Urine NEGATIVE NEGATIVE mg/dL    Bilirubin, Urine NEGATIVE NEGATIVE    Urobilinogen, Urine <2.0 <2.0 mg/dL    Nitrite, Urine NEGATIVE NEGATIVE    Leukocyte Esterase, Urine TRACE (A) NEGATIVE   Microscopic Only, Urine   Result Value Ref Range    WBC, Urine 1-5 1-5, NONE /HPF    RBC, Urine NONE NONE, 1-2, 3-5 /HPF    Squamous Epithelial Cells, Urine  1-9 (SPARSE) Reference range not established. /HPF    Bacteria, Urine 1+ (A) NONE SEEN /HPF    Hyaline Casts, Urine 3+ (A) NONE /LPF   Urine electrolytes   Result Value Ref Range    Sodium, Urine Random 87 mmol/L    Sodium/Creatinine Ratio 90 Not established. mmol/g Creat    Potassium, Urine Random 14 mmol/L    Potassium/Creatinine Ratio 14 Not established mmol/g Creat    Chloride, Urine Random 85 mmol/L    Chloride/Creatinine Ratio 87 38 - 318 mmol/g creat    Creatinine, Urine Random 97.2 20.0 - 320.0 mg/dL   Troponin, High Sensitivity, 1 Hour   Result Value Ref Range    Troponin I, High Sensitivity <3 0 - 13 ng/L     CT head wo IV contrast    Result Date: 3/16/2024  Interpreted By:  Kian Grady, STUDY: CT HEAD WO IV CONTRAST;  3/16/2024 1:38 pm   INDICATION: Signs/Symptoms:persistent vomiting, falls.   COMPARISON: 12/03/2018   ACCESSION NUMBER(S): [id number]   ORDERING CLINICIAN: EVELIA WASHINGTON   TECHNIQUE: Noncontrast axial CT scan of head was performed. Angled reformats in brain and bone windows were generated. The images were reviewed in bone, brain, blood and soft tissue windows.   FINDINGS: The ventricles, cisterns and sulci are prominent, consistent with mild diffuse volume loss. There are areas of nonspecific white matter hypodensity, which are probably age-related or microvascular in nature.   Gray-white differentiation is intact and there is no evidence of acute cortical infarct. Encephalomalacia in the right basal ganglia likely related to old infarct. There is a hyperdense area of subtle extra-axial masslike density in the left frontal lobe measuring approximately 16 mm in diameter. This is likely a in meningioma. Recommend further evaluation with MRI to better evaluate and characterize and confirm.       The visualized paranasal sinuses are clear.         No evidence of acute cortical infarct or intracranial hemorrhage.   Likely an extra-axial hyperdense mass in the left frontal lobe for which  recommend MRI with gadolinium to further evaluate. This may represent a meningioma.   MACRO: None   Signed by: Kian Grady 3/16/2024 2:04 PM Dictation workstation:   [id number]      Assessment/Plan   Principal Problem:    Acute kidney injury (CMS/HCC)    Jing Jon is a 64 y.o. female presenting with PMHx significant for IDDM2 c/b neuropathy with spinal stimulator, stroke with residual left sided decreased sensation and LLE weakness, HTN, HLD, MI no reports stents, asthma, cervical stenosis, lumbar stenosis, depression, chronic migraine, vitamin D deficiency, who is presenting for 4 days of nausea.  Unfortunately upon initial evaluation, patient with MELANI and persistent nausea.  Prior CT abdomen pelvis was negative for acute finding.  Incidental finding on CT head of hyperdense mass in the frontal lobe possible meningioma.  The patient was admitted to the general medicine team for further management evaluation.  Suspect MELANI is secondary to dehydration in the setting of emesis.  Unclear primary trigger of nausea, differential diagnosis includes vestibular, neurologic, medication side effect, dehydration.  ED discussed imaging with neurology, who recommended nonemergent MRI.  Will continue fluids for dehydration and continue to monitor labs.    # Nausea  # Emesis  # Leukocytosis  Unclear primary etiology, possibly vestibular, neurologic, or pharmacologic in nature. Low suspicion for infectious etiology, no clear indication for antibiotics. Will consider vestibular neuritis vs. BPPV due to reproducible nature of symptoms on exam. Additionally, patient was switched from Ozempic to Mounjaro, common side effects of Mounjaro include nausea and emesis. Last subcutaneous injection was 3/10, given timing, will consider pharmacologic side effect as primary etiology.  -WBC on admission 14.0, suspect this is reactive in the setting of emesis  -UA showed trace leukocyte esterase with 1+ bacteria, but no nitrites.  Culture pending.  -CT head negative for acute cortical infarct or intracranial hemorrhage  -Orthostats ordered  -IV Zofran as needed  -Clear liquid diet, advance as tolerated  -Trend CBC  -PT/OT    # Likely nonoliguric prerenal MELANI 2/2 dehydration caused by emesis  -Creatinine on admission 1.95, baseline ~ .65  -Urine sodium 87, urine creatinine 97.2. FeNa indeterminate at 1.3  -Serum osmolality, urine osmolality ordered  -Will continue IV rehydration including maintenance LR  -Trend RFP    # IDDM 2 c/b neuropathy s/p  spinal stimulator placement  -SSI  -Will hold U-500, continue at half dosing when able to tolerate PO  -q4 POCT glucose    # Left frontal lobe hyperdense mass  -CT showed extra-axial hyperdense mass in the left frontal lobe  -Neurology was consulted, per ED report recommended nonemergent MRI, appreciate rec  -Plan for outpatient MRI    Chronic conditions  # Asthma  # Cervical stenosis  # Lumbar stenosis  # HTN  # HLD  # Depression  # Hx of stroke with residual left-sided deficits  -Continue home medications and management as appropriate    IVF: LR bolus followed by maintenance  Diet: Clear liquid diet, will advance as tolerated  DVT: Heparin, SCDs    Dispo: Unclear etiology of symptoms, anticipate patient will require 1-2+ additional midnights for further evaluation and management.    Case to be discussed with attending physician,  Mark Ratliff D.O. PGY-1    Senior Resident Addendum:    I saw this patient with the above intern physician and performed my own history and physical examination.   My subjective and objective findings are reflected in the above documentation.  The assessment and plan reflect my input. My edits are included in the above documentation.    To be discussed with attending.  Joshua Preston DO, PGY-3

## 2024-03-16 NOTE — ED PROVIDER NOTES
EMERGENCY DEPARTMENT ENCOUNTER      Pt Name: Jing Jon  MRN: [id number]  Birthdate 1960  Date of evaluation: 3/16/2024  Provider: Zeke Rosen DO    CHIEF COMPLAINT       Chief Complaint   Patient presents with    Multiple Medical Complaints     Pt here with same symptoms 4 days ago. Pt having dizziness, nausea, weakness, headache, fatigue, diarrhea, vomiting, decreased appetite.          HISTORY OF PRESENT ILLNESS    64-year-old female who was seen at this facility 4 days ago with persistent nausea and vomiting who returns to the emergency department for ongoing nausea and vomiting.  She at that time was found to have a mild elevation in lipase but no significant intra-abdominal pathology on CT.  Remained nauseated after several doses of Zofran but improved after Compazine, so went home with Compazine.     primarily relates history as the patient is very sleepy.  He states that she initially went home on Wednesday, and continued to have a feel ill but PCP switched them to Zofran and she recovered some of her functionality, had a normal meal yesterday, and then began vomiting again.  Deteriorated overnight and became very sleepy and confused, repeating same questions over and over, and blood sugars continue to increase on her Dexcom to up over 200.    She denies abdominal pain at this time.  She has had multiple episodes of vomiting and several of diarrhea.  Of note, her switched from Ozempic to Mounjaro in December, but states that her last dose was last Sunday.  No fevers, no chest pain, no difficulty breathing.           Nursing Notes were reviewed.    PAST MEDICAL HISTORY     Past Medical History:   Diagnosis Date    Allergic     Anemia     Anxiety     Arthritis     Asthma     CHF (congestive heart failure) (CMS/[facility])     Depression     Diabetes mellitus (CMS/[facility])     Disease of thyroid gland     GERD (gastroesophageal reflux disease)     Headache     Heart disease     Hypertension      Iliotibial band syndrome, left leg 05/27/2019    Iliotibial band tendinitis of left side    Myocardial infarction (CMS/HCC)     Obesity, unspecified 01/03/2022    Obesity (BMI 30-39.9)    Other abnormal auditory perceptions, unspecified ear 12/12/2019    Abnormal auditory perception    Other fecal abnormalities     Change in stool    Other spondylosis with myelopathy, cervical region 11/30/2016    Cervical spondylitic cord compression    Pain in left hip 03/23/2021    Left hip pain    Pain in left knee 05/09/2018    Left knee pain, unspecified chronicity    Pain in left shoulder 07/12/2017    Pain of left shoulder region    Pain in right shoulder 02/22/2019    Chronic right shoulder pain    Pain in right shoulder 04/01/2019    Right shoulder pain    Personal history of other diseases of the circulatory system     History of cardiac disorder    Personal history of other diseases of the circulatory system     History of hypertension    Personal history of other diseases of the musculoskeletal system and connective tissue 04/15/2015    History of neck pain    Personal history of other diseases of the musculoskeletal system and connective tissue 11/25/2015    History of joint pain    Personal history of other diseases of the musculoskeletal system and connective tissue     History of arthritis    Personal history of other diseases of the respiratory system     History of asthma    Personal history of transient ischemic attack (TIA), and cerebral infarction without residual deficits 08/28/2019    History of stroke in adulthood    Personal history of transient ischemic attack (TIA), and cerebral infarction without residual deficits     History of cerebrovascular accident    Radiculopathy, lumbar region 02/26/2020    Lumbar radiculopathy    Spinal stenosis, lumbar region without neurogenic claudication 11/18/2020    Lumbar stenosis    Sprain of other part of unspecified wrist and hand, initial encounter 07/26/2015     Carpometacarpal joint sprains    Stroke (CMS/HCC)     Trochanteric bursitis, left hip 07/28/2020    Trochanteric bursitis of left hip    Unilateral primary osteoarthritis, left hip 03/31/2021    Primary osteoarthritis of left hip    Urinary tract infection          SURGICAL HISTORY       Past Surgical History:   Procedure Laterality Date    APPENDECTOMY      BACK SURGERY      CARDIAC CATHETERIZATION      CERVICAL FUSION  11/25/2015    Cervical Vertebral Fusion    NASAL SEPTUM SURGERY  11/25/2015    Nasal Septal Deviation Repair    OOPHORECTOMY  11/25/2015    Oophorectomy    OTHER SURGICAL HISTORY  11/01/2021    Ovarian surgery    OTHER SURGICAL HISTORY  11/01/2021    Complete colonoscopy    OTHER SURGICAL HISTORY  11/01/2021    Shoulder surgery    ROTATOR CUFF REPAIR  11/25/2015    Rotator Cuff Repair    SINUS SURGERY      SPINE SURGERY      WISDOM TOOTH EXTRACTION           CURRENT MEDICATIONS       Current Discharge Medication List        CONTINUE these medications which have NOT CHANGED    Details   acetaminophen (Tylenol) 500 mg tablet Take 1-2 tablets (500-1,000 mg) by mouth every 8 hours if needed for mild pain (1 - 3) or moderate pain (4 - 6).      albuterol 1.25 mg/3 mL nebulizer solution Take 3 mL (1.25 mg) by nebulization every 4 hours if needed for shortness of breath or wheezing.      albuterol 90 mcg/actuation inhaler Inhale 2 puffs every 4 hours if needed for shortness of breath or wheezing.      apple cider vinegar 600 mg capsule Take 1,200 mg by mouth once daily at bedtime.      baclofen (Lioresal) 10 mg tablet Take 1 tablet (10 mg) by mouth 3 times a day as needed for muscle spasms.      biotin 10 mg tablet Take 1 tablet (10 mg) by mouth once daily at bedtime.      clopidogrel (Plavix) 75 mg tablet Take 1 tablet (75 mg) by mouth once daily at bedtime.      cyanocobalamin (Vitamin B-12) 1,000 mcg tablet Take 1 tablet (1,000 mcg) by mouth once daily at bedtime.      esomeprazole (NexIUM) 40 mg   capsule Take 2 capsules (80 mg) by mouth once daily in the morning. Take before meals.  Qty: 180 capsule, Refills: 0    Associated Diagnoses: Gastroesophageal reflux disease without esophagitis      fenofibrate (Tricor) 48 mg tablet Take 1 tablet (48 mg) by mouth once daily at bedtime.      HumuLIN R U-500, Conc, Kwikpen 500 unit/mL (3 mL) CONCENTRATED injection Inject 70 Units under the skin 2 times a day.      ibuprofen 200 mg tablet Take 1-2 tablets (200-400 mg) by mouth every 6 hours if needed for mild pain (1 - 3) or moderate pain (4 - 6).      multivitamin (Daily Multi-Vitamin) tablet Take 1 tablet by mouth once daily at bedtime.      olmesartan-amLODIPin-hcthiazid (Tribenzor) 40-5-12.5 mg tablet Take 1 tablet by mouth once daily at bedtime.      ondansetron (Zofran) 4 mg tablet Take 1 tablet (4 mg) by mouth every 12 hours if needed for nausea or vomiting for up to 7 days.  Qty: 14 tablet, Refills: 0    Associated Diagnoses: Nausea and vomiting, unspecified vomiting type      pregabalin (Lyrica) 150 mg capsule Take 1 capsule (150 mg) by mouth 3 times a day.      Repatha SureClick 140 mg/mL injection Inject 140 mg subcutaneously every 2 weeks.      tirzepatide (Mounjaro) 7.5 mg/0.5 mL pen injector Inject 7.5 mg under the skin every 7 days. On Sundays      turmeric root extract 500 mg capsule Take 500 mg by mouth once daily at bedtime.             ALLERGIES     Demerol [meperidine], Farxiga [dapagliflozin], Morphine, Propoxyphene, Statins-hmg-coa reductase inhibitors, Catapres [clonidine hcl], Lotensin [benazepril], Egg, Invokana [canagliflozin], and Zetia [ezetimibe]    FAMILY HISTORY       Family History   Problem Relation Name Age of Onset    Diabetes Mother Nirmala Stone     Hypertension Mother Nirmala Stone     Alcohol abuse Father Bandar Stone     Arthritis Father Bandar Stone     Diabetes Father Bandar Stone     Hypertension Father Bandar Stone     Heart disease Maternal Grandfather Imtiaz Best     Alcohol  abuse Ean Jon     Asthma Ean Jon     Learning disabilities Ean Jon     Asthma Son Ton Jon Jr.     Learning disabilities Son Ton Jon Jr.     Asthma Sister Ashley Mustafaemente     Cancer Sister Ashley Mustafaemente     Diabetes Sister Ashley DeClemente     Cancer Sister Afia Joseph     Cancer Brother Bandar Stone     Colon cancer Brother Bandar Stone     Hypertension Brother Bandar Stone           SOCIAL HISTORY       Social History     Socioeconomic History    Marital status:      Spouse name: Not on file    Number of children: Not on file    Years of education: Not on file    Highest education level: Not on file   Occupational History    Not on file   Tobacco Use    Smoking status: Former     Packs/day: 0.50     Years: 15.00     Additional pack years: 0.00     Total pack years: 7.50     Types: Cigarettes    Smokeless tobacco: Never   Vaping Use    Vaping Use: Never used   Substance and Sexual Activity    Alcohol use: Not Currently     Comment: social drinker    Drug use: Never    Sexual activity: Not Currently     Partners: Male     Birth control/protection: Male Sterilization   Other Topics Concern    Not on file   Social History Narrative    Not on file     Social Determinants of Health     Financial Resource Strain: Low Risk  (3/16/2024)    Overall Financial Resource Strain (CARDIA)     Difficulty of Paying Living Expenses: Not hard at all   Food Insecurity: Not on file   Transportation Needs: No Transportation Needs (3/16/2024)    PRAPARE - Transportation     Lack of Transportation (Medical): No     Lack of Transportation (Non-Medical): No   Physical Activity: Not on file   Stress: Not on file   Social Connections: Not on file   Intimate Partner Violence: Not on file   Housing Stability: Low Risk  (3/16/2024)    Housing Stability Vital Sign     Unable to Pay for Housing in the Last Year: No     Number of Places Lived in the Last Year: 1      Unstable Housing in the Last Year: No       SCREENINGS                        PHYSICAL EXAM    (up to 7 for level 4, 8 or more for level 5)     ED Triage Vitals [03/16/24 1200]   Temperature Heart Rate Respirations BP   36.3 °C (97.3 °F) 86 14 125/62      Pulse Ox Temp Source Heart Rate Source Patient Position   98 % Temporal Monitor Sitting      BP Location FiO2 (%)     Left arm --       Physical Exam  Vitals and nursing note reviewed.   Constitutional:       Appearance: She is well-developed. She is ill-appearing (Fatigued and pale).   HENT:      Head: Normocephalic and atraumatic.      Mouth/Throat:      Mouth: Mucous membranes are dry.   Eyes:      Conjunctiva/sclera: Conjunctivae normal.   Cardiovascular:      Rate and Rhythm: Normal rate and regular rhythm.      Heart sounds: No murmur heard.  Pulmonary:      Effort: Pulmonary effort is normal. No respiratory distress.      Breath sounds: Normal breath sounds.   Abdominal:      Palpations: Abdomen is soft.      Tenderness: There is no abdominal tenderness.   Musculoskeletal:         General: No swelling.      Cervical back: Neck supple.   Skin:     General: Skin is warm and dry.      Capillary Refill: Capillary refill takes less than 2 seconds.      Coloration: Skin is pale.   Neurological:      Mental Status: She is alert.   Psychiatric:         Mood and Affect: Mood normal.          DIAGNOSTIC RESULTS     LABS:  Labs Reviewed   CBC WITH AUTO DIFFERENTIAL - Abnormal       Result Value    WBC 14.0 (*)     nRBC 0.0      RBC 4.99      Hemoglobin 15.1      Hematocrit 44.9      MCV 90      MCH 30.3      MCHC 33.6      RDW 13.0      Platelets 415      Neutrophils % 74.1      Immature Granulocytes %, Automated 0.5      Lymphocytes % 16.3      Monocytes % 7.0      Eosinophils % 1.5      Basophils % 0.6      Neutrophils Absolute 10.35 (*)     Immature Granulocytes Absolute, Automated 0.07      Lymphocytes Absolute 2.28      Monocytes Absolute 0.98      Eosinophils  Absolute 0.21      Basophils Absolute 0.08     COMPREHENSIVE METABOLIC PANEL - Abnormal    Glucose 174 (*)     Sodium 138      Potassium 3.6      Chloride 98      Bicarbonate 28      Anion Gap 16      Urea Nitrogen 24 (*)     Creatinine 1.95 (*)     eGFR 29 (*)     Calcium 9.9      Albumin 4.8      Alkaline Phosphatase 35      Total Protein 7.5      AST 25      Bilirubin, Total 0.3      ALT 37     BLOOD GAS VENOUS FULL PANEL - Abnormal    POCT pH, Venous 7.32 (*)     POCT pCO2, Venous 57 (*)     POCT pO2, Venous 23 (*)     POCT SO2, Venous 33 (*)     POCT Oxy Hemoglobin, Venous 32.7 (*)     POCT Hematocrit Calculated, Venous 45.0      POCT Sodium, Venous 136      POCT Potassium, Venous 3.6      POCT Chloride, Venous 98      POCT Ionized Calicum, Venous 1.16      POCT Glucose, Venous 178 (*)     POCT Lactate, Venous 1.7      POCT Base Excess, Venous 1.8      POCT HCO3 Calculated, Venous 29.4 (*)     POCT Hemoglobin, Venous 14.9      POCT Anion Gap, Venous 12.0      Patient Temperature        FiO2 21     URINALYSIS WITH REFLEX CULTURE AND MICROSCOPIC - Abnormal    Color, Urine Yellow      Appearance, Urine Hazy (*)     Specific Gravity, Urine 1.012      pH, Urine 5.0      Protein, Urine NEGATIVE      Glucose, Urine NEGATIVE      Blood, Urine NEGATIVE      Ketones, Urine NEGATIVE      Bilirubin, Urine NEGATIVE      Urobilinogen, Urine <2.0      Nitrite, Urine NEGATIVE      Leukocyte Esterase, Urine TRACE (*)    MICROSCOPIC ONLY, URINE - Abnormal    WBC, Urine 1-5      RBC, Urine NONE      Squamous Epithelial Cells, Urine 1-9 (SPARSE)      Bacteria, Urine 1+ (*)     Hyaline Casts, Urine 3+ (*)    POCT GLUCOSE - Abnormal    POCT Glucose 103 (*)    POCT GLUCOSE - Abnormal    POCT Glucose 175 (*)    MAGNESIUM - Normal    Magnesium 2.08     PHOSPHORUS - Normal    Phosphorus 4.4     LIPASE - Normal    Lipase 40      Narrative:     Venipuncture immediately after or during the administration of Metamizole may lead to falsely  low results. Testing should be performed immediately prior to Metamizole dosing.   LACTATE - Normal    Lactate 1.5      Narrative:     Venipuncture immediately after or during the administration of Metamizole may lead to falsely low results. Testing should be performed immediately  prior to Metamizole dosing.   MONONUCLEOSIS SCREEN (HETEROPHILE ANTIBODY) - Normal    Mononucleosis Screen Negative     SERIAL TROPONIN-INITIAL - Normal    Troponin I, High Sensitivity 3      Narrative:     Less than 99th percentile of normal range cutoff-  Female and children under 18 years old <14 ng/L; Male <21 ng/L: Negative  Repeat testing should be performed if clinically indicated.     Female and children under 18 years old 14-50 ng/L; Male 21-50 ng/L:  Consistent with possible cardiac damage and possible increased clinical   risk. Serial measurements may help to assess extent of myocardial damage.     >50 ng/L: Consistent with cardiac damage, increased clinical risk and  myocardial infarction. Serial measurements may help assess extent of   myocardial damage.      NOTE: Children less than 1 year old may have higher baseline troponin   levels and results should be interpreted in conjunction with the overall   clinical context.     NOTE: Troponin I testing is performed using a different   testing methodology at [facility] than at other   [facility]. Direct result comparisons should only   be made within the same method.   SERIAL TROPONIN, 1 HOUR - Normal    Troponin I, High Sensitivity <3      Narrative:     Less than 99th percentile of normal range cutoff-  Female and children under 18 years old <14 ng/L; Male <21 ng/L: Negative  Repeat testing should be performed if clinically indicated.     Female and children under 18 years old 14-50 ng/L; Male 21-50 ng/L:  Consistent with possible cardiac damage and possible increased clinical   risk. Serial measurements may help to assess extent of myocardial damage.      >50 ng/L: Consistent with cardiac damage, increased clinical risk and  myocardial infarction. Serial measurements may help assess extent of   myocardial damage.      NOTE: Children less than 1 year old may have higher baseline troponin   levels and results should be interpreted in conjunction with the overall   clinical context.     NOTE: Troponin I testing is performed using a different   testing methodology at [facility] than at other   [facility]. Direct result comparisons should only   be made within the same method.   URINE CULTURE   TROPONIN SERIES- (INITIAL, 1 HR)    Narrative:     The following orders were created for panel order Troponin Series, (0, 1 HR).  Procedure                               Abnormality         Status                     ---------                               -----------         ------                     Troponin I, High Sensiti...[[id number]]  Normal              Final result               Troponin, High Sensitivi...[[id number]]  Normal              Final result                 Please view results for these tests on the individual orders.   ELECTROLYTE PANEL, URINE    Sodium, Urine Random 87      Sodium/Creatinine Ratio 90      Potassium, Urine Random 14      Potassium/Creatinine Ratio 14      Chloride, Urine Random 85      Chloride/Creatinine Ratio 87      Creatinine, Urine Random 97.2     URINALYSIS WITH REFLEX CULTURE AND MICROSCOPIC    Narrative:     The following orders were created for panel order Urinalysis with Reflex Culture and Microscopic.  Procedure                               Abnormality         Status                     ---------                               -----------         ------                     Urinalysis with Reflex C...[[id number]]  Abnormal            Final result               Extra Urine Gray Tube[[id number]]                            In process                   Please view results for these tests on the individual orders.    EXTRA URINE GRAY TUBE   SODIUM, URINE RANDOM   OSMOLALITY   OSMOLALITY, URINE   CBC   RENAL FUNCTION PANEL       All other labs were within normal range or not returned as of this dictation.    Imaging  CT head wo IV contrast   Final Result   No evidence of acute cortical infarct or intracranial hemorrhage.        Likely an extra-axial hyperdense mass in the left frontal lobe for   which recommend MRI with gadolinium to further evaluate. This may   represent a meningioma.        MACRO:   None        Signed by: Kian Grady 3/16/2024 2:04 PM   Dictation workstation:   [id number]           Procedures  Procedures     EMERGENCY DEPARTMENT COURSE/MDM:   Jing Jon is a 64 y.o. female presenting to the ED for evaluation of had concerns including Multiple Medical Complaints (Pt here with same symptoms 4 days ago. Pt having dizziness, nausea, weakness, headache, fatigue, diarrhea, vomiting, decreased appetite. )..   Medical Decision Making  64-year-old diabetic female with persistent nausea and vomiting today.  CT scan was unremarkable and as the patient has not been having pain, presumed limited benefit from repeating scan.  Will repeat CBC, CMP, lipase and lactate, get urinalysis and because of the ongoing vomiting, will scan her head. Glucose is 174, she has a new MELANI to creatinine 1.95 from a baseline of 0.67.  She has a leukocytosis to 14 which is predominantly neutrophilic, and some evidence of hemoconcentration as her hemoglobin is gone from 13.6-15.1 in the last 4 days.  Lipase is not elevated lactate is normal.  Troponin is normal.  Her mag level is 2.08.  CT of the head shows hyperdense lesion in the left frontal lobe that is new from her imaging at her last stroke, favors meningioma.  No obvious compression or mass effect.  Perked up after IV fluid bolus and Zofran, admitted for intractable nausea with MELANI        ED Course as of 03/16/24 2052   Sat Mar 16, 2024   1512 MELANI.  CT of the head shows  hyperdensity in the left frontal lobe that is new from her MRIs in 2018.  Reached out to Dr. Mejia for recommendations on specific MRI imaging. She reviewed the images and agrees that it appears to be a meningioma, and recommends non-emergent MRI brain with and without contrast.  [AS]   1517 On reevaluation, patient is more alert but still feeling very fatigued.  Admitted for intractable nausea and vomiting with acute kidney injury [AS]      ED Course User Index  [AS] Aurelia Millan DO         Diagnoses as of 03/16/24 2052   Acute kidney injury (CMS/[facility])          External records reviewed: I reviewed external records including outpatient, PCP records, and prior discharge summaries    I have reviewed this case with the ED attending physician, and the attending agrees with the plan. Patient or family was counselled regarding labs, imaging, likely diagnosis, and plan. All questions were answered.     Aurelia Millan DO  PGY-4, emergency medicine    The above documentation was completed with the use of speech recognition software. It may contain dictation errors secondary to limitations of the software.      ED Medications administered this visit:    Medications   heparin (porcine) injection 5,000 Units (5,000 Units subcutaneous Given 3/16/24 1652)   polyethylene glycol (Glycolax, Miralax) packet 17 g (17 g oral Not Given 3/16/24 1530)   lactated Ringer's infusion (125 mL/hr intravenous Rate/Dose Verify 3/16/24 1857)   ondansetron (Zofran) injection 4 mg (has no administration in time range)   dextrose 50 % injection 25 g (has no administration in time range)   glucagon (Glucagen) injection 1 mg (has no administration in time range)   dextrose 50 % injection 12.5 g (has no administration in time range)   insulin lispro (HumaLOG) injection 0-5 Units ( subcutaneous Not Given 3/16/24 1530)   clopidogrel (Plavix) tablet 75 mg (has no administration in time range)   insulin regular (HumuLIN R U-500) CONCENTRATED injection  40 Units ( subcutaneous Dose Auto Held 3/20/24 2100)   pregabalin (Lyrica) capsule 150 mg (150 mg oral Not Given 3/16/24 1730)   baclofen (Lioresal) tablet 10 mg (has no administration in time range)   amLODIPine (Norvasc) tablet 5 mg (has no administration in time range)   ondansetron (Zofran) injection 4 mg (4 mg intravenous Given 3/16/24 1319)   lactated Ringer's bolus 1,000 mL (0 mL intravenous Stopped 3/16/24 1418)   lactated Ringer's bolus 1,000 mL (0 mL intravenous Stopped 3/16/24 1858)       New Prescriptions from this visit:    Current Discharge Medication List          Final Impression:   1. Acute kidney injury (CMS/HCC)          (Please note that portions of this note were completed with a voice recognition program.  Efforts were made to edit the dictations but occasionally words are mis-transcribed.)     Aurelia Millan DO  Resident  03/16/24 8237      The patient was seen by the resident/fellow.  I have personally performed a substantive portion of the encounter.  I have seen and examined the patient; agree with the workup, evaluation, MDM, management and diagnosis.  The care plan has been discussed with the resident; I have reviewed the resident’s note and agree with the documented findings.       Zeke Rosen DO  03/16/24 2052

## 2024-03-16 NOTE — HOSPITAL COURSE
Patient is a 65 y/o female with T2DM, HTN, presented with 4-5 days of nausea and poor PO intake. Was seen in the ED for the same thing and discharged with compazine. Did not help much, symptoms persisted, also experienced dizziness thus re-presented to the ED. ED course notable for MELANI with creatining of 1.95 from baseline of 0.67. She received a total of 2L IVFs, followed by mIVFs and was admitted to medicine for MELANI likely in the setting of nausea/vomiting. Of note, CT-H did show potential meningioma. Neurology curbsided from ED and recommended non-emergent MRI. Patient has spinal cord stimulator that requires special device in order to have MRI, thus will do as outpatient via her [facility] Neurologist. This morning, no complaints. Feeling significantly better. Still with some lightheadedness with standing but much improved from yesterday. She tolerated a diet advanced from clears to regular. Creatinine resolved back to 0.7. She is being discharged to home with PRN Zofran. Follow up with [facility] Neurologist on discharge for non-emergent MRI.

## 2024-03-17 VITALS
OXYGEN SATURATION: 96 % | TEMPERATURE: 97.7 F | HEART RATE: 80 BPM | WEIGHT: 192.68 LBS | HEIGHT: 62 IN | RESPIRATION RATE: 18 BRPM | DIASTOLIC BLOOD PRESSURE: 69 MMHG | SYSTOLIC BLOOD PRESSURE: 121 MMHG | BODY MASS INDEX: 35.46 KG/M2

## 2024-03-17 PROBLEM — N17.9 ACUTE KIDNEY INJURY (CMS-HCC): Status: RESOLVED | Noted: 2024-03-16 | Resolved: 2024-03-17

## 2024-03-17 LAB
ALBUMIN SERPL BCP-MCNC: 3.6 G/DL (ref 3.4–5)
ANION GAP SERPL CALC-SCNC: 13 MMOL/L (ref 10–20)
BUN SERPL-MCNC: 9 MG/DL (ref 6–23)
CALCIUM SERPL-MCNC: 8.8 MG/DL (ref 8.6–10.3)
CHLORIDE SERPL-SCNC: 100 MMOL/L (ref 98–107)
CO2 SERPL-SCNC: 27 MMOL/L (ref 21–32)
CREAT SERPL-MCNC: 0.7 MG/DL (ref 0.5–1.05)
EGFRCR SERPLBLD CKD-EPI 2021: >90 ML/MIN/1.73M*2
ERYTHROCYTE [DISTWIDTH] IN BLOOD BY AUTOMATED COUNT: 12.7 % (ref 11.5–14.5)
GLUCOSE BLD MANUAL STRIP-MCNC: 118 MG/DL (ref 74–99)
GLUCOSE BLD MANUAL STRIP-MCNC: 131 MG/DL (ref 74–99)
GLUCOSE BLD MANUAL STRIP-MCNC: 132 MG/DL (ref 74–99)
GLUCOSE BLD MANUAL STRIP-MCNC: 162 MG/DL (ref 74–99)
GLUCOSE BLD MANUAL STRIP-MCNC: 163 MG/DL (ref 74–99)
GLUCOSE SERPL-MCNC: 174 MG/DL (ref 74–99)
HCT VFR BLD AUTO: 37.4 % (ref 36–46)
HGB BLD-MCNC: 12.3 G/DL (ref 12–16)
HOLD SPECIMEN: NORMAL
MCH RBC QN AUTO: 29.6 PG (ref 26–34)
MCHC RBC AUTO-ENTMCNC: 32.9 G/DL (ref 32–36)
MCV RBC AUTO: 90 FL (ref 80–100)
NRBC BLD-RTO: 0 /100 WBCS (ref 0–0)
OSMOLALITY SERPL: 298 MOSM/KG (ref 280–300)
OSMOLALITY UR: 400 MOSM/KG (ref 200–1200)
PHOSPHATE SERPL-MCNC: 2 MG/DL (ref 2.5–4.9)
PLATELET # BLD AUTO: 357 X10*3/UL (ref 150–450)
POTASSIUM SERPL-SCNC: 3.3 MMOL/L (ref 3.5–5.3)
RBC # BLD AUTO: 4.16 X10*6/UL (ref 4–5.2)
SODIUM SERPL-SCNC: 137 MMOL/L (ref 136–145)
WBC # BLD AUTO: 7.2 X10*3/UL (ref 4.4–11.3)

## 2024-03-17 PROCEDURE — 82947 ASSAY GLUCOSE BLOOD QUANT: CPT

## 2024-03-17 PROCEDURE — 2500000004 HC RX 250 GENERAL PHARMACY W/ HCPCS (ALT 636 FOR OP/ED)

## 2024-03-17 PROCEDURE — 99222 1ST HOSP IP/OBS MODERATE 55: CPT | Performed by: STUDENT IN AN ORGANIZED HEALTH CARE EDUCATION/TRAINING PROGRAM

## 2024-03-17 PROCEDURE — G0378 HOSPITAL OBSERVATION PER HR: HCPCS

## 2024-03-17 PROCEDURE — 80069 RENAL FUNCTION PANEL: CPT

## 2024-03-17 PROCEDURE — 36415 COLL VENOUS BLD VENIPUNCTURE: CPT

## 2024-03-17 PROCEDURE — 2500000001 HC RX 250 WO HCPCS SELF ADMINISTERED DRUGS (ALT 637 FOR MEDICARE OP)

## 2024-03-17 PROCEDURE — 2500000002 HC RX 250 W HCPCS SELF ADMINISTERED DRUGS (ALT 637 FOR MEDICARE OP, ALT 636 FOR OP/ED)

## 2024-03-17 PROCEDURE — 85027 COMPLETE CBC AUTOMATED: CPT

## 2024-03-17 PROCEDURE — 99236 HOSP IP/OBS SAME DATE HI 85: CPT

## 2024-03-17 RX ORDER — ONDANSETRON 4 MG/1
4 TABLET, ORALLY DISINTEGRATING ORAL EVERY 8 HOURS PRN
Qty: 21 TABLET | Refills: 0 | Status: SHIPPED | OUTPATIENT
Start: 2024-03-17 | End: 2024-03-25 | Stop reason: SDUPTHER

## 2024-03-17 RX ADMIN — PREGABALIN 150 MG: 150 CAPSULE ORAL at 09:11

## 2024-03-17 RX ADMIN — SODIUM CHLORIDE, POTASSIUM CHLORIDE, SODIUM LACTATE AND CALCIUM CHLORIDE 125 ML/HR: 600; 310; 30; 20 INJECTION, SOLUTION INTRAVENOUS at 03:35

## 2024-03-17 RX ADMIN — INSULIN LISPRO 1 UNITS: 100 INJECTION, SOLUTION INTRAVENOUS; SUBCUTANEOUS at 11:53

## 2024-03-17 RX ADMIN — INSULIN LISPRO 1 UNITS: 100 INJECTION, SOLUTION INTRAVENOUS; SUBCUTANEOUS at 17:30

## 2024-03-17 RX ADMIN — SODIUM CHLORIDE, POTASSIUM CHLORIDE, SODIUM LACTATE AND CALCIUM CHLORIDE 125 ML/HR: 600; 310; 30; 20 INJECTION, SOLUTION INTRAVENOUS at 11:04

## 2024-03-17 RX ADMIN — HEPARIN SODIUM 5000 UNITS: 5000 INJECTION INTRAVENOUS; SUBCUTANEOUS at 09:13

## 2024-03-17 RX ADMIN — HEPARIN SODIUM 5000 UNITS: 5000 INJECTION INTRAVENOUS; SUBCUTANEOUS at 15:40

## 2024-03-17 ASSESSMENT — PAIN SCALES - GENERAL: PAINLEVEL_OUTOF10: 0 - NO PAIN

## 2024-03-17 ASSESSMENT — COGNITIVE AND FUNCTIONAL STATUS - GENERAL
DAILY ACTIVITIY SCORE: 24
WALKING IN HOSPITAL ROOM: A LITTLE
MOBILITY SCORE: 19
MOVING TO AND FROM BED TO CHAIR: A LITTLE
CLIMB 3 TO 5 STEPS WITH RAILING: A LOT
STANDING UP FROM CHAIR USING ARMS: A LITTLE

## 2024-03-17 NOTE — NURSING NOTE
Assumed care at 1900  VSS stable   Gave Zofran for nausea. No complaints of pain, LR running at 125 ml/hr continuously. Did not give pt Lispro doses for her BS seems to decrease faster than normal, patient is on clear liquids only. A/Ox 4 NSR on tele. Urine sample sent.   Pt inquired about restless leg syndrome med Horizant 600mg she usually takes at home. Team notified. Pt. Was given pregablin which is similar.   Pt. Appears to be resting. VSS. Call light within reach

## 2024-03-17 NOTE — DISCHARGE INSTRUCTIONS
Please follow up with your PCP within 1 week and your [facility] neurologist   Please get brain MRI outpatient at your earliest convenience   Take zofran as needed for nausea and vomiting

## 2024-03-17 NOTE — CARE PLAN
The patient's goals for the shift include none    The clinical goals for the shift include see POC

## 2024-03-17 NOTE — PROGRESS NOTES
Physical Therapy                 Therapy Communication Note    Patient Name: Jing Jon  MRN: [id number]  Today's Date: 3/17/2024     Discipline: Physical Therapy    Missed Time:   Screen    Comment:  Order received.  Chart reviewed.    C/O 4-5 days of emesis, from a drug effect.   She has been getting up to the bathroom on her own.  He agreed to get up OOB and walk x 50 ft..  Steady and balanced.  Looks safe to go home.  Has a chair lift to 2nd floor bed/bath (put in due to a CVA a few years ago).  Home with .  Screen only.  No PT eval. needed. Will discontinue from PT svcs..

## 2024-03-17 NOTE — CONSULTS
Inpatient consult to Neurology  Consult performed by: Nia Mejia MD  Consult ordered by: Nia Mckenna MD      History Of Present Illness  Jing Jon is a 64 y.o. female presenting with acute kidney injury in the setting of nausea and vomiting.    She presented to the hospital with nausea and vomiting. She was found to have an acute kidney injury in the ED. CT head showed a left frontal extra-axial mass.    She has had a headache for the past week since the nausea and vomiting started otherwise denies a headcahe. She does have episodic dizziness and R ear pain as well. She has no history of TBI, seizure, CNS radiation or previous cancer.    She has a history of multiple TIA and several strokes in 3071-2832 but has been stable since that time on clopidogrel. She follows with a Neurologist at [facility].     Past Medical History  Past Medical History:   Diagnosis Date    Allergic     Anemia     Anxiety     Arthritis     Asthma     CHF (congestive heart failure) (CMS/[facility])     Depression     Diabetes mellitus (CMS/[facility])     Disease of thyroid gland     GERD (gastroesophageal reflux disease)     Headache     Heart disease     Hypertension     Iliotibial band syndrome, left leg 05/27/2019    Iliotibial band tendinitis of left side    Myocardial infarction (CMS/[facility])     Obesity, unspecified 01/03/2022    Obesity (BMI 30-39.9)    Other abnormal auditory perceptions, unspecified ear 12/12/2019    Abnormal auditory perception    Other fecal abnormalities     Change in stool    Other spondylosis with myelopathy, cervical region 11/30/2016    Cervical spondylitic cord compression    Pain in left hip 03/23/2021    Left hip pain    Pain in left knee 05/09/2018    Left knee pain, unspecified chronicity    Pain in left shoulder 07/12/2017    Pain of left shoulder region    Pain in right shoulder 02/22/2019    Chronic right shoulder pain    Pain in right shoulder 04/01/2019    Right shoulder pain    Personal history  of other diseases of the circulatory system     History of cardiac disorder    Personal history of other diseases of the circulatory system     History of hypertension    Personal history of other diseases of the musculoskeletal system and connective tissue 04/15/2015    History of neck pain    Personal history of other diseases of the musculoskeletal system and connective tissue 11/25/2015    History of joint pain    Personal history of other diseases of the musculoskeletal system and connective tissue     History of arthritis    Personal history of other diseases of the respiratory system     History of asthma    Personal history of transient ischemic attack (TIA), and cerebral infarction without residual deficits 08/28/2019    History of stroke in adulthood    Personal history of transient ischemic attack (TIA), and cerebral infarction without residual deficits     History of cerebrovascular accident    Radiculopathy, lumbar region 02/26/2020    Lumbar radiculopathy    Spinal stenosis, lumbar region without neurogenic claudication 11/18/2020    Lumbar stenosis    Sprain of other part of unspecified wrist and hand, initial encounter 07/26/2015    Carpometacarpal joint sprains    Stroke (CMS/HCC)     Trochanteric bursitis, left hip 07/28/2020    Trochanteric bursitis of left hip    Unilateral primary osteoarthritis, left hip 03/31/2021    Primary osteoarthritis of left hip    Urinary tract infection      Surgical History  Past Surgical History:   Procedure Laterality Date    APPENDECTOMY      BACK SURGERY      CARDIAC CATHETERIZATION      CERVICAL FUSION  11/25/2015    Cervical Vertebral Fusion    NASAL SEPTUM SURGERY  11/25/2015    Nasal Septal Deviation Repair    OOPHORECTOMY  11/25/2015    Oophorectomy    OTHER SURGICAL HISTORY  11/01/2021    Ovarian surgery    OTHER SURGICAL HISTORY  11/01/2021    Complete colonoscopy    OTHER SURGICAL HISTORY  11/01/2021    Shoulder surgery    ROTATOR CUFF REPAIR  11/25/2015     Rotator Cuff Repair    SINUS SURGERY      SPINE SURGERY      WISDOM TOOTH EXTRACTION       Social History  Social History     Tobacco Use    Smoking status: Former     Packs/day: 0.50     Years: 15.00     Additional pack years: 0.00     Total pack years: 7.50     Types: Cigarettes    Smokeless tobacco: Never   Vaping Use    Vaping Use: Never used   Substance Use Topics    Alcohol use: Not Currently     Comment: social drinker    Drug use: Never     Allergies  Demerol [meperidine], Farxiga [dapagliflozin], Morphine, Propoxyphene, Statins-hmg-coa reductase inhibitors, Catapres [clonidine hcl], Lotensin [benazepril], Egg, Invokana [canagliflozin], and Zetia [ezetimibe]  Medications Prior to Admission   Medication Sig Dispense Refill Last Dose    acetaminophen (Tylenol) 500 mg tablet Take 1-2 tablets (500-1,000 mg) by mouth every 8 hours if needed for mild pain (1 - 3) or moderate pain (4 - 6).   Past Week    albuterol 1.25 mg/3 mL nebulizer solution Take 3 mL (1.25 mg) by nebulization every 4 hours if needed for shortness of breath or wheezing.   More than a month    albuterol 90 mcg/actuation inhaler Inhale 2 puffs every 4 hours if needed for shortness of breath or wheezing.   Past Week    apple cider vinegar 600 mg capsule Take 1,200 mg by mouth once daily at bedtime.   3/15/2024 at PM    baclofen (Lioresal) 10 mg tablet Take 1 tablet (10 mg) by mouth 3 times a day as needed for muscle spasms.   3/15/2024 at PM    biotin 10 mg tablet Take 1 tablet (10 mg) by mouth once daily at bedtime.   3/15/2024 at PM    clopidogrel (Plavix) 75 mg tablet Take 1 tablet (75 mg) by mouth once daily at bedtime.   3/15/2024 at PM    cyanocobalamin (Vitamin B-12) 1,000 mcg tablet Take 1 tablet (1,000 mcg) by mouth once daily at bedtime.   3/15/2024 at PM    esomeprazole (NexIUM) 40 mg DR capsule Take 2 capsules (80 mg) by mouth once daily in the morning. Take before meals. (Patient taking differently: Take 2 capsules (80 mg) by mouth  once daily as needed (for indigestion/heartburn).) 180 capsule 0 Past Week    fenofibrate (Tricor) 48 mg tablet Take 1 tablet (48 mg) by mouth once daily at bedtime.   3/15/2024 at PM    HumuLIN R U-500, Conc, Kwikpen 500 unit/mL (3 mL) CONCENTRATED injection Inject 70 Units under the skin 2 times a day.   3/15/2024 at PM    ibuprofen 200 mg tablet Take 1-2 tablets (200-400 mg) by mouth every 6 hours if needed for mild pain (1 - 3) or moderate pain (4 - 6).   Past Week    multivitamin (Daily Multi-Vitamin) tablet Take 1 tablet by mouth once daily at bedtime.   3/15/2024 at PM    olmesartan-amLODIPin-hcthiazid (Tribenzor) 40-5-12.5 mg tablet Take 1 tablet by mouth once daily at bedtime.   3/15/2024 at PM    ondansetron (Zofran) 4 mg tablet Take 1 tablet (4 mg) by mouth every 12 hours if needed for nausea or vomiting for up to 7 days. 14 tablet 0 3/16/2024 at AM    pregabalin (Lyrica) 150 mg capsule Take 1 capsule (150 mg) by mouth 3 times a day.   3/15/2024 at PM    Repatha SureClick 140 mg/mL injection Inject 140 mg subcutaneously every 2 weeks.   Past Week    tirzepatide (Mounjaro) 7.5 mg/0.5 mL pen injector Inject 7.5 mg under the skin every 7 days. On Sundays   3/3/2024 at PM    turmeric root extract 500 mg capsule Take 500 mg by mouth once daily at bedtime.   3/15/2024 at PM       Review of Systems  Neurological Exam  Mental Status  Awake, alert and oriented to person, place and time. Speech is normal.    Cranial Nerves  CN II: Visual fields full to confrontation.  CN III, IV, VI: Extraocular movements intact bilaterally.  CN V: Facial sensation is normal.  CN VII: Full and symmetric facial movement.  CN VIII: Hearing is normal.  CN IX, X: Palate elevates symmetrically  CN XI: Shoulder shrug strength is normal.  CN XII: Tongue midline without atrophy or fasciculations.    Motor   Strength is 5/5 throughout all four extremities.  Left hip flexion 4+/5.    Sensory  Light touch is normal in upper and lower  "extremities.     Coordination  Right: Finger-to-nose normal.Left: Finger-to-nose normal.    Physical Exam  Eyes:      Extraocular Movements: Extraocular movements intact.   Neurological:      Motor: Motor strength is normal.  Psychiatric:         Speech: Speech normal.       Last Recorded Vitals  Blood pressure 133/80, pulse 84, temperature 36.1 °C (97 °F), temperature source Temporal, resp. rate 16, height 1.575 m (5' 2\"), weight 87.4 kg (192 lb 10.9 oz), SpO2 96 %.    Relevant Results  CT head showed a hypodensity extraaxially in the L frontal lobe      NIH Stroke Scale  1A. Level of Consciousness: Alert, Keenly Responsive  1B. Ask Month and Age: Both Questions Right  1C. Blink Eyes & Squeeze Hands: Performs Both Tasks  2. Best Gaze: Normal  3. Visual: No Visual Loss  4. Facial Palsy: Normal Symmetrical Movements  5A. Motor - Left Arm: No Drift  5B. Motor - Right Arm: No Drift  6A. Motor - Left Leg: No Drift  6B. Motor - Right Leg: No Drift  7. Limb Ataxia: Absent  8. Sensory Loss: Normal  9. Best Language: No Aphasia  10. Dysarthria: Normal  11. Extinction and Inattention: No Abnormality  NIH Stroke Scale: 0           [place] Coma Scale  Best Eye Response: Spontaneous  Best Verbal Response: Oriented  Best Motor Response: Follows commands  [place] Coma Scale Score: 15              Assessment/Plan   Principal Problem:    Acute kidney injury (CMS/HCC)  Extra-axial brain mass, incidentally found left frontal mass concerning for meningioma     Outpatient MRI brain with and without contrast   Follow-up with her outpatient Neurologist at [facility]. If further concern, can refer her to our neuro-oncology team at         Nai Mejia MD  "

## 2024-03-17 NOTE — NURSING NOTE
"Pt has discharge order, she is aware. She states that \"my entire family I downtown at the [facility], we have a float in it.\" Drs are aware that pt will not be able to be picked up until later this afternoon or evening due to no transportation until then.     Pt was discharged at 1820. HL removed. All belongings taken. Reviewed discharge instructions with pt and , gave verbal understanding.   "

## 2024-03-17 NOTE — DISCHARGE SUMMARY
Discharge Diagnosis  Acute kidney injury (CMS/HCC)    Issues Requiring Follow-Up  PCP  Neuro  Brain MRI    Discharge Meds     Your medication list        START taking these medications        Instructions Last Dose Given Next Dose Due   ondansetron ODT 4 mg disintegrating tablet  Commonly known as: Zofran-ODT      Take 1 tablet (4 mg) by mouth every 8 hours if needed for nausea or vomiting for up to 7 days.              CONTINUE taking these medications        Instructions Last Dose Given Next Dose Due   acetaminophen 500 mg tablet  Commonly known as: Tylenol           albuterol 90 mcg/actuation inhaler           albuterol 1.25 mg/3 mL nebulizer solution           apple cider vinegar 600 mg capsule           baclofen 10 mg tablet  Commonly known as: Lioresal           biotin 10 mg tablet           clopidogrel 75 mg tablet  Commonly known as: Plavix           cyanocobalamin 1,000 mcg tablet  Commonly known as: Vitamin B-12           Daily Multi-Vitamin tablet  Generic drug: multivitamin           esomeprazole 40 mg DR capsule  Commonly known as: NexIUM      Take 2 capsules (80 mg) by mouth once daily in the morning. Take before meals.       fenofibrate 48 mg tablet  Commonly known as: Tricor           HumuLIN R U-500 (Conc) Kwikpen 500 unit/mL (3 mL) CONCENTRATED injection  Generic drug: insulin regular           ibuprofen 200 mg tablet           Mounjaro 7.5 mg/0.5 mL pen injector  Generic drug: tirzepatide           ondansetron 4 mg tablet  Commonly known as: Zofran      Take 1 tablet (4 mg) by mouth every 12 hours if needed for nausea or vomiting for up to 7 days.       pregabalin 150 mg capsule  Commonly known as: Lyrica           Repatha SureClick 140 mg/mL injection  Generic drug: evolocumab           Tribenzor 40-5-12.5 mg tablet  Generic drug: olmesartan-amLODIPin-hcthiazid           turmeric root extract 500 mg capsule                     Where to Get Your Medications        These medications were sent to  [id number] - [place] [address] [address] [address]      Phone: [phone]   ondansetron ODT 4 mg disintegrating tablet         Test Results Pending At Discharge  Pending Labs       Order Current Status    Urine Culture In process            Hospital Course  Patient is a 65 y/o female with T2DM, HTN, presented with 4-5 days of nausea and poor PO intake. Was seen in the ED for the same thing and discharged with compazine. Did not help much, symptoms persisted, also experienced dizziness thus re-presented to the ED. ED course notable for MELANI with creatining of 1.95 from baseline of 0.67. She received a total of 2L IVFs, followed by mIVFs and was admitted to medicine for MELANI likely in the setting of nausea/vomiting. Of note, CT-H did show potential meningioma. Neurology curbsided from ED and recommended non-emergent MRI. Patient has spinal cord stimulator that requires special device in order to have MRI, thus will do as outpatient via her [facility] Neurologist. This morning, no complaints. Feeling significantly better. Still with some lightheadedness with standing but much improved from yesterday. She tolerated a diet advanced from clears to regular. Creatinine resolved back to 0.7. She is being discharged to home with PRN Zofran. Follow up with [facility] Neurologist on discharge for non-emergent MRI.    Pertinent Physical Exam At Time of Discharge  Constitutional:       General: She is not in acute distress.  HENT:      Head: Normocephalic and atraumatic.      Mouth/Throat:      Mouth: Mucous membranes are moist.      Pharynx: No posterior oropharyngeal erythema.   Eyes:      General: No scleral icterus.     Extraocular Movements: Extraocular movements intact.   Cardiovascular:      Rate and Rhythm: Normal rate and regular rhythm.      Heart sounds: No murmur heard.  Pulmonary:      Effort: Pulmonary effort is normal. No respiratory distress.      Breath sounds: No wheezing, rhonchi or  rales.   Abdominal:      General: Abdomen is flat. There is no distension.      Palpations: Abdomen is soft.      Tenderness: There is no abdominal tenderness. There is no guarding.   Musculoskeletal:         General: No swelling or tenderness.      Cervical back: Neck supple. No tenderness.   Skin:     General: Skin is warm and dry.   Neurological:      Mental Status: She is alert and oriented to person, place, and time. Mental status is at baseline.      Comments: Sensation decreased in the left upper and left lower extremity, this is chronic from prior stroke.  Left lower extremity strength 4/5, this is also chronic from prior stroke.   Psychiatric:         Mood and Affect: Mood normal.     Outpatient Follow-Up  Future Appointments   Date Time Provider Department Center   5/22/2024 10:45 AM Jaimie Santos PA-C [email or web address] [place]   6/10/2024 10:30 AM Gage Jean DO [email or web address] [place]   9/9/2024 10:00 AM Richmond Mijares MD [id number] [place]         Sangeeta Haines DO

## 2024-03-18 ENCOUNTER — PATIENT OUTREACH (OUTPATIENT)
Dept: PRIMARY CARE | Facility: CLINIC | Age: 64
End: 2024-03-18
Payer: COMMERCIAL

## 2024-03-18 LAB — BACTERIA UR CULT: NORMAL

## 2024-03-18 NOTE — PROGRESS NOTES
Discharge Facility:  [facility]   Discharge Diagnosis:  Acute kidney injury   Admission Date:  3/16/24   Discharge Date:  3/17/24    PCP Appointment Date:  3/25/24 with dilisi   Specialist Appointment Date:  5/15/24 ENDO   5/5/24 NEURO   Hospital Encounter and Summary: Linked   See discharge assessment below for further details    PT SICK AND AT ED 3/12-13-BACK TO HOSP 3/16/24    Engagement  Call Start Time: 1200 (3/18/2024 12:17 PM)    Medications  Medications reviewed with patient/caregiver?: Yes (3/18/2024 12:17 PM)  Is the patient having any side effects they believe may be caused by any medication additions or changes?: -- (SIDE EFFECTS FROM DM MEDS) (3/18/2024 12:17 PM)  Does the patient have all medications ordered at discharge?: Yes (3/18/2024 12:17 PM)  Prescription Comments: SCRIPT FOR ZOFRAN (3/18/2024 12:17 PM)  Is the patient taking all medications as directed (includes completed medication regime)?: Yes (3/18/2024 12:17 PM)  Care Management Interventions: Provided patient education (3/18/2024 12:17 PM)  Medication Comments: Pt denies problems obtaining or affording medication (3/18/2024 12:17 PM)    Appointments  Does the patient have a primary care provider?: Yes (3/18/2024 12:17 PM)  Care Management Interventions: Verified appointment date/time/provider (3/18/2024 12:17 PM)  Has the patient kept scheduled appointments due by today?: Yes (3/18/2024 12:17 PM)  Care Management Interventions: Advised to schedule with specialist (3/18/2024 12:17 PM)    Self Management  What is the home health agency?: DENIES NEED (3/18/2024 12:17 PM)  Has home health visited the patient within 72 hours of discharge?: Not applicable (3/18/2024 12:17 PM)  What Durable Medical Equipment (DME) was ordered?: N/A (3/18/2024 12:17 PM)    Patient Teaching  Does the patient have access to their discharge instructions?: Yes (3/18/2024 12:17 PM)  Care Management Interventions: Reviewed instructions with patient (3/18/2024 12:17 PM)  What  is the patient's perception of their health status since discharge?: Improving (3/18/2024 12:17 PM)  Is the patient/caregiver able to teach back the hierarchy of who to call/visit for symptoms/problems? PCP, Specialist, Home Health nurse, Urgent Care, ED, 911: Yes (3/18/2024 12:17 PM)  Patient/Caregiver Education Comments: This CM spoke with pt via phone. Pt reports doing well at home since discharge but still nauseas at times. New meds reviewed. Pt denies CP and SOB. Patient denies any further discharge questions/needs at this time. Emphasized that Follow up is needed after discharge to review the hospital recommendations,assess your response to your treatment.  Pt aware of my availability for non-emergent concerns. Contact info provided to patient . (3/18/2024 12:17 PM)

## 2024-03-22 PROBLEM — I63.9 CEREBROVASCULAR ACCIDENT (CVA) (MULTI): Status: ACTIVE | Noted: 2023-09-19

## 2024-03-22 PROBLEM — R10.9 ABDOMINAL PAIN: Status: ACTIVE | Noted: 2024-03-22

## 2024-03-22 PROBLEM — I99.8 VASCULAR CALCIFICATION: Status: ACTIVE | Noted: 2024-03-14

## 2024-03-22 PROBLEM — R11.2 NAUSEA AND VOMITING: Status: ACTIVE | Noted: 2024-03-22

## 2024-03-22 PROBLEM — M79.2: Status: ACTIVE | Noted: 2023-09-20

## 2024-03-25 ENCOUNTER — OFFICE VISIT (OUTPATIENT)
Dept: PRIMARY CARE | Facility: CLINIC | Age: 64
End: 2024-03-25
Payer: COMMERCIAL

## 2024-03-25 VITALS
HEART RATE: 80 BPM | TEMPERATURE: 98.1 F | OXYGEN SATURATION: 96 % | SYSTOLIC BLOOD PRESSURE: 110 MMHG | HEIGHT: 62 IN | DIASTOLIC BLOOD PRESSURE: 68 MMHG | WEIGHT: 194.6 LBS | BODY MASS INDEX: 35.81 KG/M2

## 2024-03-25 DIAGNOSIS — E11.42 TYPE 2 DIABETES MELLITUS WITH DIABETIC POLYNEUROPATHY, WITH LONG-TERM CURRENT USE OF INSULIN (MULTI): Primary | ICD-10-CM

## 2024-03-25 DIAGNOSIS — J45.909 ASTHMA, UNSPECIFIED ASTHMA SEVERITY, UNSPECIFIED WHETHER COMPLICATED, UNSPECIFIED WHETHER PERSISTENT (HHS-HCC): ICD-10-CM

## 2024-03-25 DIAGNOSIS — G45.9 TRANSIENT ISCHEMIC ATTACK: ICD-10-CM

## 2024-03-25 DIAGNOSIS — R10.9 ABDOMINAL PAIN, UNSPECIFIED ABDOMINAL LOCATION: Primary | ICD-10-CM

## 2024-03-25 DIAGNOSIS — Z79.4 TYPE 2 DIABETES MELLITUS WITH DIABETIC POLYNEUROPATHY, WITH LONG-TERM CURRENT USE OF INSULIN (MULTI): Primary | ICD-10-CM

## 2024-03-25 DIAGNOSIS — R11.0 NAUSEA: ICD-10-CM

## 2024-03-25 PROCEDURE — 99495 TRANSJ CARE MGMT MOD F2F 14D: CPT | Performed by: INTERNAL MEDICINE

## 2024-03-25 PROCEDURE — 1036F TOBACCO NON-USER: CPT | Performed by: INTERNAL MEDICINE

## 2024-03-25 PROCEDURE — 3078F DIAST BP <80 MM HG: CPT | Performed by: INTERNAL MEDICINE

## 2024-03-25 PROCEDURE — 3061F NEG MICROALBUMINURIA REV: CPT | Performed by: INTERNAL MEDICINE

## 2024-03-25 PROCEDURE — 3074F SYST BP LT 130 MM HG: CPT | Performed by: INTERNAL MEDICINE

## 2024-03-25 RX ORDER — ONDANSETRON 4 MG/1
4 TABLET, ORALLY DISINTEGRATING ORAL EVERY 8 HOURS PRN
Qty: 30 TABLET | Refills: 2 | Status: SHIPPED | OUTPATIENT
Start: 2024-03-25 | End: 2024-06-10 | Stop reason: SDUPTHER

## 2024-03-25 RX ORDER — SEMAGLUTIDE 2.68 MG/ML
INJECTION, SOLUTION SUBCUTANEOUS
COMMUNITY
Start: 2024-03-18 | End: 2024-03-25 | Stop reason: WASHOUT

## 2024-03-25 RX ORDER — GABAPENTIN ENACARBIL 600 MG/1
600 TABLET, EXTENDED RELEASE ORAL NIGHTLY
COMMUNITY
Start: 2024-03-18

## 2024-03-25 RX ORDER — PANTOPRAZOLE SODIUM 40 MG/1
40 TABLET, DELAYED RELEASE ORAL DAILY
Qty: 30 TABLET | Refills: 0 | Status: SHIPPED | OUTPATIENT
Start: 2024-03-25 | End: 2024-04-16 | Stop reason: SDUPTHER

## 2024-03-25 ASSESSMENT — PATIENT HEALTH QUESTIONNAIRE - PHQ9
2. FEELING DOWN, DEPRESSED OR HOPELESS: NOT AT ALL
1. LITTLE INTEREST OR PLEASURE IN DOING THINGS: NOT AT ALL
SUM OF ALL RESPONSES TO PHQ9 QUESTIONS 1 & 2: 0

## 2024-03-25 ASSESSMENT — PAIN SCALES - GENERAL: PAINLEVEL: 4

## 2024-03-25 NOTE — PROGRESS NOTES
Standard Progress Note  Chief Complaint   Patient presents with    Hospital Follow-up     Pt of Dr. Jean here for follow up after discharge fro [facility] on 3/17/24.     Patient of Dr Jean    Hospital follow up. 3/16-3/17/2024 for nausea, decreased po intake, dizziness.    She was in the emergency room twice.  She had taken her usual dose of Mounjaro on March 10  The following day she felt out of it.  March 12 emergency room for vomiting.  She was sent home.  March 14 telehealth visit -prescribed  Zofran.  That did work better for the nausea and vomiting  However March 16 she had nausea vomiting fell onto the bathroom window.  She went back to the emergency room and this time was admitted.  At that point her creatinine was 1.95.  Prior baseline 0.67range.    No fever or chills.  Since admission, taking nausea medication one time a day.  She still has some nausea and abdominal discomfort that is diffuse.  There is some improvement from yesterday    She is on mounjaro -but has been on same dose since November.  They had intended to increase the dose.  However she had not actually taken the higher dose yet.  She did not have any problems with Mounjaro in the past.     DM not well controlled. Typically run 200.   Recalls having gallstones in the distant past.  She still has her gallbladder.  History of peptic ulcer disease.  She states that this was when she was under a lot of stress with work.  She does take ibuprofen as needed.  She does not use any caffeinated products.  She just drinks flavored water.    Patient Active Problem List   Diagnosis    Encounter for wellness examination in adult    Type 2 diabetes mellitus with diabetic polyneuropathy, with long-term current use of insulin (CMS/[facility])    HTN (hypertension), benign    B12 deficiency    Obesity (BMI 30-39.9)    Left foot pain    Callus of foot    Primary osteoarthritis of right shoulder    Acute maxillary sinusitis    Allergic rhinitis    Arthralgia of multiple  joints    Asthma    Ataxia    Blister of foot without infection    Calcification of both carotid arteries    Chronic pain of right upper extremity    Chronic pain    Fibromyalgia    Polyp of colon    Contusion of knee    Coronary artery disease involving native coronary artery of native heart without angina pectoris    COVID-19 virus infection    Degenerative disc disease, lumbar    Mixed anxiety depressive disorder    Diabetic neuropathy (CMS/HCC)    Fever    Gastroesophageal reflux disease    History of non-ST elevation myocardial infarction (NSTEMI)    History of hypertension    History of stroke    Hyperlipidemia    Hyperuricemia    Intrinsic urethral sphincter deficiency    Left hemiparesis (CMS/HCC)    Leukocytosis    Localized, secondary osteoarthritis of the hand    Lumbar back pain    Lumbosacral spondylosis without myelopathy    Metabolic syndrome    Migraine headache    Mitral valve disease    Myocardial infarction (CMS/HCC)    Non-toxic multinodular goiter    Ovarian cyst    Patellofemoral arthritis of right knee    Neuropathy of both feet    Motor neuritis    Polyarthritis    Polyarthropathy    Postmenopausal bleeding    Proteinuria    Restless legs syndrome    Right knee pain    Diabetic polyneuropathy associated with diabetes mellitus due to underlying condition (CMS/HCC)    Shingles (herpes zoster) polyneuropathy    Shortness of breath    Spinal cord compression due to degenerative disorder of spinal column    Sprain of carpometacarpal joint    Sprain, metatarsophalangeal joint    Stress incontinence of urine    Stroke (CMS/HCC)    Transient ischemic attack    Uncontrolled type 2 diabetes mellitus with hyperglycemia (CMS/HCC)    Venous stasis ulcer with edema of lower leg (CMS/HCC)    Dizziness and giddiness    Vitamin D deficiency    Abdominal pain    Nausea and vomiting    Vascular calcification    Neuropathy of lower extremity    Cerebrovascular accident (CVA) (CMS/HCC)         Blood pressure  "110/68, pulse 80, temperature 36.7 °C (98.1 °F), height 1.575 m (5' 2\"), weight 88.3 kg (194 lb 9.6 oz), SpO2 96 %.  Body mass index is 35.59 kg/m².    Vital reviewed  Abdomen: hypoactive bowel sounds.  Soft,tenderness epigastric and to the right of epigastric area. No guarding.  No mass.  Extremities: no pretibial edema  Neuro: speech intact.         Lab Results   Component Value Date    GLUCOSE 174 (H) 03/17/2024    CALCIUM 8.8 03/17/2024     03/17/2024    K 3.3 (L) 03/17/2024    CO2 27 03/17/2024     03/17/2024    BUN 9 03/17/2024    CREATININE 0.70 03/17/2024      Lab Results   Component Value Date    WBC 7.2 03/17/2024    HGB 12.3 03/17/2024    HCT 37.4 03/17/2024    MCV 90 03/17/2024     03/17/2024      Lab Results   Component Value Date    CHOL 115 12/27/2023    CHOL 133 02/22/2021    CHOL 119 08/26/2020     Lab Results   Component Value Date    HDL 39.5 12/27/2023    HDL 36.0 (A) 02/22/2021    HDL 49.0 08/26/2020     Lab Results   Component Value Date    LDLCALC  12/27/2023      Comment:      The calculation of LDL and VLDL are inaccurate when the Triglycerides are greater than 400 mg/dL or when the patient is non-fasting. If LDL measurement is necessary contact the testing laboratory for an alternative LDL assay.                                  Near   Borderline      AGE      Desirable  Optimal    High     High     Very High     0-19 Y     0 - 109     ---    110-129   >/= 130     ----    20-24 Y     0 - 119     ---    120-159   >/= 160     ----      >24 Y     0 -  99   100-129  130-159   160-189     >/=190       Lab Results   Component Value Date    TRIG 411 (H) 12/27/2023    TRIG 372 (H) 02/22/2021    TRIG 177 (H) 08/26/2020     No components found for: \"CHOLHDL\"    Reviewed hospital records including labs, CT abd, CT head-she has MRI head scheduled    1. Nausea  2. Abdominal pain.   Refill zofran   Possibly PUD. Rx pantoprazole. Advised f/up with Dr Jean 1 month to evaluate response. "   Also RUQ US to evaluate gallbladder.   - ondansetron ODT (Zofran-ODT) 4 mg disintegrating tablet; Take 1 tablet (4 mg) by mouth every 8 hours if needed for nausea or vomiting.  Dispense: 30 tablet; Refill: 2  - pantoprazole (ProtoNix) 40 mg EC tablet; Take 1 tablet (40 mg) by mouth once daily. Do not crush, chew, or split.  Dispense: 30 tablet; Refill: 0  - US right upper quadrant; Future  Hx of colonoscopy 8/3/2020 Dr Saurabh Finn [facility].   She does have pior history of EGD  She understands if symptoms refractory may need another EGD  We also discussed RUQ ultrasound does not rule out gallbladder disease  Also consider diabetic gastroparesis    ARF resolved.       Current Outpatient Medications on File Prior to Visit   Medication Sig Dispense Refill    acetaminophen (Tylenol) 500 mg tablet Take 1-2 tablets (500-1,000 mg) by mouth every 8 hours if needed for mild pain (1 - 3) or moderate pain (4 - 6).      albuterol 1.25 mg/3 mL nebulizer solution Take 3 mL (1.25 mg) by nebulization every 4 hours if needed for shortness of breath or wheezing.      albuterol 90 mcg/actuation inhaler Inhale 2 puffs every 4 hours if needed for shortness of breath or wheezing.      apple cider vinegar 600 mg capsule Take 1,200 mg by mouth once daily at bedtime.      baclofen (Lioresal) 10 mg tablet Take 1 tablet (10 mg) by mouth 3 times a day as needed for muscle spasms.      biotin 10 mg tablet Take 1 tablet (10 mg) by mouth once daily at bedtime.      clopidogrel (Plavix) 75 mg tablet Take 1 tablet (75 mg) by mouth once daily at bedtime.      cyanocobalamin (Vitamin B-12) 1,000 mcg tablet Take 1 tablet (1,000 mcg) by mouth once daily at bedtime.      esomeprazole (NexIUM) 40 mg DR capsule Take 2 capsules (80 mg) by mouth once daily in the morning. Take before meals. (Patient taking differently: Take 2 capsules (80 mg) by mouth once daily as needed (for indigestion/heartburn).) 180 capsule 0    fenofibrate (Tricor) 48 mg tablet Take 1  tablet (48 mg) by mouth once daily at bedtime.      Horizant 600 mg tablet extended release ER tablet Take 1 tablet (600 mg) by mouth once daily with breakfast.      HumuLIN R U-500, Conc, Kwikpen 500 unit/mL (3 mL) CONCENTRATED injection Inject 70 Units under the skin 2 times a day.      ibuprofen 200 mg tablet Take 1-2 tablets (200-400 mg) by mouth every 6 hours if needed for mild pain (1 - 3) or moderate pain (4 - 6).      multivitamin (Daily Multi-Vitamin) tablet Take 1 tablet by mouth once daily at bedtime.      olmesartan-amLODIPin-hcthiazid (Tribenzor) 40-5-12.5 mg tablet Take 1 tablet by mouth once daily at bedtime.      Ozempic 2 mg/dose (8 mg/3 mL) pen injector       pregabalin (Lyrica) 150 mg capsule Take 1 capsule (150 mg) by mouth 3 times a day.      Repatha SureClick 140 mg/mL injection Inject 140 mg subcutaneously every 2 weeks.      turmeric root extract 500 mg capsule Take 500 mg by mouth once daily at bedtime.      [] ondansetron (Zofran) 4 mg tablet Take 1 tablet (4 mg) by mouth every 12 hours if needed for nausea or vomiting for up to 7 days. 14 tablet 0    [] ondansetron ODT (Zofran-ODT) 4 mg disintegrating tablet Take 1 tablet (4 mg) by mouth every 8 hours if needed for nausea or vomiting for up to 7 days. 21 tablet 0    [DISCONTINUED] tirzepatide (Mounjaro) 7.5 mg/0.5 mL pen injector Inject 7.5 mg under the skin every 7 days. On Sundays       No current facility-administered medications on file prior to visit.         Savanna Torrez MD

## 2024-03-28 ENCOUNTER — PATIENT OUTREACH (OUTPATIENT)
Dept: PRIMARY CARE | Facility: CLINIC | Age: 64
End: 2024-03-28
Payer: COMMERCIAL

## 2024-03-28 NOTE — PROGRESS NOTES
Unable to reach patient for call back after patient's follow up appointment with PCP.   PAMM with call back number for patient to call if needed   If no voicemail available call attempts x 2 were made to contact the patient to assist with any questions or concerns patient may have.

## 2024-03-29 LAB
ATRIAL RATE: 74 BPM
P AXIS: 32 DEGREES
P OFFSET: 168 MS
P ONSET: 116 MS
PR INTERVAL: 190 MS
Q ONSET: 211 MS
QRS COUNT: 12 BEATS
QRS DURATION: 104 MS
QT INTERVAL: 432 MS
QTC CALCULATION(BAZETT): 479 MS
QTC FREDERICIA: 463 MS
R AXIS: -31 DEGREES
T AXIS: 49 DEGREES
T OFFSET: 427 MS
VENTRICULAR RATE: 74 BPM

## 2024-04-02 ENCOUNTER — HOSPITAL ENCOUNTER (OUTPATIENT)
Dept: RADIOLOGY | Facility: CLINIC | Age: 64
Discharge: HOME | End: 2024-04-02
Payer: COMMERCIAL

## 2024-04-02 DIAGNOSIS — R11.0 NAUSEA: ICD-10-CM

## 2024-04-02 PROCEDURE — 76705 ECHO EXAM OF ABDOMEN: CPT | Performed by: STUDENT IN AN ORGANIZED HEALTH CARE EDUCATION/TRAINING PROGRAM

## 2024-04-02 PROCEDURE — 76705 ECHO EXAM OF ABDOMEN: CPT

## 2024-04-08 ENCOUNTER — OFFICE VISIT (OUTPATIENT)
Dept: PRIMARY CARE | Facility: CLINIC | Age: 64
End: 2024-04-08
Payer: COMMERCIAL

## 2024-04-08 VITALS
OXYGEN SATURATION: 97 % | SYSTOLIC BLOOD PRESSURE: 122 MMHG | BODY MASS INDEX: 35.7 KG/M2 | WEIGHT: 194 LBS | HEIGHT: 62 IN | TEMPERATURE: 98.2 F | HEART RATE: 96 BPM | DIASTOLIC BLOOD PRESSURE: 70 MMHG

## 2024-04-08 DIAGNOSIS — J45.20 MILD INTERMITTENT ASTHMA, UNSPECIFIED WHETHER COMPLICATED (HHS-HCC): ICD-10-CM

## 2024-04-08 DIAGNOSIS — I10 HTN (HYPERTENSION), BENIGN: ICD-10-CM

## 2024-04-08 DIAGNOSIS — J40 BRONCHITIS: Primary | ICD-10-CM

## 2024-04-08 PROBLEM — D49.7 NEOPLASM OF MENINGES: Status: ACTIVE | Noted: 2024-04-08

## 2024-04-08 PROCEDURE — 99213 OFFICE O/P EST LOW 20 MIN: CPT | Performed by: INTERNAL MEDICINE

## 2024-04-08 PROCEDURE — 3074F SYST BP LT 130 MM HG: CPT | Performed by: INTERNAL MEDICINE

## 2024-04-08 PROCEDURE — 1036F TOBACCO NON-USER: CPT | Performed by: INTERNAL MEDICINE

## 2024-04-08 PROCEDURE — 3078F DIAST BP <80 MM HG: CPT | Performed by: INTERNAL MEDICINE

## 2024-04-08 PROCEDURE — 3061F NEG MICROALBUMINURIA REV: CPT | Performed by: INTERNAL MEDICINE

## 2024-04-08 RX ORDER — BLOOD-GLUCOSE SENSOR
EACH MISCELLANEOUS
COMMUNITY
Start: 2024-03-28

## 2024-04-08 RX ORDER — ALBUTEROL SULFATE 1.25 MG/3ML
1.25 SOLUTION RESPIRATORY (INHALATION) EVERY 4 HOURS PRN
Qty: 75 ML | Refills: 0 | Status: SHIPPED | OUTPATIENT
Start: 2024-04-08

## 2024-04-08 RX ORDER — AZITHROMYCIN 250 MG/1
TABLET, FILM COATED ORAL
Qty: 6 TABLET | Refills: 0 | Status: SHIPPED | OUTPATIENT
Start: 2024-04-08 | End: 2024-04-13

## 2024-04-08 NOTE — PROGRESS NOTES
Patient is seen my office today with chief complaint of cough and intermittent wheezing for 2 weeks.  She has no fever or chills.  Has some shortness of breath after bouts of coughing.  Patient has tried over-the-counter cough medications without significant relief.  She has no chest pain or palpitation.  Denies any nausea matting pain abdomen.  Review of other systems are negative.    On examination:  General examination: No acute discomfort  Eyes: There is no pallor or jaundice  Oral cavity some pharyngeal wall congestion is present there is no postnasal discharge  Neck: There is no JVD or thyroid enlargement  Lungs: Few rhonchi present bilaterally  CVS: Heart sounds are regular.    Assessment and plan  1.  Acute bronchitis: Prescription for azithromycin is given to the patient  2.  History of asthma: Patient is asking for refill on her albuterol nebulizer which is sent to the pharmacy  3.  Hypertension: Blood pressure readings are good today.  She is advised to follow-up with her regular primary care physician if symptoms are not resolved after completing the course of antibiotic

## 2024-04-16 DIAGNOSIS — R11.0 NAUSEA: ICD-10-CM

## 2024-04-16 RX ORDER — PANTOPRAZOLE SODIUM 40 MG/1
40 TABLET, DELAYED RELEASE ORAL DAILY
Qty: 30 TABLET | Refills: 0 | Status: SHIPPED | OUTPATIENT
Start: 2024-04-16 | End: 2024-05-09

## 2024-04-16 RX ORDER — PANTOPRAZOLE SODIUM 40 MG/1
TABLET, DELAYED RELEASE ORAL
Qty: 30 TABLET | Refills: 0 | OUTPATIENT
Start: 2024-04-16

## 2024-04-16 NOTE — TELEPHONE ENCOUNTER
Approving, but needs appt for additional refills.    \"Scheduled follow up 8/4/21 at [facility] at 10 am.    Please review medications to stop prior to this appointment    Also scheduled for Penicillin Testing and Possible Amoxicillin Challenge on 8/10/21 at 8:30.   Please review medications to stop prior to this appointment  Also bring things to do during the waiting times since you will be in the clinic 2-3 hours.  If you need to reschedule either of these appointments, Please reschedule with the allergy nurse.     IMPORTANT INFORMATION FOR ALLERGY TESTING      If you are taking any of the following medications and wish to be allergy tested, these  MUST be stopped 5 days prior to your appointment. Please note, your medicine may be  listed by generic or trade name.  • Fexofenadine (Allegra or Allegra-D)  • Azelastine (Astelin, Astepro Nasal sprays)  • Loratadine (Claritin, Claritin-D, Alavert, Walatin, others)  • Desloratadine (Clarinex, Clarinex-D)  • Cetirizine (Zyrtec or Zyrtec-D)  • Levocetirizine (Xyzal)  • Patanase (Nasal Spray)  • Hydroxyzine (Atarax)  • Allergy eye drops    If you must use an antihistamine, you may take diphenydramine (Benadryl) up to 24 hours  prior to your appointment. Then stop medication.  In addition, also stop taking the following medications 24 hours prior to our appointment.    • Zantac/Ranitidine  • Pepcid/ Famotidine  • Axid/Nizadine  • Tagamet/Cimetidine    Many cold medications and sleep aids contain antihistamines. These must also  be stopped 24 hours prior to your appointment. If you have any questions  about a particular medication, please contact the appropriate clinic where your  appointment is scheduled.    Continue all other medications as prescribed.

## 2024-04-18 ASSESSMENT — ENCOUNTER SYMPTOMS
SWEATS: 0
NECK PAIN: 1
SHORTNESS OF BREATH: 0
ORTHOPNEA: 0
PND: 0
HEADACHES: 0
HYPERTENSION: 1
PALPITATIONS: 0
BLURRED VISION: 0

## 2024-04-24 ENCOUNTER — OFFICE VISIT (OUTPATIENT)
Dept: PRIMARY CARE | Facility: CLINIC | Age: 64
End: 2024-04-24
Payer: COMMERCIAL

## 2024-04-24 ENCOUNTER — PATIENT MESSAGE (OUTPATIENT)
Dept: ORTHOPEDIC SURGERY | Facility: HOSPITAL | Age: 64
End: 2024-04-24

## 2024-04-24 VITALS
OXYGEN SATURATION: 99 % | HEART RATE: 93 BPM | WEIGHT: 195 LBS | DIASTOLIC BLOOD PRESSURE: 82 MMHG | SYSTOLIC BLOOD PRESSURE: 132 MMHG | HEIGHT: 62 IN | BODY MASS INDEX: 35.88 KG/M2 | TEMPERATURE: 97.8 F

## 2024-04-24 DIAGNOSIS — Z79.4 TYPE 2 DIABETES MELLITUS WITH DIABETIC POLYNEUROPATHY, WITH LONG-TERM CURRENT USE OF INSULIN (MULTI): Primary | ICD-10-CM

## 2024-04-24 DIAGNOSIS — J45.20 MILD INTERMITTENT ASTHMA WITHOUT COMPLICATION (HHS-HCC): ICD-10-CM

## 2024-04-24 DIAGNOSIS — D49.7 NEOPLASM OF MENINGES: ICD-10-CM

## 2024-04-24 DIAGNOSIS — I63.9 CEREBROVASCULAR ACCIDENT (CVA), UNSPECIFIED MECHANISM (MULTI): ICD-10-CM

## 2024-04-24 DIAGNOSIS — E66.9 OBESITY (BMI 30-39.9): ICD-10-CM

## 2024-04-24 DIAGNOSIS — E11.42 TYPE 2 DIABETES MELLITUS WITH DIABETIC POLYNEUROPATHY, WITH LONG-TERM CURRENT USE OF INSULIN (MULTI): Primary | ICD-10-CM

## 2024-04-24 DIAGNOSIS — D49.6 NEOPLASM OF BRAIN CAUSING MASS EFFECT ON ADJACENT STRUCTURES (MULTI): ICD-10-CM

## 2024-04-24 PROCEDURE — 1036F TOBACCO NON-USER: CPT | Performed by: FAMILY MEDICINE

## 2024-04-24 PROCEDURE — 99214 OFFICE O/P EST MOD 30 MIN: CPT | Performed by: FAMILY MEDICINE

## 2024-04-24 PROCEDURE — 3061F NEG MICROALBUMINURIA REV: CPT | Performed by: FAMILY MEDICINE

## 2024-04-24 PROCEDURE — 3075F SYST BP GE 130 - 139MM HG: CPT | Performed by: FAMILY MEDICINE

## 2024-04-24 PROCEDURE — 3079F DIAST BP 80-89 MM HG: CPT | Performed by: FAMILY MEDICINE

## 2024-04-24 ASSESSMENT — PATIENT HEALTH QUESTIONNAIRE - PHQ9
1. LITTLE INTEREST OR PLEASURE IN DOING THINGS: SEVERAL DAYS
5. POOR APPETITE OR OVEREATING: NOT AT ALL
9. THOUGHTS THAT YOU WOULD BE BETTER OFF DEAD, OR OF HURTING YOURSELF: NOT AT ALL
3. TROUBLE FALLING OR STAYING ASLEEP OR SLEEPING TOO MUCH: MORE THAN HALF THE DAYS
SUM OF ALL RESPONSES TO PHQ9 QUESTIONS 1 AND 2: 3
SUM OF ALL RESPONSES TO PHQ QUESTIONS 1-9: 10
7. TROUBLE CONCENTRATING ON THINGS, SUCH AS READING THE NEWSPAPER OR WATCHING TELEVISION: MORE THAN HALF THE DAYS
8. MOVING OR SPEAKING SO SLOWLY THAT OTHER PEOPLE COULD HAVE NOTICED. OR THE OPPOSITE, BEING SO FIGETY OR RESTLESS THAT YOU HAVE BEEN MOVING AROUND A LOT MORE THAN USUAL: NOT AT ALL
2. FEELING DOWN, DEPRESSED OR HOPELESS: MORE THAN HALF THE DAYS
10. IF YOU CHECKED OFF ANY PROBLEMS, HOW DIFFICULT HAVE THESE PROBLEMS MADE IT FOR YOU TO DO YOUR WORK, TAKE CARE OF THINGS AT HOME, OR GET ALONG WITH OTHER PEOPLE: SOMEWHAT DIFFICULT
6. FEELING BAD ABOUT YOURSELF - OR THAT YOU ARE A FAILURE OR HAVE LET YOURSELF OR YOUR FAMILY DOWN: NOT AT ALL
4. FEELING TIRED OR HAVING LITTLE ENERGY: NEARLY EVERY DAY

## 2024-04-24 ASSESSMENT — ENCOUNTER SYMPTOMS
OCCASIONAL FEELINGS OF UNSTEADINESS: 1
LOSS OF SENSATION IN FEET: 1

## 2024-04-24 NOTE — PROGRESS NOTES
Patient is here with some nausea dizziness, vomiting, dizziness unsteadiness and imbalance.  She gone through this whole thing where she started vomiting and has been in the hospital a couple times.  They thought potentially it was Mounjaro where she also had some MELANI and then she had not actually increase the Mounjaro at all.  In fact actually, she is now back on the Mounjaro but has never had the cessation of the nausea systoles she is now having increased problems with balance.  She just had an MRI was finished today.    REVIEW OF SYSTEMS: 12 systems negative except for those mentioned in the HPI.    PHYSICAL EXAMINATION:   Constitutional: The patient is alert, in no acute  distress.  Eyes: Extraocular movements are intact.   ENT: external ear canals patent  Neck: no  JVD.  Heart: no JVD  Lungs: Normal respiration without stridor or nasal flaring   Psychiatric: Good judgment and insight. Normal affect and mood.  Skin: no rashes or lesions    Assessment:  per EMR    Plan:  Going back through patient had acute kidney injury likely secondary to the nausea and vomiting dehydration.  The MRI today is discussed with the patient and with quite urgency as she now has moderate mass effect from the meningioma.  She needs to urgently see a neurosurgeon to talk about decompression especially now that she is having worsening symptoms and has been having worsening symptoms now subsequently for the last 3 months.  This is not secondary to the Mounjaro.  She was with us to go see Dr. Taveras her neurologist who ordered the MRI at [facility] but I now recommend the patient to see neurosurgery urgently and put a stat referral in.  I reviewed the other blood work including CBC CMP A1c lipid panel thyroid    This dictation was created using Dragon dictation and may contain errors  Answers submitted by the patient for this visit:  High Blood Pressure Questionnaire (Submitted on 4/18/2024)  Chief Complaint: Hypertension  Chronicity:  chronic  Onset: more than 1 year ago  Progression since onset: unchanged  Condition status: controlled  anxiety: No  blurred vision: No  chest pain: No  headaches: No  malaise/fatigue: Yes  neck pain: Yes  orthopnea: No  palpitations: No  peripheral edema: Yes  PND: No  shortness of breath: No  sweats: No  Agents associated with hypertension: no associated agents  CAD risks: diabetes mellitus, dyslipidemia, obesity, sedentary lifestyle  Compliance problems: no compliance problems

## 2024-04-24 NOTE — PROGRESS NOTES
1 month fuv     Answers submitted by the patient for this visit:  High Blood Pressure Questionnaire (Submitted on 4/18/2024)  Chief Complaint: Hypertension  Chronicity: chronic  Onset: more than 1 year ago  Progression since onset: unchanged  Condition status: controlled  anxiety: No  blurred vision: No  chest pain: No  headaches: No  malaise/fatigue: Yes  neck pain: Yes  orthopnea: No  palpitations: No  peripheral edema: Yes  PND: No  shortness of breath: No  sweats: No  Agents associated with hypertension: no associated agents  CAD risks: diabetes mellitus, dyslipidemia, obesity, sedentary lifestyle  Compliance problems: no compliance problems

## 2024-04-25 DIAGNOSIS — Z12.31 ENCOUNTER FOR SCREENING MAMMOGRAM FOR BREAST CANCER: ICD-10-CM

## 2024-04-30 ENCOUNTER — PATIENT OUTREACH (OUTPATIENT)
Dept: CARDIOLOGY | Facility: CLINIC | Age: 64
End: 2024-04-30
Payer: COMMERCIAL

## 2024-04-30 ENCOUNTER — TELEPHONE (OUTPATIENT)
Dept: PREADMISSION TESTING | Facility: HOSPITAL | Age: 64
End: 2024-04-30
Payer: COMMERCIAL

## 2024-04-30 ENCOUNTER — TELEPHONE (OUTPATIENT)
Dept: ORTHOPEDIC SURGERY | Facility: HOSPITAL | Age: 64
End: 2024-04-30

## 2024-04-30 ENCOUNTER — TELEPHONE (OUTPATIENT)
Dept: PRIMARY CARE | Facility: CLINIC | Age: 64
End: 2024-04-30
Payer: COMMERCIAL

## 2024-04-30 NOTE — TELEPHONE ENCOUNTER
Pt called and said she saw the neuro surgeon Dr Stewart and they said they cannot help her, she wants to know if you know any other doctors that can help

## 2024-04-30 NOTE — TELEPHONE ENCOUNTER
Jing called, she has recently been evaluated by neurosurgery for known brain tumor that she has had for several years but recent MRI scan shows it is now growing in size.  Discussion with neurosurgeon was to contact orthopedic surgeon Dr Vanessa since she is scheduled for RIGHT TSA on 5/14/2024 with general anesthesia.  Patient understands the surgery may be canceled but then wants to know how to manage shoulder pain.  She is looking for neurosurgeon to manage the brain tumor.  Please advise on TSA or other shoulder treatment options.

## 2024-05-01 NOTE — TELEPHONE ENCOUNTER
Last injection RIGHT Glenohumeral joint was 2/19/2024, last subacromial injection 1/4/2024.  Office notes do not report much relief.

## 2024-05-02 ENCOUNTER — APPOINTMENT (OUTPATIENT)
Dept: PREADMISSION TESTING | Facility: HOSPITAL | Age: 64
End: 2024-05-02
Payer: COMMERCIAL

## 2024-05-02 NOTE — TELEPHONE ENCOUNTER
Patient called to ask again what to do about surgery vs other treatments.    I called and left message that we need to cancel surgery, to call back regarding injections.

## 2024-05-08 ENCOUNTER — HOSPITAL ENCOUNTER (OUTPATIENT)
Dept: RADIOLOGY | Facility: CLINIC | Age: 64
Discharge: HOME | End: 2024-05-08
Payer: COMMERCIAL

## 2024-05-08 DIAGNOSIS — Z12.31 ENCOUNTER FOR SCREENING MAMMOGRAM FOR BREAST CANCER: ICD-10-CM

## 2024-05-13 ENCOUNTER — APPOINTMENT (OUTPATIENT)
Dept: RADIOLOGY | Facility: CLINIC | Age: 64
End: 2024-05-13
Payer: COMMERCIAL

## 2024-05-16 ENCOUNTER — HOSPITAL ENCOUNTER (OUTPATIENT)
Dept: RADIOLOGY | Facility: CLINIC | Age: 64
Discharge: HOME | End: 2024-05-16
Payer: COMMERCIAL

## 2024-05-16 ENCOUNTER — PREP FOR PROCEDURE (OUTPATIENT)
Dept: ORTHOPEDIC SURGERY | Facility: HOSPITAL | Age: 64
End: 2024-05-16
Payer: COMMERCIAL

## 2024-05-16 ENCOUNTER — HOSPITAL ENCOUNTER (OUTPATIENT)
Facility: HOSPITAL | Age: 64
Setting detail: OUTPATIENT SURGERY
End: 2024-05-16
Attending: ORTHOPAEDIC SURGERY | Admitting: ORTHOPAEDIC SURGERY
Payer: COMMERCIAL

## 2024-05-16 VITALS — WEIGHT: 195 LBS | HEIGHT: 62 IN | BODY MASS INDEX: 35.88 KG/M2

## 2024-05-16 DIAGNOSIS — M19.011 ARTHRITIS OF RIGHT GLENOHUMERAL JOINT: ICD-10-CM

## 2024-05-16 PROCEDURE — 77067 SCR MAMMO BI INCL CAD: CPT | Performed by: RADIOLOGY

## 2024-05-16 PROCEDURE — 77067 SCR MAMMO BI INCL CAD: CPT

## 2024-05-16 PROCEDURE — 77063 BREAST TOMOSYNTHESIS BI: CPT | Performed by: RADIOLOGY

## 2024-05-17 ENCOUNTER — HOSPITAL ENCOUNTER (OUTPATIENT)
Dept: RADIOLOGY | Facility: EXTERNAL LOCATION | Age: 64
Discharge: HOME | End: 2024-05-17
Payer: COMMERCIAL

## 2024-05-22 ENCOUNTER — APPOINTMENT (OUTPATIENT)
Dept: ORTHOPEDIC SURGERY | Facility: HOSPITAL | Age: 64
End: 2024-05-22
Payer: COMMERCIAL

## 2024-05-30 ENCOUNTER — PATIENT OUTREACH (OUTPATIENT)
Dept: CARE COORDINATION | Facility: CLINIC | Age: 64
End: 2024-05-30
Payer: COMMERCIAL

## 2024-06-07 PROBLEM — D32.0 INTRACRANIAL MENINGIOMA (MULTI): Status: ACTIVE | Noted: 2024-05-15

## 2024-06-07 PROBLEM — D32.9 MENINGIOMA (MULTI): Status: ACTIVE | Noted: 2024-04-08

## 2024-06-07 PROBLEM — J40 BRONCHITIS: Status: ACTIVE | Noted: 2024-06-07

## 2024-06-07 PROBLEM — D49.6 NEOPLASM OF BRAIN (MULTI): Status: ACTIVE | Noted: 2024-06-07

## 2024-06-07 PROBLEM — I63.81 THALAMIC STROKE (MULTI): Status: ACTIVE | Noted: 2024-05-06

## 2024-06-10 ENCOUNTER — OFFICE VISIT (OUTPATIENT)
Dept: PRIMARY CARE | Facility: CLINIC | Age: 64
End: 2024-06-10
Payer: COMMERCIAL

## 2024-06-10 VITALS
HEART RATE: 83 BPM | DIASTOLIC BLOOD PRESSURE: 80 MMHG | HEIGHT: 62 IN | SYSTOLIC BLOOD PRESSURE: 126 MMHG | BODY MASS INDEX: 35.7 KG/M2 | TEMPERATURE: 97.6 F | WEIGHT: 194 LBS

## 2024-06-10 DIAGNOSIS — I10 HTN (HYPERTENSION), BENIGN: Primary | ICD-10-CM

## 2024-06-10 DIAGNOSIS — E66.9 OBESITY (BMI 30-39.9): ICD-10-CM

## 2024-06-10 DIAGNOSIS — K21.9 GASTROESOPHAGEAL REFLUX DISEASE WITHOUT ESOPHAGITIS: ICD-10-CM

## 2024-06-10 DIAGNOSIS — J30.1 SEASONAL ALLERGIC RHINITIS DUE TO POLLEN: ICD-10-CM

## 2024-06-10 DIAGNOSIS — E11.42 TYPE 2 DIABETES MELLITUS WITH DIABETIC POLYNEUROPATHY, WITH LONG-TERM CURRENT USE OF INSULIN (MULTI): ICD-10-CM

## 2024-06-10 DIAGNOSIS — I63.81 THALAMIC STROKE (MULTI): ICD-10-CM

## 2024-06-10 DIAGNOSIS — J45.20 MILD INTERMITTENT ASTHMA WITHOUT COMPLICATION (HHS-HCC): ICD-10-CM

## 2024-06-10 DIAGNOSIS — E78.2 MIXED HYPERLIPIDEMIA: ICD-10-CM

## 2024-06-10 DIAGNOSIS — M13.0 POLYARTHROPATHY: ICD-10-CM

## 2024-06-10 DIAGNOSIS — Z79.4 TYPE 2 DIABETES MELLITUS WITH DIABETIC POLYNEUROPATHY, WITH LONG-TERM CURRENT USE OF INSULIN (MULTI): ICD-10-CM

## 2024-06-10 DIAGNOSIS — R11.0 NAUSEA: ICD-10-CM

## 2024-06-10 PROCEDURE — 90750 HZV VACC RECOMBINANT IM: CPT | Performed by: FAMILY MEDICINE

## 2024-06-10 PROCEDURE — 3074F SYST BP LT 130 MM HG: CPT | Performed by: FAMILY MEDICINE

## 2024-06-10 PROCEDURE — 90471 IMMUNIZATION ADMIN: CPT | Performed by: FAMILY MEDICINE

## 2024-06-10 PROCEDURE — 3079F DIAST BP 80-89 MM HG: CPT | Performed by: FAMILY MEDICINE

## 2024-06-10 PROCEDURE — 3061F NEG MICROALBUMINURIA REV: CPT | Performed by: FAMILY MEDICINE

## 2024-06-10 PROCEDURE — 99214 OFFICE O/P EST MOD 30 MIN: CPT | Performed by: FAMILY MEDICINE

## 2024-06-10 PROCEDURE — 1036F TOBACCO NON-USER: CPT | Performed by: FAMILY MEDICINE

## 2024-06-10 RX ORDER — RIMEGEPANT SULFATE 75 MG/75MG
1 TABLET, ORALLY DISINTEGRATING ORAL
COMMUNITY
Start: 2024-05-06 | End: 2025-05-06

## 2024-06-10 RX ORDER — SEMAGLUTIDE 1.34 MG/ML
INJECTION, SOLUTION SUBCUTANEOUS
COMMUNITY
Start: 2024-05-15

## 2024-06-10 RX ORDER — ONDANSETRON 4 MG/1
4 TABLET, ORALLY DISINTEGRATING ORAL EVERY 8 HOURS PRN
Qty: 30 TABLET | Refills: 2 | Status: SHIPPED | OUTPATIENT
Start: 2024-06-10

## 2024-06-10 ASSESSMENT — PATIENT HEALTH QUESTIONNAIRE - PHQ9
SUM OF ALL RESPONSES TO PHQ9 QUESTIONS 1 AND 2: 0
1. LITTLE INTEREST OR PLEASURE IN DOING THINGS: NOT AT ALL
2. FEELING DOWN, DEPRESSED OR HOPELESS: NOT AT ALL

## 2024-06-10 NOTE — PROGRESS NOTES
Patient is here 6-month follow-up of diabetes.  Diabetes a stable at 7.7 but they would like her underneath 7.  She was on Mounjaro and then went back to the Ozempic because of availability.  She does need a refill of Zofran she has nauseous days.  She has had that for a long period of time and does not necessarily seem to be related to the Ozempic more than likely secondary to the diabetes and more gastroparesis.  Blood pressure is stable without any chest pain or palpitations.    REVIEW OF SYSTEMS: 12 systems negative except for those mentioned in the HPI.    PHYSICAL EXAMINATION:   Constitutional: The patient is alert, in no acute  distress.  Eyes: Extraocular movements are intact.   ENT: external ear canals patent  Neck: no  JVD.  Heart: no JVD  Lungs: Normal respiration without stridor or nasal flaring   Psychiatric: Good judgment and insight. Normal affect and mood.  Skin: no rashes or lesions    Assessment:  per EMR    Plan:  OARRS reviewed appropriate.  Hypertension is well-controlled continue on current medications.  Patient still with some nausea and vomiting likely secondary to gastroparesis at this point will refill Zofran also discussed using good Rx if she needs to get 30 she usually gets 9 at the time.  She is also going to her oncologist today.  Did review her MRI from the meningioma.  She is going to talk about gamma knife procedure versus actual physical surgery which is good.  Also discussed vaccination patient will have second shingles shot today.  Was offered Prevnar 20 however we do not have it in the office and she will get this and updated COVID vaccination.  Will follow-up in 6 months.  Cholesterol and hypertriglyceridemia is also currently controlled and patient is on both injectable as well as also oral    This dictation was created using Dragon dictation and may contain errors

## 2024-06-13 DIAGNOSIS — J45.20 MILD INTERMITTENT ASTHMA WITHOUT COMPLICATION (HHS-HCC): Primary | ICD-10-CM

## 2024-06-13 RX ORDER — ALBUTEROL SULFATE 90 UG/1
2 AEROSOL, METERED RESPIRATORY (INHALATION) EVERY 4 HOURS PRN
Qty: 18 G | Refills: 0 | Status: SHIPPED | OUTPATIENT
Start: 2024-06-13

## 2024-06-14 RX ORDER — ALBUTEROL SULFATE 90 UG/1
AEROSOL, METERED RESPIRATORY (INHALATION)
Qty: 51 G | Refills: 3 | OUTPATIENT
Start: 2024-06-14

## 2024-06-26 ENCOUNTER — OFFICE VISIT (OUTPATIENT)
Dept: ORTHOPEDIC SURGERY | Facility: HOSPITAL | Age: 64
End: 2024-06-26
Payer: COMMERCIAL

## 2024-06-26 VITALS — HEIGHT: 62 IN | WEIGHT: 190 LBS | BODY MASS INDEX: 34.96 KG/M2

## 2024-06-26 DIAGNOSIS — M19.011 PRIMARY OSTEOARTHRITIS OF RIGHT SHOULDER: Primary | ICD-10-CM

## 2024-06-26 PROCEDURE — 20610 DRAIN/INJ JOINT/BURSA W/O US: CPT | Mod: RT | Performed by: PHYSICIAN ASSISTANT

## 2024-06-26 PROCEDURE — 3061F NEG MICROALBUMINURIA REV: CPT | Performed by: PHYSICIAN ASSISTANT

## 2024-06-26 PROCEDURE — 99212 OFFICE O/P EST SF 10 MIN: CPT | Performed by: PHYSICIAN ASSISTANT

## 2024-06-26 PROCEDURE — 2500000005 HC RX 250 GENERAL PHARMACY W/O HCPCS: Performed by: PHYSICIAN ASSISTANT

## 2024-06-26 PROCEDURE — 2500000004 HC RX 250 GENERAL PHARMACY W/ HCPCS (ALT 636 FOR OP/ED): Performed by: PHYSICIAN ASSISTANT

## 2024-06-26 PROCEDURE — 1036F TOBACCO NON-USER: CPT | Performed by: PHYSICIAN ASSISTANT

## 2024-06-26 RX ORDER — LIDOCAINE HYDROCHLORIDE 10 MG/ML
1 INJECTION INFILTRATION; PERINEURAL
Status: COMPLETED | OUTPATIENT
Start: 2024-06-26 | End: 2024-06-26

## 2024-06-26 RX ORDER — TOPIRAMATE 25 MG/1
25 TABLET ORAL 2 TIMES DAILY
COMMUNITY
Start: 2024-06-18 | End: 2024-07-18

## 2024-06-26 RX ORDER — TRIAMCINOLONE ACETONIDE 40 MG/ML
40 INJECTION, SUSPENSION INTRA-ARTICULAR; INTRAMUSCULAR
Status: COMPLETED | OUTPATIENT
Start: 2024-06-26 | End: 2024-06-26

## 2024-06-26 ASSESSMENT — PAIN SCALES - GENERAL: PAINLEVEL_OUTOF10: 8

## 2024-06-26 ASSESSMENT — PAIN - FUNCTIONAL ASSESSMENT: PAIN_FUNCTIONAL_ASSESSMENT: 0-10

## 2024-06-26 NOTE — PROGRESS NOTES
Patient returns to clinic today for her right shoulder.  Previously recently diagnosed by Dr. Vanessa with glenohumeral arthritis seen on MRI.  At she has not gotten much relief with previous steroid injections including subacromial as well as intra-articular.  She did have surgery scheduled for May however recently was diagnosed with a brain tumor and does have to go through radiation prior to any shoulder surgery.  She is requesting a repeat steroid injection to get her some relief prior to her newly scheduled surgery date in October.    Patient's self reported past medical history, medications, allergies, surgical history, family and social history as well as a 10 point review of systems has been documented in the new patient intake form and scanned into the patient's electronic medical record. Pertinent findings are documented in the HPI.    Physical Examination Findings:  Constitutional: Appears well-developed and well-nourished.  Head: Normocephalic and atraumatic.  Eyes: Pupils are equal and round.  Cardiovascular: Intact distal pulses.   Respiratory: Effort normal. No respiratory distress.  Neurologic: Alert and oriented to person, place, and time.  Skin: Skin is warm and dry.  Hematologic / Lymphatic: No lymphedema, lymphangitis.  Psychiatric: normal mood and affect. Behavior is normal.   Musculoskeletal: Painful arc range of motion.  Midrange range of motion.  No crepitus on exam.  Motor power is well-preserved in all directions.  Well-healed previous surgical scars.    Impression: Right shoulder glenohumeral arthritis    Plan: Repeat right shoulder intra-articular steroid injection was performed today and tolerated well.  It we will continue to monitor symptoms.  She is scheduled for surgery in mid October.  She will return to our office for any further issues.    Patient ID: Jing Jon is a 64 y.o. female.    L Inj/Asp: R glenohumeral on 6/26/2024 9:07 AM  Details: 22 G needle, posterior  approach  Medications: 40 mg triamcinolone acetonide 40 mg/mL; 1 mL lidocaine 10 mg/mL (1 %)  Procedure, treatment alternatives, risks and benefits explained, specific risks discussed. Immediately prior to procedure a time out was called to verify the correct patient, procedure, equipment, support staff and site/side marked as required.           Jaimie Santos PA-C  Department of Orthopaedic Surgery  [facility]    Dictation performed with the use of voice recognition software. Syntax and grammatical errors may exist.

## 2024-07-24 ENCOUNTER — APPOINTMENT (OUTPATIENT)
Dept: ORTHOPEDIC SURGERY | Facility: HOSPITAL | Age: 64
End: 2024-07-24
Payer: COMMERCIAL

## 2024-09-05 ENCOUNTER — OFFICE VISIT (OUTPATIENT)
Dept: PRIMARY CARE | Facility: CLINIC | Age: 64
End: 2024-09-05
Payer: COMMERCIAL

## 2024-09-05 VITALS
SYSTOLIC BLOOD PRESSURE: 83 MMHG | WEIGHT: 184 LBS | DIASTOLIC BLOOD PRESSURE: 55 MMHG | BODY MASS INDEX: 33.86 KG/M2 | HEART RATE: 90 BPM | HEIGHT: 62 IN

## 2024-09-05 DIAGNOSIS — R11.0 NAUSEA: ICD-10-CM

## 2024-09-05 DIAGNOSIS — R11.10 VOMITING, UNSPECIFIED VOMITING TYPE, UNSPECIFIED WHETHER NAUSEA PRESENT: Primary | ICD-10-CM

## 2024-09-05 DIAGNOSIS — E86.0 DEHYDRATION: ICD-10-CM

## 2024-09-05 PROCEDURE — 3008F BODY MASS INDEX DOCD: CPT | Performed by: FAMILY MEDICINE

## 2024-09-05 PROCEDURE — 3061F NEG MICROALBUMINURIA REV: CPT | Performed by: FAMILY MEDICINE

## 2024-09-05 PROCEDURE — 3074F SYST BP LT 130 MM HG: CPT | Performed by: FAMILY MEDICINE

## 2024-09-05 PROCEDURE — 3078F DIAST BP <80 MM HG: CPT | Performed by: FAMILY MEDICINE

## 2024-09-05 PROCEDURE — 1036F TOBACCO NON-USER: CPT | Performed by: FAMILY MEDICINE

## 2024-09-05 PROCEDURE — 99214 OFFICE O/P EST MOD 30 MIN: CPT | Performed by: FAMILY MEDICINE

## 2024-09-05 RX ORDER — INDOMETHACIN 25 MG/1
25 CAPSULE ORAL
COMMUNITY
Start: 2024-08-27 | End: 2024-09-10

## 2024-09-05 RX ORDER — ONDANSETRON 4 MG/1
4 TABLET, ORALLY DISINTEGRATING ORAL EVERY 8 HOURS PRN
Qty: 30 TABLET | Refills: 2 | Status: SHIPPED | OUTPATIENT
Start: 2024-09-05

## 2024-09-05 RX ORDER — DICYCLOMINE HYDROCHLORIDE 20 MG/1
20 TABLET ORAL 4 TIMES DAILY PRN
Qty: 30 TABLET | Refills: 0 | Status: SHIPPED | OUTPATIENT
Start: 2024-09-05 | End: 2024-11-04

## 2024-09-05 ASSESSMENT — PATIENT HEALTH QUESTIONNAIRE - PHQ9
2. FEELING DOWN, DEPRESSED OR HOPELESS: NOT AT ALL
SUM OF ALL RESPONSES TO PHQ9 QUESTIONS 1 AND 2: 0
1. LITTLE INTEREST OR PLEASURE IN DOING THINGS: NOT AT ALL

## 2024-09-05 ASSESSMENT — ENCOUNTER SYMPTOMS
ACTIVITY CHANGE: 0
HEADACHES: 0
NAUSEA: 1
SHORTNESS OF BREATH: 0
FATIGUE: 1
DIZZINESS: 0
VOMITING: 1
FEVER: 0

## 2024-09-05 NOTE — PROGRESS NOTES
"Subjective   Patient ID: Jing Jon is a 64 y.o. female who presents for Sick Visit (Patient has been vomiting for last 3 days. Cannot keep anything down.).    Vomiting   - reports she has been dealing with vomiting several times daily since Monday   - having mild fevers/chills, stomach pain and cramping   - did keep a meal down yesterday for the first time   - has been trying to keep water down but has been struggling   - having dizziness, weakness and fatigue which she contributes to the vomiting   - having stomach cramping that is very severe   - did go to a baseball game on Sunday, but did not eat anything else unusual   - no blood in the vomit, no blood in the stool          Review of Systems   Constitutional:  Positive for fatigue. Negative for activity change and fever.   Respiratory:  Negative for shortness of breath.    Cardiovascular:  Negative for chest pain.   Gastrointestinal:  Positive for nausea and vomiting.   Neurological:  Negative for dizziness and headaches.       Objective   BP 83/55   Pulse 90   Ht 1.575 m (5' 2\")   Wt 83.5 kg (184 lb)   BMI 33.65 kg/m²     Physical Exam  Constitutional:       Appearance: Normal appearance. She is normal weight.   Neurological:      General: No focal deficit present.      Mental Status: She is alert and oriented to person, place, and time.         Assessment/Plan   Problem List Items Addressed This Visit    None  Visit Diagnoses         Codes    Vomiting, unspecified vomiting type, unspecified whether nausea present    -  Primary R11.10    stable   - improving   - likely viral gastroenteritis   - trial anti-emetics   - check bmp if symptoms persist     Relevant Medications    dicyclomine (Bentyl) 20 mg tablet    Other Relevant Orders    Basic metabolic panel    Dehydration     E86.0    stable   - 2/2 vomiting   - encourage oral intake   - f/u if symptoms not improving     Relevant Medications    dicyclomine (Bentyl) 20 mg tablet    Other Relevant " Orders    Basic metabolic panel    Nausea     R11.0    stable   - zofran prn     Relevant Medications    ondansetron ODT (Zofran-ODT) 4 mg disintegrating tablet    dicyclomine (Bentyl) 20 mg tablet    Other Relevant Orders    Basic metabolic panel

## 2024-09-09 ENCOUNTER — APPOINTMENT (OUTPATIENT)
Dept: RHEUMATOLOGY | Facility: CLINIC | Age: 64
End: 2024-09-09
Payer: COMMERCIAL

## 2024-09-09 ENCOUNTER — PATIENT OUTREACH (OUTPATIENT)
Dept: PRIMARY CARE | Facility: CLINIC | Age: 64
End: 2024-09-09

## 2024-09-09 NOTE — PROGRESS NOTES
Discharge Facility:  [facility]   Discharge Diagnosis:  Hypovolemic Hypotension   Resolved Problems:  Metabolic acidosis due to diabetes mellitus (HCC)  Gastroenteritis     Admission Date:  9/6/24  Discharge Date:   9/8/24    PCP Appointment Date:  Visit Type: Primary Care Hospital Discharge          Date: 9/17/2024                  Dept: [facility]                  Provider: Gage Jean                  Time: 9:00 AM    Specialist Appointment Date: TBD  9/18/24 [facility]    Hospital Encounter and Summary Linked: Yes    See discharge assessment below for further details  Medications  Is the patient having any side effects they believe may be caused by any medication additions or changes?: No (9/9/2024  1:52 PM)  Does the patient have all medications ordered at discharge?: Not applicable (9/9/2024  1:52 PM)  Prescription Comments: No new prescriptions   Hold home BP medications 1-2 weeks, until  PCP appointment (9/9/2024  1:52 PM)  Is the patient taking all medications as directed (includes completed medication regime)?: Yes (9/9/2024  1:52 PM)  Medication Comments: Pt confirmed on call back voicemal that she was told to hold her BP meds until she sees PCP - Appt made for 9/17/24 with Dr Jean (9/9/2024  1:52 PM)    Appointments  Does the patient have a primary care provider?: Yes (9/9/2024  1:52 PM)  Care Management Interventions: Verified appointment date/time/provider (9/9/2024  1:52 PM)  Care Management Interventions: Educated on importance of keeping appointment (9/9/2024  1:52 PM)    Self Management  Has home health visited the patient within 72 hours of discharge?: Not applicable (9/9/2024  1:52 PM)  What Durable Medical Equipment (DME) was ordered?: n/a (9/9/2024  1:52 PM)    Patient Teaching  Does the patient have access to their discharge instructions?: Yes (9/9/2024  1:52 PM)    Wrap Up  Wrap Up Additional Comments: I left voicemail to introduce myself and the TCM program to Jing Jon. I  encouraged patient to keep follow up appt. I gave my contact information to return my call so we can go over discharge instructions and see if I can assist in anyway. Patient returned my call and stated that she is aware of holding BP meds until seeing PCP so she had called this morning to make sure she could get in with Dr Jean. (9/9/2024  1:52 PM)

## 2024-09-17 ENCOUNTER — APPOINTMENT (OUTPATIENT)
Dept: PRIMARY CARE | Facility: CLINIC | Age: 64
End: 2024-09-17
Payer: COMMERCIAL

## 2024-09-17 VITALS
HEIGHT: 62 IN | HEART RATE: 77 BPM | DIASTOLIC BLOOD PRESSURE: 88 MMHG | TEMPERATURE: 98 F | BODY MASS INDEX: 34.41 KG/M2 | WEIGHT: 187 LBS | SYSTOLIC BLOOD PRESSURE: 132 MMHG

## 2024-09-17 DIAGNOSIS — I63.81 THALAMIC STROKE (MULTI): ICD-10-CM

## 2024-09-17 DIAGNOSIS — Z79.4 TYPE 2 DIABETES MELLITUS WITH DIABETIC POLYNEUROPATHY, WITH LONG-TERM CURRENT USE OF INSULIN: ICD-10-CM

## 2024-09-17 DIAGNOSIS — E11.42 TYPE 2 DIABETES MELLITUS WITH DIABETIC POLYNEUROPATHY, WITH LONG-TERM CURRENT USE OF INSULIN: ICD-10-CM

## 2024-09-17 DIAGNOSIS — E11.65 UNCONTROLLED TYPE 2 DIABETES MELLITUS WITH HYPERGLYCEMIA: Primary | ICD-10-CM

## 2024-09-17 DIAGNOSIS — R10.11 RIGHT UPPER QUADRANT ABDOMINAL PAIN: ICD-10-CM

## 2024-09-17 DIAGNOSIS — I10 HTN (HYPERTENSION), BENIGN: ICD-10-CM

## 2024-09-17 DIAGNOSIS — E66.9 OBESITY (BMI 30-39.9): ICD-10-CM

## 2024-09-17 PROBLEM — G81.94 LEFT HEMIPARESIS (MULTI): Status: RESOLVED | Noted: 2023-03-20 | Resolved: 2024-09-17

## 2024-09-17 PROCEDURE — 1036F TOBACCO NON-USER: CPT | Performed by: FAMILY MEDICINE

## 2024-09-17 PROCEDURE — 99495 TRANSJ CARE MGMT MOD F2F 14D: CPT | Performed by: FAMILY MEDICINE

## 2024-09-17 PROCEDURE — 3008F BODY MASS INDEX DOCD: CPT | Performed by: FAMILY MEDICINE

## 2024-09-17 PROCEDURE — 3079F DIAST BP 80-89 MM HG: CPT | Performed by: FAMILY MEDICINE

## 2024-09-17 PROCEDURE — 3075F SYST BP GE 130 - 139MM HG: CPT | Performed by: FAMILY MEDICINE

## 2024-09-17 PROCEDURE — 3061F NEG MICROALBUMINURIA REV: CPT | Performed by: FAMILY MEDICINE

## 2024-09-17 RX ORDER — METOCLOPRAMIDE 10 MG/1
10 TABLET ORAL 4 TIMES DAILY PRN
Qty: 30 TABLET | Refills: 0 | Status: SHIPPED | OUTPATIENT
Start: 2024-09-17 | End: 2024-11-16

## 2024-09-17 NOTE — PROGRESS NOTES
"Patient: Jing Jon  : 1960  PCP: Gage Jean DO  MRN: [id number]  Program: Hyperlipidemia Core  Status: Enrolled  Effective Dates: 2020 - present  Responsible Staff: No information to display  Social Determinants to be Addressed: No information to display    Transitional Care Management  Status: Enrolled  Effective Dates: 2024 - present  Responsible Staff: Jayna Hansen LPN  Social Determinants to be Addressed: No information to display         Jing Jon is a 64 y.o. female presenting today for follow-up after being discharged from the hospital 8 days ago. The main problem requiring admission was hyperglycemia and vomiting. The discharge summary and/or Transitional Care Management documentation was reviewed. Medication reconciliation was performed as indicated via the \"[place]y as Reviewed\" timestamp.  Patient was having elevation of sugar with the use of her insulin still going up to nearly 400.  She is having nausea and vomiting being admitted she has had her endocrinologist tomorrow.    Jing Jon was contacted by Transitional Care Management services two days after her discharge. This encounter and supporting documentation was reviewed.    Review of Systems    /88   Pulse 77   Temp 36.7 °C (98 °F)   Ht 1.575 m (5' 2\")   Wt 84.8 kg (187 lb)   BMI 34.20 kg/m²     Physical Exam  REVIEW OF SYSTEMS: 12 systems negative except for those mentioned in the HPI.    PHYSICAL EXAMINATION:   Constitutional: The patient is alert, in no acute  distress.  Eyes: Extraocular movements are intact.   ENT: external ear canals patent  Neck: no  JVD.  Heart: no JVD  Lungs: Normal respiration without stridor or nasal flaring   Psychiatric: Good judgment and insight. Normal affect and mood.  Skin: no rashes or lesions  Abdomen: Slightly distended, soft, pain with quadrant      This dictation was created using Dragon dictation and may contain errors  The complexity of " medical decision making for this patient's transitional care is moderate.    Assessment/Plan   Problem List Items Addressed This Visit             ICD-10-CM    Type 2 diabetes mellitus with diabetic polyneuropathy, with long-term current use of insulin (Multi) E11.42, Z79.4    HTN (hypertension), benign I10    Obesity (BMI 30-39.9) E66.9    Uncontrolled type 2 diabetes mellitus with hyperglycemia (Multi) - Primary E11.65     I discussed with the patient I am still concerned about the gallbladder, would do a HIDA scan even though her ultrasound was negative still having pain also discussing it sounded like she was having issues with both roughage and also fried foods.  Also we will go ahead and put on Reglan which should help move things along with food as well as also with the diabetes.  She is follow-up with endocrinology tomorrow and needs to have better management of her insulin.  She may require some long-acting insulin when she does not look like she is actually on  Patient was identified as a fall risk. Risk prevention instructions provided.

## 2024-09-17 NOTE — PROGRESS NOTES
[facility]   [facility] Jemma d/charge 9/8   Still vomiting, can't keep anything down

## 2024-09-24 ENCOUNTER — PATIENT OUTREACH (OUTPATIENT)
Dept: CARDIOLOGY | Facility: CLINIC | Age: 64
End: 2024-09-24
Payer: COMMERCIAL

## 2024-09-24 NOTE — PROGRESS NOTES
Unable to reach patient for call back after recent hospitalization.   LVM with call back number for patient to call if needed to assist with any questions or concerns patient may have.

## 2024-10-01 ENCOUNTER — PREP FOR PROCEDURE (OUTPATIENT)
Dept: ORTHOPEDIC SURGERY | Facility: HOSPITAL | Age: 64
End: 2024-10-01
Payer: COMMERCIAL

## 2024-10-01 DIAGNOSIS — M19.011 ARTHRITIS OF RIGHT GLENOHUMERAL JOINT: ICD-10-CM

## 2024-10-02 ENCOUNTER — CLINICAL SUPPORT (OUTPATIENT)
Dept: PREADMISSION TESTING | Facility: HOSPITAL | Age: 64
End: 2024-10-02
Payer: COMMERCIAL

## 2024-10-02 DIAGNOSIS — M19.011 ARTHRITIS OF RIGHT GLENOHUMERAL JOINT: ICD-10-CM

## 2024-10-02 RX ORDER — SEMAGLUTIDE 0.68 MG/ML
INJECTION, SOLUTION SUBCUTANEOUS WEEKLY
COMMUNITY
Start: 2024-09-18

## 2024-10-02 RX ORDER — INDOMETHACIN 25 MG/1
25 CAPSULE ORAL
COMMUNITY

## 2024-10-03 ENCOUNTER — LAB (OUTPATIENT)
Dept: LAB | Facility: LAB | Age: 64
End: 2024-10-03
Payer: COMMERCIAL

## 2024-10-03 ENCOUNTER — APPOINTMENT (OUTPATIENT)
Dept: ORTHOPEDIC SURGERY | Facility: CLINIC | Age: 64
End: 2024-10-03
Payer: COMMERCIAL

## 2024-10-03 ENCOUNTER — DOCUMENTATION (OUTPATIENT)
Dept: PREADMISSION TESTING | Facility: HOSPITAL | Age: 64
End: 2024-10-03

## 2024-10-03 ENCOUNTER — HOSPITAL ENCOUNTER (OUTPATIENT)
Dept: RADIOLOGY | Facility: EXTERNAL LOCATION | Age: 64
Discharge: HOME | End: 2024-10-03

## 2024-10-03 ENCOUNTER — PRE-ADMISSION TESTING (OUTPATIENT)
Dept: PREADMISSION TESTING | Facility: HOSPITAL | Age: 64
End: 2024-10-03
Payer: COMMERCIAL

## 2024-10-03 VITALS
OXYGEN SATURATION: 98 % | BODY MASS INDEX: 33.92 KG/M2 | HEIGHT: 62 IN | WEIGHT: 184.3 LBS | RESPIRATION RATE: 16 BRPM | TEMPERATURE: 96.4 F | SYSTOLIC BLOOD PRESSURE: 168 MMHG | HEART RATE: 88 BPM | DIASTOLIC BLOOD PRESSURE: 86 MMHG

## 2024-10-03 DIAGNOSIS — Z79.4 TYPE 2 DIABETES MELLITUS WITH DIABETIC POLYNEUROPATHY, WITH LONG-TERM CURRENT USE OF INSULIN: ICD-10-CM

## 2024-10-03 DIAGNOSIS — R11.14 BILIOUS VOMITING WITH NAUSEA: Primary | ICD-10-CM

## 2024-10-03 DIAGNOSIS — R10.11 RIGHT UPPER QUADRANT ABDOMINAL PAIN: ICD-10-CM

## 2024-10-03 DIAGNOSIS — E11.42 TYPE 2 DIABETES MELLITUS WITH DIABETIC POLYNEUROPATHY, WITH LONG-TERM CURRENT USE OF INSULIN: Primary | ICD-10-CM

## 2024-10-03 DIAGNOSIS — R11.14 BILIOUS VOMITING WITH NAUSEA: ICD-10-CM

## 2024-10-03 DIAGNOSIS — Z01.818 PREPROCEDURAL EXAMINATION: ICD-10-CM

## 2024-10-03 DIAGNOSIS — E11.42 TYPE 2 DIABETES MELLITUS WITH DIABETIC POLYNEUROPATHY, WITH LONG-TERM CURRENT USE OF INSULIN: ICD-10-CM

## 2024-10-03 DIAGNOSIS — D50.9 IRON DEFICIENCY ANEMIA, UNSPECIFIED IRON DEFICIENCY ANEMIA TYPE: ICD-10-CM

## 2024-10-03 DIAGNOSIS — Z79.4 TYPE 2 DIABETES MELLITUS WITH DIABETIC POLYNEUROPATHY, WITH LONG-TERM CURRENT USE OF INSULIN: Primary | ICD-10-CM

## 2024-10-03 LAB
ALBUMIN SERPL BCP-MCNC: 4.5 G/DL (ref 3.4–5)
ALP SERPL-CCNC: 44 U/L (ref 33–136)
ALT SERPL W P-5'-P-CCNC: 25 U/L (ref 7–45)
ANION GAP SERPL CALC-SCNC: 15 MMOL/L (ref 10–20)
AST SERPL W P-5'-P-CCNC: 19 U/L (ref 9–39)
BASOPHILS # BLD AUTO: 0.05 X10*3/UL (ref 0–0.1)
BASOPHILS NFR BLD AUTO: 0.4 %
BILIRUB SERPL-MCNC: 0.4 MG/DL (ref 0–1.2)
BUN SERPL-MCNC: 17 MG/DL (ref 6–23)
CALCIUM SERPL-MCNC: 9.7 MG/DL (ref 8.6–10.3)
CHLORIDE SERPL-SCNC: 105 MMOL/L (ref 98–107)
CO2 SERPL-SCNC: 23 MMOL/L (ref 21–32)
CREAT SERPL-MCNC: 0.69 MG/DL (ref 0.5–1.05)
EGFRCR SERPLBLD CKD-EPI 2021: >90 ML/MIN/1.73M*2
EOSINOPHIL # BLD AUTO: 0.2 X10*3/UL (ref 0–0.7)
EOSINOPHIL NFR BLD AUTO: 1.5 %
ERYTHROCYTE [DISTWIDTH] IN BLOOD BY AUTOMATED COUNT: 13.1 % (ref 11.5–14.5)
GLUCOSE SERPL-MCNC: 155 MG/DL (ref 74–99)
HCT VFR BLD AUTO: 44.2 % (ref 36–46)
HGB BLD-MCNC: 14.7 G/DL (ref 12–16)
IMM GRANULOCYTES # BLD AUTO: 0.08 X10*3/UL (ref 0–0.7)
IMM GRANULOCYTES NFR BLD AUTO: 0.6 % (ref 0–0.9)
LYMPHOCYTES # BLD AUTO: 2.27 X10*3/UL (ref 1.2–4.8)
LYMPHOCYTES NFR BLD AUTO: 17.1 %
MCH RBC QN AUTO: 29.7 PG (ref 26–34)
MCHC RBC AUTO-ENTMCNC: 33.3 G/DL (ref 32–36)
MCV RBC AUTO: 89 FL (ref 80–100)
MONOCYTES # BLD AUTO: 0.68 X10*3/UL (ref 0.1–1)
MONOCYTES NFR BLD AUTO: 5.1 %
NEUTROPHILS # BLD AUTO: 10 X10*3/UL (ref 1.2–7.7)
NEUTROPHILS NFR BLD AUTO: 75.3 %
NRBC BLD-RTO: 0 /100 WBCS (ref 0–0)
PLATELET # BLD AUTO: 383 X10*3/UL (ref 150–450)
POTASSIUM SERPL-SCNC: 4 MMOL/L (ref 3.5–5.3)
PROT SERPL-MCNC: 6.8 G/DL (ref 6.4–8.2)
RBC # BLD AUTO: 4.95 X10*6/UL (ref 4–5.2)
SODIUM SERPL-SCNC: 139 MMOL/L (ref 136–145)
WBC # BLD AUTO: 13.3 X10*3/UL (ref 4.4–11.3)

## 2024-10-03 PROCEDURE — 85025 COMPLETE CBC W/AUTO DIFF WBC: CPT

## 2024-10-03 PROCEDURE — 87081 CULTURE SCREEN ONLY: CPT | Mod: AHULAB | Performed by: NURSE PRACTITIONER

## 2024-10-03 PROCEDURE — 36415 COLL VENOUS BLD VENIPUNCTURE: CPT

## 2024-10-03 PROCEDURE — 80053 COMPREHEN METABOLIC PANEL: CPT

## 2024-10-03 PROCEDURE — 99204 OFFICE O/P NEW MOD 45 MIN: CPT | Performed by: NURSE PRACTITIONER

## 2024-10-03 RX ORDER — CHLORHEXIDINE GLUCONATE ORAL RINSE 1.2 MG/ML
15 SOLUTION DENTAL DAILY
Qty: 30 ML | Refills: 0 | Status: SHIPPED | OUTPATIENT
Start: 2024-10-03 | End: 2024-10-03 | Stop reason: ENTERED-IN-ERROR

## 2024-10-03 RX ORDER — CHLORHEXIDINE GLUCONATE ORAL RINSE 1.2 MG/ML
15 SOLUTION DENTAL DAILY
Qty: 30 ML | Refills: 0 | Status: SHIPPED | OUTPATIENT
Start: 2024-10-03 | End: 2024-10-05

## 2024-10-03 ASSESSMENT — ENCOUNTER SYMPTOMS
NUMBNESS: 1
CARDIOVASCULAR NEGATIVE: 1
ARTHRALGIAS: 1
RHINORRHEA: 1
NECK STIFFNESS: 1
BRUISES/BLEEDS EASILY: 1
COUGH: 1
GASTROINTESTINAL NEGATIVE: 1

## 2024-10-03 NOTE — PREPROCEDURE INSTRUCTIONS
Medication List            Accurate as of October 3, 2024  9:25 AM. Always use your most recent med list.                acetaminophen 500 mg tablet  Commonly known as: Tylenol  Medication Adjustments for Surgery: Take/Use as prescribed     * albuterol 1.25 mg/3 mL nebulizer solution  Take 3 mL (1.25 mg) by nebulization every 4 hours if needed for shortness of breath or wheezing.  Medication Adjustments for Surgery: Take/Use as prescribed     * albuterol 90 mcg/actuation inhaler  Inhale 2 puffs every 4 hours if needed for shortness of breath or wheezing.  Medication Adjustments for Surgery: Take/Use as prescribed     apple cider vinegar 600 mg capsule  Additional Medication Adjustments for Surgery: Take last dose 7 days before surgery     baclofen 10 mg tablet  Commonly known as: Lioresal  Medication Adjustments for Surgery: Take/Use as prescribed     biotin 10 mg tablet  Additional Medication Adjustments for Surgery: Take last dose 7 days before surgery     chlorhexidine 0.12 % solution  Commonly known as: Peridex  Use 15 mL in the mouth or throat once daily for 2 days.  Medication Adjustments for Surgery: Take/Use as prescribed     clopidogrel 75 mg tablet  Commonly known as: Plavix  Additional Medication Adjustments for Surgery: Take last dose 7 days before surgery     cyanocobalamin 1,000 mcg tablet  Commonly known as: Vitamin B-12  Medication Adjustments for Surgery: Do Not take on the morning of surgery     Daily Multi-Vitamin tablet  Generic drug: multivitamin  Additional Medication Adjustments for Surgery: Take last dose 7 days before surgery     Dexcom G7 Sensor device  Generic drug: blood-glucose sensor  Medication Adjustments for Surgery: Take/Use as prescribed     dicyclomine 20 mg tablet  Commonly known as: Bentyl  Take 1 tablet (20 mg) by mouth 4 times a day as needed (abdominal pain or cramps).  Medication Adjustments for Surgery: Take/Use as prescribed     esomeprazole 40 mg DR capsule  Commonly  known as: NexIUM  Take 2 capsules (80 mg) by mouth once daily in the morning. Take before meals.  Medication Adjustments for Surgery: Take on the morning of surgery     fenofibrate 48 mg tablet  Commonly known as: Tricor  Medication Adjustments for Surgery: Do Not take on the morning of surgery     Horizant 600 mg tablet extended release ER tablet  Generic drug: gabapentin enacarbil  Medication Adjustments for Surgery: Take/Use as prescribed     HumuLIN R U-500 (Conc) Kwikpen 500 unit/mL (3 mL) CONCENTRATED injection  Generic drug: insulin regular  Medication Adjustments for Surgery: Do Not take on the morning of surgery     ibuprofen 200 mg tablet  Additional Medication Adjustments for Surgery: Take last dose 7 days before surgery     indomethacin 25 mg capsule  Commonly known as: Indocin  Additional Medication Adjustments for Surgery: Take last dose 7 days before surgery     metoclopramide 10 mg tablet  Commonly known as: Reglan  Take 1 tablet (10 mg) by mouth 4 times a day as needed (nausea).  Medication Adjustments for Surgery: Take/Use as prescribed     Nurtec ODT 75 mg tablet,disintegrating  Generic drug: rimegepant  Medication Adjustments for Surgery: Take/Use as prescribed     ondansetron ODT 4 mg disintegrating tablet  Commonly known as: Zofran-ODT  Take 1 tablet (4 mg) by mouth every 8 hours if needed for nausea or vomiting.  Medication Adjustments for Surgery: Take/Use as prescribed     Ozempic 0.25 mg or 0.5 mg (2 mg/3 mL) pen injector  Generic drug: semaglutide  Additional Medication Adjustments for Surgery: Take last dose 7 days before surgery     pregabalin 150 mg capsule  Commonly known as: Lyrica     Repatha SureClick 140 mg/mL injection  Generic drug: evolocumab  Medication Adjustments for Surgery: Take/Use as prescribed     topiramate 25 mg tablet  Commonly known as: Topamax  Medication Adjustments for Surgery: Take/Use as prescribed     Tribenzor 40-5-12.5 mg tablet  Generic drug:  olmesartan-amLODIPin-hcthiazid  Medication Adjustments for Surgery: Do Not take on the morning of surgery     turmeric root extract 500 mg capsule  Additional Medication Adjustments for Surgery: Take last dose 7 days before surgery           * This list has 2 medication(s) that are the same as other medications prescribed for you. Read the directions carefully, and ask your doctor or other care provider to review them with you.                  IF SURGERY IS POSTPONED RESUME PLAVIX AS USUAL        CONTACT SURGEON'S OFFICE IF YOU DEVELOP:  * Fever = 100.4 F   * New respiratory symptoms (e.g. cough, shortness of breath, respiratory distress, sore throat)  * Recent loss of taste or smell  *Flu like symptoms such as headache, fatigue or gastrointestinal symptoms  * You develop any open sores, shingles, burning or painful urination   AND/OR:  * You no longer wish to have the surgery.  * Any other personal circumstances change that may lead to the need to cancel or defer this surgery.  *You were admitted to any hospital within one week of your planned procedure.    SMOKING:  *Quitting smoking can make a huge difference to your health and recovery from surgery.    *If you need help with quitting, call [phone]-[phone]-NOW.    THE DAY BEFORE SURGERY:  *Do not eat any food after midnight the night before surgery.   You may have sips of clear liquid to take am medication until TWO hours before your instructed arrival time to the hospital  DIABETICS:  Please check fasting blood sugar  upon waking up.  If fasting sugar is <80 mg/dl, please drink 100ml/3oz of apple juice no later than 2 hours prior to surgery.      SURGICAL TIME  *You will be contacted between 2 p.m. and 6 p.m. the business day before your surgery with your arrival time.  *If you haven't received a call by 6pm, call [phone].  *Scheduled surgery times may change and you will be notified if this occurs-check your personal voicemail for any updates.    ON THE  MORNING OF SURGERY:  *Wear comfortable, loose fitting clothing.   *Do not use moisturizers, creams, lotions or perfume.  *All jewelry and valuables should be left at home.  *Prosthetic devices such as contact lenses, hearing aids, dentures, eyelash extensions, hairpins and body piercing must be removed before surgery.    BRING WITH YOU:  *Photo ID and insurance card  *Current list of medicines and allergies  *Pacemaker/Defibrillator/Heart stent cards  *CPAP machine and mask  *Slings/splints/crutches  *Copy of your complete Advanced Directive/DHPOA-if applicable  *Neurostimulator implant remote    PARKING AND ARRIVAL:  *Check in at the Main Entrance desk and let them know you are here for surgery.  *You will be directed to the 2nd floor surgical waiting area.    AFTER OUTPATIENT SURGERY:  *A responsible adult MUST accompany you at the time of discharge and stay with you for 24 hours after your surgery.  *You may NOT drive yourself home after surgery.  *You may use a taxi or ride sharing service ([employer], Uber) to return home ONLY if you are accompanied by a friend or family member.  *Instructions for resuming your medications will be provided by your surgeon.          .  Home Preoperative Antibacterial Shower     What is a home preoperative antibacterial shower?  This shower is a way of cleaning the skin with a germ killing soap before surgery.  The soap contains chlorhexidine, commonly known as CHG.  CHG is a soap for your skin with germ killing ability.  Let your doctor know if you are allergic to chlorhexidine.    Why do I need to take a preoperative antibacterial shower?  Skin is not sterile.  It is best to try to make your skin as free of germs as possible before surgery.  Proper cleansing with a germ killing soap before surgery can lower the number of germs on your skin.  This helps to reduce the risk of infection at the surgical site.  Following the instructions listed below will help you prepare your skin for  surgery.      How do I use the CHG skin cleanser?  Steps:  Begin using your CHG soap five days before your scheduled surgery on ________________________.    Days 1-4 Shower before bed:  Wash your face and genitals with your normal soap and rinse.    Wash and rinse your hair using the CHG soap. Rinse completely, do not condition your   Hair.          3.    Apply the CHG soap to a clean wet washcloth.  Turn the water off or move away                From the water spray to avoid premature rinsing of the CHG soap as you are applying.     4.   Lather your entire body from the neck down.  Do not use on your face or genitals.   Pay special attention to the area(s) where your incision(s) will be located unless they are on your face.  Avoid scrubbing your skin too hard.  The important point is to have the CHG soap sit on your skin for 3 minutes.    When the 3 minutes are up, turn on the water and rinse the CHG soap off your body completely.   Pat yourself dry with a clean, freshly-laundered towel.  Dress in clean, freshly laundered night clothes.    Be sure to sleep with clean, freshly laundered sheets.  Day 5:  Last shower is the morning before surgery: Follow above Instructions.    NOTE:    *Hair extensions should be removed    *Keep CHG soap out of eyes and ear canals   *DO NOT wash with regular soap on your body after you have used the CHG        soap solution  *DO NOT apply powders, lotions, or perfume.  *Deodorant may be used days 1-4, BUT NOT the day of surgery    Who should I contact if I have any questions regarding the use of CHG soap?  Call the [facility], Preadmission Testing at [phone] if you have any questions.              Patient Information: Pre-Operative Infection Prevention Measures     Why did I have my nose, under my arms and groin swabbed?  The purpose of the swab is to identify Staphylococcus aureus inside your nose or on your skin.  The swab was sent to the  laboratory for culture.  A positive swab/culture for Staphylococcus aureus is called colonization or carriage.      What is Staphylococcus aureus?  Staphylococcus aureus, also known as “staph”, is a germ found on the skin or in the nose of healthy people.  Sometimes Staphylococcus aureus can get into the body and cause an infection.  This can be minor (such as pimples, boils or other skin problems).  It might also be serious (such as blood infection, pneumonia or a surgical site infection).    What is Staphylococcus aureus colonization or carriage?  Colonization or carriage means that a person has the germ but is not sick from it.  These bacteria can be spread on the hands or when breathing or sneezing.    How is Staphylococcus aureus spread?  It is most often spread by close contact with a person or item that carries it.    What happens if my culture is positive for Staphylococcus aureus?  Your doctor/medical team will use this information to guide any antibiotic treatment which may be necessary.  Regardless of the culture results, we will clean the inside of your nose with a betadine swab just before you have your surgery.      Will I get an infection if I have Staphylococcus aureus in my nose or on my skin?  Anyone can get an infection with Staphylococcus aureus.  However, the best way to reduce your risk of infection is to follow the instructions provided to you for the use of your CHG soap and dental rinse.        Who should I contact if I have any questions?  Call the [facility], Preadmission Testing at [phone] if you have any questions.           Patient Information: Oral/Dental Rinse  **This is a prescription; pick it up at your preferred local pharmacy **  What is oral/dental rinse?   It is a mouthwash. It is a way of cleaning the mouth with a germ killing solution before your surgery.  The solution contains chlorhexidine, commonly known as CHG.   It is used inside the  mouth to kill a bacteria known as Staphylococcus aureus.  Let your doctor know if you are allergic to Chlorhexidine.    Why do I need to use CHG oral/dental rinse?  The CHG oral/dental rinse helps to kill a bacteria in your mouth known a Staphylococcus aureus.     This reduces the risk of infection at the surgical site.      Using your CHG oral/dental rinse  STEPS:  Use your CHG oral/dental rinse after you brush your teeth the night before (at bedtime) and the morning of your surgery.  Follow all directions on your prescription label.    Use the cap on the container to measure 15ml (fill cap to fill line)  Swish (gargle if you can) the mouthwash in your mouth for at least 30 seconds, (do not to swallow) spit out  After you use your CHG rinse, do not rinse your mouth with water, drink or eat.  Please refer to prescription label for the appropriate time to resume oral intake  Dental rinse comes in one size bottle: 473ml ~16oz.  You will have leftover    rinse, discard after this use.    What side effects might I have using the CHG oral/dental rinse?  CHG rinse will stick to plaque on the teeth.  Brush and floss just before use.  Teeth brushing will help avoid staining of plaque during use.    Who should I contact if I have questions about the CHG oral/dental rinse?  Please call [facility], Preadmission Testing at [phone] if you have any questions

## 2024-10-03 NOTE — CPM/PAT H&P
CPM/PAT Evaluation       Name: Jing Jon (Jing Jon)  /Age: 1960/64 y.o.     SURGEON :DR PARAS FAJARDO   Surgery, Date, and Length:  Right Shoulder Anatomic Total Arthroplasty; Biceps Tenodesis 10/8/24      HPI:  This a 64 y.o. fe-male who presents for presurgical evaluation for for above mentioned procedure   . After discussion of the risks and benefits with Dr. FAJARDO the patient elects to proceed with the planned procedure.       Past Medical History:   Diagnosis Date    Albuminuria     Allergic rhinitis     Anemia     Anticoagulated     Plavix    Anxiety     Arthritis of right glenohumeral joint     Plan: Right Shoulder Anatomic Total Arthroplasty; Biceps Tenodesis 10/8/24    Asthma     Balance problem     due to underlying peripheral neuropathy    Cardiology follow-up encounter 02/15/2024    Sienna Kamara MD    Cardiology follow-up encounter 02/15/2024    Sienna Kamara MD    CHF (congestive heart failure)     Coronary artery disease     Degenerative disc disease, lumbar     Depression     Disease of thyroid gland     DM (diabetes mellitus), type 2 (Multi)     24 A1C 7.3%    Fibromyalgia     GERD (gastroesophageal reflux disease)     Headache     Heart disease     Heart murmur     Hyperlipidemia     on repatha    Hypertension     Hypothyroidism     Iliotibial band syndrome, left leg     Lumbosacral spondylosis without myelopathy     s/p SPINAL C ORD STIMULATOR TRIAL 23    Meningioma (Multi)     repeat brain MRI showing increase in size of left frontal lobe meningioma since 2018. This was causing mass effect on left frontal horn however no edema noted. She is now undergoing gamma knife treatment    Migraines     treated w/ Indocin    Multiple thyroid nodules     MVP (mitral valve prolapse)     Myasthenia gravis     PT DENIES, NO ACHR ABX TEST, NO EMG , NO THYMUS SCAN    Myocardial infarction (Multi)     Neuropathy     Obesity, unspecified     Other spondylosis with  myelopathy, cervical region     Peripheral neuropathy     Preoperative clearance     Neurology Paz Min, NP LOV 10/1/24: “/110.  NP rechecked & BP not as elevated at 150/90 & 168/84. I cannot clear her w/o BP more stable around 130/80.  Pt reports home BP runs 140s. She was taken off BP med d/t hypotension/hypovolemia. I'm going to start low dose Norvasc. F/u w/ PCP for further treatment. She can be cleared as long as stable BP& hold plavix days prior to surgery.”    Primary osteoarthritis of right shoulder     Radiculopathy, lumbar region     Restless legs syndrome     Spinal stenosis, lumbar region without neurogenic claudication     Statin intolerance     Status post gamma knife treatment     Status post insertion of spinal cord stimulator 2023    Stroke (Multi) 2017    old lacunar thalamic infarcts-  2/6/2017(given tpa), 4/2017, 5/2017 still has left sided weakness    TIA (transient ischemic attack) 03/2014    Trochanteric bursitis, left hip     Unilateral primary osteoarthritis, left hip     Urinary incontinence     Urinary tract infection     Venous stasis ulcer with edema of lower leg     Vitamin D deficiency     Walker as ambulation aid     ambulates with wheeled walker       Past Surgical History:   Procedure Laterality Date    APPENDECTOMY      BACK SURGERY      BICEPS TENDON REPAIR Right 2011    CARDIAC CATHETERIZATION  2006    CARDIAC CATHETERIZATION  04/04/2016    CERVICAL FUSION  12/19/2014    COLONOSCOPY  2016    ENDOMETRIAL BIOPSY      Endometrial Biopsy and Cervical Dilation    NASAL SEPTUM SURGERY      OTHER SURGICAL HISTORY      radiation TX stereotactic radiosurgery for Meningioma    OVARIAN CYST SURGERY      POLYPECTOMY  08/21/2019    HYSTEROSCOPY SURGICAL W/SAMPLING BIOPSY OF ENDOMETRIUM &/OR POLYPECTOMY W/ OR W/O D&C    ROTATOR CUFF REPAIR Right 11/12/2009    SALPINGECTOMY  05/11/2010    SINGLE PORT LAPAROSCOPY WITH OOPHORECTOMY AND SALPINGECTOMY    SEPTOPLASTY  02/18/2011     SPINAL CORD STIMULATOR IMPLANT  2023    SCS TRIAL    SPINAL CORD STIMULATOR IMPLANT  2023    PERMANENT SPINAL CORD STIMULATOR INSERTION    SPINE SURGERY      TOTAL ABDOMINAL HYSTERECTOMY W/ BILATERAL SALPINGOOPHORECTOMY  2010    UPPER GASTROINTESTINAL ENDOSCOPY  2016    WISDOM TOOTH EXTRACTION       Anesthesia History    Difficult iv stick       Pt denies any past history of anesthetic complications such as PONV, awareness, prolonged sedation, dental damage, aspiration, cardiac arrest, difficult intubation, difficult I.V. access or unexpected hospital admissions.  NO malignant hyperthermia and or pseudo cholinesterase deficiency.    The patient is not  a Taoist and will accept blood and blood products if medically indicated.   No history of blood transfusions .Type and screen  sent.     Social History  Social History     Substance and Sexual Activity   Drug Use Never      Social History     Substance and Sexual Activity   Alcohol Use Yes    Alcohol/week: 1.0 standard drink of alcohol    Types: 1 Standard drinks or equivalent per week    Comment: social drinker      Social History     Tobacco Use   Smoking Status Former    Current packs/day: 0.00    Average packs/day: 0.5 packs/day for 17.0 years (8.5 ttl pk-yrs)    Types: Cigarettes    Start date:     Quit date:     Years since quittin.7   Smokeless Tobacco Never          Family History   Problem Relation Name Age of Onset    Diabetes Mother Nirmala Stone     Hypertension Mother Nirmala Stone     Alcohol abuse Father Bandar Stone     Arthritis Father Bandar Stone     Diabetes Father Bandar Stone     Hypertension Father Bandar Stone     Asthma Sister Ashley DeClemente     Cancer Sister Ashley DeClemente     Diabetes Sister Ashley DeClemente     Cancer Sister Afia Merrill English     Ovarian cancer Sister Afia Merrill English     Cancer Brother Bandar Chase     Colon cancer Brother Bandar Chase     Hypertension Brother Bandar  Chase     Diabetes type II Brother Bandar Stone     Alcohol abuse Son Dipak Jon     Asthma Son Dipak Jon     Learning disabilities Son Dipak Jon     Asthma Son Ton Jon Jr.     Learning disabilities Son Ton Jon Jr.     Heart disease Maternal Grandfather Imtiaz Jewell        Allergies   Allergen Reactions    Demerol [Meperidine] Shortness of breath and Nausea/vomiting    Farxiga [Dapagliflozin] Other     Palpitations, yeast infection    Morphine Hives, Nausea And Vomiting, GI intolerance, GI Upset and Nausea/vomiting     Tolerated Vicodin    Propoxyphene Hives    Statins-Hmg-Coa Reductase Inhibitors Hives and Drowsiness     Reactions to atorvastatin, rosuvastatin, simvastatin, lovastatin    Catapres [Clonidine Hcl] Unknown    Clonidine Unknown    Lotensin [Benazepril] Unknown    Canagliflozin Other and Rash     Rash, yeast infection    Egg GI Upset     GI upset and positive allergy test. Patient does not have allergy to things with eggs in it, only eggs by themselves.    Mounjaro [Tirzepatide] Nausea/vomiting    Zetia [Ezetimibe] GI Upset       Prior to Admission medications    Medication Sig Start Date End Date Taking? Authorizing Provider   acetaminophen (Tylenol) 500 mg tablet Take 1-2 tablets (500-1,000 mg) by mouth every 8 hours if needed for mild pain (1 - 3) or moderate pain (4 - 6).    Historical Provider, MD   albuterol 1.25 mg/3 mL nebulizer solution Take 3 mL (1.25 mg) by nebulization every 4 hours if needed for shortness of breath or wheezing. 4/8/24   Mallorie Ellison MD   albuterol 90 mcg/actuation inhaler Inhale 2 puffs every 4 hours if needed for shortness of breath or wheezing. 6/13/24   Gage Jean,    apple cider vinegar 600 mg capsule Take 1,200 mg by mouth once daily at bedtime.    Historical Provider, MD   baclofen (Lioresal) 10 mg tablet Take 1 tablet (10 mg) by mouth 3 times a day as needed for muscle spasms. 7/27/23   Historical Provider, MD    biotin 10 mg tablet Take 1 tablet (10 mg) by mouth once daily at bedtime.    Historical Provider, MD   clopidogrel (Plavix) 75 mg tablet Take 1 tablet (75 mg) by mouth once daily at bedtime. Last dose 9/30/24 7/17/23   Historical Provider, MD   cyanocobalamin (Vitamin B-12) 1,000 mcg tablet Take 1 tablet (1,000 mcg) by mouth once daily at bedtime.    Historical Provider, MD   Dexcom G7 Sensor device  3/28/24   Historical Provider, MD   dicyclomine (Bentyl) 20 mg tablet Take 1 tablet (20 mg) by mouth 4 times a day as needed (abdominal pain or cramps). 9/5/24 11/4/24  Gregory Holt MD   esomeprazole (NexIUM) 40 mg DR capsule Take 2 capsules (80 mg) by mouth once daily in the morning. Take before meals.  Patient taking differently: Take 2 capsules (80 mg) by mouth once daily as needed (for indigestion/heartburn). 11/1/23   Claus Galicia MD   fenofibrate (Tricor) 48 mg tablet Take 1 tablet (48 mg) by mouth once daily at bedtime. 6/21/23   Historical Provider, MD   Horizant 600 mg tablet extended release ER tablet Take 1 tablet (600 mg) by mouth once daily at bedtime. 3/18/24   Historical Provider, MD   HumuLIN R U-500, Conc, Kwikpen 500 unit/mL (3 mL) CONCENTRATED injection Inject under the skin 3 times a day. 65 units with first meal and 40 units with second meal 2/26/21   Historical Provider, MD   ibuprofen 200 mg tablet Take 1-2 tablets (200-400 mg) by mouth every 6 hours if needed for mild pain (1 - 3) or moderate pain (4 - 6).    Historical Provider, MD   indomethacin (Indocin) 25 mg capsule Take 1 capsule (25 mg) by mouth 2 times daily (morning and late afternoon).    Historical Provider, MD   metoclopramide (Reglan) 10 mg tablet Take 1 tablet (10 mg) by mouth 4 times a day as needed (nausea). 9/17/24 11/16/24  Gage Jean,    multivitamin (Daily Multi-Vitamin) tablet Take 1 tablet by mouth once daily at bedtime.    Historical Provider, MD Clarketec ODT 75 mg tablet,disintegrating Take 1 tablet (75 mg) by  mouth once daily as needed. 5/6/24 5/6/25  Historical Provider, MD   olmesartan-amLODIPin-hcthiazid (Tribenzor) 40-5-12.5 mg tablet Take 1 tablet by mouth once daily at bedtime.    Historical Provider, MD   ondansetron ODT (Zofran-ODT) 4 mg disintegrating tablet Take 1 tablet (4 mg) by mouth every 8 hours if needed for nausea or vomiting. 9/5/24   Gregory Holt MD   Ozempic 0.25 mg or 0.5 mg (2 mg/3 mL) pen injector 1 (one) time per week. Sundays- advised to stop 1 week prior to surgery 9/18/24   Historical Provider, MD   pregabalin (Lyrica) 150 mg capsule Take 1 capsule (150 mg) by mouth 3 times a day. 1/30/19   Historical Provider, MD   Repathjerry SureClick 140 mg/mL injection Inject 140 mg subcutaneously every 2 weeks. 8/5/23   Historical Provider, MD   topiramate (Topamax) 25 mg tablet Take 1 tablet (25 mg) by mouth twice a day. 6/18/24 7/18/24  Historical Provider, MD   turmeric root extract 500 mg capsule Take 500 mg by mouth once daily at bedtime.    Historical Provider, MD   Ozempic 1 mg/dose (4 mg/3 mL) pen injector Inject 1 mg under the skin 1 (one) time per week. Sundays 5/15/24 10/2/24  Historical Provider, MD CRUZ ROS:   Constitutional:   Neuro/Psych:    numbness  Eyes:   Ears:   Nose:    nasal discharge  Mouth:   neg    Throat:   neg    Neck:    Cervical fusion    neck stiffness  Cardio:   neg    Respiratory:    cough  Endocrine:   GI:   neg    :    Urgency   Musculoskeletal:    arthralgias (generalized)  Hematologic:    bruises/bleeds easily  Skin:  neg        Physical Exam  Constitutional:       Appearance: She is obese.   HENT:      Head: Normocephalic.      Mouth/Throat:      Mouth: Mucous membranes are moist.   Eyes:      Extraocular Movements: Extraocular movements intact.      Pupils: Pupils are equal, round, and reactive to light.   Neck:      Comments: Cervical fusion   Cardiovascular:      Rate and Rhythm: Normal rate and regular rhythm.      Pulses: Normal pulses.      Heart sounds:  "Murmur (unable to appreciate) heard.   Pulmonary:      Effort: Pulmonary effort is normal.      Breath sounds: Normal breath sounds.   Musculoskeletal:         General: Tenderness (RT SHOULDER) present.      Cervical back: Normal range of motion.   Skin:     General: Skin is warm.   Neurological:      Mental Status: She is alert and oriented to person, place, and time.   Psychiatric:         Behavior: Behavior normal.          PAT AIRWAY:   Airway:     Mallampati::  III  normal        /86   Pulse 88   Temp 35.8 °C (96.4 °F)   Resp 16   Ht 1.575 m (5' 2\")   Wt 83.6 kg (184 lb 4.9 oz)   SpO2 98%   BMI 33.71 kg/m²     EKG IN EPIC     Lab Results   Component Value Date    WBC 13.3 (H) 10/03/2024    HGB 14.7 10/03/2024    HCT 44.2 10/03/2024    MCV 89 10/03/2024     10/03/2024     Results from last 7 days   Lab Units 10/03/24  1027   SODIUM mmol/L 139   POTASSIUM mmol/L 4.0   CHLORIDE mmol/L 105   CO2 mmol/L 23   BUN mg/dL 17   CREATININE mg/dL 0.69   CALCIUM mg/dL 9.7   PROTEIN TOTAL g/dL 6.8   BILIRUBIN TOTAL mg/dL 0.4   ALK PHOS U/L 44   ALT U/L 25   AST U/L 19   GLUCOSE mg/dL 155*     Lab Results   Component Value Date    HGBA1C 7.3 (H) 09/07/2024           ASSESSMENT/PLAN    Patient is a 64 year-old  scheduled for Right Shoulder Anatomic Total Arthroplasty; Biceps Tenodesis  with Dr. FAJARDO   on  10/8/24 .  CARDIOVASCULAR:  RCRI score / Risk: The patients score is 3 based on history . Per ACC/AHA guidelines this places her  at  15% risk for MACE undergoing a intermediate  risk procedure . The patient has the following risk factors:  CAD, IDDM, CVA Functional Capacity: The patients exercise tolerance is  4  METS. This is based on the patient's abililty to ascend a flight of stairs  and walk 2 block w/o chest pain or other anginal equivalents.  . Patient denies  active cardiac symptoms or anginal equivalents .    UNCONTROLLED HYPERTENSION :  Pt was seen in neurology office on 10/1/24. Her BP " was 218/110. Pt with past history CVA patient taken off her original antihypertensive due to hypotension 2/2 hypovolemia on hospital admission . In neurology visit Provider states patient is not optimized for surgery . A new prescription for amlodipine stated. Pt has not started this medication .. Provider GIN Dennis states BP must be near normal at 130/80. Dr Vanessa notified .    PULMONARY:  The patient has the following factors that place them at increased risk of perioperative pulmonary complications;age greater than 65/BMI greater than 27/asthma/gerd/decreased r functional status/greater than 2.5 hour procedure.  Postoperatively the patient would benefit from early pulmonary toilet/incentive spirometry q 1-2 hours while awake/pulse oximetry/cautious use of respiratory depressant medications such as opioids/elevate the HOB/oral hygiene.    HX CVA /ANTIPLATELET THERAPY:  Patient was instructed to stop PLAVIX , 7 days before surgery .  Resume anticoagulation therapy as soon as appropriate.    ANEMIA :  The patient has a history of anemia.  The patient’s anemia appears stable when compared to prior labs.  Type and screen NOT  obtained. Pt refuses blood transfusions . Blood product refusal paperwork given to patient .    IDDM:  The patient has 15 year history diabetes.Currently the patient manages their diabetes with oral agents/insulin, her  recent A1C was 7.3 % on 9/7/24.  The morning of surgery, the patient  was told to hold regular insulin and hold oral antihyperglycemic medications.        DVT:  CAPRINI SCORE=10  The patient has the following factors that increase HER  Risk for thrombus formation ; Virchow's triad , , age>60, bmi>30, hx cva, Surgical procedure >2 hrs  procedure .    Recommendations: DVT prophylaxis  per  protocol . SCD's, CORETTA's, and early ambulation are recommended. Heparin or LMWH is recommended for the very high risk .      Risk assessment complete.  Patient is scheduled for   intermediate  surgical risk procedure.  Patient is considered an increased risk to proceed with the planned procedure.      Preoperative medication instructions were provided and reviewed with the patient.  Any additional testing or evaluation was explained to the patient.  Nothing by mouth instructions were discussed and patient's questions were answered prior to conclusion to this encounter.  Patient verbalized understanding of preoperative instructions given in preadmission testing; discharge instructions available in EMR.

## 2024-10-03 NOTE — CPM/PAT NURSE NOTE
CPM/PAT Nurse Note      Name: Jing Jon (Jing Jon)  /Age: 1960/64 y.o.       Past Medical History:   Diagnosis Date    Albuminuria     Allergic rhinitis     Anemia     Anticoagulated     Plavix    Anxiety     Arthritis of right glenohumeral joint     Plan: Right Shoulder Anatomic Total Arthroplasty; Biceps Tenodesis 10/8/24    Asthma     Balance problem     due to underlying peripheral neuropathy    Cardiology follow-up encounter 02/15/2024    Sienna Kamara MD    Cardiology follow-up encounter 02/15/2024    Sienna Kamara MD    CHF (congestive heart failure)     Coronary artery disease     Degenerative disc disease, lumbar     Depression     Disease of thyroid gland     DM (diabetes mellitus), type 2 (Multi)     24 A1C 7.3%    Fibromyalgia     GERD (gastroesophageal reflux disease)     Headache     Heart disease     Heart murmur     Hyperlipidemia     on repatha    Hypertension     Hypothyroidism     Iliotibial band syndrome, left leg     Lumbosacral spondylosis without myelopathy     s/p SPINAL C ORD STIMULATOR TRIAL 23    Meningioma (Multi)     repeat brain MRI showing increase in size of left frontal lobe meningioma since 2018. This was causing mass effect on left frontal horn however no edema noted. She is now undergoing gamma knife treatment    Migraines     treated w/ Indocin    Multiple thyroid nodules     MVP (mitral valve prolapse)     Myasthenia gravis     Myocardial infarction (Multi)     Neuropathy     Obesity, unspecified     Other spondylosis with myelopathy, cervical region     Peripheral neuropathy     Preoperative clearance     Neurology Paz Min NP LOV 10/1/24: “/110.  NP rechecked & BP not as elevated at 150/90 & 168/84. I cannot clear her w/o BP more stable around 130/80.  Pt reports home BP runs 140s. She was taken off BP med d/t hypotension/hypovolemia. I'm going to start low dose Norvasc. F/u w/ PCP for further treatment. She can be  cleared as long as stable BP& hold plavix days prior to surgery.”    Primary osteoarthritis of right shoulder     Radiculopathy, lumbar region     Restless legs syndrome     Spinal stenosis, lumbar region without neurogenic claudication     Statin intolerance     Status post gamma knife treatment     Status post insertion of spinal cord stimulator 2023    Stroke (Multi) 2017    old lacunar thalamic infarcts-  2/6/2017(given tpa), 4/2017, 5/2017 still has left sided weakness    TIA (transient ischemic attack) 03/2014    Trochanteric bursitis, left hip     Unilateral primary osteoarthritis, left hip     Urinary incontinence     Urinary tract infection     Venous stasis ulcer with edema of lower leg     Vitamin D deficiency     Walker as ambulation aid     ambulates with wheeled walker       Past Surgical History:   Procedure Laterality Date    APPENDECTOMY      BACK SURGERY      BICEPS TENDON REPAIR Right 2011    CARDIAC CATHETERIZATION  2006    CARDIAC CATHETERIZATION  04/04/2016    CERVICAL FUSION  12/19/2014    COLONOSCOPY  2016    ENDOMETRIAL BIOPSY      Endometrial Biopsy and Cervical Dilation    NASAL SEPTUM SURGERY      OTHER SURGICAL HISTORY      radiation TX stereotactic radiosurgery for Meningioma    OVARIAN CYST SURGERY      POLYPECTOMY  08/21/2019    HYSTEROSCOPY SURGICAL W/SAMPLING BIOPSY OF ENDOMETRIUM &/OR POLYPECTOMY W/ OR W/O D&C    ROTATOR CUFF REPAIR Right 11/12/2009    SALPINGECTOMY  05/11/2010    SINGLE PORT LAPAROSCOPY WITH OOPHORECTOMY AND SALPINGECTOMY    SEPTOPLASTY  02/18/2011    SPINAL CORD STIMULATOR IMPLANT  02/14/2023    SCS TRIAL    SPINAL CORD STIMULATOR IMPLANT  04/11/2023    PERMANENT SPINAL CORD STIMULATOR INSERTION    SPINE SURGERY      TOTAL ABDOMINAL HYSTERECTOMY W/ BILATERAL SALPINGOOPHORECTOMY  05/2010    UPPER GASTROINTESTINAL ENDOSCOPY  08/09/2016    WISDOM TOOTH EXTRACTION         Patient  reports that she is not currently sexually active and has had partner(s) who are male.  She reports using the following method of birth control/protection: Male Sterilization.    Family History   Problem Relation Name Age of Onset    Diabetes Mother Nirmala Stone     Hypertension Mother Nirmala Stone     Alcohol abuse Father Bandar Stone     Arthritis Father Bandar Stone     Diabetes Father Bandar Stone     Hypertension Father Bandar Stone     Asthma Sister Ashley DeClemente     Cancer Sister Ashley DeClemente     Diabetes Sister Ashley DeClemente     Cancer Sister Afia Joseph     Ovarian cancer Sister Afia Merrill English     Cancer Brother Bandar Stone     Colon cancer Brother Bandar Stone     Hypertension Brother Bandar Stone     Diabetes type II Brother Bandar Stone     Alcohol abuse Ean Jon     Asthma Son Dipak Jon     Learning disabilities Son Dipak Jon     Asthma Son Ton Jon Jr.     Learning disabilities Son Ton Jon Jr.     Heart disease Maternal Grandfather Imtiaz Woodsoncele        Allergies   Allergen Reactions    Demerol [Meperidine] Shortness of breath and Nausea/vomiting    Farxiga [Dapagliflozin] Other     Palpitations, yeast infection    Morphine Hives, Nausea And Vomiting, GI intolerance, GI Upset and Nausea/vomiting     Tolerated Vicodin    Propoxyphene Hives    Statins-Hmg-Coa Reductase Inhibitors Hives and Drowsiness     Reactions to atorvastatin, rosuvastatin, simvastatin, lovastatin    Catapres [Clonidine Hcl] Unknown    Clonidine Unknown    Lotensin [Benazepril] Unknown    Canagliflozin Other and Rash     Rash, yeast infection    Egg GI Upset     GI upset and positive allergy test. Patient does not have allergy to things with eggs in it, only eggs by themselves.    Mounjaro [Tirzepatide] Nausea/vomiting    Zetia [Ezetimibe] GI Upset       Prior to Admission medications    Medication Sig Start Date End Date Taking? Authorizing Provider   acetaminophen (Tylenol) 500 mg tablet Take 1-2 tablets (500-1,000 mg) by mouth every 8 hours if needed  for mild pain (1 - 3) or moderate pain (4 - 6).    Historical Provider, MD   albuterol 1.25 mg/3 mL nebulizer solution Take 3 mL (1.25 mg) by nebulization every 4 hours if needed for shortness of breath or wheezing.  Patient not taking: Reported on 10/3/2024 4/8/24   Mallorie Ellison MD   albuterol 90 mcg/actuation inhaler Inhale 2 puffs every 4 hours if needed for shortness of breath or wheezing.  Patient not taking: Reported on 10/3/2024 6/13/24   Gage Jean,    apple cider vinegar 600 mg capsule Take 1,200 mg by mouth once daily at bedtime.    Historical Provider, MD   baclofen (Lioresal) 10 mg tablet Take 1 tablet (10 mg) by mouth 3 times a day as needed for muscle spasms. 7/27/23   Historical Provider, MD   biotin 10 mg tablet Take 1 tablet (10 mg) by mouth once daily at bedtime.    Historical Provider, MD   chlorhexidine (Peridex) 0.12 % solution Use 15 mL in the mouth or throat once daily for 2 days. 10/3/24 10/5/24  Jewels Nunez, APRN-CNP   clopidogrel (Plavix) 75 mg tablet Take 1 tablet (75 mg) by mouth once daily at bedtime. Last dose 9/30/24 7/17/23   Historical Provider, MD   cyanocobalamin (Vitamin B-12) 1,000 mcg tablet Take 1 tablet (1,000 mcg) by mouth once daily at bedtime.    Historical Provider, MD   Dexcom G7 Sensor device  3/28/24   Historical Provider, MD   dicyclomine (Bentyl) 20 mg tablet Take 1 tablet (20 mg) by mouth 4 times a day as needed (abdominal pain or cramps).  Patient not taking: Reported on 10/3/2024 9/5/24 11/4/24  Gregory Holt MD   esomeprazole (NexIUM) 40 mg DR capsule Take 2 capsules (80 mg) by mouth once daily in the morning. Take before meals.  Patient not taking: Reported on 10/3/2024 11/1/23   Claus Galicia MD   fenofibrate (Tricor) 48 mg tablet Take 1 tablet (48 mg) by mouth once daily at bedtime. 6/21/23   Historical Provider, MD   Horizant 600 mg tablet extended release ER tablet Take 1 tablet (600 mg) by mouth once daily at bedtime. 3/18/24   Historical  Provider, MD   HumuLIN R U-500, Conc, Kwikpen 500 unit/mL (3 mL) CONCENTRATED injection Inject under the skin 3 times a day. 65 units with first meal and 40 units with second meal 2/26/21   Historical Provider, MD   ibuprofen 200 mg tablet Take 1-2 tablets (200-400 mg) by mouth every 6 hours if needed for mild pain (1 - 3) or moderate pain (4 - 6).    Historical Provider, MD   indomethacin (Indocin) 25 mg capsule Take 1 capsule (25 mg) by mouth 2 times daily (morning and late afternoon).    Historical Provider, MD   metoclopramide (Reglan) 10 mg tablet Take 1 tablet (10 mg) by mouth 4 times a day as needed (nausea). 9/17/24 11/16/24  Gage Jean,    multivitamin (Daily Multi-Vitamin) tablet Take 1 tablet by mouth once daily at bedtime.    Historical Provider, MD   Nurtec ODT 75 mg tablet,disintegrating Take 1 tablet (75 mg) by mouth once daily as needed. 5/6/24 5/6/25  Historical Provider, MD   olmesartan-amLODIPin-hcthiazid (Tribenzor) 40-5-12.5 mg tablet Take 1 tablet by mouth once daily at bedtime.    Historical Provider, MD   ondansetron ODT (Zofran-ODT) 4 mg disintegrating tablet Take 1 tablet (4 mg) by mouth every 8 hours if needed for nausea or vomiting. 9/5/24   Gregory Holt MD   Ozempic 0.25 mg or 0.5 mg (2 mg/3 mL) pen injector 1 (one) time per week. Sundays- advised to stop 1 week prior to surgery 9/18/24   Historical Provider, MD   pregabalin (Lyrica) 150 mg capsule Take 1 capsule (150 mg) by mouth 3 times a day. 1/30/19   Historical Provider, MD   Repatha SureClick 140 mg/mL injection Inject 140 mg subcutaneously every 2 weeks. 8/5/23   Historical Provider, MD   topiramate (Topamax) 25 mg tablet Take 1 tablet (25 mg) by mouth twice a day. 6/18/24 10/3/24  Historical Provider, MD   turmeric root extract 500 mg capsule Take 500 mg by mouth once daily at bedtime.    Historical Provider, MD   Ozempic 1 mg/dose (4 mg/3 mL) pen injector Inject 1 mg under the skin 1 (one) time per week. Sundays 5/15/24  10/2/24  Historical Provider, MD ANTHONY GUTIERREZ Risk Score    No data to display       Caprini DVT Assessment      Flowsheet Row ED to Hosp-Admission (Discharged) from 3/16/2024 in [address] with Nia Mckenna MD and Zeke Rosen DO   DVT Score 6 filed at 03/16/2024 1523   BMI 31-40 (Obesity) filed at 03/16/2024 1523   RETIRED: History Acute myocardial infarction filed at 03/16/2024 1523   RETIRED: Age 60-75 years filed at 03/16/2024 1523          Modified Frailty Index    No data to display       CHADS2 Stroke Risk  Current as of 2 hours ago        N/A 3 to 100%: High Risk   2 to < 3%: Medium Risk   0 to < 2%: Low Risk     Last Change: N/A          This score determines the patient's risk of having a stroke if the patient has atrial fibrillation.        This score is not applicable to this patient. Components are not calculated.          Revised Cardiac Risk Index    No data to display       Apfel Simplified Score    No data to display       Risk Analysis Index Results This Encounter    No data found in the last 10 encounters.         Nurse Plan of Action:     After Visit Summary (AVS) reviewed and patient verbalized good understanding of medications and NPO instructions.  Pre-op infection prevention measures:  CHG showers and mouthwash reviewed, understanding voiced.  CHG soap given and patient verbalized need to pick CHG mouthwash at their preferred local pharmacy.

## 2024-10-04 ENCOUNTER — TELEPHONE (OUTPATIENT)
Dept: PREADMISSION TESTING | Facility: HOSPITAL | Age: 64
End: 2024-10-04
Payer: COMMERCIAL

## 2024-10-05 LAB — STAPHYLOCOCCUS SPEC CULT: NORMAL

## 2024-10-08 ENCOUNTER — OFFICE VISIT (OUTPATIENT)
Dept: PRIMARY CARE | Facility: CLINIC | Age: 64
End: 2024-10-08
Payer: COMMERCIAL

## 2024-10-08 VITALS
HEART RATE: 76 BPM | WEIGHT: 185 LBS | HEIGHT: 62 IN | BODY MASS INDEX: 34.04 KG/M2 | SYSTOLIC BLOOD PRESSURE: 148 MMHG | DIASTOLIC BLOOD PRESSURE: 80 MMHG

## 2024-10-08 DIAGNOSIS — I10 PRIMARY HYPERTENSION: Primary | ICD-10-CM

## 2024-10-08 PROCEDURE — 1036F TOBACCO NON-USER: CPT | Performed by: FAMILY MEDICINE

## 2024-10-08 PROCEDURE — 99213 OFFICE O/P EST LOW 20 MIN: CPT | Performed by: FAMILY MEDICINE

## 2024-10-08 PROCEDURE — 3061F NEG MICROALBUMINURIA REV: CPT | Performed by: FAMILY MEDICINE

## 2024-10-08 PROCEDURE — 3077F SYST BP >= 140 MM HG: CPT | Performed by: FAMILY MEDICINE

## 2024-10-08 PROCEDURE — 4010F ACE/ARB THERAPY RXD/TAKEN: CPT | Performed by: FAMILY MEDICINE

## 2024-10-08 PROCEDURE — 3008F BODY MASS INDEX DOCD: CPT | Performed by: FAMILY MEDICINE

## 2024-10-08 PROCEDURE — 3079F DIAST BP 80-89 MM HG: CPT | Performed by: FAMILY MEDICINE

## 2024-10-08 RX ORDER — LOSARTAN POTASSIUM 50 MG/1
50 TABLET ORAL DAILY
Qty: 90 TABLET | Refills: 1 | Status: SHIPPED | OUTPATIENT
Start: 2024-10-08 | End: 2025-04-06

## 2024-10-08 RX ORDER — LOSARTAN POTASSIUM 50 MG/1
50 TABLET ORAL DAILY
Qty: 30 TABLET | Refills: 0 | Status: SHIPPED | OUTPATIENT
Start: 2024-10-08 | End: 2025-04-06

## 2024-10-08 RX ORDER — AMLODIPINE BESYLATE 10 MG/1
10 TABLET ORAL DAILY
COMMUNITY

## 2024-10-08 RX ORDER — AMLODIPINE BESYLATE 5 MG/1
1 TABLET ORAL
COMMUNITY
Start: 2024-10-01 | End: 2024-10-08 | Stop reason: WASHOUT

## 2024-10-08 RX ORDER — LOSARTAN POTASSIUM 50 MG/1
50 TABLET ORAL DAILY
Qty: 30 TABLET | Refills: 11 | Status: SHIPPED | OUTPATIENT
Start: 2024-10-08 | End: 2024-10-08

## 2024-10-08 ASSESSMENT — ENCOUNTER SYMPTOMS
ACTIVITY CHANGE: 0
SHORTNESS OF BREATH: 0
FATIGUE: 0
HEADACHES: 0
FEVER: 0
DIZZINESS: 0

## 2024-10-08 NOTE — PROGRESS NOTES
"Subjective   Patient ID: Jing Jon is a 64 y.o. female who presents for Consult (Has high bp 212/110 at neurologist office a week ago).    Elevated blood pressure   - reports she was doing a pre-operative evaluation, and had an elevated blood pressure up to the 210/100 range   - historically at home she checks it and has been getting 145/85   - taking medication daily but current regimen is just amlodipine 10mg daily   - no headaches, no chest pain, no shortness of breath   - no issues with kidney function   - patient has previously had strokes and heart attacks   - no headaches, no chest pain          Review of Systems   Constitutional:  Negative for activity change, fatigue and fever.   Respiratory:  Negative for shortness of breath.    Cardiovascular:  Negative for chest pain.   Neurological:  Negative for dizziness and headaches.       Objective   /80   Pulse 76   Ht 1.575 m (5' 2\")   Wt 83.9 kg (185 lb)   BMI 33.84 kg/m²     Physical Exam  Constitutional:       Appearance: She is obese.   Cardiovascular:      Rate and Rhythm: Normal rate and regular rhythm.   Pulmonary:      Effort: Pulmonary effort is normal.      Breath sounds: Normal breath sounds.   Neurological:      Mental Status: She is alert.   Psychiatric:         Mood and Affect: Mood normal.         Behavior: Behavior normal.         Assessment/Plan   Problem List Items Addressed This Visit             ICD-10-CM    Primary hypertension - Primary I10     Stable  - add back losartan to help manage blood pressure   - readings 140's/80's today in office   - discussed healthy lifestyle changes          Relevant Medications    losartan (Cozaar) 50 mg tablet    losartan (Cozaar) 50 mg tablet          "

## 2024-10-08 NOTE — ASSESSMENT & PLAN NOTE
Stable  - add back losartan to help manage blood pressure   - readings 140's/80's today in office   - discussed healthy lifestyle changes    Dr. Sergio Sarkar

## 2024-10-15 ASSESSMENT — ENCOUNTER SYMPTOMS
SWEATS: 0
HYPERTENSION: 1
HEADACHES: 1
BLURRED VISION: 1
ORTHOPNEA: 0
SHORTNESS OF BREATH: 0
PND: 0
NECK PAIN: 1
PALPITATIONS: 0

## 2024-10-16 ENCOUNTER — APPOINTMENT (OUTPATIENT)
Dept: ORTHOPEDIC SURGERY | Facility: HOSPITAL | Age: 64
End: 2024-10-16
Payer: COMMERCIAL

## 2024-10-17 ENCOUNTER — PATIENT OUTREACH (OUTPATIENT)
Dept: PRIMARY CARE | Facility: CLINIC | Age: 64
End: 2024-10-17
Payer: COMMERCIAL

## 2024-10-22 ENCOUNTER — APPOINTMENT (OUTPATIENT)
Dept: PRIMARY CARE | Facility: CLINIC | Age: 64
End: 2024-10-22
Payer: COMMERCIAL

## 2024-10-22 VITALS
BODY MASS INDEX: 34.23 KG/M2 | TEMPERATURE: 98 F | HEART RATE: 83 BPM | DIASTOLIC BLOOD PRESSURE: 70 MMHG | HEIGHT: 62 IN | WEIGHT: 186 LBS | SYSTOLIC BLOOD PRESSURE: 134 MMHG

## 2024-10-22 DIAGNOSIS — E66.9 OBESITY (BMI 30-39.9): ICD-10-CM

## 2024-10-22 DIAGNOSIS — J01.10 ACUTE NON-RECURRENT FRONTAL SINUSITIS: ICD-10-CM

## 2024-10-22 DIAGNOSIS — E11.65 UNCONTROLLED TYPE 2 DIABETES MELLITUS WITH HYPERGLYCEMIA: Primary | ICD-10-CM

## 2024-10-22 DIAGNOSIS — I10 PRIMARY HYPERTENSION: ICD-10-CM

## 2024-10-22 DIAGNOSIS — I63.81 THALAMIC STROKE (MULTI): ICD-10-CM

## 2024-10-22 PROCEDURE — 1036F TOBACCO NON-USER: CPT | Performed by: FAMILY MEDICINE

## 2024-10-22 PROCEDURE — 3008F BODY MASS INDEX DOCD: CPT | Performed by: FAMILY MEDICINE

## 2024-10-22 PROCEDURE — 3075F SYST BP GE 130 - 139MM HG: CPT | Performed by: FAMILY MEDICINE

## 2024-10-22 PROCEDURE — 99214 OFFICE O/P EST MOD 30 MIN: CPT | Performed by: FAMILY MEDICINE

## 2024-10-22 PROCEDURE — 4010F ACE/ARB THERAPY RXD/TAKEN: CPT | Performed by: FAMILY MEDICINE

## 2024-10-22 PROCEDURE — 3078F DIAST BP <80 MM HG: CPT | Performed by: FAMILY MEDICINE

## 2024-10-22 PROCEDURE — 3061F NEG MICROALBUMINURIA REV: CPT | Performed by: FAMILY MEDICINE

## 2024-10-22 RX ORDER — AMOXICILLIN AND CLAVULANATE POTASSIUM 875; 125 MG/1; MG/1
875 TABLET, FILM COATED ORAL 2 TIMES DAILY
Qty: 20 TABLET | Refills: 0 | Status: SHIPPED | OUTPATIENT
Start: 2024-10-22 | End: 2024-11-01

## 2024-10-22 RX ORDER — LOSARTAN POTASSIUM 50 MG/1
50 TABLET ORAL DAILY
Qty: 90 TABLET | Refills: 1 | Status: SHIPPED | OUTPATIENT
Start: 2024-10-22 | End: 2025-04-20

## 2024-10-22 RX ORDER — MONTELUKAST SODIUM 10 MG/1
10 TABLET ORAL NIGHTLY
Qty: 90 TABLET | Refills: 1 | Status: SHIPPED | OUTPATIENT
Start: 2024-10-22 | End: 2025-04-20

## 2024-10-22 ASSESSMENT — PATIENT HEALTH QUESTIONNAIRE - PHQ9
2. FEELING DOWN, DEPRESSED OR HOPELESS: NOT AT ALL
1. LITTLE INTEREST OR PLEASURE IN DOING THINGS: NOT AT ALL
SUM OF ALL RESPONSES TO PHQ9 QUESTIONS 1 AND 2: 0

## 2024-10-22 NOTE — PROGRESS NOTES
Patient was identified as a fall risk. Risk prevention instructions provided.  Patient is here follow-up of blood pressure as the patient was denied surgery because her blood pressure was little bit high but they had taken her off of her blood pressure medication.  Is now back to being stable.  She is also had some sinus pressure and congestion more so on the right side which is common for her.    REVIEW OF SYSTEMS: 12 systems negative except for those mentioned in the HPI.    PHYSICAL EXAMINATION:   Constitutional: The patient is alert, in no acute  distress.  Eyes: Extraocular movements are intact. Pupils are equal and reactive to light  ENT: Patient with congestion more so on the right than the left.  Neck: Supple without lymphadenopathy or JVD.  Heart: Regular rate and rhythm without murmur, click, gallop, or rub.  Lungs: Clear to auscultation bilaterally. No rales, rhonchi, or  wheezing.  Psychiatric: Good judgment and insight. Normal affect and mood.  Lymphatic: as noted above.  Skin: no rashes or lesions    Assessment:  per EMR    Plan:  Patient's blood pressure is optimized and excellent medications refilled.  Diabetes is stable A1c of 7.3.  With symptoms of congestion now the sinus for 7 days will put on Augmentin preventatively for sinus infection.  Also put on Singulair to help with overall congestion.    This dictation was created using Dragon dictation and may contain errors  Answers submitted by the patient for this visit:  High Blood Pressure Questionnaire (Submitted on 10/15/2024)  Chief Complaint: Hypertension  Chronicity: chronic  Onset: more than 1 year ago  Progression since onset: waxing and waning  Condition status: uncontrolled  anxiety: Yes  blurred vision: Yes  chest pain: No  headaches: Yes  malaise/fatigue: Yes  neck pain: Yes  orthopnea: No  palpitations: No  peripheral edema: Yes  PND: No  shortness of breath: No  sweats: No  CAD risks: diabetes mellitus, dyslipidemia, family history,  obesity, post-menopausal state, sedentary lifestyle, stress  Compliance problems: exercise

## 2024-10-22 NOTE — PROGRESS NOTES
1 month fuv   BP check       Answers submitted by the patient for this visit:  High Blood Pressure Questionnaire (Submitted on 10/15/2024)  Chief Complaint: Hypertension  Chronicity: chronic  Onset: more than 1 year ago  Progression since onset: waxing and waning  Condition status: uncontrolled  anxiety: Yes  blurred vision: Yes  chest pain: No  headaches: Yes  malaise/fatigue: Yes  neck pain: Yes  orthopnea: No  palpitations: No  peripheral edema: Yes  PND: No  shortness of breath: No  sweats: No  CAD risks: diabetes mellitus, dyslipidemia, family history, obesity, post-menopausal state, sedentary lifestyle, stress  Compliance problems: exercise

## 2024-10-24 ENCOUNTER — PATIENT MESSAGE (OUTPATIENT)
Dept: ORTHOPEDIC SURGERY | Facility: HOSPITAL | Age: 64
End: 2024-10-24
Payer: COMMERCIAL

## 2024-11-25 ENCOUNTER — PATIENT OUTREACH (OUTPATIENT)
Dept: PRIMARY CARE | Facility: CLINIC | Age: 64
End: 2024-11-25
Payer: COMMERCIAL

## 2024-11-25 NOTE — PROGRESS NOTES
Discharge Facility:  [facility] transferred to [facility]  Discharge Diagnosis:  radiation necrosis   Brain mass --left frontal parafalcine meningioma   Admission Date:  11/19/24  Discharge Date:   11/22/24    PCP Appointment Date:  TBD-I am unable to make an appt due to no openings . Message sent to office staff requesting assistance. As well as encourged patient to call today to schedule.     Specialist Appointment Date:   12/9/24 10AM   Casey Sanderson MD  Neurology    1/7/2025 10:00 AM   Paz Min NP  Neurology  Hospital Encounter and Summary Linked: Yes    Two attempts were made to reach patient within two business days after discharge. I left voicemail to introduce myself and the TCM program to Jing Jon. I encouraged patient to contact office or myself for follow up appt. I gave my contact information to return my call so we can go over discharge instructions and see if I can assist in anyway.

## 2024-12-09 ENCOUNTER — PATIENT OUTREACH (OUTPATIENT)
Dept: PRIMARY CARE | Facility: CLINIC | Age: 64
End: 2024-12-09
Payer: COMMERCIAL

## 2024-12-09 NOTE — PROGRESS NOTES
Discharge Facility:  [facility] transferred to [facility]  Discharge Diagnosis:  radiation necrosis     Admission Date:  12/6/24  Discharge Date:   12/8/24-Home with Home Health     PCP Appointment Date:  12/19/24- Dr Jean   Specialist Appointment Date:   1/7/24 neurology   Hospital Encounter and Summary Linked: Yes    Home Health Care   Agency [facility] (AKA [facility])   Phone# [phone]   Start of Care 11/25/24     Two attempts were made to reach patient within two business days after discharge. I left voicemail to introduce myself and the TCM program to Jing Jon. I encouraged patient to contact office or myself for follow up appt. I gave my contact information to return my call so we can go over discharge instructions and see if I can assist in anyway.

## 2024-12-10 ENCOUNTER — APPOINTMENT (OUTPATIENT)
Dept: PRIMARY CARE | Facility: CLINIC | Age: 64
End: 2024-12-10
Payer: COMMERCIAL

## 2024-12-12 DIAGNOSIS — J45.20 MILD INTERMITTENT ASTHMA WITHOUT COMPLICATION (HHS-HCC): ICD-10-CM

## 2024-12-13 DIAGNOSIS — J45.20 MILD INTERMITTENT ASTHMA WITHOUT COMPLICATION (HHS-HCC): ICD-10-CM

## 2024-12-13 DIAGNOSIS — J45.20 MILD INTERMITTENT ASTHMA, UNSPECIFIED WHETHER COMPLICATED (HHS-HCC): ICD-10-CM

## 2024-12-13 RX ORDER — PREGABALIN 150 MG/1
CAPSULE ORAL
Refills: 0 | OUTPATIENT
Start: 2024-12-13

## 2024-12-13 RX ORDER — ALBUTEROL SULFATE 90 UG/1
INHALANT RESPIRATORY (INHALATION)
Refills: 0 | OUTPATIENT
Start: 2024-12-13

## 2024-12-13 RX ORDER — ALBUTEROL SULFATE 90 UG/1
2 INHALANT RESPIRATORY (INHALATION) EVERY 4 HOURS PRN
Qty: 54 G | Refills: 1 | Status: SHIPPED | OUTPATIENT
Start: 2024-12-13 | End: 2024-12-19 | Stop reason: SDUPTHER

## 2024-12-13 RX ORDER — ALBUTEROL SULFATE 1.25 MG/3ML
1.25 SOLUTION RESPIRATORY (INHALATION) EVERY 4 HOURS PRN
Qty: 3240 ML | Refills: 0 | Status: SHIPPED | OUTPATIENT
Start: 2024-12-13 | End: 2025-06-11

## 2024-12-19 ENCOUNTER — APPOINTMENT (OUTPATIENT)
Dept: PRIMARY CARE | Facility: CLINIC | Age: 64
End: 2024-12-19
Payer: COMMERCIAL

## 2024-12-19 VITALS
WEIGHT: 182 LBS | HEART RATE: 107 BPM | SYSTOLIC BLOOD PRESSURE: 134 MMHG | BODY MASS INDEX: 33.49 KG/M2 | DIASTOLIC BLOOD PRESSURE: 80 MMHG | TEMPERATURE: 98 F | HEIGHT: 62 IN

## 2024-12-19 DIAGNOSIS — G89.4 CHRONIC PAIN SYNDROME: ICD-10-CM

## 2024-12-19 DIAGNOSIS — Z00.00 WELLNESS EXAMINATION: ICD-10-CM

## 2024-12-19 DIAGNOSIS — Z79.4 TYPE 2 DIABETES MELLITUS WITH DIABETIC POLYNEUROPATHY, WITH LONG-TERM CURRENT USE OF INSULIN: Primary | ICD-10-CM
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
## 2024-12-19 DIAGNOSIS — J45.20 MILD INTERMITTENT ASTHMA WITHOUT COMPLICATION (HHS-HCC): ICD-10-CM

## 2024-12-19 DIAGNOSIS — E11.42 TYPE 2 DIABETES MELLITUS WITH DIABETIC POLYNEUROPATHY, WITH LONG-TERM CURRENT USE OF INSULIN: Primary | ICD-10-CM

## 2024-12-19 DIAGNOSIS — D32.9 MENINGIOMA (MULTI): ICD-10-CM

## 2024-12-19 DIAGNOSIS — E88.810 METABOLIC SYNDROME: ICD-10-CM

## 2024-12-19 DIAGNOSIS — I63.9 CEREBROVASCULAR ACCIDENT (CVA), UNSPECIFIED MECHANISM (MULTI): ICD-10-CM

## 2024-12-19 PROCEDURE — 3008F BODY MASS INDEX DOCD: CPT | Performed by: FAMILY MEDICINE

## 2024-12-19 PROCEDURE — 3075F SYST BP GE 130 - 139MM HG: CPT | Performed by: FAMILY MEDICINE

## 2024-12-19 PROCEDURE — 3079F DIAST BP 80-89 MM HG: CPT | Performed by: FAMILY MEDICINE

## 2024-12-19 PROCEDURE — 3061F NEG MICROALBUMINURIA REV: CPT | Performed by: FAMILY MEDICINE

## 2024-12-19 PROCEDURE — 99495 TRANSJ CARE MGMT MOD F2F 14D: CPT | Performed by: FAMILY MEDICINE

## 2024-12-19 PROCEDURE — 1036F TOBACCO NON-USER: CPT | Performed by: FAMILY MEDICINE

## 2024-12-19 PROCEDURE — 4010F ACE/ARB THERAPY RXD/TAKEN: CPT | Performed by: FAMILY MEDICINE

## 2024-12-19 RX ORDER — ALBUTEROL SULFATE 90 UG/1
2 INHALANT RESPIRATORY (INHALATION) EVERY 4 HOURS PRN
Qty: 54 G | Refills: 1 | Status: SHIPPED | OUTPATIENT
Start: 2024-12-19

## 2024-12-19 RX ORDER — DEXAMETHASONE 4 MG/1
8 TABLET ORAL 2 TIMES DAILY
COMMUNITY
Start: 2024-12-09

## 2024-12-19 RX ORDER — LEVETIRACETAM 750 MG/1
750 TABLET ORAL 2 TIMES DAILY
COMMUNITY
Start: 2024-11-22

## 2024-12-19 RX ORDER — VIT C/E/ZN/COPPR/LUTEIN/ZEAXAN 250MG-90MG
50 CAPSULE ORAL DAILY
COMMUNITY

## 2024-12-19 RX ORDER — IPRATROPIUM BROMIDE AND ALBUTEROL SULFATE 2.5; .5 MG/3ML; MG/3ML
3 SOLUTION RESPIRATORY (INHALATION) 4 TIMES DAILY PRN
Qty: 360 ML | Refills: 11 | Status: SHIPPED | OUTPATIENT
Start: 2024-12-19 | End: 2025-12-19

## 2024-12-19 ASSESSMENT — PATIENT HEALTH QUESTIONNAIRE - PHQ9
10. IF YOU CHECKED OFF ANY PROBLEMS, HOW DIFFICULT HAVE THESE PROBLEMS MADE IT FOR YOU TO DO YOUR WORK, TAKE CARE OF THINGS AT HOME, OR GET ALONG WITH OTHER PEOPLE: VERY DIFFICULT
1. LITTLE INTEREST OR PLEASURE IN DOING THINGS: NOT AT ALL
SUM OF ALL RESPONSES TO PHQ9 QUESTIONS 1 AND 2: 1
2. FEELING DOWN, DEPRESSED OR HOPELESS: SEVERAL DAYS

## 2024-12-19 NOTE — PROGRESS NOTES
TCM   [facility] d/charge 12/8 and 11/22   Brain tumor, multiple treatments going on   Major fall 2 days ago, pt thinks she broke her back   Med refills   Hair is falling out - wants to discuss   Wants to discuss RSV and Covid vaccines   In a lot of pain

## 2024-12-19 NOTE — PATIENT INSTRUCTIONS

## 2024-12-19 NOTE — PROGRESS NOTES
"Patient: Jing Jon  : 1960  PCP: Gage Jean DO  MRN: [id number]  Program: Hyperlipidemia Core  Status: Enrolled  Effective Dates: 2020 - present  Responsible Staff: No information to display  Social Drivers to be Addressed: No information to display    Transitional Care Management  Status: Enrolled  Effective Dates: 12/10/2024 - present  Responsible Staff: Jayna Hansen LPN  Social Drivers to be Addressed: No information to display         Jing Jon is a 64 y.o. female presenting today for follow-up after being discharged from the hospital 10 days ago. The main problem requiring admission was brain tumor. The discharge summary and/or Transitional Care Management documentation was reviewed. Medication reconciliation was performed as indicated via the \"Casey as Reviewed\" timestamp.  Patient is here follow-up of to back-to-back admissions.  She also 2 days ago had a substantial fall from standing since having some back pain over where her spinal stimulator is.  As she was also having a little bit of discomfort in the right hip that she is still been able to walk.  She has been about of the pain does have pain management has not followed up in a while but she is trying to take care of the issue with the brain.  Jing Jon was contacted by Transitional Care Management services two days after her discharge. This encounter and supporting documentation was reviewed.    Review of Systems    /80   Pulse 107   Temp 36.7 °C (98 °F)   Ht 1.575 m (5' 2\")   Wt 82.6 kg (182 lb)   BMI 33.29 kg/m²     Physical Exam  REVIEW OF SYSTEMS: 12 systems negative except for those mentioned in the HPI.    PHYSICAL EXAMINATION:   Constitutional: The patient is alert, in no acute  distress.  Eyes: Extraocular movements are intact.   ENT: external ear canals patent  Neck: no  JVD.  Heart: no JVD  Lungs: Normal respiration without stridor or nasal flaring   Psychiatric: Good " judgment and insight. Normal affect and mood.  Skin: no rashes or lesions  MSK/neurological graders 2 through 12 grossly intact.  Patient is using a walker.  He has some midline back pain from L1-L4 as well as also paraspinal muscle pain.  Negative MELANIA negative internal tension of the hips      Assessment:  per EMR    Plan:  OARRS reviewed appropriate.  Patient declines liking them.  Reviewed notes will be following up with her neurologist and oncologist.  She already has a baclofen also talked about a sparse dose back brace.  I am concerned about the back her hip evaluation was normal and go ahead and get x-rays of the lumbar spine.  Also patient was concerned about her hair falling out which could be secondary to the gamma knife more the other medications will also be concerned about the thyroid and go ahead and do CBC CMP lipid panel thyroid A1c and also iron panel    This dictation was created using Dragon dictation and may contain errors  The complexity of medical decision making for this patient's transitional care is moderate.    Assessment/Plan   Problem List Items Addressed This Visit             ICD-10-CM    Type 2 diabetes mellitus with diabetic polyneuropathy, with long-term current use of insulin - Primary E11.42, Z79.4    Chronic pain G89.29    Metabolic syndrome E88.810    Cerebrovascular accident (CVA) (Multi) I63.9    Meningioma (Multi) D32.9    Fall W19.XXXA

## 2024-12-20 ENCOUNTER — PATIENT OUTREACH (OUTPATIENT)
Dept: PRIMARY CARE | Facility: CLINIC | Age: 64
End: 2024-12-20
Payer: COMMERCIAL

## 2024-12-23 ENCOUNTER — LAB (OUTPATIENT)
Dept: LAB | Facility: LAB | Age: 64
End: 2024-12-23
Payer: COMMERCIAL

## 2024-12-23 ENCOUNTER — HOSPITAL ENCOUNTER (OUTPATIENT)
Dept: RADIOLOGY | Facility: CLINIC | Age: 64
Discharge: HOME | End: 2024-12-23
Payer: COMMERCIAL

## 2024-12-23 DIAGNOSIS — E88.810 METABOLIC SYNDROME: ICD-10-CM

## 2024-12-23 DIAGNOSIS — G89.4 CHRONIC PAIN SYNDROME: ICD-10-CM

## 2024-12-23 DIAGNOSIS — Z00.00 WELLNESS EXAMINATION: ICD-10-CM

## 2024-12-23 DIAGNOSIS — R11.0 NAUSEA: ICD-10-CM
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
## 2024-12-23 DIAGNOSIS — R11.10 VOMITING, UNSPECIFIED VOMITING TYPE, UNSPECIFIED WHETHER NAUSEA PRESENT: ICD-10-CM

## 2024-12-23 DIAGNOSIS — Z79.4 TYPE 2 DIABETES MELLITUS WITH DIABETIC POLYNEUROPATHY, WITH LONG-TERM CURRENT USE OF INSULIN: ICD-10-CM

## 2024-12-23 DIAGNOSIS — I63.9 CEREBROVASCULAR ACCIDENT (CVA), UNSPECIFIED MECHANISM (MULTI): ICD-10-CM

## 2024-12-23 DIAGNOSIS — E86.0 DEHYDRATION: ICD-10-CM

## 2024-12-23 DIAGNOSIS — E11.42 TYPE 2 DIABETES MELLITUS WITH DIABETIC POLYNEUROPATHY, WITH LONG-TERM CURRENT USE OF INSULIN: ICD-10-CM

## 2024-12-23 DIAGNOSIS — D32.9 MENINGIOMA (MULTI): ICD-10-CM

## 2024-12-23 LAB
ALBUMIN SERPL BCP-MCNC: 3.6 G/DL (ref 3.4–5)
ALP SERPL-CCNC: 34 U/L (ref 33–136)
ALT SERPL W P-5'-P-CCNC: 31 U/L (ref 7–45)
ANION GAP SERPL CALC-SCNC: 13 MMOL/L (ref 10–20)
AST SERPL W P-5'-P-CCNC: 10 U/L (ref 9–39)
BASOPHILS # BLD AUTO: 0.08 X10*3/UL (ref 0–0.1)
BASOPHILS NFR BLD AUTO: 0.5 %
BILIRUB SERPL-MCNC: 0.4 MG/DL (ref 0–1.2)
BUN SERPL-MCNC: 30 MG/DL (ref 6–23)
CALCIUM SERPL-MCNC: 9.1 MG/DL (ref 8.6–10.3)
CHLORIDE SERPL-SCNC: 98 MMOL/L (ref 98–107)
CO2 SERPL-SCNC: 33 MMOL/L (ref 21–32)
CREAT SERPL-MCNC: 0.49 MG/DL (ref 0.5–1.05)
EGFRCR SERPLBLD CKD-EPI 2021: >90 ML/MIN/1.73M*2
EOSINOPHIL # BLD AUTO: 0 X10*3/UL (ref 0–0.7)
EOSINOPHIL NFR BLD AUTO: 0 %
ERYTHROCYTE [DISTWIDTH] IN BLOOD BY AUTOMATED COUNT: 14 % (ref 11.5–14.5)
FOLATE SERPL-MCNC: >22.3 NG/ML
GLUCOSE SERPL-MCNC: 155 MG/DL (ref 74–99)
HCT VFR BLD AUTO: 44.9 % (ref 36–46)
HGB BLD-MCNC: 14.8 G/DL (ref 12–16)
IMM GRANULOCYTES # BLD AUTO: 0.62 X10*3/UL (ref 0–0.7)
IMM GRANULOCYTES NFR BLD AUTO: 3.5 % (ref 0–0.9)
LYMPHOCYTES # BLD AUTO: 0.87 X10*3/UL (ref 1.2–4.8)
LYMPHOCYTES NFR BLD AUTO: 4.9 %
MAGNESIUM SERPL-MCNC: 2.14 MG/DL (ref 1.6–2.4)
MCH RBC QN AUTO: 29.3 PG (ref 26–34)
MCHC RBC AUTO-ENTMCNC: 33 G/DL (ref 32–36)
MCV RBC AUTO: 89 FL (ref 80–100)
MONOCYTES # BLD AUTO: 0.65 X10*3/UL (ref 0.1–1)
MONOCYTES NFR BLD AUTO: 3.7 %
NEUTROPHILS # BLD AUTO: 15.4 X10*3/UL (ref 1.2–7.7)
NEUTROPHILS NFR BLD AUTO: 87.4 %
NRBC BLD-RTO: 0 /100 WBCS (ref 0–0)
PLATELET # BLD AUTO: 256 X10*3/UL (ref 150–450)
POTASSIUM SERPL-SCNC: 4.6 MMOL/L (ref 3.5–5.3)
PROT SERPL-MCNC: 5.7 G/DL (ref 6.4–8.2)
PTH-INTACT SERPL-MCNC: 15.8 PG/ML (ref 18.5–88)
RBC # BLD AUTO: 5.05 X10*6/UL (ref 4–5.2)
SODIUM SERPL-SCNC: 139 MMOL/L (ref 136–145)
THYROPEROXIDASE AB SERPL-ACNC: 38 IU/ML
TSH SERPL-ACNC: 0.26 MIU/L (ref 0.44–3.98)
VIT B12 SERPL-MCNC: 805 PG/ML (ref 211–911)
WBC # BLD AUTO: 17.6 X10*3/UL (ref 4.4–11.3)

## 2024-12-23 PROCEDURE — 83970 ASSAY OF PARATHORMONE: CPT

## 2024-12-23 PROCEDURE — 80053 COMPREHEN METABOLIC PANEL: CPT

## 2024-12-23 PROCEDURE — 85025 COMPLETE CBC W/AUTO DIFF WBC: CPT

## 2024-12-23 PROCEDURE — 86376 MICROSOMAL ANTIBODY EACH: CPT

## 2024-12-23 PROCEDURE — 82607 VITAMIN B-12: CPT

## 2024-12-23 PROCEDURE — 82746 ASSAY OF FOLIC ACID SERUM: CPT

## 2024-12-23 PROCEDURE — 83735 ASSAY OF MAGNESIUM: CPT

## 2024-12-23 PROCEDURE — 72110 X-RAY EXAM L-2 SPINE 4/>VWS: CPT

## 2024-12-23 PROCEDURE — 72110 X-RAY EXAM L-2 SPINE 4/>VWS: CPT | Performed by: RADIOLOGY

## 2024-12-23 PROCEDURE — 86800 THYROGLOBULIN ANTIBODY: CPT

## 2024-12-23 PROCEDURE — 84443 ASSAY THYROID STIM HORMONE: CPT

## 2024-12-25 LAB — THYROGLOB AB SERPL-ACNC: <0.9 IU/ML (ref 0–4)

## 2024-12-29 ENCOUNTER — APPOINTMENT (OUTPATIENT)
Dept: RADIOLOGY | Facility: HOSPITAL | Age: 64
End: 2024-12-29
Payer: COMMERCIAL

## 2024-12-29 ENCOUNTER — HOSPITAL ENCOUNTER (EMERGENCY)
Facility: HOSPITAL | Age: 64
Discharge: LEFT BEFORE DISCHARGE | End: 2024-12-29
Attending: STUDENT IN AN ORGANIZED HEALTH CARE EDUCATION/TRAINING PROGRAM
Payer: COMMERCIAL

## 2024-12-29 VITALS
HEIGHT: 62 IN | BODY MASS INDEX: 34.04 KG/M2 | TEMPERATURE: 96.8 F | HEART RATE: 96 BPM | DIASTOLIC BLOOD PRESSURE: 86 MMHG | OXYGEN SATURATION: 95 % | RESPIRATION RATE: 20 BRPM | WEIGHT: 185 LBS | SYSTOLIC BLOOD PRESSURE: 163 MMHG

## 2024-12-29 DIAGNOSIS — R60.0 BILATERAL LEG EDEMA: ICD-10-CM

## 2024-12-29 DIAGNOSIS — E16.2 HYPOGLYCEMIA: Primary | ICD-10-CM

## 2024-12-29 DIAGNOSIS — R53.1 GENERALIZED WEAKNESS: ICD-10-CM

## 2024-12-29 DIAGNOSIS — E87.3 METABOLIC ALKALOSIS: ICD-10-CM

## 2024-12-29 LAB
ALBUMIN SERPL BCP-MCNC: 3.5 G/DL (ref 3.4–5)
ALP SERPL-CCNC: 40 U/L (ref 33–136)
ALT SERPL W P-5'-P-CCNC: 37 U/L (ref 7–45)
ANION GAP SERPL CALC-SCNC: 9 MMOL/L (ref 10–20)
APPEARANCE UR: CLEAR
AST SERPL W P-5'-P-CCNC: 14 U/L (ref 9–39)
BASE EXCESS BLDV CALC-SCNC: 8.2 MMOL/L (ref -2–3)
BASOPHILS # BLD AUTO: 0.06 X10*3/UL (ref 0–0.1)
BASOPHILS NFR BLD AUTO: 0.4 %
BILIRUB SERPL-MCNC: 0.4 MG/DL (ref 0–1.2)
BILIRUB UR STRIP.AUTO-MCNC: NEGATIVE MG/DL
BNP SERPL-MCNC: 17 PG/ML (ref 0–99)
BODY TEMPERATURE: ABNORMAL
BUN SERPL-MCNC: 29 MG/DL (ref 6–23)
CALCIUM SERPL-MCNC: 9 MG/DL (ref 8.6–10.3)
CARDIAC TROPONIN I PNL SERPL HS: 6 NG/L (ref 0–13)
CHLORIDE SERPL-SCNC: 104 MMOL/L (ref 98–107)
CO2 SERPL-SCNC: 30 MMOL/L (ref 21–32)
COLOR UR: ABNORMAL
CREAT SERPL-MCNC: 0.42 MG/DL (ref 0.5–1.05)
CRITICAL CALL TIME: 941
CRITICAL CALLED BY: ABNORMAL
CRITICAL CALLED TO: ABNORMAL
CRITICAL READ BACK: ABNORMAL
EGFRCR SERPLBLD CKD-EPI 2021: >90 ML/MIN/1.73M*2
EOSINOPHIL # BLD AUTO: 0.02 X10*3/UL (ref 0–0.7)
EOSINOPHIL NFR BLD AUTO: 0.1 %
ERYTHROCYTE [DISTWIDTH] IN BLOOD BY AUTOMATED COUNT: 14.5 % (ref 11.5–14.5)
ETHANOL SERPL-MCNC: <10 MG/DL
FLUAV RNA RESP QL NAA+PROBE: NOT DETECTED
FLUBV RNA RESP QL NAA+PROBE: NOT DETECTED
GLUCOSE BLD MANUAL STRIP-MCNC: 154 MG/DL (ref 74–99)
GLUCOSE BLD MANUAL STRIP-MCNC: 157 MG/DL (ref 74–99)
GLUCOSE BLD MANUAL STRIP-MCNC: 189 MG/DL (ref 74–99)
GLUCOSE BLD MANUAL STRIP-MCNC: 37 MG/DL (ref 74–99)
GLUCOSE SERPL-MCNC: 35 MG/DL (ref 74–99)
GLUCOSE UR STRIP.AUTO-MCNC: ABNORMAL MG/DL
HCO3 BLDV-SCNC: 29.5 MMOL/L (ref 22–26)
HCT VFR BLD AUTO: 43.3 % (ref 36–46)
HGB BLD-MCNC: 14.5 G/DL (ref 12–16)
HOLD SPECIMEN: NORMAL
HOLD SPECIMEN: NORMAL
IMM GRANULOCYTES # BLD AUTO: 0.58 X10*3/UL (ref 0–0.7)
IMM GRANULOCYTES NFR BLD AUTO: 3.5 % (ref 0–0.9)
INHALED O2 CONCENTRATION: 21 %
KETONES UR STRIP.AUTO-MCNC: NEGATIVE MG/DL
LEUKOCYTE ESTERASE UR QL STRIP.AUTO: NEGATIVE
LYMPHOCYTES # BLD AUTO: 0.8 X10*3/UL (ref 1.2–4.8)
LYMPHOCYTES NFR BLD AUTO: 4.9 %
MCH RBC QN AUTO: 29.4 PG (ref 26–34)
MCHC RBC AUTO-ENTMCNC: 33.5 G/DL (ref 32–36)
MCV RBC AUTO: 88 FL (ref 80–100)
MONOCYTES # BLD AUTO: 0.71 X10*3/UL (ref 0.1–1)
MONOCYTES NFR BLD AUTO: 4.3 %
NEUTROPHILS # BLD AUTO: 14.22 X10*3/UL (ref 1.2–7.7)
NEUTROPHILS NFR BLD AUTO: 86.8 %
NITRITE UR QL STRIP.AUTO: NEGATIVE
NRBC BLD-RTO: 0.2 /100 WBCS (ref 0–0)
OXYHGB MFR BLDV: 93.2 % (ref 45–75)
PCO2 BLDV: 30 MM HG (ref 41–51)
PH BLDV: 7.6 PH (ref 7.33–7.43)
PH UR STRIP.AUTO: 7 [PH]
PLATELET # BLD AUTO: 211 X10*3/UL (ref 150–450)
PO2 BLDV: 64 MM HG (ref 35–45)
POTASSIUM SERPL-SCNC: 4.3 MMOL/L (ref 3.5–5.3)
PROT SERPL-MCNC: 5.9 G/DL (ref 6.4–8.2)
PROT UR STRIP.AUTO-MCNC: NEGATIVE MG/DL
RBC # BLD AUTO: 4.93 X10*6/UL (ref 4–5.2)
RBC # UR STRIP.AUTO: NEGATIVE /UL
SAO2 % BLDV: 93 % (ref 45–75)
SARS-COV-2 RNA RESP QL NAA+PROBE: NOT DETECTED
SODIUM SERPL-SCNC: 139 MMOL/L (ref 136–145)
SP GR UR STRIP.AUTO: 1.02
UROBILINOGEN UR STRIP.AUTO-MCNC: NORMAL MG/DL
WBC # BLD AUTO: 16.4 X10*3/UL (ref 4.4–11.3)

## 2024-12-29 PROCEDURE — 2500000005 HC RX 250 GENERAL PHARMACY W/O HCPCS: Performed by: STUDENT IN AN ORGANIZED HEALTH CARE EDUCATION/TRAINING PROGRAM

## 2024-12-29 PROCEDURE — 2500000005 HC RX 250 GENERAL PHARMACY W/O HCPCS: Performed by: EMERGENCY MEDICINE

## 2024-12-29 PROCEDURE — 96376 TX/PRO/DX INJ SAME DRUG ADON: CPT

## 2024-12-29 PROCEDURE — 96374 THER/PROPH/DIAG INJ IV PUSH: CPT

## 2024-12-29 PROCEDURE — 81003 URINALYSIS AUTO W/O SCOPE: CPT | Performed by: EMERGENCY MEDICINE

## 2024-12-29 PROCEDURE — 82947 ASSAY GLUCOSE BLOOD QUANT: CPT | Mod: 59

## 2024-12-29 PROCEDURE — 36415 COLL VENOUS BLD VENIPUNCTURE: CPT | Performed by: STUDENT IN AN ORGANIZED HEALTH CARE EDUCATION/TRAINING PROGRAM

## 2024-12-29 PROCEDURE — 93970 EXTREMITY STUDY: CPT

## 2024-12-29 PROCEDURE — 2500000004 HC RX 250 GENERAL PHARMACY W/ HCPCS (ALT 636 FOR OP/ED): Performed by: EMERGENCY MEDICINE

## 2024-12-29 PROCEDURE — 83880 ASSAY OF NATRIURETIC PEPTIDE: CPT | Performed by: STUDENT IN AN ORGANIZED HEALTH CARE EDUCATION/TRAINING PROGRAM

## 2024-12-29 PROCEDURE — 70450 CT HEAD/BRAIN W/O DYE: CPT

## 2024-12-29 PROCEDURE — 82805 BLOOD GASES W/O2 SATURATION: CPT | Performed by: STUDENT IN AN ORGANIZED HEALTH CARE EDUCATION/TRAINING PROGRAM

## 2024-12-29 PROCEDURE — 82077 ASSAY SPEC XCP UR&BREATH IA: CPT | Performed by: EMERGENCY MEDICINE

## 2024-12-29 PROCEDURE — 87636 SARSCOV2 & INF A&B AMP PRB: CPT | Performed by: STUDENT IN AN ORGANIZED HEALTH CARE EDUCATION/TRAINING PROGRAM

## 2024-12-29 PROCEDURE — 70450 CT HEAD/BRAIN W/O DYE: CPT | Performed by: RADIOLOGY

## 2024-12-29 PROCEDURE — 99285 EMERGENCY DEPT VISIT HI MDM: CPT | Mod: 25 | Performed by: STUDENT IN AN ORGANIZED HEALTH CARE EDUCATION/TRAINING PROGRAM

## 2024-12-29 PROCEDURE — 71045 X-RAY EXAM CHEST 1 VIEW: CPT | Mod: FOREIGN READ | Performed by: RADIOLOGY

## 2024-12-29 PROCEDURE — 85025 COMPLETE CBC W/AUTO DIFF WBC: CPT | Performed by: STUDENT IN AN ORGANIZED HEALTH CARE EDUCATION/TRAINING PROGRAM

## 2024-12-29 PROCEDURE — 84484 ASSAY OF TROPONIN QUANT: CPT | Performed by: STUDENT IN AN ORGANIZED HEALTH CARE EDUCATION/TRAINING PROGRAM

## 2024-12-29 PROCEDURE — 71045 X-RAY EXAM CHEST 1 VIEW: CPT

## 2024-12-29 PROCEDURE — 80053 COMPREHEN METABOLIC PANEL: CPT | Performed by: STUDENT IN AN ORGANIZED HEALTH CARE EDUCATION/TRAINING PROGRAM

## 2024-12-29 RX ORDER — DEXTROSE MONOHYDRATE AND SODIUM CHLORIDE 5; .9 G/100ML; G/100ML
125 INJECTION, SOLUTION INTRAVENOUS CONTINUOUS
Status: DISCONTINUED | OUTPATIENT
Start: 2024-12-29 | End: 2024-12-29 | Stop reason: HOSPADM

## 2024-12-29 RX ORDER — DEXTROSE 50 % IN WATER (D50W) INTRAVENOUS SYRINGE
25 ONCE
Status: COMPLETED | OUTPATIENT
Start: 2024-12-29 | End: 2024-12-29

## 2024-12-29 RX ADMIN — DEXTROSE MONOHYDRATE 25 G: 25 INJECTION, SOLUTION INTRAVENOUS at 06:22

## 2024-12-29 RX ADMIN — DEXTROSE MONOHYDRATE 25 G: 25 INJECTION, SOLUTION INTRAVENOUS at 09:10

## 2024-12-29 RX ADMIN — DEXTROSE AND SODIUM CHLORIDE 125 ML/HR: 5; 900 INJECTION, SOLUTION INTRAVENOUS at 09:33

## 2024-12-29 ASSESSMENT — PAIN SCALES - GENERAL: PAINLEVEL_OUTOF10: 0 - NO PAIN

## 2024-12-29 ASSESSMENT — COLUMBIA-SUICIDE SEVERITY RATING SCALE - C-SSRS
2. HAVE YOU ACTUALLY HAD ANY THOUGHTS OF KILLING YOURSELF?: NO
1. IN THE PAST MONTH, HAVE YOU WISHED YOU WERE DEAD OR WISHED YOU COULD GO TO SLEEP AND NOT WAKE UP?: NO
6. HAVE YOU EVER DONE ANYTHING, STARTED TO DO ANYTHING, OR PREPARED TO DO ANYTHING TO END YOUR LIFE?: NO

## 2024-12-29 ASSESSMENT — PAIN - FUNCTIONAL ASSESSMENT: PAIN_FUNCTIONAL_ASSESSMENT: 0-10

## 2024-12-29 ASSESSMENT — PAIN DESCRIPTION - FREQUENCY: FREQUENCY: INTERMITTENT

## 2024-12-29 ASSESSMENT — PAIN DESCRIPTION - LOCATION: LOCATION: LEG

## 2024-12-29 NOTE — PROGRESS NOTES
Emergency Medicine Transition of Care Note    I received Jing Jon in signout from Dr. Bazzi.  Please see the previous ED provider note for all HPI, PE and MDM up to the time of signout at 7 AM. This is in addition to the primary record.    In brief Jing Jon is an 64 y.o. female presenting for   Chief Complaint   Patient presents with    Leg Swelling     Left leg swelling and  left arm leg     At the time of signout we were awaiting: Head CT, labs, reevaluation, and disposition    Diagnoses as of 12/29/24 1853   Hypoglycemia   Bilateral leg edema   Generalized weakness   Metabolic alkalosis       Medical Decision Making  64-year-old female presents emergency department with report of leg swelling.  Patient was signed out to me by Dr. Bazzi.  He reports that patient initially reported she was here for swelling in her legs, but while here in the emergency department she was appreciated to become increasingly more sleepy and less responsive.  Patient was appreciated to have significant hypoglycemia on blood work which was treated with amp of D50.  Patient was signed out to me pending lab work, CT head, reevaluation, disposition. On my evaluation of the patient she awakes easily to voice and is alert and interactive.  She is currently alert and oriented x 4.  She is accompanied by  who aids in providing history.  He reports that the patient has been struggling with generalized weakness and is currently getting treated for a meningioma/brain lesion that has been treated with gamma knife.  Patient is now receiving treatment for increased fluid on the brain including treatment with Decadron.  Patient and family state that her blood sugars often will drop at nighttime and they have been managing this with oral glucose at home.  Tonight though family states that the patient was more weak and confused than normal and she has had some increased leg swelling leading them to bring her here  to the emergency department.  On my exam heart RRR.  Lungs are coarse bilaterally.  Patient's abdomen is benign.  Cranial nerves II through XII are grossly intact and I do not appreciate focal neurologic deficit for the patient.  She is appreciated to have 1+ pitting edema bilateral lower extremities no calf tenderness.  I reviewed workup obtained by previous provider as well as adding on additional workup.  CMP shows hyperglycemia and I monitored the patient's blood glucose here in the ED.  Her glucose again dropped into the 30s and she was given additional amp of D50 and initially started on D5 infusion.  Patient was then given food after negative head CT and repeat glucose was stable.  Chest x-ray showed no acute cardiopulmonary process such as pneumonia, pleural effusion, pulmonary edema.  CT of the head showed interval area of encephalomalacia related to the patient's recent treatment and old infarct.  These findings were known by the patient.  Ultrasound DVT study was obtained and is negative.  CBC showed nonspecific leukocytosis likely secondary to the patient being on steroids and no significant anemia.  Patient does not have findings of sepsis.  Venous blood gas showed findings of metabolic and respiratory alkalosis.  Cardiac enzyme and EKG show no acute ACS.  Blood alcohol is negative.  UA does not show obvious finding of infection.  BNP and chest imaging did not show findings of decompensated heart failure.  COVID and flu testing are negative.  Given the patient's recurrent hypoglycemia and findings of metabolic abnormality on blood gas I recommended admission for further evaluation and treatment of her symptoms.  I discussed concerns regarding the patient's recurrent hypoglycemia and that her significant weakness and confusion could return.  Family and patient states that they are aware of the recurrent hypoglycemia and have been managing this at home.  They also feel that the patient's metabolic  abnormalities may be due to her new medications.  Patient was ambulated in the department and was able to get around using a walker.  Patient and family reports that they have follow-up at [facility] for an infusion in regards to her brain lesion and have been waiting for this appointment for quite some time.  They state they do not wish to stay in the hospital and would prefer to follow-up outpatient as they are to see their doctor tomorrow.  Patient wishes to leave AMA.  He/she appeared rational, alert, appropriate, and exhibited no signs/symptoms of psychosis or suicidal ideation.  Patient has the capacity to make this medical decision.  Patient was aware of his/her suspected diagnosis as suggested by the initial screening exam and acknowledged their understanding for my recommendations (hospitalization, transfer, further testing, medical treatment).  Risks of refusal of recommended care were disclosed to the patient including, but not limited to death, permanent mental or physical impairment, loss of current lifestyle or paralysis.  Welcomed to return to the ED at any time regardless of their ability to pay and was provided follow up instructions.  The patient will leave AMA at this time.           Final diagnoses:   [E16.2] Hypoglycemia   [R60.0] Bilateral leg edema   [R53.1] Generalized weakness   [E87.3] Metabolic alkalosis           Procedure  Procedures    Basim Mata,

## 2024-12-29 NOTE — ED PROVIDER NOTES
HPI   Chief Complaint   Patient presents with    Leg Swelling     Left leg swelling and  left arm leg         History provided by:  Patient    64-year-old female brought in by EMS for evaluation of shortness of breath, lower leg swelling.  History of CHF, CAD, type 2 diabetes, hypertension.  1 week of symptoms.      Patient History   Past Medical History:   Diagnosis Date    Albuminuria     Allergic     Allergic rhinitis     Anemia     Anticoagulated     Plavix    Anxiety     Arthritis     Arthritis of right glenohumeral joint     Plan: Right Shoulder Anatomic Total Arthroplasty; Biceps Tenodesis 10/8/24    Asthma     Balance problem     due to underlying peripheral neuropathy    Cardiology follow-up encounter 02/15/2024    Sienna Kamara MD    Cardiology follow-up encounter 02/15/2024    Sienna Kamara MD    CHF (congestive heart failure)     Coronary artery disease     Degenerative disc disease, lumbar     Depression     Disease of thyroid gland     DM (diabetes mellitus), type 2 (Multi)     9/7/24 A1C 7.3%    Fibromyalgia     GERD (gastroesophageal reflux disease)     Headache     Heart disease     Heart murmur     Hyperlipidemia     on repatha    Hypertension     Hypothyroidism     Iliotibial band syndrome, left leg     Lumbosacral spondylosis without myelopathy     s/p SPINAL C ORD STIMULATOR TRIAL 2/14/23    Meningioma (Multi)     repeat brain MRI showing increase in size of left frontal lobe meningioma since 4/2018. This was causing mass effect on left frontal horn however no edema noted. She is now undergoing gamma knife treatment    Migraines     treated w/ Indocin    Multiple thyroid nodules     MVP (mitral valve prolapse)     Myasthenia gravis     PT DENIES, NO ACHR ABX TEST, NO EMG , NO THYMUS SCAN    Myocardial infarction (Multi)     Neuropathy     Obesity, unspecified     Other spondylosis with myelopathy, cervical region     Peripheral neuropathy     Preoperative clearance     Neurology Paz  Graziani, NP LOV 10/1/24: “/110.  NP rechecked & BP not as elevated at 150/90 & 168/84. I cannot clear her w/o BP more stable around 130/80.  Pt reports home BP runs 140s. She was taken off BP med d/t hypotension/hypovolemia. I'm going to start low dose Norvasc. F/u w/ PCP for further treatment. She can be cleared as long as stable BP& hold plavix days prior to surgery.”    Primary osteoarthritis of right shoulder     Radiculopathy, lumbar region     Restless legs syndrome     Spinal stenosis, lumbar region without neurogenic claudication     Statin intolerance     Status post gamma knife treatment     Status post insertion of spinal cord stimulator 2023    Stroke (Multi) 2017    old lacunar thalamic infarcts-  2/6/2017(given tpa), 4/2017, 5/2017 still has left sided weakness    TIA (transient ischemic attack) 03/2014    Trochanteric bursitis, left hip     Unilateral primary osteoarthritis, left hip     Urinary incontinence     Urinary tract infection     Venous stasis ulcer with edema of lower leg     Vitamin D deficiency     Walker as ambulation aid     ambulates with wheeled walker     Past Surgical History:   Procedure Laterality Date    APPENDECTOMY      BACK SURGERY      BICEPS TENDON REPAIR Right 2011    CARDIAC CATHETERIZATION  2006    CARDIAC CATHETERIZATION  04/04/2016    CERVICAL FUSION  12/19/2014    COLONOSCOPY  2016    ENDOMETRIAL BIOPSY      Endometrial Biopsy and Cervical Dilation    NASAL SEPTUM SURGERY      OTHER SURGICAL HISTORY      radiation TX stereotactic radiosurgery for Meningioma    OVARIAN CYST SURGERY      POLYPECTOMY  08/21/2019    HYSTEROSCOPY SURGICAL W/SAMPLING BIOPSY OF ENDOMETRIUM &/OR POLYPECTOMY W/ OR W/O D&C    ROTATOR CUFF REPAIR Right 11/12/2009    SALPINGECTOMY  05/11/2010    SINGLE PORT LAPAROSCOPY WITH OOPHORECTOMY AND SALPINGECTOMY    SEPTOPLASTY  02/18/2011    SINUS SURGERY      SPINAL CORD STIMULATOR IMPLANT  02/14/2023    SCS TRIAL    SPINAL CORD STIMULATOR IMPLANT   2023    PERMANENT SPINAL CORD STIMULATOR INSERTION    SPINE SURGERY      TOTAL ABDOMINAL HYSTERECTOMY W/ BILATERAL SALPINGOOPHORECTOMY  2010    UPPER GASTROINTESTINAL ENDOSCOPY  2016    WISDOM TOOTH EXTRACTION       Family History   Problem Relation Name Age of Onset    Diabetes Mother Nirmala Stone     Hypertension Mother Nirmala Stone     Alcohol abuse Father Bandar Stone     Arthritis Father Bandar Stone     Diabetes Father Bandar Stone     Hypertension Father Bandar Stone     Asthma Sister Ashley DeClemente     Cancer Sister Ashley DeClemente     Diabetes Sister Ashley DeClemente     Cancer Sister Afia Joseph     Ovarian cancer Sister Afia Joseph     Cancer Brother Bandar Stoen     Colon cancer Brother Bandar Stone     Hypertension Brother Bandar Stone     Diabetes type II Brother Bandar Stone     Alcohol abuse Son Dipak Jon     Asthma Son Dipak Jon     Learning disabilities Son Dipak Jon     Asthma Son Ton Jon Jr.     Learning disabilities Son Ton Jon Jr.     Heart disease Maternal Grandfather Imtiaz Jewell      Social History     Tobacco Use    Smoking status: Former     Current packs/day: 0.00     Average packs/day: 0.5 packs/day for 17.0 years (8.5 ttl pk-yrs)     Types: Cigarettes     Start date:      Quit date:      Years since quittin.0    Smokeless tobacco: Never   Vaping Use    Vaping status: Never Used   Substance Use Topics    Alcohol use: Yes     Alcohol/week: 1.0 standard drink of alcohol     Types: 1 Standard drinks or equivalent per week     Comment: social drinker    Drug use: Yes     Comment: cbd       Physical Exam   ED Triage Vitals [24 0258]   Temperature Heart Rate Respirations BP   36 °C (96.8 °F) 84 20 152/72      Pulse Ox Temp Source Heart Rate Source Patient Position   94 % Temporal Monitor Lying      BP Location FiO2 (%)     Right arm --       Physical Exam  Vitals and nursing note reviewed.    Constitutional:       General: She is not in acute distress.     Comments: Somnolent   HENT:      Head: Atraumatic.      Mouth/Throat:      Mouth: Mucous membranes are moist.      Pharynx: Oropharynx is clear.   Eyes:      Conjunctiva/sclera: Conjunctivae normal.      Pupils: Pupils are equal, round, and reactive to light.   Cardiovascular:      Rate and Rhythm: Normal rate and regular rhythm.      Pulses: Normal pulses.   Pulmonary:      Effort: Pulmonary effort is normal. No respiratory distress.      Breath sounds: Normal breath sounds.   Abdominal:      General: There is no distension.      Palpations: Abdomen is soft.      Tenderness: There is no abdominal tenderness. There is no guarding or rebound.   Musculoskeletal:         General: No deformity.      Cervical back: Neck supple.      Right lower leg: Edema present.      Left lower leg: Edema present.   Skin:     General: Skin is warm and dry.   Neurological:      Mental Status: She is oriented to person, place, and time.   Psychiatric:         Mood and Affect: Mood normal.         Behavior: Behavior normal.           ED Course & MDM   Diagnoses as of 12/29/24 0719   Hypoglycemia   Bilateral leg edema   Somnolence                 No data recorded     Gladys Coma Scale Score: 15 (12/29/24 0603 : Gogo Hudson RN)                   Labs Reviewed   CBC WITH AUTO DIFFERENTIAL - Abnormal       Result Value    WBC 16.4 (*)     nRBC 0.2 (*)     RBC 4.93      Hemoglobin 14.5      Hematocrit 43.3      MCV 88      MCH 29.4      MCHC 33.5      RDW 14.5      Platelets 211      Neutrophils % 86.8      Immature Granulocytes %, Automated 3.5 (*)     Lymphocytes % 4.9      Monocytes % 4.3      Eosinophils % 0.1      Basophils % 0.4      Neutrophils Absolute 14.22 (*)     Immature Granulocytes Absolute, Automated 0.58      Lymphocytes Absolute 0.80 (*)     Monocytes Absolute 0.71      Eosinophils Absolute 0.02      Basophils Absolute 0.06     COMPREHENSIVE METABOLIC  PANEL - Abnormal    Glucose 35 (*)     Sodium 139      Potassium 4.3      Chloride 104      Bicarbonate 30      Anion Gap 9 (*)     Urea Nitrogen 29 (*)     Creatinine 0.42 (*)     eGFR >90      Calcium 9.0      Albumin 3.5      Alkaline Phosphatase 40      Total Protein 5.9 (*)     AST 14      Bilirubin, Total 0.4      ALT 37     POCT GLUCOSE - Abnormal    POCT Glucose 154 (*)    B-TYPE NATRIURETIC PEPTIDE - Normal    BNP 17      Narrative:        <100 pg/mL - Heart failure unlikely  100-299 pg/mL - Intermediate probability of acute heart                  failure exacerbation. Correlate with clinical                  context and patient history.    >=300 pg/mL - Heart Failure likely. Correlate with clinical                  context and patient history.    BNP testing is performed using different testing methodology at [facility] than at [facility]. Direct result comparisons should only be made within the same method.      INFLUENZA A AND B PCR - Normal    Flu A Result Not Detected      Flu B Result Not Detected      Narrative:     This assay is an in vitro diagnostic multiplex nucleic acid amplification test for the detection and discrimination of Influenza A & B from nasopharyngeal specimens, and has been validated for use at [facility]. Negative results do not preclude Influenza A/B infections, and should not be used as the sole basis for diagnosis, treatment, or other management decisions. If Influenza A/B and RSV PCR results are negative, testing for Parainfluenza virus, Adenovirus and Metapneumovirus is routinely performed for [facility] pediatric oncology and intensive care inpatients, and is available on other patients by placing an add-on request.   SARS-COV-2 PCR - Normal    Coronavirus 2019, PCR Not Detected      Narrative:     This assay has received FDA Emergency Use Authorization (EUA) and is only authorized for the duration of time that circumstances exist to  justify the authorization of the emergency use of in vitro diagnostic tests for the detection of SARS-CoV-2 virus and/or diagnosis of COVID-19 infection under section 564(b)(1) of the Act, 21 U.S.C. 360bbb-3(b)(1). This assay is an in vitro diagnostic nucleic acid amplification test for the qualitative detection of SARS-CoV-2 from nasopharyngeal specimens and has been validated for use at [facility]. Negative results do not preclude COVID-19 infections and should not be used as the sole basis for diagnosis, treatment, or other management decisions.     TROPONIN I, HIGH SENSITIVITY   BLOOD GAS VENOUS   URINALYSIS WITH REFLEX CULTURE AND MICROSCOPIC    Narrative:     The following orders were created for panel order Urinalysis with Reflex Culture and Microscopic.  Procedure                               Abnormality         Status                     ---------                               -----------         ------                     Urinalysis with Reflex C...[[id number]]                                                 Extra Urine Gray Tube[[id number]]                                                         Please view results for these tests on the individual orders.   ALCOHOL   URINALYSIS WITH REFLEX CULTURE AND MICROSCOPIC   EXTRA URINE GRAY TUBE   POCT FINGERSTICK GLUCOSE       XR chest 1 view   Final Result   Lung volumes are low.  Left basilar atelectasis.  Otherwise   unremarkable.   Signed by Moise Adair MD      CT head wo IV contrast    (Results Pending)           Medical Decision Making  64-year-old female brought in by EMS for evaluation of lower leg edema.  On evaluation patient reports that she has been feeling more short of breath and legs are swollen.  Patient somewhat somnolent on evaluation, nodding off.  Patient able to answer questions appropriately when awake.  Workup was initiated in the ED for evaluation of possible CHF exacerbation.  No signs of infection in the lower  extremities.  Pitting edema bilaterally.  Normal SpO2 on room air.  Patient's chest x-ray clear without findings of pulmonary edema.  Patient CBC with leukocytosis of 16.4.  Normal H&H.  BNP within normal limits.  Metabolic panel with hypoglycemia, glucose 35.  Patient given D50 for treatment with improvement to 154.  Normal renal function.  EMR was reviewed showing recent admission to outside hospital for altered mental status.  Patient has known brain mass, on dexamethasone due to cerebral edema.  Patient also has a history of type 2 diabetes.  CT head was added to patient's workup.  Patient signed out to oncoming physician pending completion of workup and reevaluation.    Procedure  Procedures     Ananda Bazzi MD  12/29/24 0719

## 2024-12-29 NOTE — DISCHARGE INSTRUCTIONS
Follow-up with your primary care doctor.  Return to the emergency department if symptoms worsen or change.  You have chosen to leave AGAINST MEDICAL ADVICE.  Risk of leaving AGAINST MEDICAL ADVICE includes death, disability, worsening symptoms, and delay diagnosis.  If h if you change your mind at any time you may return to the emergency department for further evaluation and treatment.

## 2025-01-01 ENCOUNTER — OFFICE VISIT (OUTPATIENT)
Dept: GERIATRIC MEDICINE | Age: 65
End: 2025-01-01

## 2025-01-01 ENCOUNTER — OFFICE VISIT (OUTPATIENT)
Dept: GERIATRIC MEDICINE | Age: 65
End: 2025-01-01
Payer: COMMERCIAL

## 2025-01-01 DIAGNOSIS — I82.90 DEEP VEIN THROMBOSIS (DVT) OF NON-EXTREMITY VEIN, UNSPECIFIED CHRONICITY: ICD-10-CM

## 2025-01-01 DIAGNOSIS — Z79.4 TYPE 2 DIABETES MELLITUS WITH OTHER CIRCULATORY COMPLICATION, WITH LONG-TERM CURRENT USE OF INSULIN (HCC): ICD-10-CM

## 2025-01-01 DIAGNOSIS — I10 HYPERTENSION, UNSPECIFIED TYPE: ICD-10-CM

## 2025-01-01 DIAGNOSIS — S91.309S OPEN WOUND OF FOOT, UNSPECIFIED LATERALITY, SEQUELA: ICD-10-CM

## 2025-01-01 DIAGNOSIS — I26.99 PULMONARY EMBOLISM, OTHER, UNSPECIFIED CHRONICITY, UNSPECIFIED WHETHER ACUTE COR PULMONALE PRESENT (HCC): ICD-10-CM

## 2025-01-01 DIAGNOSIS — I26.99 PULMONARY EMBOLISM, OTHER, UNSPECIFIED CHRONICITY, UNSPECIFIED WHETHER ACUTE COR PULMONALE PRESENT (HCC): Primary | ICD-10-CM

## 2025-01-01 DIAGNOSIS — J10.1 INFLUENZA A: Primary | ICD-10-CM

## 2025-01-01 DIAGNOSIS — E11.59 TYPE 2 DIABETES MELLITUS WITH OTHER CIRCULATORY COMPLICATION, WITH LONG-TERM CURRENT USE OF INSULIN (HCC): ICD-10-CM

## 2025-01-01 PROCEDURE — 99309 SBSQ NF CARE MODERATE MDM 30: CPT | Performed by: INTERNAL MEDICINE

## 2025-01-21 ENCOUNTER — OFFICE VISIT (OUTPATIENT)
Dept: PRIMARY CARE | Facility: CLINIC | Age: 65
End: 2025-01-21
Payer: COMMERCIAL

## 2025-01-21 VITALS
WEIGHT: 185 LBS | HEART RATE: 111 BPM | DIASTOLIC BLOOD PRESSURE: 84 MMHG | TEMPERATURE: 97.2 F | BODY MASS INDEX: 34.04 KG/M2 | HEIGHT: 62 IN | SYSTOLIC BLOOD PRESSURE: 122 MMHG

## 2025-01-21 DIAGNOSIS — E66.9 OBESITY (BMI 30-39.9): ICD-10-CM

## 2025-01-21 DIAGNOSIS — E11.42 TYPE 2 DIABETES MELLITUS WITH DIABETIC POLYNEUROPATHY, WITH LONG-TERM CURRENT USE OF INSULIN: ICD-10-CM

## 2025-01-21 DIAGNOSIS — L97.909 VENOUS STASIS ULCER WITH EDEMA OF LOWER LEG: Primary | ICD-10-CM

## 2025-01-21 DIAGNOSIS — E11.10: ICD-10-CM

## 2025-01-21 DIAGNOSIS — I10 PRIMARY HYPERTENSION: ICD-10-CM

## 2025-01-21 DIAGNOSIS — Z79.4 TYPE 2 DIABETES MELLITUS WITH DIABETIC POLYNEUROPATHY, WITH LONG-TERM CURRENT USE OF INSULIN: ICD-10-CM

## 2025-01-21 DIAGNOSIS — J45.20 MILD INTERMITTENT ASTHMA WITHOUT COMPLICATION (HHS-HCC): ICD-10-CM

## 2025-01-21 DIAGNOSIS — I83.009 VENOUS STASIS ULCER WITH EDEMA OF LOWER LEG: Primary | ICD-10-CM

## 2025-01-21 DIAGNOSIS — I50.9 HEART FAILURE, UNSPECIFIED HF CHRONICITY, UNSPECIFIED HEART FAILURE TYPE: ICD-10-CM

## 2025-01-21 DIAGNOSIS — I63.81 THALAMIC STROKE (MULTI): ICD-10-CM

## 2025-01-21 DIAGNOSIS — I83.899 VENOUS STASIS ULCER WITH EDEMA OF LOWER LEG: Primary | ICD-10-CM

## 2025-01-21 DIAGNOSIS — E11.42 DIABETIC POLYNEUROPATHY ASSOCIATED WITH TYPE 2 DIABETES MELLITUS (MULTI): ICD-10-CM

## 2025-01-21 DIAGNOSIS — R60.9 VENOUS STASIS ULCER WITH EDEMA OF LOWER LEG: Primary | ICD-10-CM

## 2025-01-21 PROCEDURE — 3079F DIAST BP 80-89 MM HG: CPT | Performed by: FAMILY MEDICINE

## 2025-01-21 PROCEDURE — 1036F TOBACCO NON-USER: CPT | Performed by: FAMILY MEDICINE

## 2025-01-21 PROCEDURE — 3008F BODY MASS INDEX DOCD: CPT | Performed by: FAMILY MEDICINE

## 2025-01-21 PROCEDURE — 3074F SYST BP LT 130 MM HG: CPT | Performed by: FAMILY MEDICINE

## 2025-01-21 PROCEDURE — 4010F ACE/ARB THERAPY RXD/TAKEN: CPT | Performed by: FAMILY MEDICINE

## 2025-01-21 PROCEDURE — 99214 OFFICE O/P EST MOD 30 MIN: CPT | Performed by: FAMILY MEDICINE

## 2025-01-21 RX ORDER — FUROSEMIDE 20 MG/1
20 TABLET ORAL DAILY
Qty: 30 TABLET | Refills: 0 | Status: ON HOLD | OUTPATIENT
Start: 2025-01-21 | End: 2025-01-25

## 2025-01-21 RX ORDER — GLUCAGON INJECTION, SOLUTION 1 MG/.2ML
INJECTION, SOLUTION SUBCUTANEOUS
COMMUNITY
Start: 2025-01-02

## 2025-01-21 ASSESSMENT — PATIENT HEALTH QUESTIONNAIRE - PHQ9
1. LITTLE INTEREST OR PLEASURE IN DOING THINGS: NEARLY EVERY DAY
2. FEELING DOWN, DEPRESSED OR HOPELESS: NEARLY EVERY DAY
SUM OF ALL RESPONSES TO PHQ9 QUESTIONS 1 AND 2: 6

## 2025-01-21 NOTE — PATIENT INSTRUCTIONS

## 2025-01-21 NOTE — PROGRESS NOTES
Patient was identified as a fall risk. Risk prevention instructions provided.  Patient is here with .  Is not having shortness of breath or wheezing but is having swelling to the point where she is having weeping at the bottom of her feet with some ulceration.  Patient has a injury to her L1 is going to the spine surgeon tomorrow and so has been very hard for her to elevate her legs.    REVIEW OF SYSTEMS: 12 systems negative except for those mentioned in the HPI.    PHYSICAL EXAMINATION:   Constitutional: The patient is alert, in no acute  distress.  Eyes: Extraocular movements are intact.   ENT: external ear canals patent  Neck: no  JVD.  Heart: Regular rate and rhythm no murmur   Lungs: clear to auscultation bilaterally.  No crackles  Psychiatric: Good judgment and insight. Normal affect and mood.  Skin: Small ulcerations on the dorsum of the feet bilaterally with weeping serosanguineous fluid pitting edema +3 on the dorsum of the feet going up to the ankles.  Substantial ecchymosis on the lateral margin of the right foot    Assessment:  per EMR    Plan:  No current signs of congestive heart failure.  I discussed leg elevation with pillows underneath of her need to take the pressure off of her back.  I am also urgent request for either wound care nurse at home as the patient is homebound versus wound care for Unna boot as well as also protective padding for the ulcerations on the top of the foot.  Will also I would like to have a venous stasis assessment with ultrasound and also I will put on Lasix.  Blood pressure stable.  Sugars relatively stable considering    This dictation was created using Dragon dictation and may contain errors

## 2025-01-22 ENCOUNTER — APPOINTMENT (OUTPATIENT)
Dept: CARDIOLOGY | Facility: HOSPITAL | Age: 65
End: 2025-01-22
Payer: COMMERCIAL

## 2025-01-22 ENCOUNTER — APPOINTMENT (OUTPATIENT)
Dept: RADIOLOGY | Facility: HOSPITAL | Age: 65
End: 2025-01-22
Payer: COMMERCIAL

## 2025-01-22 ENCOUNTER — HOSPITAL ENCOUNTER (INPATIENT)
Facility: HOSPITAL | Age: 65
LOS: 3 days | Discharge: NURSING, REHAB OR LONG-TERM CARE | End: 2025-01-25
Attending: EMERGENCY MEDICINE | Admitting: HOSPITALIST
Payer: COMMERCIAL

## 2025-01-22 ENCOUNTER — APPOINTMENT (OUTPATIENT)
Dept: ORTHOPEDIC SURGERY | Facility: CLINIC | Age: 65
End: 2025-01-22
Payer: COMMERCIAL

## 2025-01-22 DIAGNOSIS — K21.9 GASTROESOPHAGEAL REFLUX DISEASE WITHOUT ESOPHAGITIS: ICD-10-CM

## 2025-01-22 DIAGNOSIS — R60.9 VENOUS STASIS ULCER WITH EDEMA OF LOWER LEG: ICD-10-CM

## 2025-01-22 DIAGNOSIS — R22.43 LOCALIZED SWELLING OF BOTH LOWER LEGS: ICD-10-CM

## 2025-01-22 DIAGNOSIS — S81.809A MULTIPLE OPEN WOUNDS OF LOWER LEG, INITIAL ENCOUNTER: ICD-10-CM

## 2025-01-22 DIAGNOSIS — M79.604 PAIN IN BOTH LOWER EXTREMITIES: ICD-10-CM

## 2025-01-22 DIAGNOSIS — M79.605 PAIN IN BOTH LOWER EXTREMITIES: ICD-10-CM

## 2025-01-22 DIAGNOSIS — I83.899 VENOUS STASIS ULCER WITH EDEMA OF LOWER LEG: ICD-10-CM

## 2025-01-22 DIAGNOSIS — B37.0 ORAL CANDIDIASIS: ICD-10-CM

## 2025-01-22 DIAGNOSIS — I26.99 ACUTE PULMONARY EMBOLISM, UNSPECIFIED PULMONARY EMBOLISM TYPE, UNSPECIFIED WHETHER ACUTE COR PULMONALE PRESENT (MULTI): Primary | ICD-10-CM

## 2025-01-22 DIAGNOSIS — L97.909 VENOUS STASIS ULCER WITH EDEMA OF LOWER LEG: ICD-10-CM

## 2025-01-22 DIAGNOSIS — I83.009 VENOUS STASIS ULCER WITH EDEMA OF LOWER LEG: ICD-10-CM

## 2025-01-22 DIAGNOSIS — I26.99 OTHER ACUTE PULMONARY EMBOLISM WITHOUT ACUTE COR PULMONALE: ICD-10-CM

## 2025-01-22 DIAGNOSIS — I10 PRIMARY HYPERTENSION: ICD-10-CM

## 2025-01-22 LAB
ALBUMIN SERPL BCP-MCNC: 3.2 G/DL (ref 3.4–5)
ALP SERPL-CCNC: 46 U/L (ref 33–136)
ALT SERPL W P-5'-P-CCNC: 44 U/L (ref 7–45)
ANION GAP SERPL CALC-SCNC: 9 MMOL/L (ref 10–20)
APPEARANCE UR: CLEAR
APTT PPP: 23 SECONDS (ref 27–38)
AST SERPL W P-5'-P-CCNC: 14 U/L (ref 9–39)
ATRIAL RATE: 117 BPM
ATRIAL RATE: 124 BPM
BACTERIA #/AREA URNS AUTO: ABNORMAL /HPF
BASOPHILS # BLD MANUAL: 0 X10*3/UL (ref 0–0.1)
BASOPHILS NFR BLD MANUAL: 0 %
BILIRUB SERPL-MCNC: 0.7 MG/DL (ref 0–1.2)
BILIRUB UR STRIP.AUTO-MCNC: NEGATIVE MG/DL
BNP SERPL-MCNC: 27 PG/ML (ref 0–99)
BUN SERPL-MCNC: 25 MG/DL (ref 6–23)
CALCIUM SERPL-MCNC: 8.6 MG/DL (ref 8.6–10.3)
CARDIAC TROPONIN I PNL SERPL HS: 13 NG/L (ref 0–13)
CHLORIDE SERPL-SCNC: 103 MMOL/L (ref 98–107)
CO2 SERPL-SCNC: 30 MMOL/L (ref 21–32)
COLOR UR: YELLOW
CREAT SERPL-MCNC: 0.49 MG/DL (ref 0.5–1.05)
D DIMER PPP FEU-MCNC: 1385 NG/ML FEU
EGFRCR SERPLBLD CKD-EPI 2021: >90 ML/MIN/1.73M*2
EOSINOPHIL # BLD MANUAL: 0.2 X10*3/UL (ref 0–0.7)
EOSINOPHIL NFR BLD MANUAL: 2 %
ERYTHROCYTE [DISTWIDTH] IN BLOOD BY AUTOMATED COUNT: 15.9 % (ref 11.5–14.5)
GLUCOSE BLD MANUAL STRIP-MCNC: 304 MG/DL (ref 74–99)
GLUCOSE BLD MANUAL STRIP-MCNC: 389 MG/DL (ref 74–99)
GLUCOSE SERPL-MCNC: 241 MG/DL (ref 74–99)
GLUCOSE UR STRIP.AUTO-MCNC: ABNORMAL MG/DL
HCT VFR BLD AUTO: 43 % (ref 36–46)
HGB BLD-MCNC: 14.6 G/DL (ref 12–16)
IMM GRANULOCYTES # BLD AUTO: 0.14 X10*3/UL (ref 0–0.7)
IMM GRANULOCYTES NFR BLD AUTO: 1.4 % (ref 0–0.9)
INR PPP: 1 (ref 0.9–1.1)
KETONES UR STRIP.AUTO-MCNC: NEGATIVE MG/DL
LACTATE SERPL-SCNC: 1.5 MMOL/L (ref 0.4–2)
LACTATE SERPL-SCNC: 2.2 MMOL/L (ref 0.4–2)
LEUKOCYTE ESTERASE UR QL STRIP.AUTO: NEGATIVE
LYMPHOCYTES # BLD MANUAL: 1.27 X10*3/UL (ref 1.2–4.8)
LYMPHOCYTES NFR BLD MANUAL: 13 %
MAGNESIUM SERPL-MCNC: 1.84 MG/DL (ref 1.6–2.4)
MCH RBC QN AUTO: 30.7 PG (ref 26–34)
MCHC RBC AUTO-ENTMCNC: 34 G/DL (ref 32–36)
MCV RBC AUTO: 90 FL (ref 80–100)
METAMYELOCYTES # BLD MANUAL: 0.1 X10*3/UL
METAMYELOCYTES NFR BLD MANUAL: 1 %
MONOCYTES # BLD MANUAL: 0.1 X10*3/UL (ref 0.1–1)
MONOCYTES NFR BLD MANUAL: 1 %
NEUTROPHILS # BLD MANUAL: 7.84 X10*3/UL (ref 1.2–7.7)
NEUTS BAND # BLD MANUAL: 0.98 X10*3/UL (ref 0–0.7)
NEUTS BAND NFR BLD MANUAL: 10 %
NEUTS SEG # BLD MANUAL: 6.86 X10*3/UL (ref 1.2–7)
NEUTS SEG NFR BLD MANUAL: 70 %
NITRITE UR QL STRIP.AUTO: NEGATIVE
NRBC BLD-RTO: 1.1 /100 WBCS (ref 0–0)
P AXIS: 44 DEGREES
P AXIS: 49 DEGREES
P OFFSET: 189 MS
P OFFSET: 192 MS
P ONSET: 144 MS
P ONSET: 146 MS
PH UR STRIP.AUTO: 7 [PH]
PLATELET # BLD AUTO: 157 X10*3/UL (ref 150–450)
POLYCHROMASIA BLD QL SMEAR: ABNORMAL
POTASSIUM SERPL-SCNC: 4.3 MMOL/L (ref 3.5–5.3)
PR INTERVAL: 140 MS
PR INTERVAL: 142 MS
PROT SERPL-MCNC: 5.7 G/DL (ref 6.4–8.2)
PROT UR STRIP.AUTO-MCNC: ABNORMAL MG/DL
PROTHROMBIN TIME: 11.5 SECONDS (ref 9.8–12.8)
Q ONSET: 215 MS
Q ONSET: 216 MS
QRS COUNT: 19 BEATS
QRS COUNT: 20 BEATS
QRS DURATION: 86 MS
QRS DURATION: 86 MS
QT INTERVAL: 308 MS
QT INTERVAL: 330 MS
QTC CALCULATION(BAZETT): 442 MS
QTC CALCULATION(BAZETT): 460 MS
QTC FREDERICIA: 392 MS
QTC FREDERICIA: 412 MS
R AXIS: -43 DEGREES
R AXIS: -48 DEGREES
RBC # BLD AUTO: 4.76 X10*6/UL (ref 4–5.2)
RBC # UR STRIP.AUTO: NEGATIVE /UL
RBC #/AREA URNS AUTO: ABNORMAL /HPF
RBC MORPH BLD: ABNORMAL
SODIUM SERPL-SCNC: 138 MMOL/L (ref 136–145)
SP GR UR STRIP.AUTO: >1.05
T AXIS: 61 DEGREES
T AXIS: 64 DEGREES
T OFFSET: 370 MS
T OFFSET: 380 MS
TOTAL CELLS COUNTED BLD: 100
UFH PPP CHRO-ACNC: 1 IU/ML
UFH PPP CHRO-ACNC: >2 IU/ML
URATE SERPL-MCNC: 4.1 MG/DL (ref 2.3–6.7)
UROBILINOGEN UR STRIP.AUTO-MCNC: NORMAL MG/DL
VARIANT LYMPHS # BLD MANUAL: 0.29 X10*3/UL (ref 0–0.5)
VARIANT LYMPHS NFR BLD: 3 %
VENTRICULAR RATE: 117 BPM
VENTRICULAR RATE: 124 BPM
WBC # BLD AUTO: 9.8 X10*3/UL (ref 4.4–11.3)
WBC #/AREA URNS AUTO: ABNORMAL /HPF

## 2025-01-22 PROCEDURE — 82247 BILIRUBIN TOTAL: CPT | Performed by: EMERGENCY MEDICINE

## 2025-01-22 PROCEDURE — 71045 X-RAY EXAM CHEST 1 VIEW: CPT

## 2025-01-22 PROCEDURE — 2500000002 HC RX 250 W HCPCS SELF ADMINISTERED DRUGS (ALT 637 FOR MEDICARE OP, ALT 636 FOR OP/ED): Performed by: REGISTERED NURSE

## 2025-01-22 PROCEDURE — 82947 ASSAY GLUCOSE BLOOD QUANT: CPT

## 2025-01-22 PROCEDURE — 2500000001 HC RX 250 WO HCPCS SELF ADMINISTERED DRUGS (ALT 637 FOR MEDICARE OP): Performed by: REGISTERED NURSE

## 2025-01-22 PROCEDURE — 73502 X-RAY EXAM HIP UNI 2-3 VIEWS: CPT | Mod: RT

## 2025-01-22 PROCEDURE — 73564 X-RAY EXAM KNEE 4 OR MORE: CPT | Mod: BILATERAL PROCEDURE | Performed by: RADIOLOGY

## 2025-01-22 PROCEDURE — 36415 COLL VENOUS BLD VENIPUNCTURE: CPT | Performed by: EMERGENCY MEDICINE

## 2025-01-22 PROCEDURE — 2500000005 HC RX 250 GENERAL PHARMACY W/O HCPCS: Performed by: REGISTERED NURSE

## 2025-01-22 PROCEDURE — 85520 HEPARIN ASSAY: CPT | Performed by: REGISTERED NURSE

## 2025-01-22 PROCEDURE — 99285 EMERGENCY DEPT VISIT HI MDM: CPT | Mod: 25 | Performed by: EMERGENCY MEDICINE

## 2025-01-22 PROCEDURE — 93005 ELECTROCARDIOGRAM TRACING: CPT

## 2025-01-22 PROCEDURE — 84075 ASSAY ALKALINE PHOSPHATASE: CPT | Performed by: EMERGENCY MEDICINE

## 2025-01-22 PROCEDURE — 73564 X-RAY EXAM KNEE 4 OR MORE: CPT | Mod: 50

## 2025-01-22 PROCEDURE — 83880 ASSAY OF NATRIURETIC PEPTIDE: CPT | Performed by: EMERGENCY MEDICINE

## 2025-01-22 PROCEDURE — 83735 ASSAY OF MAGNESIUM: CPT | Performed by: EMERGENCY MEDICINE

## 2025-01-22 PROCEDURE — 85007 BL SMEAR W/DIFF WBC COUNT: CPT | Performed by: EMERGENCY MEDICINE

## 2025-01-22 PROCEDURE — 85610 PROTHROMBIN TIME: CPT | Performed by: EMERGENCY MEDICINE

## 2025-01-22 PROCEDURE — 85730 THROMBOPLASTIN TIME PARTIAL: CPT | Performed by: EMERGENCY MEDICINE

## 2025-01-22 PROCEDURE — 71045 X-RAY EXAM CHEST 1 VIEW: CPT | Performed by: RADIOLOGY

## 2025-01-22 PROCEDURE — 96361 HYDRATE IV INFUSION ADD-ON: CPT

## 2025-01-22 PROCEDURE — 2500000004 HC RX 250 GENERAL PHARMACY W/ HCPCS (ALT 636 FOR OP/ED): Performed by: REGISTERED NURSE

## 2025-01-22 PROCEDURE — 83605 ASSAY OF LACTIC ACID: CPT | Performed by: EMERGENCY MEDICINE

## 2025-01-22 PROCEDURE — 81001 URINALYSIS AUTO W/SCOPE: CPT | Performed by: EMERGENCY MEDICINE

## 2025-01-22 PROCEDURE — 93970 EXTREMITY STUDY: CPT

## 2025-01-22 PROCEDURE — 2500000004 HC RX 250 GENERAL PHARMACY W/ HCPCS (ALT 636 FOR OP/ED): Performed by: EMERGENCY MEDICINE

## 2025-01-22 PROCEDURE — 73502 X-RAY EXAM HIP UNI 2-3 VIEWS: CPT | Mod: RIGHT SIDE | Performed by: RADIOLOGY

## 2025-01-22 PROCEDURE — 84550 ASSAY OF BLOOD/URIC ACID: CPT | Performed by: REGISTERED NURSE

## 2025-01-22 PROCEDURE — 85027 COMPLETE CBC AUTOMATED: CPT | Performed by: EMERGENCY MEDICINE

## 2025-01-22 PROCEDURE — 85379 FIBRIN DEGRADATION QUANT: CPT | Performed by: EMERGENCY MEDICINE

## 2025-01-22 PROCEDURE — 2550000001 HC RX 255 CONTRASTS: Performed by: EMERGENCY MEDICINE

## 2025-01-22 PROCEDURE — 1210000001 HC SEMI-PRIVATE ROOM DAILY

## 2025-01-22 PROCEDURE — 84484 ASSAY OF TROPONIN QUANT: CPT | Performed by: EMERGENCY MEDICINE

## 2025-01-22 PROCEDURE — 71275 CT ANGIOGRAPHY CHEST: CPT

## 2025-01-22 PROCEDURE — 99222 1ST HOSP IP/OBS MODERATE 55: CPT | Performed by: REGISTERED NURSE

## 2025-01-22 PROCEDURE — 2500000001 HC RX 250 WO HCPCS SELF ADMINISTERED DRUGS (ALT 637 FOR MEDICARE OP): Performed by: EMERGENCY MEDICINE

## 2025-01-22 PROCEDURE — 93971 EXTREMITY STUDY: CPT | Performed by: STUDENT IN AN ORGANIZED HEALTH CARE EDUCATION/TRAINING PROGRAM

## 2025-01-22 PROCEDURE — 96374 THER/PROPH/DIAG INJ IV PUSH: CPT

## 2025-01-22 RX ORDER — ACETAMINOPHEN 650 MG/1
650 SUPPOSITORY RECTAL EVERY 4 HOURS PRN
Status: DISCONTINUED | OUTPATIENT
Start: 2025-01-22 | End: 2025-01-25 | Stop reason: HOSPADM

## 2025-01-22 RX ORDER — ACETAMINOPHEN 160 MG/5ML
650 SOLUTION ORAL EVERY 4 HOURS PRN
Status: DISCONTINUED | OUTPATIENT
Start: 2025-01-22 | End: 2025-01-25 | Stop reason: HOSPADM

## 2025-01-22 RX ORDER — DOCUSATE SODIUM 100 MG/1
100 CAPSULE, LIQUID FILLED ORAL 2 TIMES DAILY
Status: DISCONTINUED | OUTPATIENT
Start: 2025-01-22 | End: 2025-01-25 | Stop reason: HOSPADM

## 2025-01-22 RX ORDER — HEPARIN SODIUM 10000 [USP'U]/100ML
0-4500 INJECTION, SOLUTION INTRAVENOUS CONTINUOUS
Status: DISCONTINUED | OUTPATIENT
Start: 2025-01-22 | End: 2025-01-22

## 2025-01-22 RX ORDER — PANTOPRAZOLE SODIUM 40 MG/1
40 TABLET, DELAYED RELEASE ORAL
COMMUNITY

## 2025-01-22 RX ORDER — ONDANSETRON 4 MG/1
4 TABLET, FILM COATED ORAL EVERY 8 HOURS PRN
Status: DISCONTINUED | OUTPATIENT
Start: 2025-01-22 | End: 2025-01-25 | Stop reason: HOSPADM

## 2025-01-22 RX ORDER — VIT C/E/ZN/COPPR/LUTEIN/ZEAXAN 250MG-90MG
1000 CAPSULE ORAL NIGHTLY
Status: DISCONTINUED | OUTPATIENT
Start: 2025-01-22 | End: 2025-01-25 | Stop reason: HOSPADM

## 2025-01-22 RX ORDER — NYSTATIN 100000 [USP'U]/ML
5 SUSPENSION ORAL 4 TIMES DAILY
Status: DISCONTINUED | OUTPATIENT
Start: 2025-01-22 | End: 2025-01-25 | Stop reason: HOSPADM

## 2025-01-22 RX ORDER — BACLOFEN 10 MG/1
20 TABLET ORAL 2 TIMES DAILY
Status: DISCONTINUED | OUTPATIENT
Start: 2025-01-22 | End: 2025-01-25 | Stop reason: HOSPADM

## 2025-01-22 RX ORDER — PANTOPRAZOLE SODIUM 40 MG/1
40 TABLET, DELAYED RELEASE ORAL
Status: DISCONTINUED | OUTPATIENT
Start: 2025-01-22 | End: 2025-01-25 | Stop reason: HOSPADM

## 2025-01-22 RX ORDER — ACETAMINOPHEN 325 MG/1
650 TABLET ORAL EVERY 4 HOURS PRN
Status: DISCONTINUED | OUTPATIENT
Start: 2025-01-22 | End: 2025-01-25 | Stop reason: HOSPADM

## 2025-01-22 RX ORDER — LOSARTAN POTASSIUM 50 MG/1
50 TABLET ORAL DAILY
Status: DISCONTINUED | OUTPATIENT
Start: 2025-01-22 | End: 2025-01-25 | Stop reason: HOSPADM

## 2025-01-22 RX ORDER — TOPIRAMATE 25 MG/1
25 TABLET ORAL 2 TIMES DAILY
Status: DISCONTINUED | OUTPATIENT
Start: 2025-01-22 | End: 2025-01-25 | Stop reason: HOSPADM

## 2025-01-22 RX ORDER — FENOFIBRATE 54 MG/1
54 TABLET ORAL DAILY
Status: DISCONTINUED | OUTPATIENT
Start: 2025-01-22 | End: 2025-01-25 | Stop reason: HOSPADM

## 2025-01-22 RX ORDER — DEXAMETHASONE 4 MG/1
8 TABLET ORAL 2 TIMES DAILY
Status: DISCONTINUED | OUTPATIENT
Start: 2025-01-22 | End: 2025-01-25 | Stop reason: HOSPADM

## 2025-01-22 RX ORDER — DEXTROSE 50 % IN WATER (D50W) INTRAVENOUS SYRINGE
12.5
Status: DISCONTINUED | OUTPATIENT
Start: 2025-01-22 | End: 2025-01-25 | Stop reason: HOSPADM

## 2025-01-22 RX ORDER — DEXTROSE 50 % IN WATER (D50W) INTRAVENOUS SYRINGE
25
Status: DISCONTINUED | OUTPATIENT
Start: 2025-01-22 | End: 2025-01-25 | Stop reason: HOSPADM

## 2025-01-22 RX ORDER — HEPARIN SODIUM 10000 [USP'U]/100ML
0-4500 INJECTION, SOLUTION INTRAVENOUS CONTINUOUS
Status: DISCONTINUED | OUTPATIENT
Start: 2025-01-22 | End: 2025-01-23

## 2025-01-22 RX ORDER — PREGABALIN 50 MG/1
150 CAPSULE ORAL 3 TIMES DAILY
Status: DISCONTINUED | OUTPATIENT
Start: 2025-01-22 | End: 2025-01-25 | Stop reason: HOSPADM

## 2025-01-22 RX ORDER — LIDOCAINE 560 MG/1
1 PATCH PERCUTANEOUS; TOPICAL; TRANSDERMAL DAILY
Status: DISCONTINUED | OUTPATIENT
Start: 2025-01-22 | End: 2025-01-25 | Stop reason: HOSPADM

## 2025-01-22 RX ORDER — ALBUTEROL SULFATE 0.83 MG/ML
1.25 SOLUTION RESPIRATORY (INHALATION) EVERY 4 HOURS PRN
Status: DISCONTINUED | OUTPATIENT
Start: 2025-01-22 | End: 2025-01-25 | Stop reason: HOSPADM

## 2025-01-22 RX ORDER — ONDANSETRON HYDROCHLORIDE 2 MG/ML
4 INJECTION, SOLUTION INTRAVENOUS EVERY 8 HOURS PRN
Status: DISCONTINUED | OUTPATIENT
Start: 2025-01-22 | End: 2025-01-25 | Stop reason: HOSPADM

## 2025-01-22 RX ORDER — OLMESARTAN MEDOXOMIL, AMLODIPINE AND HYDROCHLOROTHIAZIDE TABLET 40/5/12.5 MG 40; 5; 12.5 MG/1; MG/1; MG/1
1 TABLET ORAL DAILY
COMMUNITY

## 2025-01-22 RX ORDER — ACETAMINOPHEN 325 MG/1
975 TABLET ORAL ONCE
Status: COMPLETED | OUTPATIENT
Start: 2025-01-22 | End: 2025-01-22

## 2025-01-22 RX ORDER — CLOPIDOGREL BISULFATE 75 MG/1
75 TABLET ORAL DAILY
Status: DISCONTINUED | OUTPATIENT
Start: 2025-01-22 | End: 2025-01-25 | Stop reason: HOSPADM

## 2025-01-22 RX ADMIN — ACETAMINOPHEN 975 MG: 325 TABLET ORAL at 14:42

## 2025-01-22 RX ADMIN — IOHEXOL 75 ML: 350 INJECTION, SOLUTION INTRAVENOUS at 11:35

## 2025-01-22 RX ADMIN — LIDOCAINE 4% 1 PATCH: 40 PATCH TOPICAL at 17:39

## 2025-01-22 RX ADMIN — PREGABALIN 150 MG: 50 CAPSULE ORAL at 20:10

## 2025-01-22 RX ADMIN — HEPARIN SODIUM 1500 UNITS/HR: 10000 INJECTION, SOLUTION INTRAVENOUS at 12:42

## 2025-01-22 RX ADMIN — ACETAMINOPHEN 650 MG: 325 TABLET ORAL at 21:07

## 2025-01-22 RX ADMIN — Medication 1000 MCG: at 20:11

## 2025-01-22 RX ADMIN — NYSTATIN 500000 UNITS: 100000 SUSPENSION ORAL at 20:10

## 2025-01-22 RX ADMIN — LOSARTAN POTASSIUM 50 MG: 50 TABLET, FILM COATED ORAL at 16:34

## 2025-01-22 RX ADMIN — PREGABALIN 150 MG: 50 CAPSULE ORAL at 17:39

## 2025-01-22 RX ADMIN — DOCUSATE SODIUM 100 MG: 100 CAPSULE, LIQUID FILLED ORAL at 14:42

## 2025-01-22 RX ADMIN — FENOFIBRATE 54 MG: 54 TABLET ORAL at 16:34

## 2025-01-22 RX ADMIN — HEPARIN SODIUM 1400 UNITS/HR: 10000 INJECTION, SOLUTION INTRAVENOUS at 20:18

## 2025-01-22 RX ADMIN — BACLOFEN 20 MG: 10 TABLET ORAL at 20:10

## 2025-01-22 RX ADMIN — SODIUM CHLORIDE 500 ML: 9 INJECTION, SOLUTION INTRAVENOUS at 10:32

## 2025-01-22 RX ADMIN — HEPARIN SODIUM 1600 UNITS/HR: 10000 INJECTION, SOLUTION INTRAVENOUS at 14:42

## 2025-01-22 RX ADMIN — PANTOPRAZOLE SODIUM 40 MG: 40 TABLET, DELAYED RELEASE ORAL at 16:34

## 2025-01-22 RX ADMIN — DOCUSATE SODIUM 100 MG: 100 CAPSULE, LIQUID FILLED ORAL at 20:11

## 2025-01-22 RX ADMIN — CLOPIDOGREL BISULFATE 75 MG: 75 TABLET, FILM COATED ORAL at 16:34

## 2025-01-22 RX ADMIN — NYSTATIN 500000 UNITS: 100000 SUSPENSION ORAL at 16:34

## 2025-01-22 RX ADMIN — DEXAMETHASONE 8 MG: 4 TABLET ORAL at 20:11

## 2025-01-22 RX ADMIN — TOPIRAMATE 25 MG: 25 TABLET ORAL at 20:11

## 2025-01-22 RX ADMIN — INSULIN HUMAN 8 UNITS: 100 INJECTION, SOLUTION PARENTERAL at 16:34

## 2025-01-22 SDOH — SOCIAL STABILITY: SOCIAL INSECURITY: ARE THERE ANY APPARENT SIGNS OF INJURIES/BEHAVIORS THAT COULD BE RELATED TO ABUSE/NEGLECT?: NO

## 2025-01-22 SDOH — SOCIAL STABILITY: SOCIAL INSECURITY: WITHIN THE LAST YEAR, HAVE YOU BEEN AFRAID OF YOUR PARTNER OR EX-PARTNER?: NO

## 2025-01-22 SDOH — SOCIAL STABILITY: SOCIAL INSECURITY
WITHIN THE LAST YEAR, HAVE YOU BEEN RAPED OR FORCED TO HAVE ANY KIND OF SEXUAL ACTIVITY BY YOUR PARTNER OR EX-PARTNER?: NO

## 2025-01-22 SDOH — HEALTH STABILITY: MENTAL HEALTH: HOW OFTEN DO YOU HAVE SIX OR MORE DRINKS ON ONE OCCASION?: NEVER

## 2025-01-22 SDOH — ECONOMIC STABILITY: HOUSING INSECURITY: AT ANY TIME IN THE PAST 12 MONTHS, WERE YOU HOMELESS OR LIVING IN A SHELTER (INCLUDING NOW)?: NO

## 2025-01-22 SDOH — SOCIAL STABILITY: SOCIAL INSECURITY: ABUSE: ADULT

## 2025-01-22 SDOH — ECONOMIC STABILITY: HOUSING INSECURITY: IN THE LAST 12 MONTHS, WAS THERE A TIME WHEN YOU WERE NOT ABLE TO PAY THE MORTGAGE OR RENT ON TIME?: NO

## 2025-01-22 SDOH — HEALTH STABILITY: MENTAL HEALTH: HOW OFTEN DO YOU HAVE A DRINK CONTAINING ALCOHOL?: MONTHLY OR LESS

## 2025-01-22 SDOH — ECONOMIC STABILITY: FOOD INSECURITY: HOW HARD IS IT FOR YOU TO PAY FOR THE VERY BASICS LIKE FOOD, HOUSING, MEDICAL CARE, AND HEATING?: NOT HARD AT ALL

## 2025-01-22 SDOH — ECONOMIC STABILITY: FOOD INSECURITY: WITHIN THE PAST 12 MONTHS, YOU WORRIED THAT YOUR FOOD WOULD RUN OUT BEFORE YOU GOT THE MONEY TO BUY MORE.: NEVER TRUE

## 2025-01-22 SDOH — ECONOMIC STABILITY: TRANSPORTATION INSECURITY: IN THE PAST 12 MONTHS, HAS LACK OF TRANSPORTATION KEPT YOU FROM MEDICAL APPOINTMENTS OR FROM GETTING MEDICATIONS?: NO

## 2025-01-22 SDOH — ECONOMIC STABILITY: FOOD INSECURITY: WITHIN THE PAST 12 MONTHS, THE FOOD YOU BOUGHT JUST DIDN'T LAST AND YOU DIDN'T HAVE MONEY TO GET MORE.: NEVER TRUE

## 2025-01-22 SDOH — ECONOMIC STABILITY: INCOME INSECURITY: IN THE PAST 12 MONTHS HAS THE ELECTRIC, GAS, OIL, OR WATER COMPANY THREATENED TO SHUT OFF SERVICES IN YOUR HOME?: NO

## 2025-01-22 SDOH — SOCIAL STABILITY: SOCIAL INSECURITY: WITHIN THE LAST YEAR, HAVE YOU BEEN HUMILIATED OR EMOTIONALLY ABUSED IN OTHER WAYS BY YOUR PARTNER OR EX-PARTNER?: NO

## 2025-01-22 SDOH — SOCIAL STABILITY: SOCIAL INSECURITY
WITHIN THE LAST YEAR, HAVE YOU BEEN KICKED, HIT, SLAPPED, OR OTHERWISE PHYSICALLY HURT BY YOUR PARTNER OR EX-PARTNER?: NO

## 2025-01-22 SDOH — HEALTH STABILITY: MENTAL HEALTH: HOW MANY DRINKS CONTAINING ALCOHOL DO YOU HAVE ON A TYPICAL DAY WHEN YOU ARE DRINKING?: 1 OR 2

## 2025-01-22 SDOH — SOCIAL STABILITY: SOCIAL INSECURITY: DOES ANYONE TRY TO KEEP YOU FROM HAVING/CONTACTING OTHER FRIENDS OR DOING THINGS OUTSIDE YOUR HOME?: NO

## 2025-01-22 SDOH — SOCIAL STABILITY: SOCIAL INSECURITY: WERE YOU ABLE TO COMPLETE ALL THE BEHAVIORAL HEALTH SCREENINGS?: YES

## 2025-01-22 SDOH — SOCIAL STABILITY: SOCIAL INSECURITY: DO YOU FEEL ANYONE HAS EXPLOITED OR TAKEN ADVANTAGE OF YOU FINANCIALLY OR OF YOUR PERSONAL PROPERTY?: NO

## 2025-01-22 SDOH — SOCIAL STABILITY: SOCIAL INSECURITY: HAS ANYONE EVER THREATENED TO HURT YOUR FAMILY OR YOUR PETS?: NO

## 2025-01-22 SDOH — ECONOMIC STABILITY: HOUSING INSECURITY: IN THE PAST 12 MONTHS, HOW MANY TIMES HAVE YOU MOVED WHERE YOU WERE LIVING?: 0

## 2025-01-22 SDOH — SOCIAL STABILITY: SOCIAL INSECURITY: HAVE YOU HAD THOUGHTS OF HARMING ANYONE ELSE?: NO

## 2025-01-22 SDOH — SOCIAL STABILITY: SOCIAL INSECURITY: DO YOU FEEL UNSAFE GOING BACK TO THE PLACE WHERE YOU ARE LIVING?: NO

## 2025-01-22 SDOH — SOCIAL STABILITY: SOCIAL INSECURITY: ARE YOU OR HAVE YOU BEEN THREATENED OR ABUSED PHYSICALLY, EMOTIONALLY, OR SEXUALLY BY ANYONE?: NO

## 2025-01-22 ASSESSMENT — COGNITIVE AND FUNCTIONAL STATUS - GENERAL
DRESSING REGULAR LOWER BODY CLOTHING: A LOT
DAILY ACTIVITIY SCORE: 17
WALKING IN HOSPITAL ROOM: TOTAL
WALKING IN HOSPITAL ROOM: A LOT
TURNING FROM BACK TO SIDE WHILE IN FLAT BAD: A LOT
DRESSING REGULAR UPPER BODY CLOTHING: A LOT
CLIMB 3 TO 5 STEPS WITH RAILING: A LOT
TURNING FROM BACK TO SIDE WHILE IN FLAT BAD: A LOT
STANDING UP FROM CHAIR USING ARMS: A LOT
MOBILITY SCORE: 14
PERSONAL GROOMING: A LITTLE
DRESSING REGULAR UPPER BODY CLOTHING: A LITTLE
MOVING FROM LYING ON BACK TO SITTING ON SIDE OF FLAT BED WITH BEDRAILS: A LOT
CLIMB 3 TO 5 STEPS WITH RAILING: TOTAL
TOILETING: A LOT
DRESSING REGULAR LOWER BODY CLOTHING: A LOT
PATIENT BASELINE BEDBOUND: NO
MOVING TO AND FROM BED TO CHAIR: A LOT
TOILETING: A LOT
HELP NEEDED FOR BATHING: A LOT
DAILY ACTIVITIY SCORE: 15
STANDING UP FROM CHAIR USING ARMS: TOTAL
HELP NEEDED FOR BATHING: A LOT
MOBILITY SCORE: 8
MOVING TO AND FROM BED TO CHAIR: TOTAL

## 2025-01-22 ASSESSMENT — ACTIVITIES OF DAILY LIVING (ADL)
ADEQUATE_TO_COMPLETE_ADL: YES
HEARING - RIGHT EAR: FUNCTIONAL
LACK_OF_TRANSPORTATION: NO
ASSISTIVE_DEVICE: WHEELCHAIR;WALKER
PATIENT'S MEMORY ADEQUATE TO SAFELY COMPLETE DAILY ACTIVITIES?: YES
JUDGMENT_ADEQUATE_SAFELY_COMPLETE_DAILY_ACTIVITIES: YES
DRESSING YOURSELF: DEPENDENT
BATHING: DEPENDENT
EFFECT OF PAIN ON DAILY ACTIVITIES: YES
FEEDING YOURSELF: INDEPENDENT
GROOMING: NEEDS ASSISTANCE
WALKS IN HOME: NEEDS ASSISTANCE
TOILETING: DEPENDENT
HEARING - LEFT EAR: FUNCTIONAL

## 2025-01-22 ASSESSMENT — PAIN DESCRIPTION - LOCATION: LOCATION: BACK

## 2025-01-22 ASSESSMENT — PATIENT HEALTH QUESTIONNAIRE - PHQ9
SUM OF ALL RESPONSES TO PHQ9 QUESTIONS 1 & 2: 2
1. LITTLE INTEREST OR PLEASURE IN DOING THINGS: SEVERAL DAYS
2. FEELING DOWN, DEPRESSED OR HOPELESS: SEVERAL DAYS

## 2025-01-22 ASSESSMENT — PAIN SCALES - GENERAL
PAINLEVEL_OUTOF10: 0 - NO PAIN
PAINLEVEL_OUTOF10: 6
PAINLEVEL_OUTOF10: 7

## 2025-01-22 ASSESSMENT — LIFESTYLE VARIABLES
TOTAL SCORE: 0
EVER FELT BAD OR GUILTY ABOUT YOUR DRINKING: NO
AUDIT-C TOTAL SCORE: 1
HAVE PEOPLE ANNOYED YOU BY CRITICIZING YOUR DRINKING: NO
EVER HAD A DRINK FIRST THING IN THE MORNING TO STEADY YOUR NERVES TO GET RID OF A HANGOVER: NO
HAVE YOU EVER FELT YOU SHOULD CUT DOWN ON YOUR DRINKING: NO
SKIP TO QUESTIONS 9-10: 1

## 2025-01-22 ASSESSMENT — PAIN - FUNCTIONAL ASSESSMENT
PAIN_FUNCTIONAL_ASSESSMENT: 0-10
PAIN_FUNCTIONAL_ASSESSMENT: 0-10

## 2025-01-22 ASSESSMENT — PAIN DESCRIPTION - DESCRIPTORS: DESCRIPTORS: ACHING

## 2025-01-22 NOTE — H&P
History Of Present Illness  Jing Jon is a 64 y.o. female presented with PMHx:       With multiple medical complaints.  S/P fall after slipping and lowering to the floor c/o of right hip pain, increased lower extremity edema, shortness of breath and bilateral wounds to dorsal aspect of feet. She did not hit her head, but landed on her bottom.  provides most of history as patient has cognitive decline after currently undergoing gamma knife radiation of meningioma.  witnessed fall, but her main complaint is worsening shortness of breath. She recently had a fall and had a fracture of her L1 that she was scheduled to see orthopedics.  states that over the last week her leg swelling has increased, she now has wound on the top of her feet that are tender -swelling has been ongoing since November. She seen her PCP who referred her to the wound clinic and started her on lasix. She has increased pain to her bilateral knees, worse to the left including swelling and ecchymotic. No loss of bladder or bowel function. He states the bruising is new  as well as the severity of pain to her bilateral knees. She was able to use her walker to be mobile, but now she has no strength when she stands.     Currently takes decadron for cerebral edema.  states that she has cognitive decline, however, it is improving with the steroid treatment.     She denies chest pain, abdominal pain fever or chills, headache, blurry vision, she denies hitting her head. She complains of pain to her lower extremities with palpation. On exam, she has oral thrush. There is bruising and tenderness to the left lower patella as well as tenderness to right knee. No new tingling or numbness. Strength is equal bilaterally to upper and lower extremities. She denies hitting her head or losing consciousness when she slipped today. Denies precipitating factors such as headache, chest pain, shortness of breath, dizziness prior to  fall.     Admit to Hospital Medicine     ER Course:   Right hip Xray- negative for fracture  CTA shows positive pulmonary embolism to right lower lobe, bandlike infiltrates or atelectasis scattered both lung fields, diffuse fatty infiltration of the liver    Glucose 241, sodium 138, potassium 4.3, chloride 103, gap 9, BUN 25, creatinine 0.49, EGFR 90, lactate 2.2, BNP 27, uric acid 4.1, troponin 13, D-dimer 1000 385, WBCs 9.8, hemoglobin 14.6, hematocrit 43, platelets 157       Past Medical History  She has a past medical history of Albuminuria, Allergic, Allergic rhinitis, Anemia, Anticoagulated, Anxiety, Arthritis, Arthritis of right glenohumeral joint, Asthma, Balance problem, Cardiology follow-up encounter (02/15/2024), Cardiology follow-up encounter (02/15/2024), CHF (congestive heart failure), Coronary artery disease, Degenerative disc disease, lumbar, Depression, Disease of thyroid gland, DM (diabetes mellitus), type 2 (Multi), Fibromyalgia, GERD (gastroesophageal reflux disease), Headache, Heart disease, Heart murmur, Hyperlipidemia, Hypertension, Hypothyroidism, Iliotibial band syndrome, left leg, Lumbosacral spondylosis without myelopathy, Meningioma (Multi), Migraines, Multiple thyroid nodules, MVP (mitral valve prolapse), Myasthenia gravis, Myocardial infarction (Multi), Neuropathy, Obesity, unspecified, Other spondylosis with myelopathy, cervical region, Peripheral neuropathy, Preoperative clearance, Primary osteoarthritis of right shoulder, Radiculopathy, lumbar region, Restless legs syndrome, Spinal stenosis, lumbar region without neurogenic claudication, Statin intolerance, Status post gamma knife treatment, Status post insertion of spinal cord stimulator (2023), Stroke (Multi) (2017), TIA (transient ischemic attack) (03/2014), Trochanteric bursitis, left hip, Unilateral primary osteoarthritis, left hip, Urinary incontinence, Urinary tract infection, Venous stasis ulcer with edema of lower leg,  Vitamin D deficiency, and Walker as ambulation aid.    Surgical History  She has a past surgical history that includes Rotator cuff repair (Right, 11/12/2009); Cervical fusion (12/19/2014); Nasal septum surgery; Ovarian cyst surgery; Colonoscopy (2016); Appendectomy; Back surgery; Cardiac catheterization (2006); Septoplasty (02/18/2011); Spine surgery; [place] tooth extraction; Upper gastrointestinal endoscopy (08/09/2016); Endometrial biopsy; Biceps tendon repair (Right, 2011); Total abdominal hysterectomy w/ bilateral salpingoophorectomy (05/2010); Salpingectomy (05/11/2010); Cardiac catheterization (04/04/2016); Polypectomy (08/21/2019); Spinal cord stimulator implant (02/14/2023); Spinal cord stimulator implant (04/11/2023); Other surgical history; and Sinus surgery.     Social History  She reports that she quit smoking about 30 years ago. Her smoking use included cigarettes. She started smoking about 47 years ago. She has a 8.5 pack-year smoking history. She has never used smokeless tobacco. She reports current alcohol use of about 1.0 standard drink of alcohol per week. She reports current drug use.    Family History  Diabetes  HTN      Allergies  Demerol [meperidine], Farxiga [dapagliflozin], Morphine, Propoxyphene, Statins-hmg-coa reductase inhibitors, Catapres [clonidine hcl], Clonidine, Lotensin [benazepril], Canagliflozin, Egg, Mounjaro [tirzepatide], and Zetia [ezetimibe]       Physical Exam   Constitutional: elderly appearing, awake/alert/oriented x2 - baseline per  , cooperative  Eyes: PERRL, EOMI, clear sclera  ENMT: mucous membranes moist, candida noted buccal cavity, apparent injury,   Head/Neck: Neck supple, no apparent injury, thyroid without mass or tenderness  Respiratory/Thorax: Patent airways,  normal breath sounds with  diminished bases   Cardiovascular: Regular, rate and rhythm, , 2+ equal pulses of the extremities, normal S 1and S 2, edema non pitting to lower pedals bilaterally    Gastrointestinal: Nondistended, soft, non-tender,  +BS x 4 quadrants  Genitourinary: voiding freely without CVA tenderness or suprabupic tenderness   Musculoskeletal: pt refused to allow bending of left lower patella, are is edematous and ecchymotic, tender with palpation, right patella is tender to palpation , right knee ROM is intact, but tender with movement  Extremities: normal extremities, left patella with ecchymosis identified   Skin: warm, dry, intact except to dorsal aspect of bilateral feet with wounds - pt refused to allow provider remove dressing, tenderness noted  Neurological: alert/oriented x2 speech clear,equal strength upper, no focal deficits identified, poor historian, pt is confused at times due to cognitive decline  Psychiatric: anxious, needs frequent redirection     Last Recorded Vitals  /79   Pulse (!) 122   Temp 37.1 °C (98.8 °F)   Resp 20   Wt 83.9 kg (185 lb)   SpO2 96%     Relevant Results  Scheduled medications  baclofen, 20 mg, oral, BID  clopidogrel, 75 mg, oral, Daily  cyanocobalamin, 1,000 mcg, oral, Nightly  dexAMETHasone, 8 mg, oral, BID  docusate sodium, 100 mg, oral, BID  fenofibrate, 54 mg, oral, Daily  insulin regular, 0-10 Units, subcutaneous, TID AC  losartan, 50 mg, oral, Daily  nystatin, 5 mL, Swish & Swallow, 4x daily  pantoprazole, 40 mg, oral, BID AC  topiramate, 25 mg, oral, BID      Continuous medications  heparin, 0-4,500 Units/hr, Last Rate: 1,600 Units/hr (01/22/25 1442)      PRN medications  PRN medications: acetaminophen **OR** acetaminophen **OR** acetaminophen, acetaminophen **OR** acetaminophen **OR** acetaminophen, albuterol, benzocaine-menthol, dextrose, dextrose, glucagon, glucagon, ondansetron **OR** ondansetron    Results for orders placed or performed during the hospital encounter of 01/22/25 (from the past 24 hours)   ECG 12 lead   Result Value Ref Range    Ventricular Rate 124 BPM    Atrial Rate 124 BPM    NH Interval 140 ms    QRS Duration  86 ms    QT Interval 308 ms    QTC Calculation(Bazett) 442 ms    P Axis 44 degrees    R Axis -43 degrees    T Axis 64 degrees    QRS Count 20 beats    Q Onset 216 ms    P Onset 146 ms    P Offset 192 ms    T Offset 370 ms    QTC Fredericia 392 ms   CBC and Auto Differential   Result Value Ref Range    WBC 9.8 4.4 - 11.3 x10*3/uL    nRBC 1.1 (H) 0.0 - 0.0 /100 WBCs    RBC 4.76 4.00 - 5.20 x10*6/uL    Hemoglobin 14.6 12.0 - 16.0 g/dL    Hematocrit 43.0 36.0 - 46.0 %    MCV 90 80 - 100 fL    MCH 30.7 26.0 - 34.0 pg    MCHC 34.0 32.0 - 36.0 g/dL    RDW 15.9 (H) 11.5 - 14.5 %    Platelets 157 150 - 450 x10*3/uL    Immature Granulocytes %, Automated 1.4 (H) 0.0 - 0.9 %    Immature Granulocytes Absolute, Automated 0.14 0.00 - 0.70 x10*3/uL   Comprehensive metabolic panel   Result Value Ref Range    Glucose 241 (H) 74 - 99 mg/dL    Sodium 138 136 - 145 mmol/L    Potassium 4.3 3.5 - 5.3 mmol/L    Chloride 103 98 - 107 mmol/L    Bicarbonate 30 21 - 32 mmol/L    Anion Gap 9 (L) 10 - 20 mmol/L    Urea Nitrogen 25 (H) 6 - 23 mg/dL    Creatinine 0.49 (L) 0.50 - 1.05 mg/dL    eGFR >90 >60 mL/min/1.73m*2    Calcium 8.6 8.6 - 10.3 mg/dL    Albumin 3.2 (L) 3.4 - 5.0 g/dL    Alkaline Phosphatase 46 33 - 136 U/L    Total Protein 5.7 (L) 6.4 - 8.2 g/dL    AST 14 9 - 39 U/L    Bilirubin, Total 0.7 0.0 - 1.2 mg/dL    ALT 44 7 - 45 U/L   Lactate   Result Value Ref Range    Lactate 2.2 (H) 0.4 - 2.0 mmol/L   Troponin I, High Sensitivity   Result Value Ref Range    Troponin I, High Sensitivity 13 0 - 13 ng/L   B-Type Natriuretic Peptide   Result Value Ref Range    BNP 27 0 - 99 pg/mL   D-Dimer, VTE Exclusion   Result Value Ref Range    D-Dimer, Quantitative VTE Exclusion 1,385 (H) <=500 ng/mL FEU   Magnesium   Result Value Ref Range    Magnesium 1.84 1.60 - 2.40 mg/dL   Manual Differential   Result Value Ref Range    Neutrophils %, Manual 70.0 40.0 - 80.0 %    Bands %, Manual 10.0 0.0 - 5.0 %    Lymphocytes %, Manual 13.0 13.0 - 44.0 %     Monocytes %, Manual 1.0 2.0 - 10.0 %    Eosinophils %, Manual 2.0 0.0 - 6.0 %    Basophils %, Manual 0.0 0.0 - 2.0 %    Atypical Lymphocytes %, Manual 3.0 0.0 - 2.0 %    Metamyelocytes %, Manual 1.0 0.0 - 0.0 %    Seg Neutrophils Absolute, Manual 6.86 1.20 - 7.00 x10*3/uL    Bands Absolute, Manual 0.98 (H) 0.00 - 0.70 x10*3/uL    Lymphocytes Absolute, Manual 1.27 1.20 - 4.80 x10*3/uL    Monocytes Absolute, Manual 0.10 0.10 - 1.00 x10*3/uL    Eosinophils Absolute, Manual 0.20 0.00 - 0.70 x10*3/uL    Basophils Absolute, Manual 0.00 0.00 - 0.10 x10*3/uL    Atypical Lymphs Absolute, Manual 0.29 0.00 - 0.50 x10*3/uL    Metamyelocytes Absolute, Manual 0.10 0.00 - 0.00 x10*3/uL    Total Cells Counted 100     Neutrophils Absolute, Manual 7.84 (H) 1.20 - 7.70 x10*3/uL    RBC Morphology See Below     Polychromasia Mild    Uric Acid   Result Value Ref Range    Uric Acid 4.1 2.3 - 6.7 mg/dL   ECG 12 lead   Result Value Ref Range    Ventricular Rate 117 BPM    Atrial Rate 117 BPM    TN Interval 142 ms    QRS Duration 86 ms    QT Interval 330 ms    QTC Calculation(Bazett) 460 ms    P Axis 49 degrees    R Axis -48 degrees    T Axis 61 degrees    QRS Count 19 beats    Q Onset 215 ms    P Onset 144 ms    P Offset 189 ms    T Offset 380 ms    QTC Fredericia 412 ms   aPTT - baseline   Result Value Ref Range    aPTT 23 (L) 27 - 38 seconds   Protime-INR   Result Value Ref Range    Protime 11.5 9.8 - 12.8 seconds    INR 1.0 0.9 - 1.1        Assessment/Plan     # Acute Pulmonary Embolism   # S/P Mechanical Fall   # Lower extremity Edema  # Diabetes Type 2 with Insulin Dependence  # Hx of Meniginoma treatment with gamma knife Radiation   # Recent L1 Compression Fracture   # Candida of Oral Cavity     Admit to Hospital Medicine   Heparin GTT initiated in ER - continue   ECHO pending due to increased leg swelling/ last LVEF 2016 showed 45-54%  CBC/BMP/ INR Heparin Assay per Protocol  Fall precautions  Bed alarms   PT/OT  Diabetic SSI ,  adjust as needed  Diabetic Cardiac diet   Pain to bilateral lower extremities - U/S of bilateral legs pending   Xray to bilateral knees - s/p mechanical fall  Pending   Nystatin swish and swallow  PT/OT  Uric acid to rule out gout  No leukocytosis and pain to bilateral knees doubt septic arthritis   Cognitive decline - due to menginoma - continue decadron  CONSULT ORTHOPEDICS - L1 compression fx from previous fall , xrays pending from bilateral knee pain     FULL CODE  DVTp: heparin gtt  Diabetic/Cardiac Diet   DVTp:  home with [facility]? 72 hours     Time spent  56  minutes obtaining labs, imaging, recommendations, interview, assessment, examination, medication review/ordering, and EMR review.    Plan of care was discussed extensively with patient, RN, Dr. Ulrich and . Patient verbalized understanding through teach back method. All questions and concerns addressed upon examination.     Of note, this documentation is completed using the Dragon Dictation system (voice recognition software). There may be spelling and/or grammatical errors that were not corrected prior to final submission.        Vicenta Cruz, APRN-CNP

## 2025-01-22 NOTE — CARE PLAN
The patient's goals for the shift include      The clinical goals for the shift include control pain    Over the shift, the patient did  Problem: Pain - Adult  Goal: Verbalizes/displays adequate comfort level or baseline comfort level  Outcome: Progressing     Problem: Safety - Adult  Goal: Free from fall injury  Outcome: Progressing     Problem: Discharge Planning  Goal: Discharge to home or other facility with appropriate resources  Outcome: Progressing     Problem: Chronic Conditions and Co-morbidities  Goal: Patient's chronic conditions and co-morbidity symptoms are monitored and maintained or improved  Outcome: Progressing     Problem: Nutrition  Goal: Nutrient intake appropriate for maintaining nutritional needs  Outcome: Progressing     Problem: Skin  Goal: Decreased wound size/increased tissue granulation at next dressing change  Outcome: Progressing  Goal: Participates in plan/prevention/treatment measures  Outcome: Progressing  Goal: Prevent/manage excess moisture  1/22/2025 1548 by Ann Dubon RN  Flowsheets (Taken 1/22/2025 1548)  Prevent/manage excess moisture:   Cleanse incontinence/protect with barrier cream   Monitor for/manage infection if present  1/22/2025 1545 by Ann Dubon RN  Outcome: Progressing  Goal: Prevent/minimize sheer/friction injuries  Outcome: Progressing  Goal: Promote/optimize nutrition  Outcome: Progressing  Goal: Promote skin healing  Outcome: Progressing     Problem: Pain  Goal: Takes deep breaths with improved pain control throughout the shift  Outcome: Progressing  Goal: Turns in bed with improved pain control throughout the shift  Outcome: Progressing  Goal: Walks with improved pain control throughout the shift  Outcome: Progressing  Goal: Performs ADL's with improved pain control throughout shift  Outcome: Progressing  Goal: Participates in PT with improved pain control throughout the shift  Outcome: Progressing  Goal: Free from opioid side effects  throughout the shift  Outcome: Progressing  Goal: Free from acute confusion related to pain meds throughout the shift  Outcome: Progressing    make progress toward the following goals.

## 2025-01-22 NOTE — ED PROVIDER NOTES
"HPI   Chief Complaint   Patient presents with    Leg Swelling     Patient having increased weakness and leg swelling with \"oozing and pain\"       HPI patient is a 64-year-old female who presents after she had a fall at home today.  She states that she has noticed some generalized fatigue and weakness over the past couple weeks which has made her mobility limited.  She states that her legs just did not move the way she needed them to and they slipped out and she landed on her bottom.  She also has a healing L1 fracture and was scheduled to see the orthopedist today for her right hip pain.  However she notes that she has had increasing lower extremity swelling since November and she saw her PCP for this yesterday.  She states that she just started a diuretic and was referred to wound care to continue to manage the lower extremity edema and the concurrent venous stasis wounds.  She complains of some shortness of breath and fatigue but denies any chest pain, abdominal pain, nausea, vomiting, diarrhea, fevers or chills.  She denies any head injury, loss of consciousness neck or back pain.  She denies any new numbness or tingling.  On exam she was noted to have thrush.  She states that she has had elevated blood glucose readings because she is currently taking Decadron.  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  She denies headache visual changes or any acute symptoms related to this.  She denies any other injuries, trauma or pain from the fall this morning.        Patient History   Past Medical History:   Diagnosis Date    Albuminuria     Allergic     Allergic rhinitis     Anemia     Anticoagulated     Plavix    Anxiety     Arthritis     Arthritis of right glenohumeral joint     Plan: Right Shoulder Anatomic Total Arthroplasty; Biceps Tenodesis 10/8/24    Asthma     Balance problem     due to underlying peripheral neuropathy    Cardiology follow-up " encounter 02/15/2024    Sienna Kamara MD    Cardiology follow-up encounter 02/15/2024    Sienna Kamara MD    CHF (congestive heart failure)     Coronary artery disease     Degenerative disc disease, lumbar     Depression     Disease of thyroid gland     DM (diabetes mellitus), type 2 (Multi)     9/7/24 A1C 7.3%    Fibromyalgia     GERD (gastroesophageal reflux disease)     Headache     Heart disease     Heart murmur     Hyperlipidemia     on repatha    Hypertension     Hypothyroidism     Iliotibial band syndrome, left leg     Lumbosacral spondylosis without myelopathy     s/p SPINAL C ORD STIMULATOR TRIAL 2/14/23    Meningioma (Multi)     repeat brain MRI showing increase in size of left frontal lobe meningioma since 4/2018. This was causing mass effect on left frontal horn however no edema noted. She is now undergoing gamma knife treatment    Migraines     treated w/ Indocin    Multiple thyroid nodules     MVP (mitral valve prolapse)     Myasthenia gravis     PT DENIES, NO ACHR ABX TEST, NO EMG , NO THYMUS SCAN    Myocardial infarction (Multi)     Neuropathy     Obesity, unspecified     Other spondylosis with myelopathy, cervical region     Peripheral neuropathy     Preoperative clearance     Neurology Paz Min NP LOV 10/1/24: “/110.  NP rechecked & BP not as elevated at 150/90 & 168/84. I cannot clear her w/o BP more stable around 130/80.  Pt reports home BP runs 140s. She was taken off BP med d/t hypotension/hypovolemia. I'm going to start low dose Norvasc. F/u w/ PCP for further treatment. She can be cleared as long as stable BP& hold plavix days prior to surgery.”    Primary osteoarthritis of right shoulder     Radiculopathy, lumbar region     Restless legs syndrome     Spinal stenosis, lumbar region without neurogenic claudication     Statin intolerance     Status post gamma knife treatment     Status post insertion of spinal cord stimulator 2023    Stroke (Multi) 2017    old lacunar  thalamic infarcts-  2/6/2017(given tpa), 4/2017, 5/2017 still has left sided weakness    TIA (transient ischemic attack) 03/2014    Trochanteric bursitis, left hip     Unilateral primary osteoarthritis, left hip     Urinary incontinence     Urinary tract infection     Venous stasis ulcer with edema of lower leg     Vitamin D deficiency     Walker as ambulation aid     ambulates with wheeled walker     Past Surgical History:   Procedure Laterality Date    APPENDECTOMY      BACK SURGERY      BICEPS TENDON REPAIR Right 2011    CARDIAC CATHETERIZATION  2006    CARDIAC CATHETERIZATION  04/04/2016    CERVICAL FUSION  12/19/2014    COLONOSCOPY  2016    ENDOMETRIAL BIOPSY      Endometrial Biopsy and Cervical Dilation    NASAL SEPTUM SURGERY      OTHER SURGICAL HISTORY      radiation TX stereotactic radiosurgery for Meningioma    OVARIAN CYST SURGERY      POLYPECTOMY  08/21/2019    HYSTEROSCOPY SURGICAL W/SAMPLING BIOPSY OF ENDOMETRIUM &/OR POLYPECTOMY W/ OR W/O D&C    ROTATOR CUFF REPAIR Right 11/12/2009    SALPINGECTOMY  05/11/2010    SINGLE PORT LAPAROSCOPY WITH OOPHORECTOMY AND SALPINGECTOMY    SEPTOPLASTY  02/18/2011    SINUS SURGERY      SPINAL CORD STIMULATOR IMPLANT  02/14/2023    SCS TRIAL    SPINAL CORD STIMULATOR IMPLANT  04/11/2023    PERMANENT SPINAL CORD STIMULATOR INSERTION    SPINE SURGERY      TOTAL ABDOMINAL HYSTERECTOMY W/ BILATERAL SALPINGOOPHORECTOMY  05/2010    UPPER GASTROINTESTINAL ENDOSCOPY  08/09/2016    WISDOM TOOTH EXTRACTION       Family History   Problem Relation Name Age of Onset    Diabetes Mother Nirmala Tog     Hypertension Mother Nirmala Chaes     Alcohol abuse Father Bandar Stone     Arthritis Father Bandar Stone     Diabetes Father Bandar Tog     Hypertension Father Bandar Tog     Asthma Sister Ashley DeClemente     Cancer Sister Ashley DeClemente     Diabetes Sister Ashley DeClemente     Cancer Sister NataliyaDesirae Joseph     Ovarian cancer Sister NataliyaDesirae Joseph     Cancer  Brother Bandar Stone     Colon cancer Brother Bandar Stone     Hypertension Brother Bandar Stone     Diabetes type II Brother Bandar Stone     Alcohol abuse Son Dipak Jon     Asthma Son Dipak Jon     Learning disabilities Son Dipak Jon     Asthma Son Ton Jon Jr.     Learning disabilities Son Ton Jon Jr.     Heart disease Maternal Grandfather Imtiaz Jewell      Social History     Tobacco Use    Smoking status: Former     Current packs/day: 0.00     Average packs/day: 0.5 packs/day for 17.0 years (8.5 ttl pk-yrs)     Types: Cigarettes     Start date:      Quit date:      Years since quittin.0    Smokeless tobacco: Never   Vaping Use    Vaping status: Never Used   Substance Use Topics    Alcohol use: Yes     Alcohol/week: 1.0 standard drink of alcohol     Types: 1 Standard drinks or equivalent per week     Comment: social drinker    Drug use: Yes     Comment: cbd and thc       Physical Exam   ED Triage Vitals [25 0920]   Temperature Heart Rate Respirations BP   36 °C (96.8 °F) (!) 125 18 140/82      Pulse Ox Temp Source Heart Rate Source Patient Position   100 % Temporal Monitor Lying      BP Location FiO2 (%)     Left arm --       Physical Exam  General: Awake, alert, in no acute distress,  appears tired and fatigued.  Eyes: Gaze conjugate.  No scleral icterus or injection  HENT: Normo-cephalic, atraumatic. No stridor  CV:   Tachycardic rate, regular rhythm. Radial pulses 2+ bilaterally.  diffuse lower extremity edema.  There is multiple venous stasis wounds and scabbing.  Nothing appears to be cellulitic or acutely infected at this time.  Resp:  There is a mildly increased work of breathing.  Lungs are clear  GI: Soft, non-distended, non-tender. No rebound or guarding.  :   No CVA tenderness.  MSK/Extremities: No gross bony deformities. Moving all extremities  Skin: Warm. Appropriate color.   Multiple venous stasis wounds on the bilateral lower  extremities.  Neuro: Alert. Oriented. Face symmetric. Speech is fluent.  Gross strength and sensation intact in b/l UE and Les.   No definite focal deficits appreciated at this time.  Psych: Appropriate mood and affect        ED Course & MDM   Diagnoses as of 01/22/25 1239   Acute pulmonary embolism, unspecified pulmonary embolism type, unspecified whether acute cor pulmonale present (Multi)   Localized swelling of both lower legs   Multiple open wounds of lower leg, initial encounter   Oral candidiasis       EKG interpreted by me at 930:  Sinus tachycardia at 124 bpm.   Patient has some slight J-point elevation in aVF but it is not in contiguous leads.  I do not appreciate any other ST elevations.    There are no reciprocal changes either. Will repeat this EKG shortly.  I do not believe this is an acute ST elevation MI as read by the EKG machine.     repeat EKG interpreted by me at 1045: Sinus tachycardia 117 bpm.  No acute ST-T wave changes.  Similar to previous but no J-point elevation seen.  Rate is slightly slower  Labs Reviewed   CBC WITH AUTO DIFFERENTIAL - Abnormal       Result Value    WBC 9.8      nRBC 1.1 (*)     RBC 4.76      Hemoglobin 14.6      Hematocrit 43.0      MCV 90      MCH 30.7      MCHC 34.0      RDW 15.9 (*)     Platelets 157      Immature Granulocytes %, Automated 1.4 (*)     Immature Granulocytes Absolute, Automated 0.14     COMPREHENSIVE METABOLIC PANEL - Abnormal    Glucose 241 (*)     Sodium 138      Potassium 4.3      Chloride 103      Bicarbonate 30      Anion Gap 9 (*)     Urea Nitrogen 25 (*)     Creatinine 0.49 (*)     eGFR >90      Calcium 8.6      Albumin 3.2 (*)     Alkaline Phosphatase 46      Total Protein 5.7 (*)     AST 14      Bilirubin, Total 0.7      ALT 44     LACTATE - Abnormal    Lactate 2.2 (*)     Narrative:     Venipuncture immediately after or during the administration of Metamizole may lead to falsely low results. Testing should be performed immediately prior to  Metamizole dosing.   D-DIMER, VTE EXCLUSION - Abnormal    D-Dimer, Quantitative VTE Exclusion 1,385 (*)     Narrative:     The VTE Exclusion D-Dimer assay is reported in ng/mL Fibrinogen Equivalent Units (FEU).    Per 's instructions for use, a value of less than 500 ng/mL (FEU) may help to exclude DVT or PE in outpatients when the assay is used with a clinical pretest probability assessment.([facility] must utilize and document eCalc 'Wells Score Deep Vein Thrombosis Risk' for DVT exclusion only. Emergency Department should utilize  Guidelines for Emergency Department Use of the VTE Exclusion D-Dimer and Clinical Pretest probability assessment model for DVT or PE exclusion.)   MANUAL DIFFERENTIAL - Abnormal    Neutrophils %, Manual 70.0      Bands %, Manual 10.0      Lymphocytes %, Manual 13.0      Monocytes %, Manual 1.0      Eosinophils %, Manual 2.0      Basophils %, Manual 0.0      Atypical Lymphocytes %, Manual 3.0      Metamyelocytes %, Manual 1.0      Seg Neutrophils Absolute, Manual 6.86      Bands Absolute, Manual 0.98 (*)     Lymphocytes Absolute, Manual 1.27      Monocytes Absolute, Manual 0.10      Eosinophils Absolute, Manual 0.20      Basophils Absolute, Manual 0.00      Atypical Lymphs Absolute, Manual 0.29      Metamyelocytes Absolute, Manual 0.10      Total Cells Counted 100      Neutrophils Absolute, Manual 7.84 (*)     RBC Morphology See Below      Polychromasia Mild     TROPONIN I, HIGH SENSITIVITY - Normal    Troponin I, High Sensitivity 13      Narrative:     Less than 99th percentile of normal range cutoff-  Female and children under 18 years old <14 ng/L; Male <21 ng/L: Negative  Repeat testing should be performed if clinically indicated.     Female and children under 18 years old 14-50 ng/L; Male 21-50 ng/L:  Consistent with possible cardiac damage and possible increased clinical   risk. Serial measurements may help to assess extent of myocardial damage.     >50 ng/L: Consistent  with cardiac damage, increased clinical risk and  myocardial infarction. Serial measurements may help assess extent of   myocardial damage.      NOTE: Children less than 1 year old may have higher baseline troponin   levels and results should be interpreted in conjunction with the overall   clinical context.     NOTE: Troponin I testing is performed using a different   testing methodology at [facility] than at other   [facility]. Direct result comparisons should only   be made within the same method.   B-TYPE NATRIURETIC PEPTIDE - Normal    BNP 27      Narrative:        <100 pg/mL - Heart failure unlikely  100-299 pg/mL - Intermediate probability of acute heart                  failure exacerbation. Correlate with clinical                  context and patient history.    >=300 pg/mL - Heart Failure likely. Correlate with clinical                  context and patient history.    BNP testing is performed using different testing methodology at [facility] than at other [facility] hospitals. Direct result comparisons should only be made within the same method.      MAGNESIUM - Normal    Magnesium 1.84     URINALYSIS WITH REFLEX CULTURE AND MICROSCOPIC    Narrative:     The following orders were created for panel order Urinalysis with Reflex Culture and Microscopic.  Procedure                               Abnormality         Status                     ---------                               -----------         ------                     Urinalysis with Reflex C...[[id number]]                                                 Extra Urine Gray Tube[[id number]]                                                         Please view results for these tests on the individual orders.   URINALYSIS WITH REFLEX CULTURE AND MICROSCOPIC   EXTRA URINE GRAY TUBE   LACTATE     CT angio chest for pulmonary embolism   Final Result   Exam is positive for pulmonary embolism in the right lower lobe.        Bandlike  infiltrates or atelectasis scattered in both lung fields.        Diffuse fatty infiltration of the liver incidentally noted.        MACRO:   None.        Signed by: Gini Alvarado 1/22/2025 11:53 AM   Dictation workstation:   [id number]      XR chest 1 view   Final Result   No focal infiltrate or pneumothorax is identified.        MACRO:   None.        Signed by: Ruben Moss 1/22/2025 10:38 AM   Dictation workstation:   [id number]      XR hip right with pelvis when performed 2 or 3 views   Final Result   1.  No fracture or dislocation.   2. Degenerative changes, as described above.        MACRO:   None.        Signed by: Ruben Moss 1/22/2025 10:40 AM   Dictation workstation:   [id number]                      No data recorded                                 Medical Decision Making      I explained to the patient that I would like to evaluate her for traumatic injuries but I have more concerns about her medically.  She has an elevated heart rate she has thrush on exam she has lower extremity edema that is not otherwise explained.  Therefore I have sent a multitude of labs, chest x-ray and also checking a D-dimer just to eliminate PE as a cause.  Patient also has significant thrush and likely needs antifungal treatments for this.  I will give her some IV fluids to help her heart rate come down and repeat her EKG.  I do not believe that she has having an ST elevation MI as she is not complaining of any pain at this time.  I believe her EKG just has some J-point elevation and it is more related to an undulating baseline rather than true ST elevation.  Will repeat and keep monitoring her closely.  A troponin level has also been sent.  I will have a low threshold to put this patient on antibiotics as a and concerned she may be developing some sort of infection at this time.  I will see what her chest x-ray looks like, it could have some pulmonary edema or infiltrates.     I discussed the results with the patient  explained that her CT angio of her chest shows that she has evidence of a new PE.  I suspect this may be the cause of her bilateral lower extremity edema.  She likely needs to have bilateral vascular Dopplers performed to evaluate for clot.  Either way she needs to be heparinized and I will start this here in the department.  Her heart rate has improved slightly with IV fluids.  But she is comfortable at this time.  Her foot wounds have been dressed again there is very mild concern for infection and I have not ordered her antibiotics.  I do not want to give her antibiotics unnecessarily as she already has candidal esophagitis.  I will discuss this with the admitting hospitalist.  Explained to her and her  that she will need to be admitted for further treatment evaluation.  They understand and agree with the plan.  All questions were answered     I spoke with Vicenta the nurse practitioner working with Dr. Ulrich the hospitalist today and she agrees to admit the patient.  We debated whether or not to give her antibiotics for potential infiltrate on her chest scan versus watch and wait.  I explained I would let them decide that with her attending.  Also had concerns because of the thrush and that she will likely need some sort of swish and swallow nystatin.  I explained the rest of her history and that this appears to be a new PE.  I just ordered her high intensity heparin protocol to be started down here.  We will continue to monitor her until she goes upstairs.    Procedure  Procedures     Amy Galvez MD  01/22/25 1001       Amy Galvez MD  01/22/25 1001       Amy Galvez MD  01/22/25 1230       Amy Galvez MD  01/22/25 1239

## 2025-01-23 ENCOUNTER — APPOINTMENT (OUTPATIENT)
Dept: CARDIOLOGY | Facility: HOSPITAL | Age: 65
End: 2025-01-23
Payer: COMMERCIAL

## 2025-01-23 LAB
ANION GAP SERPL CALC-SCNC: 14 MMOL/L (ref 10–20)
AORTIC VALVE MEAN GRADIENT: 3 MMHG
AORTIC VALVE PEAK VELOCITY: 1.27 M/S
AV PEAK GRADIENT: 6 MMHG
AVA (PEAK VEL): 1.98 CM2
AVA (VTI): 2.02 CM2
BUN SERPL-MCNC: 20 MG/DL (ref 6–23)
CALCIUM SERPL-MCNC: 8.5 MG/DL (ref 8.6–10.3)
CHLORIDE SERPL-SCNC: 102 MMOL/L (ref 98–107)
CO2 SERPL-SCNC: 24 MMOL/L (ref 21–32)
CREAT SERPL-MCNC: 0.34 MG/DL (ref 0.5–1.05)
EGFRCR SERPLBLD CKD-EPI 2021: >90 ML/MIN/1.73M*2
EJECTION FRACTION APICAL 4 CHAMBER: 59.4
EJECTION FRACTION: 63 %
ERYTHROCYTE [DISTWIDTH] IN BLOOD BY AUTOMATED COUNT: 15.4 % (ref 11.5–14.5)
GLUCOSE BLD MANUAL STRIP-MCNC: 392 MG/DL (ref 74–99)
GLUCOSE BLD MANUAL STRIP-MCNC: 436 MG/DL (ref 74–99)
GLUCOSE BLD MANUAL STRIP-MCNC: 459 MG/DL (ref 74–99)
GLUCOSE BLD MANUAL STRIP-MCNC: 472 MG/DL (ref 74–99)
GLUCOSE BLD MANUAL STRIP-MCNC: 483 MG/DL (ref 74–99)
GLUCOSE SERPL-MCNC: 443 MG/DL (ref 74–99)
HCT VFR BLD AUTO: 39.5 % (ref 36–46)
HGB BLD-MCNC: 13.4 G/DL (ref 12–16)
HOLD SPECIMEN: NORMAL
LEFT ATRIUM VOLUME AREA LENGTH INDEX BSA: 22 ML/M2
LEFT VENTRICLE INTERNAL DIMENSION DIASTOLE: 3.24 CM (ref 3.5–6)
LEFT VENTRICULAR OUTFLOW TRACT DIAMETER: 1.9 CM
LV EJECTION FRACTION BIPLANE: 59 %
MCH RBC QN AUTO: 30.6 PG (ref 26–34)
MCHC RBC AUTO-ENTMCNC: 33.9 G/DL (ref 32–36)
MCV RBC AUTO: 90 FL (ref 80–100)
MITRAL VALVE E/A RATIO: 0.58
MITRAL VALVE E/E' RATIO: 8.3
NRBC BLD-RTO: 0.3 /100 WBCS (ref 0–0)
PLATELET # BLD AUTO: 142 X10*3/UL (ref 150–450)
POTASSIUM SERPL-SCNC: 4.5 MMOL/L (ref 3.5–5.3)
PROCALCITONIN SERPL-MCNC: 0.38 NG/ML
RBC # BLD AUTO: 4.38 X10*6/UL (ref 4–5.2)
RIGHT VENTRICLE FREE WALL PEAK S': 16.2 CM/S
RIGHT VENTRICLE PEAK SYSTOLIC PRESSURE: 15 MMHG
SODIUM SERPL-SCNC: 135 MMOL/L (ref 136–145)
TRICUSPID ANNULAR PLANE SYSTOLIC EXCURSION: 1.8 CM
UFH PPP CHRO-ACNC: 0.3 IU/ML
UFH PPP CHRO-ACNC: 0.6 IU/ML
UFH PPP CHRO-ACNC: 0.8 IU/ML
UFH PPP CHRO-ACNC: 1.1 IU/ML
WBC # BLD AUTO: 11 X10*3/UL (ref 4.4–11.3)

## 2025-01-23 PROCEDURE — 2500000004 HC RX 250 GENERAL PHARMACY W/ HCPCS (ALT 636 FOR OP/ED): Performed by: HOSPITALIST

## 2025-01-23 PROCEDURE — 93306 TTE W/DOPPLER COMPLETE: CPT

## 2025-01-23 PROCEDURE — 97165 OT EVAL LOW COMPLEX 30 MIN: CPT | Mod: GO

## 2025-01-23 PROCEDURE — 85520 HEPARIN ASSAY: CPT | Performed by: HOSPITALIST

## 2025-01-23 PROCEDURE — 93306 TTE W/DOPPLER COMPLETE: CPT | Performed by: INTERNAL MEDICINE

## 2025-01-23 PROCEDURE — 2500000002 HC RX 250 W HCPCS SELF ADMINISTERED DRUGS (ALT 637 FOR MEDICARE OP, ALT 636 FOR OP/ED): Performed by: HOSPITALIST

## 2025-01-23 PROCEDURE — 80048 BASIC METABOLIC PNL TOTAL CA: CPT | Performed by: REGISTERED NURSE

## 2025-01-23 PROCEDURE — 2500000001 HC RX 250 WO HCPCS SELF ADMINISTERED DRUGS (ALT 637 FOR MEDICARE OP): Performed by: REGISTERED NURSE

## 2025-01-23 PROCEDURE — 2500000002 HC RX 250 W HCPCS SELF ADMINISTERED DRUGS (ALT 637 FOR MEDICARE OP, ALT 636 FOR OP/ED): Performed by: REGISTERED NURSE

## 2025-01-23 PROCEDURE — 99232 SBSQ HOSP IP/OBS MODERATE 35: CPT | Performed by: HOSPITALIST

## 2025-01-23 PROCEDURE — 2500000005 HC RX 250 GENERAL PHARMACY W/O HCPCS: Performed by: REGISTERED NURSE

## 2025-01-23 PROCEDURE — 84145 PROCALCITONIN (PCT): CPT | Mod: ELYLAB | Performed by: REGISTERED NURSE

## 2025-01-23 PROCEDURE — 36415 COLL VENOUS BLD VENIPUNCTURE: CPT | Performed by: HOSPITALIST

## 2025-01-23 PROCEDURE — 36415 COLL VENOUS BLD VENIPUNCTURE: CPT | Performed by: REGISTERED NURSE

## 2025-01-23 PROCEDURE — 97161 PT EVAL LOW COMPLEX 20 MIN: CPT | Mod: GP | Performed by: PHYSICAL THERAPIST

## 2025-01-23 PROCEDURE — 85027 COMPLETE CBC AUTOMATED: CPT | Performed by: REGISTERED NURSE

## 2025-01-23 PROCEDURE — 82947 ASSAY GLUCOSE BLOOD QUANT: CPT

## 2025-01-23 PROCEDURE — 2500000004 HC RX 250 GENERAL PHARMACY W/ HCPCS (ALT 636 FOR OP/ED): Performed by: REGISTERED NURSE

## 2025-01-23 PROCEDURE — 1210000001 HC SEMI-PRIVATE ROOM DAILY

## 2025-01-23 PROCEDURE — 99223 1ST HOSP IP/OBS HIGH 75: CPT | Performed by: NURSE PRACTITIONER

## 2025-01-23 RX ORDER — INSULIN LISPRO 100 [IU]/ML
0-15 INJECTION, SOLUTION INTRAVENOUS; SUBCUTANEOUS
Status: DISCONTINUED | OUTPATIENT
Start: 2025-01-23 | End: 2025-01-23

## 2025-01-23 RX ADMIN — FENOFIBRATE 54 MG: 54 TABLET ORAL at 09:23

## 2025-01-23 RX ADMIN — CLOPIDOGREL BISULFATE 75 MG: 75 TABLET, FILM COATED ORAL at 09:23

## 2025-01-23 RX ADMIN — LOSARTAN POTASSIUM 50 MG: 50 TABLET, FILM COATED ORAL at 09:23

## 2025-01-23 RX ADMIN — INSULIN HUMAN 95 UNITS: 500 INJECTION, SOLUTION SUBCUTANEOUS at 09:26

## 2025-01-23 RX ADMIN — Medication 1000 MCG: at 22:02

## 2025-01-23 RX ADMIN — HEPARIN SODIUM 1000 UNITS/HR: 10000 INJECTION, SOLUTION INTRAVENOUS; SUBCUTANEOUS at 15:46

## 2025-01-23 RX ADMIN — PANTOPRAZOLE SODIUM 40 MG: 40 TABLET, DELAYED RELEASE ORAL at 17:02

## 2025-01-23 RX ADMIN — INSULIN HUMAN 10 UNITS: 100 INJECTION, SOLUTION PARENTERAL at 11:44

## 2025-01-23 RX ADMIN — BACLOFEN 20 MG: 10 TABLET ORAL at 22:02

## 2025-01-23 RX ADMIN — DOCUSATE SODIUM 100 MG: 100 CAPSULE, LIQUID FILLED ORAL at 09:23

## 2025-01-23 RX ADMIN — PREGABALIN 150 MG: 50 CAPSULE ORAL at 22:01

## 2025-01-23 RX ADMIN — PANTOPRAZOLE SODIUM 40 MG: 40 TABLET, DELAYED RELEASE ORAL at 05:46

## 2025-01-23 RX ADMIN — INSULIN HUMAN 35 UNITS: 500 INJECTION, SOLUTION SUBCUTANEOUS at 16:58

## 2025-01-23 RX ADMIN — DOCUSATE SODIUM 100 MG: 100 CAPSULE, LIQUID FILLED ORAL at 22:02

## 2025-01-23 RX ADMIN — DEXAMETHASONE 8 MG: 4 TABLET ORAL at 22:02

## 2025-01-23 RX ADMIN — NYSTATIN 500000 UNITS: 100000 SUSPENSION ORAL at 05:46

## 2025-01-23 RX ADMIN — LIDOCAINE 4% 1 PATCH: 40 PATCH TOPICAL at 09:25

## 2025-01-23 RX ADMIN — INSULIN HUMAN 65 UNITS: 500 INJECTION, SOLUTION SUBCUTANEOUS at 12:19

## 2025-01-23 RX ADMIN — HEPARIN SODIUM 1200 UNITS/HR: 10000 INJECTION, SOLUTION INTRAVENOUS at 01:45

## 2025-01-23 RX ADMIN — NYSTATIN 500000 UNITS: 100000 SUSPENSION ORAL at 11:44

## 2025-01-23 RX ADMIN — INSULIN HUMAN 10 UNITS: 100 INJECTION, SOLUTION PARENTERAL at 16:57

## 2025-01-23 RX ADMIN — INSULIN HUMAN 10 UNITS: 100 INJECTION, SOLUTION PARENTERAL at 09:23

## 2025-01-23 RX ADMIN — NYSTATIN 500000 UNITS: 100000 SUSPENSION ORAL at 22:02

## 2025-01-23 RX ADMIN — PREGABALIN 150 MG: 50 CAPSULE ORAL at 16:57

## 2025-01-23 RX ADMIN — TOPIRAMATE 25 MG: 25 TABLET ORAL at 22:02

## 2025-01-23 RX ADMIN — PREGABALIN 150 MG: 50 CAPSULE ORAL at 09:23

## 2025-01-23 RX ADMIN — BACLOFEN 20 MG: 10 TABLET ORAL at 09:23

## 2025-01-23 RX ADMIN — DEXAMETHASONE 8 MG: 4 TABLET ORAL at 09:23

## 2025-01-23 RX ADMIN — NYSTATIN 500000 UNITS: 100000 SUSPENSION ORAL at 16:57

## 2025-01-23 RX ADMIN — TOPIRAMATE 25 MG: 25 TABLET ORAL at 09:23

## 2025-01-23 ASSESSMENT — ENCOUNTER SYMPTOMS
SHORTNESS OF BREATH: 1
NAUSEA: 0
JOINT SWELLING: 0
MYALGIAS: 1
CONFUSION: 1
TROUBLE SWALLOWING: 0
DYSPHORIC MOOD: 1
ENDOCRINE NEGATIVE: 1
DYSURIA: 0
ABDOMINAL PAIN: 0
ABDOMINAL DISTENTION: 0
FATIGUE: 1
FEVER: 0
WOUND: 1
DIARRHEA: 0
WEAKNESS: 1
ALLERGIC/IMMUNOLOGIC NEGATIVE: 1
BRUISES/BLEEDS EASILY: 1
ACTIVITY CHANGE: 0
DIFFICULTY URINATING: 0
CHEST TIGHTNESS: 0
BACK PAIN: 1
FREQUENCY: 1
GASTROINTESTINAL NEGATIVE: 1
VOICE CHANGE: 0
EYES NEGATIVE: 1
PSYCHIATRIC NEGATIVE: 1
NEUROLOGICAL NEGATIVE: 1
COUGH: 0
SHORTNESS OF BREATH: 0
VOMITING: 0
ARTHRALGIAS: 1

## 2025-01-23 ASSESSMENT — COGNITIVE AND FUNCTIONAL STATUS - GENERAL
DRESSING REGULAR UPPER BODY CLOTHING: A LITTLE
TOILETING: A LOT
DRESSING REGULAR UPPER BODY CLOTHING: A LOT
PERSONAL GROOMING: A LITTLE
MOVING FROM LYING ON BACK TO SITTING ON SIDE OF FLAT BED WITH BEDRAILS: A LOT
TOILETING: A LOT
DAILY ACTIVITIY SCORE: 16
EATING MEALS: A LOT
EATING MEALS: A LOT
WALKING IN HOSPITAL ROOM: TOTAL
TURNING FROM BACK TO SIDE WHILE IN FLAT BAD: A LOT
STANDING UP FROM CHAIR USING ARMS: A LOT
TURNING FROM BACK TO SIDE WHILE IN FLAT BAD: A LOT
HELP NEEDED FOR BATHING: A LOT
PERSONAL GROOMING: A LOT
TOILETING: A LOT
MOBILITY SCORE: 9
MOBILITY SCORE: 14
MOVING TO AND FROM BED TO CHAIR: A LOT
CLIMB 3 TO 5 STEPS WITH RAILING: TOTAL
CLIMB 3 TO 5 STEPS WITH RAILING: A LOT
WALKING IN HOSPITAL ROOM: A LOT
DAILY ACTIVITIY SCORE: 12
DRESSING REGULAR LOWER BODY CLOTHING: A LOT
DRESSING REGULAR UPPER BODY CLOTHING: A LOT
HELP NEEDED FOR BATHING: A LOT
TURNING FROM BACK TO SIDE WHILE IN FLAT BAD: A LOT
STANDING UP FROM CHAIR USING ARMS: A LOT
MOVING TO AND FROM BED TO CHAIR: A LOT
DRESSING REGULAR LOWER BODY CLOTHING: A LOT
WALKING IN HOSPITAL ROOM: A LOT
STANDING UP FROM CHAIR USING ARMS: A LOT
PERSONAL GROOMING: A LOT
DAILY ACTIVITIY SCORE: 12
MOVING TO AND FROM BED TO CHAIR: TOTAL
DRESSING REGULAR LOWER BODY CLOTHING: A LOT
CLIMB 3 TO 5 STEPS WITH RAILING: A LOT
MOBILITY SCORE: 14
HELP NEEDED FOR BATHING: A LOT

## 2025-01-23 ASSESSMENT — PAIN - FUNCTIONAL ASSESSMENT
PAIN_FUNCTIONAL_ASSESSMENT: 0-10

## 2025-01-23 ASSESSMENT — ACTIVITIES OF DAILY LIVING (ADL): BATHING_ASSISTANCE: MAXIMAL

## 2025-01-23 ASSESSMENT — PAIN SCALES - GENERAL
PAINLEVEL_OUTOF10: 0 - NO PAIN
PAINLEVEL_OUTOF10: 6
PAINLEVEL_OUTOF10: 6

## 2025-01-23 ASSESSMENT — PAIN SCALES - WONG BAKER: WONGBAKER_NUMERICALRESPONSE: HURTS LITTLE BIT

## 2025-01-23 NOTE — CONSULTS
Inpatient consult to Vascular Surgery  Consult performed by: JOSE Enamorado  Consult ordered by: CHESTER Holliday-CNP          Reason For Consult  LLE DVT    History Of Present Illness  Jing Jon is a 64 y.o. female with history of hypertension, hyperlipidemia, type 2 diabetes mellitus, asthma, hypothyroidism, coronary artery disease status post remote MI, CVA with mild residual left-sided hemiparesis, and meningioma status post gamma knife resection, currently on chemotherapy and Decadron for management of cerebral edema.  She has had chronic back pain and right hip pain since sustaining a lumbar fracture in December and has had significant impairment to her mobility since that time.  Over the last several months she has noticed an increase in bilateral lower extremity swelling with blistering and weeping for which she has been followed by her primary care provider.  She presented to the ED yesterday after an accidental fall at home which occurred while trying to get out of bed and losing her footing. Due to complaints of edema and dyspnea CTA of the chest and bilateral lower extremity venous duplex were obtained.  Patient was found to have both a right lower lobe pulmonary embolus and left lower extremity DVT stenting from the proximal femoral to common femoral vein.  She has been placed on IV heparin and admitted for further management.  Vascular surgery asked to see for further recommendations.  At time of consultation the patient is slightly lethargic, but oriented x 3.  She is a poor historian and most of her history is obtained through her  who is at bedside.  She is in no acute distress.  Bilateral lower extremities are noted for 1-2+ nonpitting edema left slightly greater than the right.  Bilateral feet are warm with palpable pulses, extremely tender to touch from feet to calf bilaterally.  There are small superficial ulcerations from ruptured blisters on the dorsal  aspects of both feet without signs of infection.  No significant discoloration although left lower extremity has some mild ecchymosis to the thigh.  Findings to be discussed with Dr. Madden, continue anticoagulation with IV heparin, further recommendations to follow.     Past Medical History  She has a past medical history of Albuminuria, Allergic, Allergic rhinitis, Anemia, Anticoagulated, Anxiety, Arthritis, Arthritis of right glenohumeral joint, Asthma, Balance problem, Cardiology follow-up encounter (02/15/2024), Cardiology follow-up encounter (02/15/2024), CHF (congestive heart failure), Coronary artery disease, Degenerative disc disease, lumbar, Depression, Disease of thyroid gland, DM (diabetes mellitus), type 2 (Multi), Fibromyalgia, GERD (gastroesophageal reflux disease), Headache, Heart disease, Heart murmur, Hyperlipidemia, Hypertension, Hypothyroidism, Iliotibial band syndrome, left leg, Lumbosacral spondylosis without myelopathy, Meningioma (Multi), Migraines, Multiple thyroid nodules, MVP (mitral valve prolapse), Myasthenia gravis, Myocardial infarction (Multi), Neuropathy, Obesity, unspecified, Other spondylosis with myelopathy, cervical region, Peripheral neuropathy, Preoperative clearance, Primary osteoarthritis of right shoulder, Radiculopathy, lumbar region, Restless legs syndrome, Spinal stenosis, lumbar region without neurogenic claudication, Statin intolerance, Status post gamma knife treatment, Status post insertion of spinal cord stimulator (2023), Stroke (Multi) (2017), TIA (transient ischemic attack) (03/2014), Trochanteric bursitis, left hip, Unilateral primary osteoarthritis, left hip, Urinary incontinence, Urinary tract infection, Venous stasis ulcer with edema of lower leg, Vitamin D deficiency, and Walker as ambulation aid.    Surgical History  She has a past surgical history that includes Rotator cuff repair (Right, 11/12/2009); Cervical fusion (12/19/2014); Nasal septum surgery;  Ovarian cyst surgery; Colonoscopy (2016); Appendectomy; Back surgery; Cardiac catheterization (2006); Septoplasty (02/18/2011); Spine surgery; [place] tooth extraction; Upper gastrointestinal endoscopy (08/09/2016); Endometrial biopsy; Biceps tendon repair (Right, 2011); Total abdominal hysterectomy w/ bilateral salpingoophorectomy (05/2010); Salpingectomy (05/11/2010); Cardiac catheterization (04/04/2016); Polypectomy (08/21/2019); Spinal cord stimulator implant (02/14/2023); Spinal cord stimulator implant (04/11/2023); Other surgical history; and Sinus surgery.     Social History  She reports that she quit smoking about 30 years ago. Her smoking use included cigarettes. She started smoking about 47 years ago. She has a 8.5 pack-year smoking history. She has never used smokeless tobacco. She reports current alcohol use of about 1.0 standard drink of alcohol per week. She reports current drug use.    Family History  Family History   Problem Relation Name Age of Onset    Diabetes Mother Nirmala Stone     Hypertension Mother Nirmala Stone     Alcohol abuse Father Bandar Stone     Arthritis Father Bandar Stone     Diabetes Father Bandar Stone     Hypertension Father Bandar Stone     Asthma Sister Ashley DeClemente     Cancer Sister Ashley DeClemente     Diabetes Sister Ashley DeClemente     Cancer Sister Afia Joseph     Ovarian cancer Sister Afia Merrill English     Cancer Brother Bandar Stone     Colon cancer Brother Bandar Stone     Hypertension Brother Bandar Stone     Diabetes type II Brother Bandar Stone     Alcohol abuse Son Dipak Jon     Asthma Son Dipak Dontrell     Learning disabilities Son Dipak Dontrell     Asthma Son Ton Jon Jr.     Learning disabilities Son Ton Jon Jr.     Heart disease Maternal Grandfather Imtiaz Best         Allergies  Demerol [meperidine], Farxiga [dapagliflozin], Morphine, Propoxyphene, Statins-hmg-coa reductase inhibitors, Catapres [clonidine hcl],  Clonidine, Lotensin [benazepril], Canagliflozin, Egg, Mounjaro [tirzepatide], and Zetia [ezetimibe]    Review of Systems   Constitutional:  Positive for fatigue.   HENT: Negative.     Eyes: Negative.    Respiratory:  Positive for shortness of breath.    Cardiovascular:  Positive for leg swelling.   Gastrointestinal: Negative.    Endocrine: Negative.    Genitourinary:  Positive for frequency.   Musculoskeletal:  Positive for arthralgias, back pain, gait problem and myalgias.   Skin:  Positive for wound.   Allergic/Immunologic: Negative.    Neurological:  Positive for weakness.   Hematological:  Bruises/bleeds easily.   Psychiatric/Behavioral:  Positive for confusion and dysphoric mood.         Physical Exam  Constitutional:       General: She is not in acute distress.     Appearance: She is obese. She is ill-appearing.   HENT:      Head: Normocephalic.      Nose: Nose normal.      Mouth/Throat:      Mouth: Mucous membranes are moist.   Eyes:      Pupils: Pupils are equal, round, and reactive to light.   Cardiovascular:      Rate and Rhythm: Normal rate and regular rhythm.      Pulses: Normal pulses.      Heart sounds: Normal heart sounds.   Pulmonary:      Effort: Pulmonary effort is normal.      Breath sounds: Normal breath sounds.   Abdominal:      General: Bowel sounds are normal.   Genitourinary:     Comments: External catheter draining clear yellow  Musculoskeletal:      Cervical back: Normal range of motion.      Comments: 1-2+ BLE edema L slightly greater than R   Skin:     General: Skin is warm and dry.   Neurological:      Mental Status: She is oriented to person, place, and time. Mental status is at baseline.      Comments: Mild L hemiparesis (chronic post remote CVA)     Psychiatric:      Comments: Currently appropriate but withdrawn, intermittently confused and easily agitated          Last Recorded Vitals  Blood pressure (!) 140/93, pulse 109, temperature 35.8 °C (96.4 °F), temperature source Temporal,  "resp. rate 18, height 1.575 m (5' 2\"), weight 89.6 kg (197 lb 8.5 oz), SpO2 96%.    Relevant Results  Scheduled medications  baclofen, 20 mg, oral, BID  clopidogrel, 75 mg, oral, Daily  cyanocobalamin, 1,000 mcg, oral, Nightly  dexAMETHasone, 8 mg, oral, BID  docusate sodium, 100 mg, oral, BID  fenofibrate, 54 mg, oral, Daily  insulin regular, 35 Units, subcutaneous, Daily before evening meal  insulin regular, 65 Units, subcutaneous, Daily  insulin regular, 95 Units, subcutaneous, Daily before breakfast  insulin regular, 0-10 Units, subcutaneous, TID AC  lidocaine, 1 patch, transdermal, Daily  losartan, 50 mg, oral, Daily  nystatin, 5 mL, Swish & Swallow, 4x daily  pantoprazole, 40 mg, oral, BID AC  pregabalin, 150 mg, oral, TID  topiramate, 25 mg, oral, BID      Continuous medications  heparin, 0-4,500 Units/hr, Last Rate: 1,000 Units/hr (01/23/25 0645)      PRN medications  PRN medications: acetaminophen **OR** acetaminophen **OR** acetaminophen, acetaminophen **OR** acetaminophen **OR** acetaminophen, albuterol, benzocaine-menthol, dextrose, dextrose, glucagon, glucagon, ondansetron **OR** ondansetron     Results for orders placed or performed during the hospital encounter of 01/22/25 (from the past 24 hours)   aPTT - baseline   Result Value Ref Range    aPTT 23 (L) 27 - 38 seconds   Protime-INR   Result Value Ref Range    Protime 11.5 9.8 - 12.8 seconds    INR 1.0 0.9 - 1.1   POCT GLUCOSE   Result Value Ref Range    POCT Glucose 304 (H) 74 - 99 mg/dL   Lactate   Result Value Ref Range    Lactate 1.5 0.4 - 2.0 mmol/L   Heparin Assay, UFH   Result Value Ref Range    Heparin Unfractionated >2.0 (HH) See Comment Below for Therapeutic Ranges IU/mL   Urinalysis with Reflex Culture and Microscopic   Result Value Ref Range    Color, Urine Yellow Light-Yellow, Yellow, Dark-Yellow    Appearance, Urine Clear Clear    Specific Gravity, Urine >1.050 (N) 1.005 - 1.035    pH, Urine 7.0 5.0, 5.5, 6.0, 6.5, 7.0, 7.5, 8.0    " Protein, Urine 10 (TRACE) NEGATIVE, 10 (TRACE), 20 (TRACE) mg/dL    Glucose, Urine 1000 (4+) (A) Normal mg/dL    Blood, Urine NEGATIVE NEGATIVE    Ketones, Urine NEGATIVE NEGATIVE mg/dL    Bilirubin, Urine NEGATIVE NEGATIVE    Urobilinogen, Urine Normal Normal mg/dL    Nitrite, Urine NEGATIVE NEGATIVE    Leukocyte Esterase, Urine NEGATIVE NEGATIVE   Extra Urine Gray Tube   Result Value Ref Range    Extra Tube Hold for add-ons.    Urinalysis Microscopic   Result Value Ref Range    WBC, Urine 1-5 1-5, NONE /HPF    RBC, Urine 11-20 (A) NONE, 1-2, 3-5 /HPF    Bacteria, Urine 1+ (A) NONE SEEN /HPF   Heparin Assay, UFH   Result Value Ref Range    Heparin Unfractionated 1.0 See Comment Below for Therapeutic Ranges IU/mL   POCT GLUCOSE   Result Value Ref Range    POCT Glucose 389 (H) 74 - 99 mg/dL   Heparin Assay, UFH   Result Value Ref Range    Heparin Unfractionated 1.1 (HH) See Comment Below for Therapeutic Ranges IU/mL   CBC   Result Value Ref Range    WBC 11.0 4.4 - 11.3 x10*3/uL    nRBC 0.3 (H) 0.0 - 0.0 /100 WBCs    RBC 4.38 4.00 - 5.20 x10*6/uL    Hemoglobin 13.4 12.0 - 16.0 g/dL    Hematocrit 39.5 36.0 - 46.0 %    MCV 90 80 - 100 fL    MCH 30.6 26.0 - 34.0 pg    MCHC 33.9 32.0 - 36.0 g/dL    RDW 15.4 (H) 11.5 - 14.5 %    Platelets 142 (L) 150 - 450 x10*3/uL   Basic metabolic panel   Result Value Ref Range    Glucose 443 (H) 74 - 99 mg/dL    Sodium 135 (L) 136 - 145 mmol/L    Potassium 4.5 3.5 - 5.3 mmol/L    Chloride 102 98 - 107 mmol/L    Bicarbonate 24 21 - 32 mmol/L    Anion Gap 14 10 - 20 mmol/L    Urea Nitrogen 20 6 - 23 mg/dL    Creatinine 0.34 (L) 0.50 - 1.05 mg/dL    eGFR >90 >60 mL/min/1.73m*2    Calcium 8.5 (L) 8.6 - 10.3 mg/dL   Heparin Assay, UFH   Result Value Ref Range    Heparin Unfractionated 0.8 See Comment Below for Therapeutic Ranges IU/mL   POCT GLUCOSE   Result Value Ref Range    POCT Glucose 392 (H) 74 - 99 mg/dL   Transthoracic Echo (TTE) Complete   Result Value Ref Range    AV mn grad 3 mmHg     AV pk bret 1.27 m/s    LV Biplane EF 59 %    LVOT diam 1.90 cm    MV E/A ratio 0.58     Tricuspid annular plane systolic excursion 1.8 cm    MV avg E/e' ratio 8.30     LA vol index A/L 22.0 ml/m2    LV EF 63 %    RV free wall pk S' 16.20 cm/s    RVSP 15.0 mmHg    LVIDd 3.24 cm    Aortic Valve Area by Continuity of Peak Velocity 1.98 cm2    AV pk grad 6 mmHg    Aortic Valve Area by Continuity of VTI 2.02 cm2    LV A4C EF 59.4       Transthoracic Echo (TTE) Complete    Result Date: 1/23/2025          [place] [address]  Tel [phone] Fax [phone] TRANSTHORACIC ECHOCARDIOGRAM REPORT Patient Name:       ALICIA Andrade Physician:    [id number] Tres Garcia MD Study Date:         1/23/2025           Ordering Provider:    [id number] LUCIANO CARDOZO MRN/PID:            [id number]            Fellow: Accession#:         [id number]        Nurse: Date of Birth/Age:  1960 / 64      Sonographer:          Joy jacobson Gender Assigned at  F                   Additional Staff:     Ary Erickson Birth:                                                        RCS Height:             157.48 cm           Admit Date:           1/22/2025 Weight:             89.36 kg            Admission Status:     Inpatient -                                                               Routine BSA / BMI:          1.90 m2 / 36.03     Department Location:  [place] [address]                                     Echo Lab Blood Pressure: 140 /72 mmHg Study Type:    TRANSTHORACIC ECHO (TTE) COMPLETE Diagnosis/ICD: Other pulmonary embolism without acute cor pulmonale-I26.99 Indication:    R/O Right Heart Strain CPT Codes:     Echo Complete w Full Doppler-[id number] Patient History: Diabetes:          Yes Pertinent History: HTN,  Hyperlipidemia, TIA, LE Edema, Dyspnea, CHF and CAD. Study Detail: The following Echo studies were performed: 2D, M-Mode, Doppler and               color flow. The patient was awake.  PHYSICIAN INTERPRETATION: Left Ventricle: The left ventricular systolic function is normal, with a visually estimated ejection fraction of 60-65%. There are no regional wall motion abnormalities. The left ventricular cavity size is normal. There is mild increased septal and normal posterior left ventricular wall thickness. There is left ventricular concentric remodeling. Spectral Doppler shows a Grade I (impaired relaxation pattern) of left ventricular diastolic filling with normal left atrial filling pressure. Left Atrium: The left atrium is normal in size. Right Ventricle: The right ventricle is normal in size. There is normal right ventricular global systolic function. Right Atrium: The right atrium is normal in size. Aortic Valve: The aortic valve is trileaflet. There is mild aortic valve cusp calcification. The aortic valve dimensionless index is 0.71. There is no evidence of aortic valve regurgitation. The peak instantaneous gradient of the aortic valve is 6 mmHg. The mean gradient of the aortic valve is 3 mmHg. Mitral Valve: The mitral valve is mildly thickened. There is no evidence of mitral valve regurgitation. Tricuspid Valve: The tricuspid valve is structurally normal. There is trace to mild tricuspid regurgitation. Pulmonic Valve: The pulmonic valve is not well visualized. There is no indication of pulmonic valve regurgitation. Pericardium: No pericardial effusion noted. Aorta: The aortic root is normal. Systemic Veins: The inferior vena cava was not well visualized.  CONCLUSIONS:  1. The left ventricular systolic function is normal, with a visually estimated ejection fraction of 60-65%.  2. Spectral Doppler shows a Grade I (impaired relaxation pattern) of left ventricular diastolic filling with normal left atrial filling  pressure.  3. There is normal right ventricular global systolic function. QUANTITATIVE DATA SUMMARY:  2D MEASUREMENTS:           Normal Ranges: Ao Root d:       2.80 cm   (2.0-3.7cm) LAs:             3.60 cm   (2.7-4.0cm) IVSd:            1.06 cm   (0.6-1.1cm) LVPWd:           0.92 cm   (0.6-1.1cm) LVIDd:           3.24 cm   (3.9-5.9cm) LVIDs:           2.34 cm LV Mass Index:   47.8 g/m2 LV % FS          27.8 %  LA VOLUME:                    Normal Ranges: LA Vol A4C:        38.5 ml    (22+/-6mL/m2) LA Vol A2C:        40.7 ml LA Vol BP:         41.7 ml LA Vol Index A4C:  20.3ml/m2 LA Vol Index A2C:  21.4 ml/m2 LA Vol Index BP:   22.0 ml/m2 LA Area A4C:       16.4 cm2 LA Area A2C:       16.0 cm2 LA Major Axis A4C: 5.9 cm LA Major Axis A2C: 5.4 cm LA Volume Index:   21.1 ml/m2 LA Vol A4C:        36.0 ml LA Vol A2C:        39.0 ml LA Vol Index BSA:  19.7 ml/m2  RA VOLUME BY A/L METHOD:            Normal Ranges: RA Vol A4C:              63.5 ml    (8.3-19.5ml) RA Vol Index A4C:        33.4 ml/m2 RA Area A4C:             20.2 cm2 RA Major Axis A4C:       5.5 cm  AORTA MEASUREMENTS:         Normal Ranges: Asc Ao, d:          2.80 cm (2.1-3.4cm)  LV SYSTOLIC FUNCTION BY 2D PLANIMETRY (MOD):                      Normal Ranges: EF-A4C View:    59 % (>=55%) EF-A2C View:    57 % EF-Biplane:     59 % EF-Visual:      63 % LV EF Reported: 63 %  LV DIASTOLIC FUNCTION:           Normal Ranges: MV Peak E:             0.67 m/s  (0.7-1.2 m/s) MV Peak A:             1.15 m/s  (0.42-0.7 m/s) E/A Ratio:             0.58      (1.0-2.2) MV e'                  0.063 m/s (>8.0) MV lateral e'          0.08 m/s MV medial e'           0.04 m/s E/e' Ratio:            10.68     (<8.0)  MITRAL VALVE:          Normal Ranges: MV DT:        166 msec (150-240msec)  AORTIC VALVE:                     Normal Ranges: AoV Vmax:                1.27 m/s (<=1.7m/s) AoV Peak P.5 mmHg (<20mmHg) AoV Mean PG:             3.0 mmHg (1.7-11.5mmHg)  LVOT Max Thanh:            0.89 m/s (<=1.1m/s) AoV VTI:                 23.00 cm (18-25cm) LVOT VTI:                16.40 cm LVOT Diameter:           1.90 cm  (1.8-2.4cm) AoV Area, VTI:           2.02 cm2 (2.5-5.5cm2) AoV Area,Vmax:           1.98 cm2 (2.5-4.5cm2) AoV Dimensionless Index: 0.71  RIGHT VENTRICLE: RV Basal 3.52 cm RV Mid   3.34 cm RV Major 7.1 cm TAPSE:   18.0 mm RV s'    0.16 m/s  TRICUSPID VALVE/RVSP:          Normal Ranges: Peak TR Velocity:     1.73 m/s RV Syst Pressure:     15 mmHg  (< 30mmHg)  PULMONIC VALVE:          Normal Ranges: PV Accel Time:  95 msec  (>120ms) PV Max Thanh:     1.0 m/s  (0.6-0.9m/s) PV Max P.2 mmHg  [id number] Tres Garcia MD Electronically signed on 2025 at 10:28:59 AM  ** Final **     Lower extremity venous duplex bilateral    Result Date: 2025  Interpreted By:  Shaggy Whelan, STUDY: [facility] LOWER EXTREMITY VENOUS DUPLEX BILATERAL  2025 4:30 pm   INDICATION: 63 y/o   F with  Signs/Symptoms:bilateral  leg swelling, worse on left r/o DVT, has PE at this time. LMP:  Unknown.   ,M79.604 Pain in right leg,M79.605 Pain in left leg   COMPARISON: None.   ACCESSION NUMBER(S): [id number]   ORDERING CLINICIAN: LUCIANO CARDOZO   TECHNIQUE: Routine ultrasound of the  bilateral lower extremity was performed with duplex Doppler (color and spectral) evaluation.   Static images were obtained for remote interpretation.   FINDINGS: THIGH VEINS:  The right common femoral, femoral, popliteal, proximal medial saphenous, and deep femoral veins are patent and free of thrombus.  The veins are normally compressible.  They demonstrate normal phasic flow and augmentation response.  Partial compressibility of the left common and profunda femoral veins with extension into the proximal femoral vein. There is compressibility of the left popliteal vein.   CALF VEINS:  The paired peroneal and posterior tibial calf veins are patent.       Positive left lower extremity DVT extending from  the proximal femoral vein through the common femoral vein.   Critical findings were delivered to ordering provider JOSE Wilson via secure message.   MACRO: None   Signed by: Shaggy Whelan 1/22/2025 4:47 PM Dictation workstation:   [id number]    XR knee 4+ views bilateral    Result Date: 1/22/2025  Interpreted By:  Humberto Slaughter, STUDY: XR KNEE 4+ VIEWS BILATERAL;  1/22/2025 3:31 pm   INDICATION: Signs/Symptoms:bilateral knee pain, worse on the left.     COMPARISON: Prior exams are from 06/21/2022, 05/09/2018, and 05/18/2015.   ACCESSION NUMBER(S): [id number]   ORDERING CLINICIAN: LUCIANO CARDOZO   TECHNIQUE: 4 views each of each knee were obtained..   FINDINGS: On the right, there was no suprapatellar fullness to indicate effusion. Sharpening of the tibial spines. Mild patellofemoral spur formation.  No lytic or blastic destructive bone lesion.  No acute fracture or dislocation.  No periosteal reaction or erosion..     On the left, there was mild medial compartment narrowing with spur formation. Sharpening of the tibial spines. Slight patellofemoral spur formation. No gross effusion.  No lytic or blastic destructive bone lesion.  No acute fracture or dislocation.  No periosteal reaction or erosion..       Mild DJD in both knees as described, left greater than right.   MACRO: None   Signed by: Humberto Slaughter 1/22/2025 3:54 PM Dictation workstation:   [id number]    CT angio chest for pulmonary embolism    Result Date: 1/22/2025  Interpreted By:  Gini Alvarado, STUDY: CT ANGIO CHEST FOR PULMONARY EMBOLISM;  1/22/2025 11:40 am   INDICATION: Signs/Symptoms:hypoxia, tachycardia.     COMPARISON: None.   ACCESSION NUMBER(S): [id number]   ORDERING CLINICIAN: CHERRY HOBBS   TECHNIQUE: CT of the chest was performed. Sagittal and coronal reconstructions were generated. 75 ML Omnipaque 350 intravenous contrast given for the examination.  Multiplanar reconstructions of the pulmonary vessels were created on an  independent workstation and provided for review.   FINDINGS:     CHEST WALL AND LOWER NECK: Absent or non included right thyroid lobe. No significant axillary adenopathy.   MEDIASTINUM AND SHAWN:  No significant mediastinal or hilar lymphadenopathy.   HEART AND VESSELS:  Filling defect consistent with PE in the posterior branch of the right lower lobe pulmonary artery. No left-sided PE. No specific findings of right heart strain. The heart is normal in size. No significant pericardial effusion. Scattered atherosclerotic calcifications.   LUNGS, PLEURA, LARGE AIRWAYS:  Coarse linear opacities scattered in both lung fields, left-greater-than-right. No confluent airspace opacity or pleural effusion. The central airways are patent.   UPPER ABDOMEN:  The included liver is diffusely hypodense/fatty infiltrated. Splenic heterogeneity as expected for phase of contrast.   BONES:  Stimulator wires in the lower thoracic and upper lumbar spinal canal. Partially included anterior fusion hardware in the lower cervical spine. Degenerative endplate spurring in the visualized spine.       Exam is positive for pulmonary embolism in the right lower lobe.   Bandlike infiltrates or atelectasis scattered in both lung fields.   Diffuse fatty infiltration of the liver incidentally noted.   MACRO: None.   Signed by: Gini Alvarado 1/22/2025 11:53 AM Dictation workstation:   [id number]    ECG 12 lead    Result Date: 1/22/2025  Sinus tachycardia Possible Left atrial enlargement Left axis deviation Inferior infarct (cited on or before 07-FEB-2017) Abnormal ECG When compared with ECG of 22-JAN-2025 09:28, (unconfirmed) No significant change was found    XR hip right with pelvis when performed 2 or 3 views    Result Date: 1/22/2025  Interpreted By:  Ruben Moss, STUDY: XR HIP RIGHT WITH PELVIS WHEN PERFORMED 2 OR 3 VIEWS 1/22/2025 10:24 am   INDICATION: Signs/Symptoms:fall with sacral pain and right hip pain   COMPARISON: None.   ACCESSION  NUMBER(S): [id number]   ORDERING CLINICIAN: CHERRY HOBBS   TECHNIQUE: A single AP view of the pelvis as well as AP and lateral views of the right hip were obtained.   FINDINGS: There is no evidence of acute fracture or dislocation identified. Mild hypertrophic degenerative changes are seen in the sacroiliac joints bilaterally. Minimal joint space narrowing and small marginal osteophytes are seen in the hips bilaterally. Rounded calcifications are seen with throughout the pelvis, most consistent with phleboliths.       1.  No fracture or dislocation. 2. Degenerative changes, as described above.   MACRO: None.   Signed by: Ruben Moss 1/22/2025 10:40 AM Dictation workstation:   [id number]    XR chest 1 view    Result Date: 1/22/2025  Interpreted By:  Ruben Moss, STUDY: XR CHEST 1 VIEW  1/22/2025 10:24 am   INDICATION: Signs/Symptoms:tachycardia shortness of breath   COMPARISON: 12/29/2024   ACCESSION NUMBER(S): [id number]   ORDERING CLINICIAN: CHERRY HOBBS   TECHNIQUE: A single AP portable radiograph of the chest was obtained.   FINDINGS: Multiple cardiac monitoring leads are seen over the chest.   No focal infiltrate, pleural effusion or pneumothorax is identified. The cardiac silhouette is within normal limits for size.       No focal infiltrate or pneumothorax is identified.   MACRO: None.   Signed by: Ruben Moss 1/22/2025 10:38 AM Dictation workstation:   [id number]    ECG 12 lead    Result Date: 1/22/2025  Sinus tachycardia Left axis deviation Inferior infarct (cited on or before 07-FEB-2017) ** ** ACUTE MI / STEMI ** ** Abnormal ECG When compared with ECG of 16-MAR-2024 13:58, Vent. rate has increased BY  50 BPM QRS duration has decreased Criteria for Anterior infarct are no longer Present       Assessment/Plan     LLE DVT; RLL PE  Patient with history of progressive bilateral lower extremity edema in setting of reduced mobility secondary to prior lumbar fracture and generalized debility following  gamma knife treatment for meningioma and chemotherapy.  Presented to the ED following an accidental fall at home, subsequently ruled in for right lower lobe pulmonary embolus and left lower extremity DVT.  Currently no contraindications to anticoagulation.  -Agree with anticoagulation, will review with Dr Madden, further recommendations to follow    I spent 60 minutes in the professional and overall care of this patient.

## 2025-01-23 NOTE — CONSULTS
Inpatient consult to Orthopaedic Surgery  Consult performed by: JOSE Mitchell  Consult ordered by: JOSE Holldiay  Reason for consult: L1 age indeterminate compression fracture and bilateral knee pain          Consult Note  Patient: Jing Jon  Unit/Bed: 1019/1019-A  YOB: 1960  MRN: [id number]  Acct: [id number]   Admitting Diagnosis: Oral candidiasis [B37.0]  Localized swelling of both lower legs [R22.43]  Multiple open wounds of lower leg, initial encounter [S81.613G]  Acute pulmonary embolism, unspecified pulmonary embolism type, unspecified whether acute cor pulmonale present (Multi) [I26.99]  Date:  1/22/2025  Hospital Day: 1    Complaint:  Back pain and bilateral knee pain     History of Present Illness:  Jing Jon is a 64 year old female patient per chart review with history of anemia, asthma, arthritis of right glenohumeral joint, balance problem, CHF, CAD, degenerative disc disease, depression, DM II, headache, heart murmur, HLD, HTN, meningioma s/p gamma knife, migraines, hypothyroid, MG, MI, neuropathy, obesity, and TIA who was admitted to hospital for increasing shortness of breath and frequent falls. Her workup found her to have L1 age indeterminate compression fracture and patient complaining of bilateral knee pain s/p mechanical fall in which orthopedics was consulted for evaluation and treatment recommendations.     PMHx:  Past Medical History:   Diagnosis Date    Albuminuria     Allergic     Allergic rhinitis     Anemia     Anticoagulated     Plavix    Anxiety     Arthritis     Arthritis of right glenohumeral joint     Plan: Right Shoulder Anatomic Total Arthroplasty; Biceps Tenodesis 10/8/24    Asthma     Balance problem     due to underlying peripheral neuropathy    Cardiology follow-up encounter 02/15/2024    Sienna Kamara MD    Cardiology follow-up encounter 02/15/2024    Sienna Kamara MD    CHF (congestive heart failure)     Coronary  artery disease     Degenerative disc disease, lumbar     Depression     Disease of thyroid gland     DM (diabetes mellitus), type 2 (Multi)     9/7/24 A1C 7.3%    Fibromyalgia     GERD (gastroesophageal reflux disease)     Headache     Heart disease     Heart murmur     Hyperlipidemia     on repatha    Hypertension     Hypothyroidism     Iliotibial band syndrome, left leg     Lumbosacral spondylosis without myelopathy     s/p SPINAL C ORD STIMULATOR TRIAL 2/14/23    Meningioma (Multi)     repeat brain MRI showing increase in size of left frontal lobe meningioma since 4/2018. This was causing mass effect on left frontal horn however no edema noted. She is now undergoing gamma knife treatment    Migraines     treated w/ Indocin    Multiple thyroid nodules     MVP (mitral valve prolapse)     Myasthenia gravis     PT DENIES, NO ACHR ABX TEST, NO EMG , NO THYMUS SCAN    Myocardial infarction (Multi)     Neuropathy     Obesity, unspecified     Other spondylosis with myelopathy, cervical region     Peripheral neuropathy     Preoperative clearance     Neurology Paz Min NP LOV 10/1/24: “/110.  NP rechecked & BP not as elevated at 150/90 & 168/84. I cannot clear her w/o BP more stable around 130/80.  Pt reports home BP runs 140s. She was taken off BP med d/t hypotension/hypovolemia. I'm going to start low dose Norvasc. F/u w/ PCP for further treatment. She can be cleared as long as stable BP& hold plavix days prior to surgery.”    Primary osteoarthritis of right shoulder     Radiculopathy, lumbar region     Restless legs syndrome     Spinal stenosis, lumbar region without neurogenic claudication     Statin intolerance     Status post gamma knife treatment     Status post insertion of spinal cord stimulator 2023    Stroke (Multi) 2017    old lacunar thalamic infarcts-  2/6/2017(given tpa), 4/2017, 5/2017 still has left sided weakness    TIA (transient ischemic attack) 03/2014    Trochanteric bursitis, left  hip     Unilateral primary osteoarthritis, left hip     Urinary incontinence     Urinary tract infection     Venous stasis ulcer with edema of lower leg     Vitamin D deficiency     Walker as ambulation aid     ambulates with wheeled walker       PSHx:  Past Surgical History:   Procedure Laterality Date    APPENDECTOMY      BACK SURGERY      BICEPS TENDON REPAIR Right 2011    CARDIAC CATHETERIZATION  2006    CARDIAC CATHETERIZATION  2016    CERVICAL FUSION  2014    COLONOSCOPY  2016    ENDOMETRIAL BIOPSY      Endometrial Biopsy and Cervical Dilation    NASAL SEPTUM SURGERY      OTHER SURGICAL HISTORY      radiation TX stereotactic radiosurgery for Meningioma    OVARIAN CYST SURGERY      POLYPECTOMY  2019    HYSTEROSCOPY SURGICAL W/SAMPLING BIOPSY OF ENDOMETRIUM &/OR POLYPECTOMY W/ OR W/O D&C    ROTATOR CUFF REPAIR Right 2009    SALPINGECTOMY  2010    SINGLE PORT LAPAROSCOPY WITH OOPHORECTOMY AND SALPINGECTOMY    SEPTOPLASTY  2011    SINUS SURGERY      SPINAL CORD STIMULATOR IMPLANT  2023    SCS TRIAL    SPINAL CORD STIMULATOR IMPLANT  2023    PERMANENT SPINAL CORD STIMULATOR INSERTION    SPINE SURGERY      TOTAL ABDOMINAL HYSTERECTOMY W/ BILATERAL SALPINGOOPHORECTOMY  2010    UPPER GASTROINTESTINAL ENDOSCOPY  2016    WISDOM TOOTH EXTRACTION         Social Hx:  Social History     Socioeconomic History    Marital status:    Tobacco Use    Smoking status: Former     Current packs/day: 0.00     Average packs/day: 0.5 packs/day for 17.0 years (8.5 ttl pk-yrs)     Types: Cigarettes     Start date:      Quit date:      Years since quittin.0    Smokeless tobacco: Never   Vaping Use    Vaping status: Never Used   Substance and Sexual Activity    Alcohol use: Yes     Alcohol/week: 1.0 standard drink of alcohol     Types: 1 Standard drinks or equivalent per week     Comment: social drinker    Drug use: Yes     Comment: cbd and thc    Sexual activity:  Not Currently     Partners: Male     Birth control/protection: Male Sterilization     Social Drivers of Health     Financial Resource Strain: Low Risk  (1/22/2025)    Overall Financial Resource Strain (CARDIA)     Difficulty of Paying Living Expenses: Not hard at all   Food Insecurity: No Food Insecurity (1/22/2025)    Hunger Vital Sign     Worried About Running Out of Food in the Last Year: Never true     Ran Out of Food in the Last Year: Never true   Transportation Needs: No Transportation Needs (1/22/2025)    PRAPARE - Transportation     Lack of Transportation (Medical): No     Lack of Transportation (Non-Medical): No   Intimate Partner Violence: Not At Risk (1/22/2025)    Humiliation, Afraid, Rape, and Kick questionnaire     Fear of Current or Ex-Partner: No     Emotionally Abused: No     Physically Abused: No     Sexually Abused: No   Housing Stability: Low Risk  (1/22/2025)    Housing Stability Vital Sign     Unable to Pay for Housing in the Last Year: No     Number of Times Moved in the Last Year: 0     Homeless in the Last Year: No       Family Hx:  Family History   Problem Relation Name Age of Onset    Diabetes Mother Nirmala Togher     Hypertension Mother Nirmala Tog     Alcohol abuse Father Bandar Togher     Arthritis Father Bandar Togher     Diabetes Father Bandar Togher     Hypertension Father Bandar Togher     Asthma Sister Ashley DeClemente     Cancer Sister Ashley DeClemente     Diabetes Sister Ashley DeClemente     Cancer Sister Afia Joseph     Ovarian cancer Sister Afia Merrill English     Cancer Brother Bandar Togher     Colon cancer Brother Bandar Togher     Hypertension Brother Bandar Togher     Diabetes type II Brother Bandar Togher     Alcohol abuse Son Dipak Jon     Asthma Son Dipak Jon     Learning disabilities Son Dipak Jon     Asthma Son Ton Jon Jr.     Learning disabilities Son Ton Jon Jr.     Heart disease Maternal Grandfather Imtiaz Jewell        Review  of Systems:   Review of Systems   Constitutional:  Negative for activity change and fever.   HENT:  Negative for congestion, ear pain, trouble swallowing and voice change.    Eyes: Negative.    Respiratory:  Negative for cough, chest tightness and shortness of breath.    Cardiovascular:  Negative for chest pain.   Gastrointestinal:  Negative for abdominal distention, abdominal pain, diarrhea, nausea and vomiting.   Endocrine: Negative.    Genitourinary:  Negative for difficulty urinating and dysuria.   Musculoskeletal:  Positive for arthralgias and back pain. Negative for gait problem and joint swelling.   Skin: Negative.    Neurological: Negative.    Psychiatric/Behavioral: Negative.     All other systems reviewed and are negative.        Physical Examination:    Visit Vitals  BP (!) 140/93   Pulse 109   Temp 35.8 °C (96.4 °F)   Resp 18      Physical Exam  Vitals and nursing note reviewed.   Constitutional:       General: She is not in acute distress.     Appearance: Normal appearance.   HENT:      Head: Normocephalic.      Nose: Nose normal.      Mouth/Throat:      Mouth: Mucous membranes are moist.   Eyes:      Extraocular Movements: Extraocular movements intact.      Pupils: Pupils are equal, round, and reactive to light.   Cardiovascular:      Rate and Rhythm: Normal rate and regular rhythm.      Pulses: Normal pulses.      Heart sounds: Normal heart sounds.   Pulmonary:      Effort: Pulmonary effort is normal.      Breath sounds: Normal breath sounds.   Abdominal:      General: Bowel sounds are normal.      Palpations: Abdomen is soft.   Musculoskeletal:         General: Swelling, tenderness and signs of injury present.      Cervical back: Normal range of motion. No rigidity or tenderness.      Right lower leg: Edema present.      Left lower leg: Edema present.      Comments: No midline spinal process tenderness or step off appreciated.   No reproducible pain.    Skin:     General: Skin is warm.      Capillary  "Refill: Capillary refill takes less than 2 seconds.      Findings: Bruising present.          Neurological:      General: No focal deficit present.      Mental Status: She is alert and oriented to person, place, and time.   Psychiatric:         Mood and Affect: Mood normal.         LABS:  CBC:   Lab Results   Component Value Date    WBC 11.0 01/23/2025    RBC 4.38 01/23/2025    HGB 13.4 01/23/2025    HCT 39.5 01/23/2025    MCV 90 01/23/2025    MCH 30.6 01/23/2025    MCHC 33.9 01/23/2025    RDW 15.4 (H) 01/23/2025     (L) 01/23/2025     CBC with Differential:    Lab Results   Component Value Date    WBC 11.0 01/23/2025    RBC 4.38 01/23/2025    HGB 13.4 01/23/2025    HCT 39.5 01/23/2025     (L) 01/23/2025    MCV 90 01/23/2025    MCH 30.6 01/23/2025    MCHC 33.9 01/23/2025    RDW 15.4 (H) 01/23/2025    NRBC 0.3 (H) 01/23/2025    BANDSPCT 10.0 01/22/2025    LYMPHOPCT 13.0 01/22/2025    MONOPCT 1.0 01/22/2025    EOSPCT 2.0 01/22/2025    BASOPCT 0.0 01/22/2025    EOSABS 0.20 01/22/2025    BASOSABS 0.00 01/22/2025     CMP:    Lab Results   Component Value Date     (L) 01/23/2025    K 4.5 01/23/2025     01/23/2025    CO2 24 01/23/2025    BUN 20 01/23/2025    CREATININE 0.34 (L) 01/23/2025    GLUCOSE 443 (H) 01/23/2025    PROT 5.7 (L) 01/22/2025    CALCIUM 8.5 (L) 01/23/2025    BILITOT 0.7 01/22/2025    ALKPHOS 46 01/22/2025    AST 14 01/22/2025    ALT 44 01/22/2025     BMP:    Lab Results   Component Value Date     (L) 01/23/2025    K 4.5 01/23/2025     01/23/2025    CO2 24 01/23/2025    BUN 20 01/23/2025    CREATININE 0.34 (L) 01/23/2025    CALCIUM 8.5 (L) 01/23/2025    GLUCOSE 443 (H) 01/23/2025     Magnesium:  Lab Results   Component Value Date    MG 1.84 01/22/2025     Troponin:  No results found for: \"TROPONINI\"    Imaging   Lower extremity venous duplex bilateral    Result Date: 1/22/2025  Interpreted By:  Shaggy Whelan, STUDY: [facility] US LOWER EXTREMITY VENOUS DUPLEX BILATERAL  " 1/22/2025 4:30 pm   INDICATION: 63 y/o   F with  Signs/Symptoms:bilateral  leg swelling, worse on left r/o DVT, has PE at this time. LMP:  Unknown.   ,M79.604 Pain in right leg,M79.605 Pain in left leg   COMPARISON: None.   ACCESSION NUMBER(S): [id number]   ORDERING CLINICIAN: LUCIANO CARDOZO   TECHNIQUE: Routine ultrasound of the  bilateral lower extremity was performed with duplex Doppler (color and spectral) evaluation.   Static images were obtained for remote interpretation.   FINDINGS: THIGH VEINS:  The right common femoral, femoral, popliteal, proximal medial saphenous, and deep femoral veins are patent and free of thrombus.  The veins are normally compressible.  They demonstrate normal phasic flow and augmentation response.  Partial compressibility of the left common and profunda femoral veins with extension into the proximal femoral vein. There is compressibility of the left popliteal vein.   CALF VEINS:  The paired peroneal and posterior tibial calf veins are patent.       Positive left lower extremity DVT extending from the proximal femoral vein through the common femoral vein.   Critical findings were delivered to ordering provider JOSE Wilson via secure message.   MACRO: None   Signed by: Shaggy Whelan 1/22/2025 4:47 PM Dictation workstation:   [id number]    XR knee 4+ views bilateral    Result Date: 1/22/2025  Interpreted By:  Humberto Slaughter, STUDY: XR KNEE 4+ VIEWS BILATERAL;  1/22/2025 3:31 pm   INDICATION: Signs/Symptoms:bilateral knee pain, worse on the left.     COMPARISON: Prior exams are from 06/21/2022, 05/09/2018, and 05/18/2015.   ACCESSION NUMBER(S): [id number]   ORDERING CLINICIAN: LUCIANO CARDOZO   TECHNIQUE: 4 views each of each knee were obtained..   FINDINGS: On the right, there was no suprapatellar fullness to indicate effusion. Sharpening of the tibial spines. Mild patellofemoral spur formation.  No lytic or blastic destructive bone lesion.  No acute fracture or  dislocation.  No periosteal reaction or erosion..     On the left, there was mild medial compartment narrowing with spur formation. Sharpening of the tibial spines. Slight patellofemoral spur formation. No gross effusion.  No lytic or blastic destructive bone lesion.  No acute fracture or dislocation.  No periosteal reaction or erosion..       Mild DJD in both knees as described, left greater than right.   MACRO: None   Signed by: Humberto Slaughter 1/22/2025 3:54 PM Dictation workstation:   [id number]    CT angio chest for pulmonary embolism    Result Date: 1/22/2025  Interpreted By:  Gini Alvarado, STUDY: CT ANGIO CHEST FOR PULMONARY EMBOLISM;  1/22/2025 11:40 am   INDICATION: Signs/Symptoms:hypoxia, tachycardia.     COMPARISON: None.   ACCESSION NUMBER(S): [id number]   ORDERING CLINICIAN: CHERYR HOBBS   TECHNIQUE: CT of the chest was performed. Sagittal and coronal reconstructions were generated. 75 ML Omnipaque 350 intravenous contrast given for the examination.  Multiplanar reconstructions of the pulmonary vessels were created on an independent workstation and provided for review.   FINDINGS:     CHEST WALL AND LOWER NECK: Absent or non included right thyroid lobe. No significant axillary adenopathy.   MEDIASTINUM AND SHAWN:  No significant mediastinal or hilar lymphadenopathy.   HEART AND VESSELS:  Filling defect consistent with PE in the posterior branch of the right lower lobe pulmonary artery. No left-sided PE. No specific findings of right heart strain. The heart is normal in size. No significant pericardial effusion. Scattered atherosclerotic calcifications.   LUNGS, PLEURA, LARGE AIRWAYS:  Coarse linear opacities scattered in both lung fields, left-greater-than-right. No confluent airspace opacity or pleural effusion. The central airways are patent.   UPPER ABDOMEN:  The included liver is diffusely hypodense/fatty infiltrated. Splenic heterogeneity as expected for phase of contrast.   BONES:  Stimulator wires  in the lower thoracic and upper lumbar spinal canal. Partially included anterior fusion hardware in the lower cervical spine. Degenerative endplate spurring in the visualized spine.       Exam is positive for pulmonary embolism in the right lower lobe.   Bandlike infiltrates or atelectasis scattered in both lung fields.   Diffuse fatty infiltration of the liver incidentally noted.   MACRO: None.   Signed by: Gini Alvarado 1/22/2025 11:53 AM Dictation workstation:   [id number]    ECG 12 lead    Result Date: 1/22/2025  Sinus tachycardia Possible Left atrial enlargement Left axis deviation Inferior infarct (cited on or before 07-FEB-2017) Abnormal ECG When compared with ECG of 22-JAN-2025 09:28, (unconfirmed) No significant change was found    XR hip right with pelvis when performed 2 or 3 views    Result Date: 1/22/2025  Interpreted By:  Ruben Moss, STUDY: XR HIP RIGHT WITH PELVIS WHEN PERFORMED 2 OR 3 VIEWS 1/22/2025 10:24 am   INDICATION: Signs/Symptoms:fall with sacral pain and right hip pain   COMPARISON: None.   ACCESSION NUMBER(S): [id number]   ORDERING CLINICIAN: CHERRY HOBBS   TECHNIQUE: A single AP view of the pelvis as well as AP and lateral views of the right hip were obtained.   FINDINGS: There is no evidence of acute fracture or dislocation identified. Mild hypertrophic degenerative changes are seen in the sacroiliac joints bilaterally. Minimal joint space narrowing and small marginal osteophytes are seen in the hips bilaterally. Rounded calcifications are seen with throughout the pelvis, most consistent with phleboliths.       1.  No fracture or dislocation. 2. Degenerative changes, as described above.   MACRO: None.   Signed by: Ruben Moss 1/22/2025 10:40 AM Dictation workstation:   [id number]    XR chest 1 view    Result Date: 1/22/2025  Interpreted By:  Ruben Moss, STUDY: XR CHEST 1 VIEW  1/22/2025 10:24 am   INDICATION: Signs/Symptoms:tachycardia shortness of breath   COMPARISON:  12/29/2024   ACCESSION NUMBER(S): [id number]   ORDERING CLINICIAN: CHERRY HOBBS   TECHNIQUE: A single AP portable radiograph of the chest was obtained.   FINDINGS: Multiple cardiac monitoring leads are seen over the chest.   No focal infiltrate, pleural effusion or pneumothorax is identified. The cardiac silhouette is within normal limits for size.       No focal infiltrate or pneumothorax is identified.   MACRO: None.   Signed by: Ruben Moss 1/22/2025 10:38 AM Dictation workstation:   [id number]    ECG 12 lead    Result Date: 1/22/2025  Sinus tachycardia Left axis deviation Inferior infarct (cited on or before 07-FEB-2017) ** ** ACUTE MI / STEMI ** ** Abnormal ECG When compared with ECG of 16-MAR-2024 13:58, Vent. rate has increased BY  50 BPM QRS duration has decreased Criteria for Anterior infarct are no longer Present    IR VASCULAR ACCESS TEAM MIDLINE INSERTION RADIO    Result Date: 1/20/2025  Hillary Echeverria RN     1/20/2025  3:00 PM MIDLINE INSERTION PROCEDURE NOTE - PICC TEAM NURSES DATE OF PROCEDURE: 1/20/2025 TIME OF PROCEDURE: 1441 ORDERING PHYSICIAN: Casey Sanderson MD Indications for line placement: Chemotherapy therapy Condition of line placement: Sterile Primary Proceduralist: Isidra Lama RN Assistant: Hillary Echeverria RN Pre-procedure Review: ALLERGIES Allergen Reactions  Demerol [Meperidine* Shortness of Breath   nausea  Benazepril           Unknown  Clonidine            Unknown  Darvon [Propoxyphen* Hives  Ezetimibe            GI Upset  Farxiga [Dapagliflo* Other: See Comments   palpatations  Invokana [Canaglifl* Rash   yeast infection  Lipitor [Atorvastat* Hives   Patient takes Pravastatin at home  Pravastatin          Unknown  Zocor [Simvastatin]  Unknown  Morphine             GI Upset   Nausea. Patient takes Vicodin at home with no problem  Tirzepatide          Vomiting   Other Reaction(s): Nausea/vomiting, Other Known history of Upper Venous Thrombosis: No Known history of Permanent  Pacemaker or Automated Implanted Cardiac Device: No Previous breast surgery of lymph node dissection: No Estimated Glomerular Filtration Rate Date Value Ref Range Status 12/30/2024 111 >=60 mL/min/1.73m² Final   Comment:   Estimated Glomerular Filtration Rate (eGFR) is calculated using the 2021 CKD-EPI creatinine equation. This equation utilizes serum creatinine, sex, and age as parameters. The creatinine assay has traceable calibration to isotope dilution-mass spectrometry. Refer to KDIGO guidelines for clinical interpretation. In patients with unstable renal function, e.g. those with acute kidney injury, the eGFR may not accurately reflect actual GFR.   eGFR- Date Value Ref Range Status 02/15/2021 >60  Final History of Renal Disease: No Ultrasound assessment complete: Yes Procedure Narrative Safe Practice: Hand hygiene per hospital policy: Yes Skin preparation used: Chloraprep (CHG + alcohol), allowed to dry Barriers used by Proceduralist and all assisting personnel: Yes UNIVERSAL PROTOCOL / SAFETY CHECKLIST Procedure to be Performed: Midline insertion Sign In: A Moment of CARE was completed. Personnel directly involved with the procedure wore the appropriate PPE (Personal Protective Equipment). Patient/Surrogate Stated/Verified: PATIENT VERIFIED(optional for EMERGENT procedures): Patient name, Date of Birth, Relevant allergies, and The intended procedure Time Out Communication: Intended patient and procedure match the source documents. Relevant labs, photos, and/or imaging studies have been reviewed. Correct side/site marked and visible. Medications required for procedure verified. Fire risk assessed and interventions discussed. Sign Out: SIGN OUT (optional for EMERGENT procedures): No specimen collected. All instruments, equipment, possible retained foreign bodies accounted for. Hillary Echeverria RN Midline Catheter Placement: Brand:  BARD                                        Lot:   [id number]  Number of lumens: 1 Type of Midline: Power Injectable Midline Lumen size: 4 Divehi Placement Technique: Lidocaine: Yes,  Lidocaine 1%  Volume 3 mL Subcutaneous Modified Seldinger Technique use to place line via the: Left Brachial Ultrasound Guidance: Yes Number of attempts at insertion: 2 Ensured control of guidewire during all aspects of the procedure: Yes Accounted for entire guidewire upon removal: Yes Internal length: 12 cm     External length: 0 cm     Trim length:12 cm Mid-Arm circumference: 35 centimeters Post insertion pain level related to procedure: 0 Action taken to address pain: RN to monitor Verified placement: Positive blood return Line was flushed with 20 mL normal saline Line secured with: Securement device Sterile dressing applied and dated: Yes Sterile caps on all ports prior to leaving procedure area: Yes Specimens: None Complications: Poor venous presentation bilaterally, LUE weakness however still maintains ROM.  Utilized LUE d/t slight better venous presentation.  Two - three person assist from chair to cart Patient education materials: Given to patient Questions or problems: Call [phone] SIGNATURE: Hillary Echeverria RN PATIENT NAME: Jing Jon DATE: January 20, 2025 MRN: [id number] TIME: 2:42 PM PAGER: [phone]     MR brain w and wo IV contrast    Result Date: 1/17/2025  * * *Final Report* * * DATE OF EXAM: Jan 17 2025  1:47PM   Critical access hospital   [id number]  -  MRI BRAIN WO/W IVCON  / ACCESSION #  [id number] PROCEDURE REASON: Meningioma (HCC)      * * * * Physician Interpretation * * * *  EXAMINATION:  MRI BRAIN WO/W IVCON CLINICAL HISTORY: Pertinent past medical history of left anterior frontal meningioma status post radiation on 07/10/2024.  Development of confluent hematoma, mass effect and left-to-right midline shift throughout the left frontal lobe on 11/19/2024, presumed to be secondary to radiation induced. TECHNIQUE:  Routine brain MRI protocol without and with contrast including diffusion images. MQ:   [id number] COMPARISON: CT brain 12/06/2024, CT brain 11/19/2024, CT brain 09/06/2024, MRI brain 04/02/2018 RESULT: Acute Change: There is no evidence of restricted diffusion to suggest an acute infarct. Hemorrhage: No evidence of prior parenchymal hemorrhage on the susceptibility weighted images. Mass Lesion/ Mass Effect: Status post gamma surgery to the left frontal lobe meningioma which is less conspicuous on today's exam on T1 postcontrast images, previously measuring 2.1 cm in the craniocaudal dimension on prior CT on 12/06/2024.  Small amount of adjacent parenchymal enhancement has developed.  Since the prior anatomical study of 04/23/2024, extensive FLAIR change has developed around a meningioma.   Perfusion imaging demonstrates persistent slightly increased cerebral blood volume within the core of the meningioma as expected, but no increased CBV associated with the FLAIR changes or enhancement in the adjacent brain parenchyma.  Therefore these changes are most likely treatment effect. Unchanged appearance of small moderate size remote infarction in the right basal ganglia with adjacent ex vacuo dilatation and smaller focus inferiorly in the right thalamus/corona radiata. Chronic Change: Scattered patchy areas of increased T2 and FLAIR signal   are present in the supratentorial white matter which is a nonspecific finding but likely represents mild chronic microvascular ischemia. Parenchyma: There is mild generalized parenchymal volume loss.  The brain parenchyma is otherwise within normal limits of signal intensity and morphology.   Ventricles: No hydrocephalus with regional mass effect from left cingulate gyrus/corpus callosum perilesional edema resulting in entrapment of the left frontal horn.  Interval improvement of segmental effacement of the left lateral and third ventricle. Skull Base: Hypothalamic and pituitary region are grossly normal.   Craniocervical junction is normal. No significant marrow  replacement process. Vasculature: Major intracranial arterial structures, and dural venous sinuses show typical flow void, suggesting patency by spin echo criteria. Other: The visualized paranasal sinuses and mastoid air cells are clear.   The orbits and extracranial soft tissues are unremarkable.    IMPRESSION: Slightly decreased enhancing meningioma adjacent to the left frontal pole. Increased surrounding T2 and FLAIR changes and a small amount of new adjacent parenchymal enhancement.  Based on perfusion imaging, these findings most likely reflect treatment effect. Chronic parenchymal changes and volume loss otherwise unchanged from the prior study. : [facility]   Transcribe Date/Time: Jan 17 2025  2:34P Dictated by : ABDELRAHMAN MARTINEZ MD This examination was interpreted and the report reviewed and electronically signed by: TATE TOVAR MD on Jan 17 2025  3:45PM  EST    Vascular US Lower Extremity Venous Duplex Bilateral    Result Date: 12/29/2024  STUDY: Bilateral lower extremity venous ultrasound; Completed Time: 12/29/2024 10:44 INDICATION: BLE swelling x1 week.  On blood thinners. COMPARISON: None available. ACCESSION NUMBER(S): [id number] ORDERING CLINICIAN: TEE SNIDER TECHNIQUE: Ultrasound of the bilateral lower extremity veins.  Multiple images were obtained. FINDINGS: The bilateral common femoral, femoral, and popliteal veins demonstrated normal compressibility, normal phasic venous flow, and normal response to augmentation.  There is no evidence for echogenic thrombi.  The bilateral posterior tibial veins are patent.  The bilateral peroneal veins are not visualized due to edema.    No evidence for DVT within the lower extremities bilaterally.  Signed by Luis Samaniego MD    CT head wo IV contrast    Result Date: 12/29/2024  Interpreted By:  Kym Trujillo, STUDY: CT HEAD WO IV CONTRAST; ;  12/29/2024 8:00 am   INDICATION: Signs/Symptoms:hx of brain mass, increased somnolence.   COMPARISON:  03/16/2024   ACCESSION NUMBER(S): [id number]   ORDERING CLINICIAN: KORIN GIRARD   TECHNIQUE: Serial axial images of the head were obtained without intravenous contrast. Sagittal and coronal reconstructions were generated.   FINDINGS: Ventricles are midline and minimally prominent.   There is an old right basal ganglia infarct.   There is encephalomalacia in the left frontal lobe.   There is no hemorrhage or extra-axial fluid.   There is no obvious scalp hematoma or skull fracture.   The paranasal sinuses and mastoids are unremarkable.   COMPARISON OF FINDINGS: Patient had prior radiation treatment to the left frontal region. The previously visualized hyperdense area in the left frontal lobe is less obvious currently.       Interval development of a large area of encephalomalacia in the left frontal lobe. Presumably related to the patient's radiotherapy.   Old right basal ganglia infarct.   MACRO: none   Signed by: Kym Trujillo 12/29/2024 8:46 AM Dictation workstation:   [id number]    XR chest 1 view    Result Date: 12/29/2024  STUDY: Chest Radiograph;  12/29/2024 4:35 AM INDICATION: Shortness of breath. COMPARISON: None Available ACCESSION NUMBER(S): [id number] ORDERING CLINICIAN: KORIN GIRARD TECHNIQUE:  Frontal chest was obtained at 04:34 hours. FINDINGS: CARDIOMEDIASTINAL SILHOUETTE: Cardiomediastinal silhouette is normal in size and configuration.  LUNGS: Left basilar atelectasis.  ABDOMEN: No remarkable upper abdominal findings.  BONES: No acute osseous changes.    Lung volumes are low.  Left basilar atelectasis.  Otherwise unremarkable. Signed by Moise Adair MD        Assessment:    64 year old female patient admitted for increasing shortness of  breath, found to have PE with incidental finding of an age indeterminate L1 compression fracture. She also reported bilateral knee pain and has new bruising to left knee since fall.  She does have some bruising about the left knee. Bilateral lower  extremities are edematous however the knees are not red, warm to touch or having any palpable effusion. Pain is most likely from contusion s/p mechanical fall.     There is no midline spinal process tenderness or step off. She denies any saddle anesthesia or loss of bowel or bladder. She had good bilateral lower extremity sensation with some decreased strength secondary to pain. Extremities are NVI. She is able to perform SLR.     Imaging was reviewed of bilateral knees and are negative for acute fractures or dislocation.She does have arthritis noted on imaging.  L1 compression fracture was noted on imaging on 12/23/24 and is not acute.     No intervention warranted. Recommend pain management consult.   She  may WBAT to bilateral lower extremities. She does not need a brace for her remote compression fracture.     She follows an orthopedic surgeon by the name of Dr. Meade at [facility] and will follow up with him as an outpatient once she is discharged from hospital.     Plan:  Recommend pain management consult   Follow up outpatient with Dr. Meade  Orthopedics will sign off. Please call for any questions or concerns.             Electronically signed by JOSE Mitchell on 1/23/2025 at 8:37 AM

## 2025-01-23 NOTE — SIGNIFICANT EVENT
Findings reviewed with Dr. Madden. At this time surgical intervention is not indicated and agree with oral anticoagulation. Vascular surgery to sign off. Please do not hesitate to call back if further assistance required.    Subjective:       Patient ID: Janine Crump is a 50 y.o. female.    Chief Complaint: Follow-up    Pt presents to clinic for f/u. Overal endorses she is feeling well. Tolerating Levothyroxine 75 mcg daily. Requesting Nicoderm patches. Had quit smoking for approximately a month earlier this year, would like to make another attempt. Open to getting screening cologuard kit sent to home.    Pt additionally reports ongoing mild to moderate bitemporal headaches. Occurring daily at times, a few times a week at others. Describe it as a constant pressure when present. Seems associated with stress and activity, but has happened at rest in the past. Can last for hours in duration. Have been occurring for more than a year. Denies any associated N/V, vision changes, dizziness, lightheadedness, photophobia, phonophobia. Endorses moderate relief with Ibuprofen as needed. Believes she tried Sumatriptan in the past, open to trying it again.     Follow-up  Associated symptoms include headaches. Pertinent negatives include no chest pain, fever, nausea, vomiting or weakness.   Review of Systems   Constitutional:  Negative for fever.   Eyes:  Negative for visual disturbance.   Cardiovascular:  Negative for chest pain.   Gastrointestinal:  Negative for nausea and vomiting.   Neurological:  Positive for headaches. Negative for dizziness, syncope, weakness and light-headedness.   All other systems reviewed and are negative.    Objective:      Physical Exam  Constitutional:       General: She is not in acute distress.     Appearance: She is not ill-appearing or toxic-appearing.   HENT:      Head: Normocephalic and atraumatic.   Eyes:      Extraocular Movements: Extraocular movements intact.      Pupils: Pupils are equal, round, and reactive to light.   Cardiovascular:      Rate and Rhythm: Normal rate and regular rhythm.   Pulmonary:      Effort: Pulmonary effort is normal.      Breath sounds: Normal breath sounds.   Skin:     General: Skin  is warm and dry.      Capillary Refill: Capillary refill takes less than 2 seconds.   Neurological:      Mental Status: She is alert and oriented to person, place, and time.      Cranial Nerves: No cranial nerve deficit.   Psychiatric:         Mood and Affect: Mood normal.         Behavior: Behavior normal.         Thought Content: Thought content normal.         Judgment: Judgment normal.       Assessment:       1. Hypothyroidism due to Hashimoto's thyroiditis    2. Other migraine without status migrainosus, intractable    3. Screen for colon cancer    4. Tobacco dependence    5. Nicotine abuse          Plan:       Janine was seen today for follow-up.    Diagnoses and all orders for this visit:    Hypothyroidism due to Hashimoto's thyroiditis  -     TSH; Future    Other migraine without status migrainosus, intractable  -     sumatriptan (IMITREX) 50 MG tablet; TAKE 1 DOSE AT HEADACHE ONSET.MAY TAKE ANOTHER DOSE IN 2 HOURS IF PERSISTS.  MG IN 24 HOURS.    Screen for colon cancer  -     Cologuard Screening (Multitarget Stool DNA); Future  -     Cologuard Screening (Multitarget Stool DNA)    Tobacco dependence  -     nicotine (NICODERM CQ) 14 mg/24 hr; Place 1 patch onto the skin once daily.    Nicotine abuse  -     nicotine (NICODERM CQ) 14 mg/24 hr; Place 1 patch onto the skin once daily.    Other orders  -     nicotine (NICODERM CQ) 7 mg/24 hr; Place 1 patch onto the skin once daily.           Follow up for   tsh today ok, pcp in 3mo , 6 mo pcp.         patient was seen and examined by me and I agree with HPI, ROS, PE as well as  assessment and plan

## 2025-01-23 NOTE — CARE PLAN
The patient's goals for the shift include control pain  Problem: Discharge Planning  Goal: Discharge to home or other facility with appropriate resources  Outcome: Progressing     Problem: Chronic Conditions and Co-morbidities  Goal: Patient's chronic conditions and co-morbidity symptoms are monitored and maintained or improved  Outcome: Progressing     Problem: Nutrition  Goal: Nutrient intake appropriate for maintaining nutritional needs  Outcome: Progressing     Problem: Skin  Goal: Decreased wound size/increased tissue granulation at next dressing change  Outcome: Progressing  Goal: Participates in plan/prevention/treatment measures  Outcome: Progressing  Goal: Prevent/manage excess moisture  Outcome: Progressing  Goal: Prevent/minimize sheer/friction injuries  Outcome: Progressing  Goal: Promote/optimize nutrition  Outcome: Progressing  Goal: Promote skin healing  Outcome: Progressing     Problem: Pain  Goal: Takes deep breaths with improved pain control throughout the shift  Outcome: Progressing  Goal: Turns in bed with improved pain control throughout the shift  Outcome: Progressing  Goal: Walks with improved pain control throughout the shift  Outcome: Progressing  Goal: Performs ADL's with improved pain control throughout shift  Outcome: Progressing  Goal: Participates in PT with improved pain control throughout the shift  Outcome: Progressing  Goal: Free from opioid side effects throughout the shift  Outcome: Progressing  Goal: Free from acute confusion related to pain meds throughout the shift  Outcome: Progressing     Problem: Fall/Injury  Goal: Not fall by end of shift  Outcome: Progressing  Goal: Be free from injury by end of the shift  Outcome: Progressing  Goal: Verbalize understanding of personal risk factors for fall in the hospital  Outcome: Progressing  Goal: Verbalize understanding of risk factor reduction measures to prevent injury from fall in the home  Outcome: Progressing  Goal: Use assistive  devices by end of the shift  Outcome: Progressing  Goal: Pace activities to prevent fatigue by end of the shift  Outcome: Progressing     Problem: Respiratory  Goal: Minimize anxiety/maximize coping throughout shift  Outcome: Progressing  Goal: Minimal/no exertional discomfort or dyspnea this shift  Outcome: Progressing  Goal: No signs of respiratory distress (eg. Use of accessory muscles. Peds grunting)  Outcome: Progressing  Goal: Patent airway maintained this shift  Outcome: Progressing  Goal: Verbalize decreased shortness of breath this shift  Outcome: Progressing  Goal: Wean oxygen to maintain O2 saturation per order/standard this shift  Outcome: Progressing  Goal: Increase self care and/or family involvement in next 24 hours  Outcome: Progressing     Problem: Diabetes  Goal: Achieve decreasing blood glucose levels by end of shift  Outcome: Progressing  Goal: Maintain electrolyte levels within acceptable range throughout shift  Outcome: Progressing  Goal: Maintain glucose levels >70mg/dl to <250mg/dl throughout shift  Outcome: Progressing  Goal: No changes in neurological exam by end of shift  Outcome: Progressing  Goal: Vital signs within normal range for age by end of shift  Outcome: Progressing       The clinical goals for the shift include control pain

## 2025-01-23 NOTE — PROGRESS NOTES
Per deisy-trung pt. Will need snf, has spoken  to spouse who is requesting [facility].  Referral sent, awaiting ot eval, will send once in.  Pt. With [facility], will require ins. Precert.

## 2025-01-23 NOTE — DISCHARGE INSTRUCTIONS
Weight bearing as tolerated to bilateral lower extremities   Follow up with Dr. Meade from orthopedics as an outpatient

## 2025-01-23 NOTE — PROGRESS NOTES
Physical Therapy    Physical Therapy Evaluation    Patient Name: Jing Jon  MRN: [id number]  Today's Date: 1/23/2025   Time Calculation  Start Time: 0926  Stop Time: 0950  Time Calculation (min): 24 min  1019/1019-A    Assessment/Plan   PT Assessment  PT Assessment Results: Decreased strength, Decreased endurance, Impaired balance, Decreased mobility, Impaired judgement  Rehab Prognosis: Good  Barriers to Discharge Home: Caregiver assistance, Physical needs  Caregiver Assistance: Caregiver assistance needed per identified barriers - however, level of patient's required assistance exceeds assistance available at home  Physical Needs: In-home setup navigation limited by function/safety, Ambulating household distances limited by function/safety, 24hr mobility assistance needed, High falls risk due to function or environment  Evaluation/Treatment Tolerance: Patient limited by fatigue, Patient limited by pain  Medical Staff Made Aware: Yes  Strengths: Support of Caregivers, Living arrangement secure  Barriers to Participation: Comorbidities, Housing layout  End of Session Communication: Bedside nurse  Assessment Comment: Pt. requires significant A for transfers and very minimal amb at this time. Pt. appeatrs weak and deconditoned and pain limiting her ability to participate. Recommend continued therapy at a moderate intensity level following D/C.  End of Session Patient Position: Bed, 3 rail up, Alarm on (Call light within reach)  IP OR SWING BED PT PLAN  Inpatient or Swing Bed: Inpatient  PT Plan  Treatment/Interventions: Bed mobility, Transfer training, Gait training, Balance training, Strengthening, Endurance training, Therapeutic exercise  PT Plan: Ongoing PT  PT Frequency: 3 times per week  PT Discharge Recommendations: Moderate intensity level of continued care  PT Recommended Transfer Status: Assist x2, Assistive device  Physical Therapy eval completed per MD requisition. P.T. recommendations as outlined  above. Recommend D/C from acute care when medically appropriate as deemed by medical staff.    Subjective       General Visit Information:  General  Reason for Referral: impaired mobility  Referred By: CLINT Cruz CNP (PT/OT 1/22)  Past Medical History Relevant to Rehab: includes: DM, neuropahty, cerebral edema, healing L1 fracture  Family/Caregiver Present: Yes  Caregiver Feedback: Spouse supportive  Co-Treatment: OT  Co-Treatment Reason: Pt. seen with OT to maximize safety and function  Prior to Session Communication: Bedside nurse  Patient Position Received: Bed, 3 rail up, Alarm off, not on at start of session  Preferred Learning Style: auditory, verbal  General Comment: Pt. is a 65yo who presented to [facility] ED on 1/22/2025 followig a fall with LE swelling and elevated glucose readings.    Imaging 1/22/2025:   CXR (-) acute findings    R hip X-ray (-) fracture,   R knee X-ray (-) fracture, (+) DJD Chest CTA (+) bandlike infiltrates, (+) PE RLL   L LE US (+) DVT    Heparin Assay (1/23) 0.8, 0.6    Dx: Fall, PE, L LE DVT    Home Living:  Home Living  Home Living Comments: Pt. lives with spouse in a 2 level house with ramp to enter.  Bed/bath on 2nd floor with tub shower with a seat and grab bars and a walkin shower (which needs repair) with a seat and grab bars. Stair lift to 2nd floor Laundry on 1st floor. Per spouse, Pt. sleeps in either an adjustibale bed or a recliner on 2nd floor PTA. Pt. uses Purewick at home. Pt. has a lift chair on order. 1/2 bath on 1st floor.    Prior Level of Function:  Prior Function Per Pt/Caregiver Report  Prior Function Comments: Pt. states she amb with RW inside and uses a W/C outside PTA. Pt. stated she had A with toileting, dressing and bathing PTA. Spouse completed IADLs PTA. Pt. reported 2 falls in last 3 months. Pt. does not drive.    Precautions:  Precautions  Medical Precautions: Fall precautions (Activity order: OOB with A)  Precautions Comment: Per EMR: High fall risk    Vital  Signs:  Vital Signs  Heart Rate: (!) 116  SpO2: 97 %  Objective     Pain:  Pain Assessment  Pain Assessment: 0-10  0-10 (Numeric) Pain Score: 6  Pain Type: Chronic pain  Pain Location: Leg  Pain Orientation: Right, Left  Pain Interventions: Repositioned    Cognition:  Cognition  Overall Cognitive Status: Within Functional Limits    General Assessments:  General Observation  General Observation: YELITZA Fernandes , tele. B feet wrapped in gauze, deep purple bruising medial L knee   Activity Tolerance  Endurance: Decreased tolerance for upright activites                 Dynamic Sitting Balance  Dynamic Sitting-Comments: Good static and dynamic sitting balance  Dynamic Standing Balance  Dynamic Standing-Comments: Poor static and dynamic standing balance    Functional Assessments:     Bed Mobility  Bed Mobility: Yes  Bed Mobility 1  Bed Mobility 1: Supine to sitting  Level of Assistance 1: Maximum assistance  Bed Mobility Comments 1: A to maneuver LEs over EOB and to elevate trunk from bed  Bed Mobility 2  Bed Mobility  2: Sitting to supine  Level of Assistance 2: Maximum assistance, +2  Bed Mobility Comments 2: A to lift LEs onto bed and to control descent/placement of trunk  Transfers  Transfer: Yes  Transfer 1  Technique 1: Sit to stand  Transfer Device 1:  (FWW)  Transfer Level of Assistance 1: Maximum assistance, +2, Maximum verbal cues  Trials/Comments 1: A for lifting, transferring weight over feet, steadying. VC's for hand placement  Transfers 2  Technique 2: Stand to sit  Transfer Device 2:  (FWW)  Transfer Level of Assistance 2: Maximum assistance, Maximum verbal cues  Trials/Comments 2: A to control descent. VC's for hand placement.  Ambulation/Gait Training  Ambulation/Gait Training Performed: Yes  Ambulation/Gait Training 1  Surface 1: Level tile  Device 1: Rolling walker  Assistance 1: Maximum assistance (x2)  Quality of Gait 1: Wide base of support (Pt. was able to take 1 lateral sidestep to the L wiht MAX  VC's for encouragement. Pt. very reluctant to attempt amb.)  Comments/Distance (ft) 1: 1 lateral step; A for balance, weight shift. safety, maneuvering FWW  Stairs  Stairs: No       Extremity/Trunk Assessments:  RUE   RUE : Within Functional Limits  LUE   LUE: Within Functional Limits  RLE   RLE :  (AROM WFL, strength 3+ to 4-/5)  LLE   LLE :  (AROM WFL, strength 3+ to 4-/5)    Outcome Measures:     [facility] Basic Mobility  Turning from your back to your side while in a flat bed without using bedrails: A lot  Moving from lying on your back to sitting on the side of a flat bed without using bedrails: A lot  Moving to and from bed to chair (including a wheelchair): Total  Standing up from a chair using your arms (e.g. wheelchair or bedside chair): A lot  To walk in hospital room: Total  Climbing 3-5 steps with railing: Total  Basic Mobility - Total Score: 9                                                             Goals:  Encounter Problems       Encounter Problems (Active)       PT Problem       Pt. will transfer supine/sit with MIN A x 1 (Progressing)       Start:  01/23/25    Expected End:  02/06/25            Pt. will transfer sit/stand with FWW with MOD A x 1 (Progressing)       Start:  01/23/25    Expected End:  02/06/25            Pt.will ambulate 25' with FWW with MOD A x 1 (Not Progressing)       Start:  01/23/25    Expected End:  02/06/25            Pt. will perform 2 x 15 B LE AROM exercises  (Not Progressing)       Start:  01/23/25    Expected End:  02/06/25                 Education Documentation  Mobility Training, taught by Dominik Alvarez, PT at 1/23/2025  2:14 PM.  Learner: Significant Other, Patient  Readiness: Acceptance  Method: Explanation  Response: Verbalizes Understanding, Needs Reinforcement  Comment: Role of PT, transfers, bed mobility, safety, PT POC

## 2025-01-23 NOTE — PROGRESS NOTES
"Daily Progress Note    Jing Jon is a 64 y.o. female on day 1 of admission presenting with Acute pulmonary embolism, unspecified pulmonary embolism type, unspecified whether acute cor pulmonale present (Multi).    Subjective   Pt seen and examined at bedside this am. Chart and labs reviewed. Pt explains she has a lot of pain in her feet. She does not endorse any CP, SOB, n/v/f/c. No adverse events overnight.        Objective     Physical Exam    Physical Exam  Constitutional:       Appearance: Normal appearance.   HENT:      Head: Normocephalic and atraumatic.   Cardiovascular:      Rate and Rhythm: Normal rate and regular rhythm.      Pulses: Normal pulses.      Heart sounds: Normal heart sounds.   Pulmonary:      Effort: Pulmonary effort is normal.      Breath sounds: Normal breath sounds.   Abdominal:      General: Bowel sounds are normal.      Palpations: Abdomen is soft.   Musculoskeletal:      Right lower leg: Edema present.      Left lower leg: Edema present.   Skin:     General: Skin is warm and dry.      Comments: Wounds on the tops of both feet    Neurological:      General: No focal deficit present.      Mental Status: She is alert and oriented to person, place, and time.   Psychiatric:         Mood and Affect: Mood normal.         Behavior: Behavior normal.         Thought Content: Thought content normal.         Judgment: Judgment normal.       Last Recorded Vitals  Blood pressure (!) 140/93, pulse 109, temperature 35.8 °C (96.4 °F), temperature source Temporal, resp. rate 18, height 1.575 m (5' 2\"), weight 89.6 kg (197 lb 8.5 oz), SpO2 96%.  Intake/Output last 3 Shifts:  I/O last 3 completed shifts:  In: 888.8 (9.9 mL/kg) [P.O.:240; I.V.:148.8 (1.7 mL/kg); IV Piggyback:500]  Out: 1650 (18.4 mL/kg) [Urine:1650 (0.5 mL/kg/hr)]  Weight: 89.6 kg     Medications  Scheduled medications  baclofen, 20 mg, oral, BID  clopidogrel, 75 mg, oral, Daily  cyanocobalamin, 1,000 mcg, oral, " Nightly  dexAMETHasone, 8 mg, oral, BID  docusate sodium, 100 mg, oral, BID  fenofibrate, 54 mg, oral, Daily  insulin regular, 35 Units, subcutaneous, Daily before evening meal  insulin regular, 65 Units, subcutaneous, Daily  insulin regular, 95 Units, subcutaneous, Daily before breakfast  insulin regular, 0-10 Units, subcutaneous, TID AC  lidocaine, 1 patch, transdermal, Daily  losartan, 50 mg, oral, Daily  nystatin, 5 mL, Swish & Swallow, 4x daily  pantoprazole, 40 mg, oral, BID AC  pregabalin, 150 mg, oral, TID  topiramate, 25 mg, oral, BID      Continuous medications  heparin, 0-4,500 Units/hr, Last Rate: 1,000 Units/hr (01/23/25 0645)      PRN medications  PRN medications: acetaminophen **OR** acetaminophen **OR** acetaminophen, acetaminophen **OR** acetaminophen **OR** acetaminophen, albuterol, benzocaine-menthol, dextrose, dextrose, glucagon, glucagon, ondansetron **OR** ondansetron    Labs  CBC:   Results from last 7 days   Lab Units 01/23/25  0547 01/22/25  1004   WBC AUTO x10*3/uL 11.0 9.8   RBC AUTO x10*6/uL 4.38 4.76   HEMOGLOBIN g/dL 13.4 14.6   HEMATOCRIT % 39.5 43.0   MCV fL 90 90   MCH pg 30.6 30.7   MCHC g/dL 33.9 34.0   RDW % 15.4* 15.9*   PLATELETS AUTO x10*3/uL 142* 157     CMP:    Results from last 7 days   Lab Units 01/23/25  0547 01/22/25  1004   SODIUM mmol/L 135* 138   POTASSIUM mmol/L 4.5 4.3   CHLORIDE mmol/L 102 103   CO2 mmol/L 24 30   BUN mg/dL 20 25*   CREATININE mg/dL 0.34* 0.49*   GLUCOSE mg/dL 443* 241*   PROTEIN TOTAL g/dL  --  5.7*   CALCIUM mg/dL 8.5* 8.6   BILIRUBIN TOTAL mg/dL  --  0.7   ALK PHOS U/L  --  46   AST U/L  --  14   ALT U/L  --  44     BMP:    Results from last 7 days   Lab Units 01/23/25  0547 01/22/25  1004   SODIUM mmol/L 135* 138   POTASSIUM mmol/L 4.5 4.3   CHLORIDE mmol/L 102 103   CO2 mmol/L 24 30   BUN mg/dL 20 25*   CREATININE mg/dL 0.34* 0.49*   CALCIUM mg/dL 8.5* 8.6   GLUCOSE mg/dL 443* 241*     Magnesium:  Results from last 7 days   Lab Units  01/22/25  1004   MAGNESIUM mg/dL 1.84     Troponin:    Results from last 7 days   Lab Units 01/22/25  1004   TROPHS ng/L 13     BNP:   Results from last 7 days   Lab Units 01/22/25  1004   BNP pg/mL 27     Lipid Panel:  Results from last 7 days   Lab Units 01/22/25  1255   INR  1.0   PROTIME seconds 11.5        Nutrition   Adult cardiac         Relevant Results  Results from last 7 days   Lab Units 01/23/25  1124 01/23/25  0703 01/23/25  0547 01/22/25  1953 01/22/25  1629 01/22/25  1004   POCT GLUCOSE mg/dL 472* 392*  --  389* 304*  --    GLUCOSE mg/dL  --   --  443*  --   --  241*     Lab Results   Component Value Date    HGBA1C 7.3 (H) 09/07/2024        Assessment/Plan  Jing, 65 yo female, presenting with pulmonary embolism, DVT in the right femoral vein, lower extremity edema with wounds on the tops of the feet, and oral thrush.     Pulmonary Embolism   -continue continuous heparin drip  -continue home plavix   -MARLEE done today, EF 63%, no right heart strain    Right femoral vein DVT  bilateral lower extremity edema, hx of CAD   -vascular surgery consulted, continue heparin drip and home plavix   -full leg ace wrap compression     Bilateral knee pain and positive L1 compression fx  -ortho consulted and signed off  -pt follows with orthopedics     Wounds bilaterally on dorsal aspect of feet  -wound care consulted     Oral candidiasis   -continue swish and swallow     Hx T2DM: with hyperglycemia, on chronic steroids   -continue home insulin regimen, with sliding scale     Hx of meningioma with gamma knife radiation- follows at [facility]  On steroids     Difficulty with ambulation: PT/OT     DVTp: heparin drip   Code Status: full code

## 2025-01-23 NOTE — CARE PLAN
The patient's goals for the shift include  SLEEP    The clinical goals for the shift include NO ADVERSE EVENTS    Over the shift, the patient did not make progress toward the following goals. Barriers to progression include DISEASE PROCESS, DECREASED MOBILITY. Recommendations to address these barriers include ACCUCHECKS WITH SLIDING SCALE INSULIN AS ORDERED; SKIN CARE AND PRESSURE INJURY PREVENTION.    Problem: Skin  Goal: Decreased wound size/increased tissue granulation at next dressing change  Outcome: Not Progressing     Problem: Diabetes  Goal: Achieve decreasing blood glucose levels by end of shift  Outcome: Not Progressing  Goal: Maintain glucose levels >70mg/dl to <250mg/dl throughout shift  Outcome: Not Progressing

## 2025-01-23 NOTE — CONSULTS
Initially saw this patient today for consult for wound care top of left foot. Patient verbally adamant about not wanting wound care today. Does not want to be moved and BLEs are extremley  sore and painful. There is edema noted BLEs knees down 2++. There is small open wound noted top of left foot which measures at 1.0cm x 2.3cm x 0.1cm secondary to stasis ulcer that has drained. Surrounding tissue is somewhat ecchymotic. Recommend xeroform and dry sterile dressing. Recommend ace wrap BLEs from base of toes to knees for mild compression and edema control when patient agrees to this. Patient refusing to turn for inspection of sacrum for possible PI.  Wound Care Consult     Visit Date: 1/23/2025      Patient Name: Jing Jon         MRN: [id number]           YOB: 1960     Reason for Consult: BLE edema and wound care        Wound History: 2 weeks     Pertinent Labs:   Albumin   Date Value Ref Range Status   01/22/2025 3.2 (L) 3.4 - 5.0 g/dL Final     Albumin, Urine Random   Date Value Ref Range Status   12/18/2023 18.3 Not established mg/L Final       Wound Assessment:  Wound 01/22/25  Dorsal foot;Right (Active)   Dressing Kerlix/rolled gauze 01/22/25 2018   Dressing Changed New 01/22/25 1532   Dressing Status Clean;Dry;Occlusive 01/22/25 2018       Wound 01/22/25  Dorsal foot;Left (Active)   Dressing Kerlix/rolled gauze 01/22/25 2018   Dressing Changed New 01/22/25 1532   Dressing Status Clean;Dry;Occlusive 01/22/25 2018       Wound 01/22/25 Pressure Injury Sacrum Bilateral (Active)   Site Assessment Denuded;Red 01/23/25 0402   Mary Ann-Wound Assessment Pink 01/23/25 0402   Pressure Injury Stage 2 01/23/25 0402   Shape IRREGULAR 01/23/25 0402   Drainage Description Sanguineous 01/23/25 0402   Drainage Amount Scant 01/23/25 0402   Dressing Foam 01/23/25 0402   Dressing Changed New 01/23/25 0402   Dressing Status Clean;Dry 01/23/25 0402       Wound Team Summary Assessment:       Wound Team Plan: Will  follow up with patient in 1-2 days.     Dung Block LPN  1/23/2025  1:32 PM

## 2025-01-23 NOTE — PROGRESS NOTES
01/23/25 1404   Discharge Planning   Living Arrangements Spouse/significant other   Support Systems Spouse/significant other   Assistance Needed Uses walker and spousal assist   Type of Residence Private residence   Number of Stairs to Enter Residence 3   Number of Stairs Within Residence 12   Do you have animals or pets at home? No   Who is requesting discharge planning? Provider   Home or Post Acute Services None   Expected Discharge Disposition Home   Does the patient need discharge transport arranged? Yes   RoundTrip coordination needed? Yes   Patient Choice   Provider Choice list and CMS website (https://medicare.gov/care-compare#search) for post-acute Quality and Resource Measure Data were provided and reviewed with: Patient   Patient / Family choosing to utilize agency / facility established prior to hospitalization No   Stroke Family Assessment   Stroke Family Assessment Needed No   Intensity of Service   Intensity of Service 0-30 min     Patient admitted from home with acute PE. Patient on Heparin drip, will wait for medical recommendations for oral anticoagulation. Patient from home, uses a cane and walker, has bilateral foot wounds, per wound nurse she did not want them looked at today. Plan as of now would be home, no needs, would have RN navigator if patient is needing Eliquis or Xarelto. CT team to follow.     UPDATE:  Spoke with patient's spouse Williams, he states patient was receiving treatment at [facility] for the brain tumor. Patient receiving Avastin through a midline, which was then discontinued by [facility] same day. Patient receives the Avastin treatments monthly, next treatment will be 2/19 per spouse. Patient is weak, her PT AM PAC score was 9, discussed this with Williams, who he states he believes patient would need to go to rehab until she can get up with one assist on to walker. Gave Williams choices of facilities, he would like [facility] as they are right down the road from that facility. Venessa JOY,  LSW will place referral, will wait for their response, if they can accept they can start precert. CT team to continue to follow.

## 2025-01-23 NOTE — PROGRESS NOTES
Occupational Therapy    Evaluation    Patient Name: Jing Jon  MRN: [id number]  Department: [facility]  Room: 1019/1019-A  Today's Date: 1/23/2025  Time Calculation  Start Time: 0925  Stop Time: 0952  Time Calculation (min): 27 min        Assessment:  OT Assessment: Pt. presents with impaired balance, safety, endurance, strength. Pt. demonstrates self limiting behaviors during evaluation ; requires a lot of encouragement and education for participation. Pt. will benefit from continued OT to address deficits as pt. requires assistance for all ADLs/IADLs and functional transfers.  Prognosis: Fair  Evaluation/Treatment Tolerance: Patient limited by pain  End of Session Communication: Bedside nurse  End of Session Patient Position: Bed, 3 rail up, Alarm on  OT Assessment Results: Decreased ADL status, Decreased safe judgment during ADL, Decreased endurance, Decreased functional mobility, Decreased trunk control for functional activities  Prognosis: Fair  Evaluation/Treatment Tolerance: Patient limited by pain  Barriers to Participation: Comorbidities  Plan:  Treatment Interventions: ADL retraining, Functional transfer training, Endurance training  OT Frequency: 2 times per week  OT Discharge Recommendations: Moderate intensity level of continued care  OT - OK to Discharge: Yes (when medically stable/cleared)    Subjective   Current Problem:  1. Acute pulmonary embolism, unspecified pulmonary embolism type, unspecified whether acute cor pulmonale present (Multi)        2. Localized swelling of both lower legs        3. Multiple open wounds of lower leg, initial encounter        4. Oral candidiasis        5. Pain in both lower extremities  Lower extremity venous duplex bilateral    Lower extremity venous duplex bilateral      6. Other acute pulmonary embolism without acute cor pulmonale  Transthoracic Echo (TTE) Complete    Transthoracic Echo (TTE) Complete        General:  General  Reason for Referral: ADL  impairment  Referred By: Anthony, 1/22, OT/PT  Past Medical History Relevant to Rehab: includes: DM, neuropahty, cerebral edema, healing L1 fracture  Family/Caregiver Present: Yes  Caregiver Feedback: spouse supportive  Co-Treatment: PT  Co-Treatment Reason: to maximize pt. safety  Prior to Session Communication: Bedside nurse  Patient Position Received: Bed, 3 rail up, Alarm off, not on at start of session  General Comment: Pt. is a 65yo who presented to [facility] ED on 1/22/2025 followig a fall with LE swelling and elevated glucose readings.  Imaging 1/22/2025: CXR (-) acute findings  R hip X-ray (-) fracture, R knee X-ray (-) fracture, (+) DJD Chest CTA (+) bandlike infiltrates, (+) PE RLL L LE US (+) DVT  Heparin Assay (1/23) 0.8, 0.6  Dx: Fall, PE, L LE DVT  Precautions:  Medical Precautions: Fall precautions    Vital Signs Comment: 97% and 116 HR     Pain:  Pain Assessment  Pain Assessment: 0-10  0-10 (Numeric) Pain Score: 6  Pain Type: Chronic pain  Pain Location: Foot  Pain Orientation: Right, Left    Objective   Cognition:  Overall Cognitive Status: Within Functional Limits (very anxious and fearful of movement)    Home Living:  Home Living Comments: Pt. lives with spouse in a 2 level house with ramp to enter. Bed/bath on 2nd floor with tub shower with a seat and grab bars and a walkin shower (which needs repair) with a seat and grab bars. Stair lift to 2nd floor Laundry on 1st floor. Per spouse, Pt. sleeps in either an adjustibale bed or a recliner on 2nd floor PTA. Pt. uses Purewick at home. Pt. has a lift chair on order. 1/2 bath on 1st floor.  Prior Function:  Prior Function Comments: Pt. states she amb with RW inside and uses a W/C outside PTA. Pt. stated she had A with toileting, dressing and bathing PTA. Spouse completed IADLs PTA. Pt. reported 2 falls in last 3 months. Pt. does not drive.    ADL:  Eating Assistance: Independent  Grooming Assistance: Minimal  Bathing Assistance: Maximal  UE Dressing  "Assistance: Minimal  LE Dressing Assistance: Maximal  Toileting Assistance with Device: Maximal  Activity Tolerance:  Endurance: Decreased tolerance for upright activites  Activity Tolerance Comments: Pt yelling out \"I can't\" and reporting that her feet are going to give out due to pain  Bed Mobility/Transfers: Bed Mobility  Bed Mobility: Yes  Bed Mobility 1  Bed Mobility 1: Supine to sitting  Level of Assistance 1: Maximum assistance  Bed Mobility Comments 1: assist to bring LEs off edge of bed, VCs for hand positioning on bed rails to assist to bring trunk to upright sit. Assist to lift trunk and steady EOB  Bed Mobility 2  Bed Mobility  2: Sitting to supine  Level of Assistance 2: Maximum assistance  Bed Mobility Comments 2: x2 assist to bring LEs and trunk into bed. dependent to boost.    Transfer 1  Technique 1: Sit to stand  Transfer Device 1: Walker  Transfer Level of Assistance 1: Maximum assistance  Trials/Comments 1: gait belt donned. WW use. Assist to lift, steady, balance and weight shift forward over walker.    Functional Mobility:  Functional Mobility  Functional Mobility Performed:  (x1 side step to L side alongside edge of bed with WW. pt. very fearful, yelling out in pain and states \"I can't\" despite encouragement and assist, pt. sits back to edge of bed. Max A x2 to steady and balance.)  Sitting Balance:  Static Sitting Balance  Static Sitting-Level of Assistance: Close supervision (pt. states back pain sitting EOB)  Standing Balance:  Static Standing Balance  Static Standing-Level of Assistance: Maximum assistance  Dynamic Standing Balance  Dynamic Standing-Comments: Poor     Sensation:  Sensation Comment: B feet neuropathy. Feet are extremely sensative. donned surgical booties for non-slip footwear as pt. was not able to  tolerate socks.  Strength:  Strength Comments: Marj 4/5 MMT    Hand Function:  Gross Grasp: Functional  Extremities: RUE   RUE : Within Functional Limits and LUE   LUE: Within " Functional Limits    Outcome Measures:[facility] Daily Activity  Putting on and taking off regular lower body clothing: A lot  Bathing (including washing, rinsing, drying): A lot  Putting on and taking off regular upper body clothing: A little  Toileting, which includes using toilet, bedpan or urinal: A lot  Taking care of personal grooming such as brushing teeth: A little  Eating Meals: None  Daily Activity - Total Score: 16    Education Documentation  ADL Training, taught by Laura Hill OT at 1/23/2025  2:56 PM.  Learner: Patient  Readiness: Acceptance  Method: Explanation  Response: Verbalizes Understanding, Needs Reinforcement    IP EDUCATION:  Education  Individual(s) Educated: Patient  Education Provided: Fall precautons, Risk and benefits of OT discussed with patient or other, POC discussed and agreed upon    Goals:  Encounter Problems       Encounter Problems (Active)       OT Goals       Min A for all functional transfers  (Progressing)       Start:  01/23/25    Expected End:  02/06/25            Pt. will tolerate static or dynamic standing x2 minutes to progress standing tolerance for ADLs (Progressing)       Start:  01/23/25    Expected End:  02/06/25            Min A for LB dressing with use of AE  (Progressing)       Start:  01/23/25    Expected End:  02/06/25            Good dyn sitting balance EOB for ADL participation.  (Progressing)       Start:  01/23/25    Expected End:  02/06/25

## 2025-01-24 LAB
ERYTHROCYTE [DISTWIDTH] IN BLOOD BY AUTOMATED COUNT: 14.7 % (ref 11.5–14.5)
GLUCOSE BLD MANUAL STRIP-MCNC: 114 MG/DL (ref 74–99)
GLUCOSE BLD MANUAL STRIP-MCNC: 301 MG/DL (ref 74–99)
GLUCOSE BLD MANUAL STRIP-MCNC: 319 MG/DL (ref 74–99)
GLUCOSE BLD MANUAL STRIP-MCNC: 477 MG/DL (ref 74–99)
HCT VFR BLD AUTO: 38.1 % (ref 36–46)
HGB BLD-MCNC: 13.2 G/DL (ref 12–16)
MCH RBC QN AUTO: 30.4 PG (ref 26–34)
MCHC RBC AUTO-ENTMCNC: 34.6 G/DL (ref 32–36)
MCV RBC AUTO: 88 FL (ref 80–100)
NRBC BLD-RTO: 0.2 /100 WBCS (ref 0–0)
PLATELET # BLD AUTO: 179 X10*3/UL (ref 150–450)
RBC # BLD AUTO: 4.34 X10*6/UL (ref 4–5.2)
UFH PPP CHRO-ACNC: 0.3 IU/ML
WBC # BLD AUTO: 12.2 X10*3/UL (ref 4.4–11.3)

## 2025-01-24 PROCEDURE — 2500000005 HC RX 250 GENERAL PHARMACY W/O HCPCS: Performed by: REGISTERED NURSE

## 2025-01-24 PROCEDURE — 1210000001 HC SEMI-PRIVATE ROOM DAILY

## 2025-01-24 PROCEDURE — 2500000001 HC RX 250 WO HCPCS SELF ADMINISTERED DRUGS (ALT 637 FOR MEDICARE OP): Performed by: HOSPITALIST

## 2025-01-24 PROCEDURE — 97530 THERAPEUTIC ACTIVITIES: CPT | Mod: GP,CQ

## 2025-01-24 PROCEDURE — 85520 HEPARIN ASSAY: CPT | Performed by: HOSPITALIST

## 2025-01-24 PROCEDURE — 36415 COLL VENOUS BLD VENIPUNCTURE: CPT | Performed by: HOSPITALIST

## 2025-01-24 PROCEDURE — 2500000004 HC RX 250 GENERAL PHARMACY W/ HCPCS (ALT 636 FOR OP/ED): Performed by: HOSPITALIST

## 2025-01-24 PROCEDURE — 2500000002 HC RX 250 W HCPCS SELF ADMINISTERED DRUGS (ALT 637 FOR MEDICARE OP, ALT 636 FOR OP/ED): Performed by: HOSPITALIST

## 2025-01-24 PROCEDURE — 99232 SBSQ HOSP IP/OBS MODERATE 35: CPT | Performed by: HOSPITALIST

## 2025-01-24 PROCEDURE — 2500000004 HC RX 250 GENERAL PHARMACY W/ HCPCS (ALT 636 FOR OP/ED): Performed by: REGISTERED NURSE

## 2025-01-24 PROCEDURE — 85027 COMPLETE CBC AUTOMATED: CPT | Performed by: HOSPITALIST

## 2025-01-24 PROCEDURE — 2500000001 HC RX 250 WO HCPCS SELF ADMINISTERED DRUGS (ALT 637 FOR MEDICARE OP): Performed by: REGISTERED NURSE

## 2025-01-24 PROCEDURE — 82947 ASSAY GLUCOSE BLOOD QUANT: CPT

## 2025-01-24 RX ORDER — HYDROXYZINE HYDROCHLORIDE 25 MG/1
25 TABLET, FILM COATED ORAL EVERY 6 HOURS PRN
Status: DISCONTINUED | OUTPATIENT
Start: 2025-01-24 | End: 2025-01-24

## 2025-01-24 RX ADMIN — PREGABALIN 150 MG: 50 CAPSULE ORAL at 09:17

## 2025-01-24 RX ADMIN — INSULIN HUMAN 65 UNITS: 500 INJECTION, SOLUTION SUBCUTANEOUS at 12:21

## 2025-01-24 RX ADMIN — PANTOPRAZOLE SODIUM 40 MG: 40 TABLET, DELAYED RELEASE ORAL at 16:51

## 2025-01-24 RX ADMIN — PANTOPRAZOLE SODIUM 40 MG: 40 TABLET, DELAYED RELEASE ORAL at 06:02

## 2025-01-24 RX ADMIN — PREGABALIN 150 MG: 50 CAPSULE ORAL at 16:59

## 2025-01-24 RX ADMIN — DEXAMETHASONE 8 MG: 4 TABLET ORAL at 09:17

## 2025-01-24 RX ADMIN — DEXAMETHASONE 8 MG: 4 TABLET ORAL at 21:13

## 2025-01-24 RX ADMIN — ACETAMINOPHEN 650 MG: 325 TABLET ORAL at 21:12

## 2025-01-24 RX ADMIN — CLOPIDOGREL BISULFATE 75 MG: 75 TABLET, FILM COATED ORAL at 09:17

## 2025-01-24 RX ADMIN — APIXABAN 10 MG: 5 TABLET, FILM COATED ORAL at 21:12

## 2025-01-24 RX ADMIN — DOCUSATE SODIUM 100 MG: 100 CAPSULE, LIQUID FILLED ORAL at 21:12

## 2025-01-24 RX ADMIN — TOPIRAMATE 25 MG: 25 TABLET ORAL at 21:12

## 2025-01-24 RX ADMIN — LIDOCAINE 4% 1 PATCH: 40 PATCH TOPICAL at 09:17

## 2025-01-24 RX ADMIN — INSULIN HUMAN 8 UNITS: 100 INJECTION, SOLUTION PARENTERAL at 17:00

## 2025-01-24 RX ADMIN — HYDROXYZINE HYDROCHLORIDE 25 MG: 25 TABLET ORAL at 11:00

## 2025-01-24 RX ADMIN — INSULIN HUMAN 8 UNITS: 100 INJECTION, SOLUTION PARENTERAL at 12:21

## 2025-01-24 RX ADMIN — NYSTATIN 500000 UNITS: 100000 SUSPENSION ORAL at 16:51

## 2025-01-24 RX ADMIN — NYSTATIN 500000 UNITS: 100000 SUSPENSION ORAL at 21:13

## 2025-01-24 RX ADMIN — BACLOFEN 20 MG: 10 TABLET ORAL at 09:17

## 2025-01-24 RX ADMIN — FENOFIBRATE 54 MG: 54 TABLET ORAL at 09:17

## 2025-01-24 RX ADMIN — NYSTATIN 500000 UNITS: 100000 SUSPENSION ORAL at 12:21

## 2025-01-24 RX ADMIN — INSULIN HUMAN 35 UNITS: 500 INJECTION, SOLUTION SUBCUTANEOUS at 17:00

## 2025-01-24 RX ADMIN — PREGABALIN 150 MG: 50 CAPSULE ORAL at 21:12

## 2025-01-24 RX ADMIN — Medication 1000 MCG: at 21:12

## 2025-01-24 RX ADMIN — INSULIN HUMAN 95 UNITS: 500 INJECTION, SOLUTION SUBCUTANEOUS at 09:17

## 2025-01-24 RX ADMIN — LOSARTAN POTASSIUM 50 MG: 50 TABLET, FILM COATED ORAL at 09:17

## 2025-01-24 RX ADMIN — BACLOFEN 20 MG: 10 TABLET ORAL at 21:12

## 2025-01-24 RX ADMIN — TOPIRAMATE 25 MG: 25 TABLET ORAL at 09:17

## 2025-01-24 RX ADMIN — APIXABAN 10 MG: 5 TABLET, FILM COATED ORAL at 11:00

## 2025-01-24 RX ADMIN — NYSTATIN 500000 UNITS: 100000 SUSPENSION ORAL at 06:02

## 2025-01-24 ASSESSMENT — PAIN SCALES - GENERAL
PAINLEVEL_OUTOF10: 7
PAINLEVEL_OUTOF10: 4
PAINLEVEL_OUTOF10: 0 - NO PAIN
PAINLEVEL_OUTOF10: 0 - NO PAIN
PAINLEVEL_OUTOF10: 5 - MODERATE PAIN
PAINLEVEL_OUTOF10: 10 - WORST POSSIBLE PAIN

## 2025-01-24 ASSESSMENT — COGNITIVE AND FUNCTIONAL STATUS - GENERAL
CLIMB 3 TO 5 STEPS WITH RAILING: TOTAL
EATING MEALS: A LOT
WALKING IN HOSPITAL ROOM: TOTAL
CLIMB 3 TO 5 STEPS WITH RAILING: A LOT
TURNING FROM BACK TO SIDE WHILE IN FLAT BAD: A LOT
DAILY ACTIVITIY SCORE: 12
PERSONAL GROOMING: A LOT
TOILETING: A LOT
HELP NEEDED FOR BATHING: A LOT
CLIMB 3 TO 5 STEPS WITH RAILING: A LOT
TURNING FROM BACK TO SIDE WHILE IN FLAT BAD: A LOT
MOBILITY SCORE: 14
HELP NEEDED FOR BATHING: A LOT
WALKING IN HOSPITAL ROOM: A LOT
DRESSING REGULAR UPPER BODY CLOTHING: A LOT
DRESSING REGULAR UPPER BODY CLOTHING: A LOT
PERSONAL GROOMING: A LOT
MOBILITY SCORE: 14
DRESSING REGULAR LOWER BODY CLOTHING: A LOT
MOVING TO AND FROM BED TO CHAIR: TOTAL
WALKING IN HOSPITAL ROOM: A LOT
EATING MEALS: A LOT
MOVING FROM LYING ON BACK TO SITTING ON SIDE OF FLAT BED WITH BEDRAILS: A LOT
MOVING TO AND FROM BED TO CHAIR: A LOT
STANDING UP FROM CHAIR USING ARMS: A LOT
DRESSING REGULAR LOWER BODY CLOTHING: A LOT
MOVING TO AND FROM BED TO CHAIR: A LOT
MOBILITY SCORE: 9
TURNING FROM BACK TO SIDE WHILE IN FLAT BAD: A LOT
STANDING UP FROM CHAIR USING ARMS: A LOT
DAILY ACTIVITIY SCORE: 12
TOILETING: A LOT
STANDING UP FROM CHAIR USING ARMS: A LOT

## 2025-01-24 ASSESSMENT — PAIN - FUNCTIONAL ASSESSMENT
PAIN_FUNCTIONAL_ASSESSMENT: 0-10

## 2025-01-24 ASSESSMENT — PAIN DESCRIPTION - LOCATION
LOCATION: BACK
LOCATION: GENERALIZED

## 2025-01-24 NOTE — PROGRESS NOTES
Per [facility] they do not have bed at this time.  Per jessica she has spoken to spouse who requests [facility] as next choice.  Referral sent, they are able to accept. Facility to obtain precert/anthem [facility]mp.

## 2025-01-24 NOTE — PROGRESS NOTES
Physical Therapy    Physical Therapy Treatment    Patient Name: Jing Jon  MRN: [id number]  Department: [facility]  Room: [facility]/1019-A  Today's Date: 1/24/2025  Time Calculation  Start Time: 0938  Stop Time: 1003  Time Calculation (min): 25 min         Assessment/Plan   PT Assessment  PT Assessment Results: Decreased strength, Decreased endurance, Impaired balance, Decreased mobility, Impaired judgement  Rehab Prognosis: Good  Barriers to Discharge Home: Caregiver assistance, Physical needs  Caregiver Assistance: Caregiver assistance needed per identified barriers - however, level of patient's required assistance exceeds assistance available at home  Physical Needs: In-home setup navigation limited by function/safety, Ambulating household distances limited by function/safety, 24hr mobility assistance needed, High falls risk due to function or environment  End of Session Communication: Bedside nurse  Assessment Comment: Pt participates with max encouragement. Pt will benefit from continued PT to further progress strength and functional mobility.  End of Session Patient Position: Bed, 3 rail up, Alarm on  PT Plan  Inpatient/Swing Bed or Outpatient: Inpatient  PT Plan  Treatment/Interventions: Bed mobility, Transfer training, Gait training, Therapeutic exercise  PT Plan: Ongoing PT  PT Frequency: 3 times per week  PT Discharge Recommendations: Moderate intensity level of continued care  PT Recommended Transfer Status: Assist x2, Assistive device    General Visit Information:   PT  Visit  PT Received On: 01/24/25  General  Reason for Referral: Impaired mobility  Referred By: Anthony 1/22, OT/PT  Past Medical History Relevant to Rehab: includes: DM, neuropahty, cerebral edema, healing L1 fracture  Family/Caregiver Present: Yes  Caregiver Feedback: spouse supportive  Prior to Session Communication: Bedside nurse  Patient Position Received: Bed, 3 rail up, Alarm on  General Comment: Pt is agreeable to PT with max  encouragement.    Subjective   Precautions:  Precautions  Medical Precautions: Fall precautions            Objective   Pain:  Pain Assessment  Pain Assessment: 0-10  0-10 (Numeric) Pain Score: 10 - Worst possible pain  Pain Type: Chronic pain  Pain Location: Leg  Pain Orientation: Right, Left  Pain Interventions: Medication (See MAR)  Cognition:  Cognition  Orientation Level: Oriented X4  Coordination:     Postural Control:     Extremity/Trunk Assessments:        Activity Tolerance:  Activity Tolerance  Endurance: Decreased tolerance for upright activites  Treatments:  Therapeutic Exercise  Therapeutic Exercise Performed: Yes (Seated LAQ and marches x 5ea BLE)    Bed Mobility  Bed Mobility: Yes (Sup<>sit with max A x 2 for both trunk and BLE management. Pt needing hand over hand assist to reach for siderail. Pt refusing socks requesting shoe covers.)    Ambulation/Gait Training  Ambulation/Gait Training Performed: Yes (Pt able to side step x 10 ea toward HOB with WW, max A x2 and gait belt. Pt takes small, shuffling steps with minimal step height. Pt very anxious with OOB activity needing max encouragement.)  Transfers  Transfer: Yes (Sit<>stand transfer at EOB with WW, gait belt and max A x 2. Pt anxious with all OOB activity.)    Outcome Measures:  [facility] Basic Mobility  Turning from your back to your side while in a flat bed without using bedrails: A lot  Moving from lying on your back to sitting on the side of a flat bed without using bedrails: A lot  Moving to and from bed to chair (including a wheelchair): Total  Standing up from a chair using your arms (e.g. wheelchair or bedside chair): A lot  To walk in hospital room: Total  Climbing 3-5 steps with railing: Total  Basic Mobility - Total Score: 9    Education Documentation  Mobility Training, taught by Kalli Dowd PTA at 1/24/2025  1:16 PM.  Learner: Patient  Readiness: Acceptance  Method: Explanation  Response: Needs Reinforcement    Education  Comments  No comments found.        EDUCATION:  Outpatient Education  Individual(s) Educated: Patient, Spouse  Education Provided: Body Mechanics, Fall Risk, Home Exercise Program    Encounter Problems       Encounter Problems (Active)       PT Problem       Pt. will transfer supine/sit with MIN A x 1 (Progressing)       Start:  01/23/25    Expected End:  02/06/25            Pt. will transfer sit/stand with FWW with MOD A x 1 (Progressing)       Start:  01/23/25    Expected End:  02/06/25            Pt.will ambulate 25' with FWW with MOD A x 1 (Progressing)       Start:  01/23/25    Expected End:  02/06/25            Pt. will perform 2 x 15 B LE AROM exercises  (Progressing)       Start:  01/23/25    Expected End:  02/06/25

## 2025-01-24 NOTE — CARE PLAN
The patient's goals for the shift include      The clinical goals for the shift include Pain control

## 2025-01-24 NOTE — PROGRESS NOTES
"Daily Progress Note    Jing Jon is a 64 y.o. female on day 2 of admission presenting with Acute pulmonary embolism, unspecified pulmonary embolism type, unspecified whether acute cor pulmonale present (Multi).    Subjective   Pt seen and examined at bedside this AM, chart and labs reviewed. Pt is still experiencing pretty significant pain in her feet, however the pain is slowly improving from yesterday. She was expressing increase in anxiety, PRN atarax was ordered. Pt denying CP, SOB, n/v/f/c. No acute events overnight.        Objective     Physical Exam    Physical Exam  Constitutional:       Appearance: Normal appearance.   Cardiovascular:      Rate and Rhythm: Regular rhythm. Tachycardia present.      Pulses: Normal pulses.      Heart sounds: Normal heart sounds.   Pulmonary:      Effort: Pulmonary effort is normal.      Breath sounds: Normal breath sounds.   Abdominal:      General: Bowel sounds are normal.      Palpations: Abdomen is soft.   Skin:     General: Skin is warm and dry.   Neurological:      General: No focal deficit present.      Mental Status: She is alert. Mental status is at baseline.   Psychiatric:         Behavior: Behavior normal.         Thought Content: Thought content normal.         Judgment: Judgment normal.      Comments: Pt appears to be feeling down and upset with her overall health          Last Recorded Vitals  Blood pressure (!) 184/84, pulse 106, temperature 35.6 °C (96.1 °F), temperature source Temporal, resp. rate 20, height 1.575 m (5' 2\"), weight 89.6 kg (197 lb 8.5 oz), SpO2 96%.  Intake/Output last 3 Shifts:  I/O last 3 completed shifts:  In: 1131.6 (12.6 mL/kg) [P.O.:1070; I.V.:61.6 (0.7 mL/kg)]  Out: 2750 (30.7 mL/kg) [Urine:2750 (0.9 mL/kg/hr)]  Weight: 89.6 kg     Medications  Scheduled medications  apixaban, 10 mg, oral, BID   Followed by  [START ON 1/31/2025] apixaban, 5 mg, oral, BID  baclofen, 20 mg, oral, BID  clopidogrel, 75 mg, oral, " Daily  cyanocobalamin, 1,000 mcg, oral, Nightly  dexAMETHasone, 8 mg, oral, BID  docusate sodium, 100 mg, oral, BID  fenofibrate, 54 mg, oral, Daily  insulin regular, 35 Units, subcutaneous, Daily before evening meal  insulin regular, 65 Units, subcutaneous, Daily  insulin regular, 95 Units, subcutaneous, Daily before breakfast  insulin regular, 0-10 Units, subcutaneous, TID AC  lidocaine, 1 patch, transdermal, Daily  losartan, 50 mg, oral, Daily  nystatin, 5 mL, Swish & Swallow, 4x daily  pantoprazole, 40 mg, oral, BID AC  pregabalin, 150 mg, oral, TID  topiramate, 25 mg, oral, BID      Continuous medications     PRN medications  PRN medications: acetaminophen **OR** acetaminophen **OR** acetaminophen, acetaminophen **OR** acetaminophen **OR** acetaminophen, albuterol, benzocaine-menthol, dextrose, dextrose, glucagon, glucagon, hydrOXYzine HCL, ondansetron **OR** ondansetron    Labs  CBC:   Results from last 7 days   Lab Units 01/24/25  0533 01/23/25  0547 01/22/25  1004   WBC AUTO x10*3/uL 12.2* 11.0 9.8   RBC AUTO x10*6/uL 4.34 4.38 4.76   HEMOGLOBIN g/dL 13.2 13.4 14.6   HEMATOCRIT % 38.1 39.5 43.0   MCV fL 88 90 90   MCH pg 30.4 30.6 30.7   MCHC g/dL 34.6 33.9 34.0   RDW % 14.7* 15.4* 15.9*   PLATELETS AUTO x10*3/uL 179 142* 157     CMP:    Results from last 7 days   Lab Units 01/23/25  0547 01/22/25  1004   SODIUM mmol/L 135* 138   POTASSIUM mmol/L 4.5 4.3   CHLORIDE mmol/L 102 103   CO2 mmol/L 24 30   BUN mg/dL 20 25*   CREATININE mg/dL 0.34* 0.49*   GLUCOSE mg/dL 443* 241*   PROTEIN TOTAL g/dL  --  5.7*   CALCIUM mg/dL 8.5* 8.6   BILIRUBIN TOTAL mg/dL  --  0.7   ALK PHOS U/L  --  46   AST U/L  --  14   ALT U/L  --  44     BMP:    Results from last 7 days   Lab Units 01/23/25  0547 01/22/25  1004   SODIUM mmol/L 135* 138   POTASSIUM mmol/L 4.5 4.3   CHLORIDE mmol/L 102 103   CO2 mmol/L 24 30   BUN mg/dL 20 25*   CREATININE mg/dL 0.34* 0.49*   CALCIUM mg/dL 8.5* 8.6   GLUCOSE mg/dL 443* 241*      Magnesium:  Results from last 7 days   Lab Units 01/22/25  1004   MAGNESIUM mg/dL 1.84     Troponin:    Results from last 7 days   Lab Units 01/22/25  1004   TROPHS ng/L 13     BNP:   Results from last 7 days   Lab Units 01/22/25  1004   BNP pg/mL 27     Lipid Panel:  Results from last 7 days   Lab Units 01/22/25  1255   INR  1.0   PROTIME seconds 11.5        Nutrition   Adult diet, carb consistent       Relevant Results  Results from last 7 days   Lab Units 01/24/25  1103 01/24/25  0639 01/23/25  2000 01/23/25  1608 01/23/25  1433 01/23/25  0703 01/23/25  0547 01/22/25  1629 01/22/25  1004   POCT GLUCOSE mg/dL 319* 114* 436* 459* 483*   < >  --    < >  --    GLUCOSE mg/dL  --   --   --   --   --   --  443*  --  241*    < > = values in this interval not displayed.     Lab Results   Component Value Date    HGBA1C 7.3 (H) 09/07/2024        Assessment/Plan  Jing, 65 yo female, presenting with pulmonary embolism, DVT in the right femoral vein, lower extremity edema with wounds on the tops of the feet, and oral thrush.     Pulmonary Embolism   -switched over to PO eliquis   -continue home plavix   -MARLEE , EF 63%, no right heart strain    Right femoral vein DVT  bilateral lower extremity edema, hx of CAD   -vascular surgery signed off, they said anticoagulation is adequate, no surgery needed   -full leg ace wrap compression was recommended     Wounds bilaterally on dorsal aspect of feet  -wound care consulted, pt refused tx yesterday dt pain     Anxiety   -pt expressed that he anxiety is very bad, she does not take anything regularly for anxiety  -PRN Atarax ordered     Bilateral knee pain and positive L1 compression fx  -ortho consulted and signed off  -pt follows with orthopedics     Oral candidiasis   -continue swish and swallow nystatin       Hx T2DM: with hyperglycemia, on chronic steroids   -continue home insulin regimen, with sliding scale      Hx of meningioma with gamma knife radiation- follows at  [facility]  -on chronic steroids      Difficulty with ambulation: PT/OT, pt will be transferring to [facility] pending precert      DVTp: now on eliquis   Code Status: full code

## 2025-01-24 NOTE — CONSULTS
Patients pain seems to be better managed today. Applied xeroform dressings to the top of both feet and secured with wound vac drape. Patient still does not want her legs wrapped with ace wraps from base of toes to knee. Patient stated absolutely not they are still too painful.  Wound Care Consult     Visit Date: 1/24/2025      Patient Name: Jing Jon         MRN: [id number]           YOB: 1960     Reason for Consult: Wound Care BLE        Wound History: Chronic     Pertinent Labs:   Albumin   Date Value Ref Range Status   01/22/2025 3.2 (L) 3.4 - 5.0 g/dL Final     Albumin, Urine Random   Date Value Ref Range Status   12/18/2023 18.3 Not established mg/L Final       Wound Assessment:  Wound 01/22/25  Dorsal foot;Right (Active)   Dressing Kerlix/rolled gauze 01/22/25 2018   Dressing Changed New 01/22/25 1532   Dressing Status Clean;Dry;Occlusive 01/22/25 2018       Wound 01/22/25  Dorsal foot;Left (Active)   Dressing Kerlix/rolled gauze 01/22/25 2018   Dressing Changed New 01/22/25 1532   Dressing Status Clean;Dry;Occlusive 01/22/25 2018       Wound 01/22/25 Pressure Injury Sacrum Bilateral (Active)   Site Assessment Denuded;Red 01/23/25 0402   Mary Ann-Wound Assessment Pink 01/23/25 0402   Pressure Injury Stage 2 01/23/25 0402   Shape IRREGULAR 01/23/25 0402   Drainage Description Sanguineous 01/23/25 0402   Drainage Amount Scant 01/23/25 0402   Dressing Foam 01/23/25 0402   Dressing Changed New 01/23/25 0402   Dressing Status Clean;Dry 01/23/25 0402       Wound Team Summary Assessment:       Wound Team Plan: Continue with current dressing changes every other day. With xeroform     Dung Block LPN  1/24/2025  1:56 PM

## 2025-01-24 NOTE — CARE PLAN
Problem: Discharge Planning  Goal: Discharge to home or other facility with appropriate resources  Outcome: Progressing     Problem: Chronic Conditions and Co-morbidities  Goal: Patient's chronic conditions and co-morbidity symptoms are monitored and maintained or improved  Outcome: Progressing     Problem: Nutrition  Goal: Nutrient intake appropriate for maintaining nutritional needs  Outcome: Progressing     Problem: Skin  Goal: Decreased wound size/increased tissue granulation at next dressing change  Outcome: Progressing  Goal: Participates in plan/prevention/treatment measures  Outcome: Progressing  Goal: Prevent/manage excess moisture  Outcome: Progressing  Goal: Prevent/minimize sheer/friction injuries  Outcome: Progressing  Goal: Promote/optimize nutrition  Outcome: Progressing  Goal: Promote skin healing  Outcome: Progressing     Problem: Pain  Goal: Takes deep breaths with improved pain control throughout the shift  Outcome: Progressing  Goal: Turns in bed with improved pain control throughout the shift  Outcome: Progressing  Goal: Walks with improved pain control throughout the shift  Outcome: Progressing  Goal: Performs ADL's with improved pain control throughout shift  Outcome: Progressing  Goal: Participates in PT with improved pain control throughout the shift  Outcome: Progressing  Goal: Free from opioid side effects throughout the shift  Outcome: Progressing  Goal: Free from acute confusion related to pain meds throughout the shift  Outcome: Progressing     Problem: Fall/Injury  Goal: Not fall by end of shift  Outcome: Progressing  Goal: Be free from injury by end of the shift  Outcome: Progressing  Goal: Verbalize understanding of personal risk factors for fall in the hospital  Outcome: Progressing  Goal: Verbalize understanding of risk factor reduction measures to prevent injury from fall in the home  Outcome: Progressing  Goal: Use assistive devices by end of the shift  Outcome:  Progressing  Goal: Pace activities to prevent fatigue by end of the shift  Outcome: Progressing     Problem: Respiratory  Goal: Minimize anxiety/maximize coping throughout shift  Outcome: Progressing  Goal: Minimal/no exertional discomfort or dyspnea this shift  Outcome: Progressing  Goal: No signs of respiratory distress (eg. Use of accessory muscles. Peds grunting)  Outcome: Progressing  Goal: Patent airway maintained this shift  Outcome: Progressing  Goal: Verbalize decreased shortness of breath this shift  Outcome: Progressing  Goal: Wean oxygen to maintain O2 saturation per order/standard this shift  Outcome: Progressing  Goal: Increase self care and/or family involvement in next 24 hours  Outcome: Progressing     Problem: Diabetes  Goal: Achieve decreasing blood glucose levels by end of shift  Outcome: Progressing  Goal: Maintain electrolyte levels within acceptable range throughout shift  Outcome: Progressing  Goal: Maintain glucose levels >70mg/dl to <250mg/dl throughout shift  Outcome: Progressing  Goal: No changes in neurological exam by end of shift  Outcome: Progressing  Goal: Vital signs within normal range for age by end of shift  Outcome: Progressing   The patient's goals for the shift include      The clinical goals for the shift include Pain Management

## 2025-01-25 VITALS
WEIGHT: 197.53 LBS | SYSTOLIC BLOOD PRESSURE: 166 MMHG | HEART RATE: 105 BPM | OXYGEN SATURATION: 96 % | TEMPERATURE: 96.8 F | HEIGHT: 62 IN | RESPIRATION RATE: 18 BRPM | DIASTOLIC BLOOD PRESSURE: 102 MMHG | BODY MASS INDEX: 36.35 KG/M2

## 2025-01-25 LAB
GLUCOSE BLD MANUAL STRIP-MCNC: 260 MG/DL (ref 74–99)
GLUCOSE BLD MANUAL STRIP-MCNC: 322 MG/DL (ref 74–99)
GLUCOSE BLD MANUAL STRIP-MCNC: 334 MG/DL (ref 74–99)
GLUCOSE BLD MANUAL STRIP-MCNC: 440 MG/DL (ref 74–99)

## 2025-01-25 PROCEDURE — 2500000001 HC RX 250 WO HCPCS SELF ADMINISTERED DRUGS (ALT 637 FOR MEDICARE OP): Performed by: HOSPITALIST

## 2025-01-25 PROCEDURE — 2500000002 HC RX 250 W HCPCS SELF ADMINISTERED DRUGS (ALT 637 FOR MEDICARE OP, ALT 636 FOR OP/ED): Performed by: NURSE PRACTITIONER

## 2025-01-25 PROCEDURE — 2500000002 HC RX 250 W HCPCS SELF ADMINISTERED DRUGS (ALT 637 FOR MEDICARE OP, ALT 636 FOR OP/ED): Performed by: HOSPITALIST

## 2025-01-25 PROCEDURE — 82947 ASSAY GLUCOSE BLOOD QUANT: CPT

## 2025-01-25 PROCEDURE — 2500000004 HC RX 250 GENERAL PHARMACY W/ HCPCS (ALT 636 FOR OP/ED): Performed by: REGISTERED NURSE

## 2025-01-25 PROCEDURE — 99239 HOSP IP/OBS DSCHRG MGMT >30: CPT | Performed by: HOSPITALIST

## 2025-01-25 PROCEDURE — 2500000005 HC RX 250 GENERAL PHARMACY W/O HCPCS: Performed by: REGISTERED NURSE

## 2025-01-25 PROCEDURE — 2500000004 HC RX 250 GENERAL PHARMACY W/ HCPCS (ALT 636 FOR OP/ED): Performed by: HOSPITALIST

## 2025-01-25 PROCEDURE — 2500000001 HC RX 250 WO HCPCS SELF ADMINISTERED DRUGS (ALT 637 FOR MEDICARE OP): Performed by: REGISTERED NURSE

## 2025-01-25 RX ORDER — OXYCODONE HYDROCHLORIDE 5 MG/1
2.5 TABLET ORAL EVERY 6 HOURS PRN
Qty: 15 TABLET | Refills: 0 | Status: SHIPPED | OUTPATIENT
Start: 2025-01-25

## 2025-01-25 RX ORDER — FUROSEMIDE 20 MG/1
20 TABLET ORAL DAILY
Start: 2025-01-25

## 2025-01-25 RX ORDER — LABETALOL HYDROCHLORIDE 5 MG/ML
20 INJECTION, SOLUTION INTRAVENOUS EVERY 6 HOURS PRN
Status: DISCONTINUED | OUTPATIENT
Start: 2025-01-25 | End: 2025-01-25 | Stop reason: HOSPADM

## 2025-01-25 RX ORDER — LOSARTAN POTASSIUM 50 MG/1
100 TABLET ORAL DAILY
Start: 2025-01-25

## 2025-01-25 RX ORDER — ESOMEPRAZOLE MAGNESIUM 40 MG/1
40 CAPSULE, DELAYED RELEASE ORAL 2 TIMES DAILY
Start: 2025-01-25

## 2025-01-25 RX ORDER — INSULIN LISPRO 100 [IU]/ML
10 INJECTION, SOLUTION INTRAVENOUS; SUBCUTANEOUS ONCE
Status: COMPLETED | OUTPATIENT
Start: 2025-01-25 | End: 2025-01-25

## 2025-01-25 RX ADMIN — INSULIN HUMAN 35 UNITS: 500 INJECTION, SOLUTION SUBCUTANEOUS at 16:30

## 2025-01-25 RX ADMIN — INSULIN HUMAN 6 UNITS: 100 INJECTION, SOLUTION PARENTERAL at 08:22

## 2025-01-25 RX ADMIN — INSULIN HUMAN 95 UNITS: 500 INJECTION, SOLUTION SUBCUTANEOUS at 08:24

## 2025-01-25 RX ADMIN — FENOFIBRATE 54 MG: 54 TABLET ORAL at 08:26

## 2025-01-25 RX ADMIN — TOPIRAMATE 25 MG: 25 TABLET ORAL at 08:25

## 2025-01-25 RX ADMIN — NYSTATIN 500000 UNITS: 100000 SUSPENSION ORAL at 16:31

## 2025-01-25 RX ADMIN — ACETAMINOPHEN 650 MG: 325 TABLET ORAL at 12:02

## 2025-01-25 RX ADMIN — ACETAMINOPHEN 650 MG: 325 TABLET ORAL at 05:21

## 2025-01-25 RX ADMIN — NYSTATIN 500000 UNITS: 100000 SUSPENSION ORAL at 12:56

## 2025-01-25 RX ADMIN — CLOPIDOGREL BISULFATE 75 MG: 75 TABLET, FILM COATED ORAL at 08:25

## 2025-01-25 RX ADMIN — INSULIN HUMAN 8 UNITS: 100 INJECTION, SOLUTION PARENTERAL at 11:19

## 2025-01-25 RX ADMIN — BACLOFEN 20 MG: 10 TABLET ORAL at 08:25

## 2025-01-25 RX ADMIN — NYSTATIN 500000 UNITS: 100000 SUSPENSION ORAL at 06:20

## 2025-01-25 RX ADMIN — APIXABAN 10 MG: 5 TABLET, FILM COATED ORAL at 08:28

## 2025-01-25 RX ADMIN — INSULIN HUMAN 8 UNITS: 100 INJECTION, SOLUTION PARENTERAL at 16:29

## 2025-01-25 RX ADMIN — LIDOCAINE 4% 1 PATCH: 40 PATCH TOPICAL at 08:25

## 2025-01-25 RX ADMIN — PREGABALIN 150 MG: 50 CAPSULE ORAL at 15:07

## 2025-01-25 RX ADMIN — DOCUSATE SODIUM 100 MG: 100 CAPSULE, LIQUID FILLED ORAL at 08:25

## 2025-01-25 RX ADMIN — PANTOPRAZOLE SODIUM 40 MG: 40 TABLET, DELAYED RELEASE ORAL at 06:20

## 2025-01-25 RX ADMIN — INSULIN HUMAN 65 UNITS: 500 INJECTION, SOLUTION SUBCUTANEOUS at 12:56

## 2025-01-25 RX ADMIN — INSULIN LISPRO 10 UNITS: 100 INJECTION, SOLUTION INTRAVENOUS; SUBCUTANEOUS at 01:31

## 2025-01-25 RX ADMIN — PANTOPRAZOLE SODIUM 40 MG: 40 TABLET, DELAYED RELEASE ORAL at 15:07

## 2025-01-25 RX ADMIN — LABETALOL HYDROCHLORIDE 20 MG: 5 INJECTION, SOLUTION INTRAVENOUS at 12:05

## 2025-01-25 RX ADMIN — PREGABALIN 150 MG: 50 CAPSULE ORAL at 08:26

## 2025-01-25 RX ADMIN — LOSARTAN POTASSIUM 50 MG: 50 TABLET, FILM COATED ORAL at 08:25

## 2025-01-25 RX ADMIN — DEXAMETHASONE 8 MG: 4 TABLET ORAL at 08:25

## 2025-01-25 ASSESSMENT — PAIN DESCRIPTION - LOCATION
LOCATION: LEG
LOCATION: GENERALIZED

## 2025-01-25 ASSESSMENT — PAIN - FUNCTIONAL ASSESSMENT
PAIN_FUNCTIONAL_ASSESSMENT: 0-10

## 2025-01-25 ASSESSMENT — PAIN SCALES - GENERAL
PAINLEVEL_OUTOF10: 10 - WORST POSSIBLE PAIN
PAINLEVEL_OUTOF10: 8
PAINLEVEL_OUTOF10: 2
PAINLEVEL_OUTOF10: 5 - MODERATE PAIN
PAINLEVEL_OUTOF10: 3

## 2025-01-25 ASSESSMENT — PAIN DESCRIPTION - ORIENTATION: ORIENTATION: RIGHT;LEFT;LOWER

## 2025-01-25 NOTE — CARE PLAN
The patient's goals for the shift include      The clinical goals for the shift include Pain Management

## 2025-01-25 NOTE — NURSING NOTE
"Lab attempted to draw patients blood  but was unable with first attempt . A second attempt was made and patient began crying stated \" she can't do this anymore and said she didn't want us to continue \" . NP Updated .  "

## 2025-01-25 NOTE — DISCHARGE SUMMARY
Discharge Diagnosis  Acute pulmonary embolism, unspecified pulmonary embolism type, unspecified whether acute cor pulmonale present (Multi)      Discharge Meds     Your medication list        START taking these medications        Instructions Last Dose Given Next Dose Due   apixaban 5 mg tablet  Commonly known as: Eliquis  Start taking on: January 25, 2025      Take 2 tablets (10 mg) by mouth 2 times a day for 6 days, THEN 1 tablet (5 mg) 2 times a day.       oxyCODONE 5 mg immediate release tablet  Commonly known as: Roxicodone      Take 0.5 tablets (2.5 mg) by mouth every 6 hours if needed for severe pain (7 - 10).              CHANGE how you take these medications        Instructions Last Dose Given Next Dose Due   esomeprazole 40 mg DR capsule  Commonly known as: NexIUM  What changed:   how much to take  when to take this      Take 1 capsule (40 mg) by mouth 2 times a day.       losartan 50 mg tablet  Commonly known as: Cozaar  What changed: how much to take      Take 2 tablets (100 mg) by mouth once daily.              CONTINUE taking these medications        Instructions Last Dose Given Next Dose Due   acetaminophen 500 mg tablet  Commonly known as: Tylenol           albuterol 1.25 mg/3 mL nebulizer solution      Take 3 mL (1.25 mg) by nebulization every 4 hours if needed for shortness of breath or wheezing.       albuterol 90 mcg/actuation inhaler      Inhale 2 puffs every 4 hours if needed for shortness of breath or wheezing.       apple cider vinegar 600 mg capsule           AVASTIN IV           baclofen 10 mg tablet  Commonly known as: Lioresal           biotin 10 mg tablet           clopidogrel 75 mg tablet  Commonly known as: Plavix           cyanocobalamin 1,000 mcg tablet  Commonly known as: Vitamin B-12           Daily Multi-Vitamin tablet  Generic drug: multivitamin           dexAMETHasone 4 mg tablet  Commonly known as: Decadron           fenofibrate 48 mg tablet  Commonly known as: Tricor            furosemide 20 mg tablet  Commonly known as: Lasix      Take 1 tablet (20 mg) by mouth once daily. NOT STARTED YET       Gvoke HypoPen 2-Pack 1 mg/0.2 mL auto-injector  Generic drug: glucagon           Horizant 600 mg tablet extended release ER tablet  Generic drug: gabapentin enacarbil           HUMULIN R U-500 (CONC) KWIKPEN SUBQ           HUMULIN R U-500 (CONC) KWIKPEN SUBQ           HumuLIN R U-500 (Conc) Kwikpen 500 unit/mL (3 mL) CONCENTRATED injection  Generic drug: insulin regular           ibuprofen 200 mg tablet           ipratropium-albuteroL 0.5-2.5 mg/3 mL nebulizer solution  Commonly known as: Duo-Neb      Take 3 mL by nebulization 4 times a day as needed for wheezing or shortness of breath.       Nurtec ODT 75 mg tablet,disintegrating  Generic drug: rimegepant           olmesartan-amLODIPin-hcthiazid 40-5-12.5 mg tablet           ondansetron ODT 4 mg disintegrating tablet  Commonly known as: Zofran-ODT      Take 1 tablet (4 mg) by mouth every 8 hours if needed for nausea or vomiting.       Ozempic 0.25 mg or 0.5 mg (2 mg/3 mL) pen injector  Generic drug: semaglutide           pantoprazole 40 mg EC tablet  Commonly known as: ProtoNix           pregabalin 150 mg capsule  Commonly known as: Lyrica           Repatha SureClick 140 mg/mL injection  Generic drug: evolocumab           topiramate 25 mg tablet  Commonly known as: Topamax           turmeric root extract 500 mg capsule           Vitamin D3 25 MCG (1000 UT) capsule  Generic drug: cholecalciferol                  STOP taking these medications      metoclopramide 10 mg tablet  Commonly known as: Reglan        montelukast 10 mg tablet  Commonly known as: Singulair                  Where to Get Your Medications        You can get these medications from any pharmacy    Bring a paper prescription for each of these medications  oxyCODONE 5 mg immediate release tablet       Information about where to get these medications is not yet available    Ask your  nurse or doctor about these medications  apixaban 5 mg tablet  esomeprazole 40 mg DR capsule  furosemide 20 mg tablet  losartan 50 mg tablet         Test Results Pending At Discharge  Pending Labs       No current pending labs.            Hospital Course  Jing, 63 yo female, presenting with pulmonary embolism, DVT in the right femoral vein, lower extremity edema with wounds on the tops of the feet, and oral thrush.         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 per [facility]  -pt has scheduled Avastin infusions every 21 days, last received 1/20, next due on 2/19   -continue to monitor mental status,   -follow up with oncology outpatient   -doing much better throughout the day and ready to go to snf      Pulmonary Embolism   -switched over to PO eliquis   -continue home plavix   -MARLEE , EF 63%, no right heart strain     Right femoral vein DVT  bilateral lower extremity edema, hx of CAD   -vascular surgery signed off, they said anticoagulation is adequate, no surgery needed   -full leg ace wrap compression was recommended, pt continues to decline     Wounds bilaterally on dorsal aspect of feet  -wound care consulted, pt allowed for wound care yesterday      Anxiety   -pt expressed that he anxiety is very bad, she does not take anything regularly for anxiety  -PRN Atarax ordered, seemed to have made pt more fatigued and confused, stopped here seems a bit better now      Bilateral knee pain and positive L1 compression fx  -ortho consulted and signed off  -pt follows with orthopedics      Oral candidiasis   -continue swish and swallow nystatin finished     Hx T2DM: with hyperglycemia, on chronic steroids   -continue home insulin regimen, with sliding scale      Hx of meningioma with gamma knife radiation- follows at [facility]  -on chronic steroids      HTN: increased losartan to 100mg daily      Plan to discharge to snf today.  Greater than 30 minutes of clinical time spent caring for this patient.       Outpatient Follow-Up  Future Appointments   Date Time Provider Department Center   4/22/2025  9:00 AM DO JAVON Keller[address]       Ananda Ulrich MD

## 2025-01-25 NOTE — CONSULTS
Social Work Note  The LSW was informed that [facility] has accepted the pt and has received the insurance pre-auth this date. The [place] dianna is completed and a 7000 has been requested to be completed by the discharge support center.    Update 1330:  The 7000 is completed and in [place]. Arrangements have been made for the pt to transfer to [facility] via [facility] stretcher as arranged in Round Trip with a 4:00 pm  time awarded. The Unit RN has been provided the number for report [phone].  The LSW met with the pt and spouse visiting at the bedside. The pt and spouse continue to be receptive to the pt's transfer to [facility] this date for continued skilled and rehab care. The number for report has been provided to the Unit RN.

## 2025-01-25 NOTE — PROGRESS NOTES
"Daily Progress Note    Jing Jon is a 64 y.o. female on day 3 of admission presenting with Acute pulmonary embolism, unspecified pulmonary embolism type, unspecified whether acute cor pulmonale present (Multi).    Subjective   Pt seen and examined at bedside this AM, chart and labs reviewed.  states she has been very confused, pt called him and said she was found walking the streets and that someone told her that her  had . Upon exam she was oriented, still experiencing intense pain and more fatigued today. Pt refused labs this AM. Pt states so n/v/f/c, SOB, CP, abdominal pain. No adverse events overnight.        Objective     Physical Exam    Physical Exam  Constitutional:       Appearance: Normal appearance.   HENT:      Head: Normocephalic and atraumatic.   Cardiovascular:      Rate and Rhythm: Regular rhythm. Tachycardia present.      Pulses: Normal pulses.      Heart sounds: Normal heart sounds.   Pulmonary:      Effort: Pulmonary effort is normal.      Breath sounds: Normal breath sounds.   Abdominal:      General: Bowel sounds are normal.      Palpations: Abdomen is soft.   Skin:     General: Skin is warm and dry.   Neurological:      General: No focal deficit present.      Mental Status: She is alert and oriented to person, place, and time.      Comments: Has been more confused    Psychiatric:         Behavior: Behavior normal.         Thought Content: Thought content normal.         Judgment: Judgment normal.      Comments: Pt is anxious          Last Recorded Vitals  Blood pressure (!) 191/104, pulse 96, temperature 35.5 °C (95.9 °F), temperature source Temporal, resp. rate 16, height 1.575 m (5' 2\"), weight 89.6 kg (197 lb 8.5 oz), SpO2 96%.  Intake/Output last 3 Shifts:  I/O last 3 completed shifts:  In: 1010 (11.3 mL/kg) [P.O.:1010]  Out: 700 (7.8 mL/kg) [Urine:700 (0.2 mL/kg/hr)]  Weight: 89.6 kg     Medications  Scheduled medications  apixaban, 10 mg, oral, BID   Followed " by  [START ON 1/31/2025] apixaban, 5 mg, oral, BID  baclofen, 20 mg, oral, BID  clopidogrel, 75 mg, oral, Daily  cyanocobalamin, 1,000 mcg, oral, Nightly  dexAMETHasone, 8 mg, oral, BID  docusate sodium, 100 mg, oral, BID  fenofibrate, 54 mg, oral, Daily  insulin regular, 35 Units, subcutaneous, Daily before evening meal  insulin regular, 65 Units, subcutaneous, Daily  insulin regular, 95 Units, subcutaneous, Daily before breakfast  insulin regular, 0-10 Units, subcutaneous, TID AC  lidocaine, 1 patch, transdermal, Daily  losartan, 50 mg, oral, Daily  nystatin, 5 mL, Swish & Swallow, 4x daily  pantoprazole, 40 mg, oral, BID AC  pregabalin, 150 mg, oral, TID  topiramate, 25 mg, oral, BID      Continuous medications     PRN medications  PRN medications: acetaminophen **OR** acetaminophen **OR** acetaminophen, acetaminophen **OR** acetaminophen **OR** acetaminophen, albuterol, benzocaine-menthol, dextrose, dextrose, glucagon, glucagon, ondansetron **OR** ondansetron    Labs  CBC:   Results from last 7 days   Lab Units 01/24/25  0533 01/23/25  0547 01/22/25  1004   WBC AUTO x10*3/uL 12.2* 11.0 9.8   RBC AUTO x10*6/uL 4.34 4.38 4.76   HEMOGLOBIN g/dL 13.2 13.4 14.6   HEMATOCRIT % 38.1 39.5 43.0   MCV fL 88 90 90   MCH pg 30.4 30.6 30.7   MCHC g/dL 34.6 33.9 34.0   RDW % 14.7* 15.4* 15.9*   PLATELETS AUTO x10*3/uL 179 142* 157     CMP:    Results from last 7 days   Lab Units 01/23/25  0547 01/22/25  1004   SODIUM mmol/L 135* 138   POTASSIUM mmol/L 4.5 4.3   CHLORIDE mmol/L 102 103   CO2 mmol/L 24 30   BUN mg/dL 20 25*   CREATININE mg/dL 0.34* 0.49*   GLUCOSE mg/dL 443* 241*   PROTEIN TOTAL g/dL  --  5.7*   CALCIUM mg/dL 8.5* 8.6   BILIRUBIN TOTAL mg/dL  --  0.7   ALK PHOS U/L  --  46   AST U/L  --  14   ALT U/L  --  44     BMP:    Results from last 7 days   Lab Units 01/23/25  0547 01/22/25  1004   SODIUM mmol/L 135* 138   POTASSIUM mmol/L 4.5 4.3   CHLORIDE mmol/L 102 103   CO2 mmol/L 24 30   BUN mg/dL 20 25*    CREATININE mg/dL 0.34* 0.49*   CALCIUM mg/dL 8.5* 8.6   GLUCOSE mg/dL 443* 241*     Magnesium:  Results from last 7 days   Lab Units 01/22/25  1004   MAGNESIUM mg/dL 1.84     Troponin:    Results from last 7 days   Lab Units 01/22/25  1004   TROPHS ng/L 13     BNP:   Results from last 7 days   Lab Units 01/22/25  1004   BNP pg/mL 27     Lipid Panel:  Results from last 7 days   Lab Units 01/22/25  1255   INR  1.0   PROTIME seconds 11.5        Nutrition   Adult diet carb consistent       Relevant Results  Results from last 7 days   Lab Units 01/25/25  1112 01/25/25  0630 01/25/25  0130 01/24/25  2005 01/24/25  1549 01/23/25  0703 01/23/25  0547 01/22/25  1629 01/22/25  1004   POCT GLUCOSE mg/dL 322* 260* 440* 477* 301*   < >  --    < >  --    GLUCOSE mg/dL  --   --   --   --   --   --  443*  --  241*    < > = values in this interval not displayed.     Lab Results   Component Value Date    HGBA1C 7.3 (H) 09/07/2024        Assessment/Plan  Jing, 65 yo female, presenting with pulmonary embolism, DVT in the right femoral vein, lower extremity edema with wounds on the tops of the feet, and oral thrush.        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 per [facility]  -pt has scheduled Avastin infusions every 21 days, last received 1/20, next due on 2/19   -continue to monitor mental status,   -follow up with oncology outpatient     Pulmonary Embolism   -switched over to PO eliquis   -continue home plavix   -MARLEE , EF 63%, no right heart strain     Right femoral vein DVT  bilateral lower extremity edema, hx of CAD   -vascular surgery signed off, they said anticoagulation is adequate, no surgery needed   -full leg ace wrap compression was recommended, pt continues to refuse      Wounds bilaterally on dorsal aspect of feet  -wound care consulted, pt allowed for wound care yesterday      Anxiety   -pt expressed that he anxiety is  very bad, she does not take anything regularly for anxiety  -PRN Atarax ordered, seemed to have made pt more fatigued and confused, stopped here seems a bit better now      Bilateral knee pain and positive L1 compression fx  -ortho consulted and signed off  -pt follows with orthopedics      Oral candidiasis   -continue swish and swallow nystatin finished     Hx T2DM: with hyperglycemia, on chronic steroids   -continue home insulin regimen, with sliding scale      Hx of meningioma with gamma knife radiation- follows at [facility]  -on chronic steroids     HTN: increased losartan to 100mg daily      Difficulty with ambulation: PT/OT, pt will be transferring to [facility] pending precert      DVTp: now on eliquis   Code Status: full code

## 2025-01-25 NOTE — PROGRESS NOTES
Discharge Diagnosis  Acute pulmonary embolism, unspecified pulmonary embolism type, unspecified whether acute cor pulmonale present (Multi)      Discharge Meds     Your medication list        START taking these medications        Instructions Last Dose Given Next Dose Due   apixaban 5 mg tablet  Commonly known as: Eliquis  Start taking on: January 25, 2025      Take 2 tablets (10 mg) by mouth 2 times a day for 6 days, THEN 1 tablet (5 mg) 2 times a day.       oxyCODONE 5 mg immediate release tablet  Commonly known as: Roxicodone      Take 0.5 tablets (2.5 mg) by mouth every 6 hours if needed for severe pain (7 - 10).              CHANGE how you take these medications        Instructions Last Dose Given Next Dose Due   esomeprazole 40 mg DR capsule  Commonly known as: NexIUM  What changed:   how much to take  when to take this      Take 1 capsule (40 mg) by mouth 2 times a day.       losartan 50 mg tablet  Commonly known as: Cozaar  What changed: how much to take      Take 2 tablets (100 mg) by mouth once daily.              CONTINUE taking these medications        Instructions Last Dose Given Next Dose Due   acetaminophen 500 mg tablet  Commonly known as: Tylenol           albuterol 1.25 mg/3 mL nebulizer solution      Take 3 mL (1.25 mg) by nebulization every 4 hours if needed for shortness of breath or wheezing.       albuterol 90 mcg/actuation inhaler      Inhale 2 puffs every 4 hours if needed for shortness of breath or wheezing.       apple cider vinegar 600 mg capsule           AVASTIN IV           baclofen 10 mg tablet  Commonly known as: Lioresal           biotin 10 mg tablet           clopidogrel 75 mg tablet  Commonly known as: Plavix           cyanocobalamin 1,000 mcg tablet  Commonly known as: Vitamin B-12           Daily Multi-Vitamin tablet  Generic drug: multivitamin           dexAMETHasone 4 mg tablet  Commonly known as: Decadron           fenofibrate 48 mg tablet  Commonly known as: Tricor            furosemide 20 mg tablet  Commonly known as: Lasix      Take 1 tablet (20 mg) by mouth once daily. NOT STARTED YET       Gvoke HypoPen 2-Pack 1 mg/0.2 mL auto-injector  Generic drug: glucagon           Horizant 600 mg tablet extended release ER tablet  Generic drug: gabapentin enacarbil           HUMULIN R U-500 (CONC) KWIKPEN SUBQ           HUMULIN R U-500 (CONC) KWIKPEN SUBQ           HumuLIN R U-500 (Conc) Kwikpen 500 unit/mL (3 mL) CONCENTRATED injection  Generic drug: insulin regular           ibuprofen 200 mg tablet           ipratropium-albuteroL 0.5-2.5 mg/3 mL nebulizer solution  Commonly known as: Duo-Neb      Take 3 mL by nebulization 4 times a day as needed for wheezing or shortness of breath.       Nurtec ODT 75 mg tablet,disintegrating  Generic drug: rimegepant           olmesartan-amLODIPin-hcthiazid 40-5-12.5 mg tablet           ondansetron ODT 4 mg disintegrating tablet  Commonly known as: Zofran-ODT      Take 1 tablet (4 mg) by mouth every 8 hours if needed for nausea or vomiting.       Ozempic 0.25 mg or 0.5 mg (2 mg/3 mL) pen injector  Generic drug: semaglutide           pantoprazole 40 mg EC tablet  Commonly known as: ProtoNix           pregabalin 150 mg capsule  Commonly known as: Lyrica           Repatha SureClick 140 mg/mL injection  Generic drug: evolocumab           topiramate 25 mg tablet  Commonly known as: Topamax           turmeric root extract 500 mg capsule           Vitamin D3 25 MCG (1000 UT) capsule  Generic drug: cholecalciferol                  STOP taking these medications      metoclopramide 10 mg tablet  Commonly known as: Reglan        montelukast 10 mg tablet  Commonly known as: Singulair                  Where to Get Your Medications        You can get these medications from any pharmacy    Bring a paper prescription for each of these medications  oxyCODONE 5 mg immediate release tablet       Information about where to get these medications is not yet available    Ask your  nurse or doctor about these medications  apixaban 5 mg tablet  esomeprazole 40 mg DR capsule  furosemide 20 mg tablet  losartan 50 mg tablet         Test Results Pending At Discharge  Pending Labs       No current pending labs.            Hospital Course  Jing, 65 yo female, presenting with pulmonary embolism, DVT in the right femoral vein, lower extremity edema with wounds on the tops of the feet, and oral thrush.         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 per [facility]  -pt has scheduled Avastin infusions every 21 days, last received 1/20, next due on 2/19   -continue to monitor mental status,   -follow up with oncology outpatient   -doing much better throughout the day and ready to go to snf      Pulmonary Embolism   -switched over to PO eliquis   -continue home plavix   -MARLEE , EF 63%, no right heart strain     Right femoral vein DVT  bilateral lower extremity edema, hx of CAD   -vascular surgery signed off, they said anticoagulation is adequate, no surgery needed   -full leg ace wrap compression was recommended, pt continues to decline     Wounds bilaterally on dorsal aspect of feet  -wound care consulted, pt allowed for wound care yesterday      Anxiety   -pt expressed that he anxiety is very bad, she does not take anything regularly for anxiety  -PRN Atarax ordered, seemed to have made pt more fatigued and confused, stopped here seems a bit better now      Bilateral knee pain and positive L1 compression fx  -ortho consulted and signed off  -pt follows with orthopedics      Oral candidiasis   -continue swish and swallow nystatin finished     Hx T2DM: with hyperglycemia, on chronic steroids   -continue home insulin regimen, with sliding scale      Hx of meningioma with gamma knife radiation- follows at [facility]  -on chronic steroids      HTN: increased losartan to 100mg daily      Plan to discharge to snf today.  Greater than 30 minutes of clinical time spent caring for this patient.       Outpatient Follow-Up  Future Appointments   Date Time Provider Department Center   4/22/2025  9:00 AM DO JAVON Keller[address]                   Ananda Ulrich MD

## 2025-01-25 NOTE — PROGRESS NOTES
Occupational Therapy                 Therapy Communication Note    Patient Name: Jing Jon  MRN: [id number]  Department: [facility]  Room: 1019/1019-A  Today's Date: 1/25/2025     Discipline: Occupational Therapy    OT Missed Visit: Yes     Missed Visit Reason: Patient refused (patient declined, reports leaving for SNF today.)

## 2025-01-27 ENCOUNTER — OFFICE VISIT (OUTPATIENT)
Dept: GERIATRIC MEDICINE | Age: 65
End: 2025-01-27

## 2025-01-27 DIAGNOSIS — E11.59 TYPE 2 DIABETES MELLITUS WITH OTHER CIRCULATORY COMPLICATION, WITH LONG-TERM CURRENT USE OF INSULIN (HCC): ICD-10-CM

## 2025-01-27 DIAGNOSIS — I10 HYPERTENSION, UNSPECIFIED TYPE: ICD-10-CM

## 2025-01-27 DIAGNOSIS — I26.99 PULMONARY EMBOLISM, OTHER, UNSPECIFIED CHRONICITY, UNSPECIFIED WHETHER ACUTE COR PULMONALE PRESENT (HCC): Primary | ICD-10-CM

## 2025-01-27 DIAGNOSIS — I82.90 DEEP VEIN THROMBOSIS (DVT) OF NON-EXTREMITY VEIN, UNSPECIFIED CHRONICITY: ICD-10-CM

## 2025-01-27 DIAGNOSIS — S91.309S OPEN WOUND OF FOOT, UNSPECIFIED LATERALITY, SEQUELA: ICD-10-CM

## 2025-01-27 DIAGNOSIS — Z79.4 TYPE 2 DIABETES MELLITUS WITH OTHER CIRCULATORY COMPLICATION, WITH LONG-TERM CURRENT USE OF INSULIN (HCC): ICD-10-CM

## 2025-01-28 ENCOUNTER — OFFICE VISIT (OUTPATIENT)
Dept: GERIATRIC MEDICINE | Age: 65
End: 2025-01-28

## 2025-01-28 DIAGNOSIS — Z79.4 TYPE 2 DIABETES MELLITUS WITH OTHER CIRCULATORY COMPLICATION, WITH LONG-TERM CURRENT USE OF INSULIN (HCC): ICD-10-CM

## 2025-01-28 DIAGNOSIS — I10 HYPERTENSION, UNSPECIFIED TYPE: ICD-10-CM

## 2025-01-28 DIAGNOSIS — I26.99 PULMONARY EMBOLISM, OTHER, UNSPECIFIED CHRONICITY, UNSPECIFIED WHETHER ACUTE COR PULMONALE PRESENT (HCC): Primary | ICD-10-CM

## 2025-01-28 DIAGNOSIS — S91.309S OPEN WOUND OF FOOT, UNSPECIFIED LATERALITY, SEQUELA: ICD-10-CM

## 2025-01-28 DIAGNOSIS — E11.59 TYPE 2 DIABETES MELLITUS WITH OTHER CIRCULATORY COMPLICATION, WITH LONG-TERM CURRENT USE OF INSULIN (HCC): ICD-10-CM

## 2025-01-28 DIAGNOSIS — I82.90 DEEP VEIN THROMBOSIS (DVT) OF NON-EXTREMITY VEIN, UNSPECIFIED CHRONICITY: ICD-10-CM

## 2025-01-29 NOTE — PROGRESS NOTES
History and Physical      CHIEF COMPLAINT:  PE     History of Present Illness:      A 64 y.o. female who is being seen at [facility] after a hospital stay for PE/ DVT in the E. She also noted to have b/l LE wounds.     REVIEW OF SYSTEMS:  A complete 10 Point review of systems was preformed and negative unless previously stated      PMH:  Past Medical History:   Diagnosis Date    Asthma     CAD (coronary artery disease)     previous MI    Cerebral artery occlusion with cerebral infarction (HCC)     Cerebrovascular disease     Stroke 2/2017 and 4/2017    Chronic pain     Depression     Diabetes (HCC)     Headache     Hyperlipidemia     Hypertension     Neuropathy     Osteoarthritis     Type 2 diabetes mellitus without complication (HCC)        Surgical History:  Past Surgical History:   Procedure Laterality Date    APPENDECTOMY      removed at age 20    BACK SURGERY      neck fusion w/placement of disc in december 2014    BICEPS TENDON REPAIR Right     BLADDER SUSPENSION      bladder sling and oopherectomy (unilateral) approx 15 yrs ago    COLONOSCOPY      ENDOSCOPY, COLON, DIAGNOSTIC      ROTATOR CUFF REPAIR Right     SPINAL CORD STIMULATOR SURGERY N/A 2/14/2023    SPINAL CORD STIMULATOR TRIAL performed by Choco Gottlieb MD at [facility] OR    SPINE SURGERY      STIMULATOR SURGERY N/A 4/11/2023    PERMANENT SPINAL CORD STIMULATOR INSERTION performed by Choco Gottlieb MD at [facility] OR       Medications Prior to Admission:    Prior to Admission medications    Medication Sig Start Date End Date Taking? Authorizing Provider   fenofibrate (TRICOR) 48 MG tablet Take 1 tablet by mouth daily 4/5/23   Viry Ghotra MD   OXcarbazepine (TRILEPTAL) 150 MG tablet Take 1 tablet by mouth 2 times daily Patient reports she only takes this medication daily.    Viry Ghotra MD   esomeprazole (NEXIUM) 40 MG delayed release capsule Take by mouth 4/24/13   Viry Ghotra MD   Multiple Vitamin (MULTIVITAMIN ADULT PO)

## 2025-01-30 ENCOUNTER — OFFICE VISIT (OUTPATIENT)
Dept: ORTHOPEDIC SURGERY | Facility: CLINIC | Age: 65
End: 2025-01-30
Payer: COMMERCIAL

## 2025-01-30 DIAGNOSIS — S32.000A LUMBAR COMPRESSION FRACTURE, CLOSED, INITIAL ENCOUNTER (MULTI): Primary | ICD-10-CM

## 2025-01-30 NOTE — PROGRESS NOTES
New patient to us new to the practice comes the office with her  who is also historian.  He gives a history as to patient's pain when it started how it started home patient is treatment and how she is responded to this treatment.  Patient is somewhat lethargic today.  She is in a nursing facility patient had some falls.  Last fall I see documented where she went to the ER was on 1/22.  But she had a prior L1 compression fracture was first documented in December.  She had prior x-rays and scans done in September which did not show a fracture.  Patient complains of back pain.  The pain is about a 5 but she does a lot of other issues with some brain issues.  Some blood clots etc.  No bowel or bladder complaints or saddle anesthesia.    Looked at patient's recent blood work.  Check today primary doctors note medication list see the patient is a diabetic she is on Plavix.  Last hemoglobin A1c was 7.4.  We read all the emergency room notes and agree with her treatment furl to us for L1 compression fracture    Physical exam: Patient conscious and alert somewhat lethargic.  Sitting in a wheelchair with some discomfort sitting in the chair.  Patient is morbidly obese per BMI standards no lymphangitis or lymphadenopathy in the examined extremities.  Patient moving cervical spine without any major issues.  Unable to really evaluate the lumbar spine because she lethargic and difficult standing up.  So unable to access how far she can bend and rotate.  Moving upper extremities.  Without any difficulty.  Moving the lower extremities motor strength intact.  No redness, abrasions, or lesions on all 4 extremities, head and neck, or trunk.  Patient neurologically intact    X-rays patient had CAT scans done and x-rays in the past which showed an L1 compression fracture first diagnosed probably December 19.    Assessment: This a patient with compression fractures in her nursing facility talk to her  I would not recommend  a kyphoplasty procedure due to the risk of adjacent level fractures and not medically safe to have any type of anesthesia with her medical condition.    Plan: Organ to continue conservative care I wrote to see if they can get her an LSO brace at the nursing facility to be worn when she is up ambulating does not have to wear it in bed or sitting in a chair.  No physical therapy exercise just PT for ambulation.  Will give this some time to heal.  She will follow-up with us as needed

## 2025-01-31 ENCOUNTER — OFFICE VISIT (OUTPATIENT)
Dept: GERIATRIC MEDICINE | Age: 65
End: 2025-01-31

## 2025-01-31 DIAGNOSIS — E11.59 TYPE 2 DIABETES MELLITUS WITH OTHER CIRCULATORY COMPLICATION, WITH LONG-TERM CURRENT USE OF INSULIN (HCC): ICD-10-CM

## 2025-01-31 DIAGNOSIS — I82.90 DEEP VEIN THROMBOSIS (DVT) OF NON-EXTREMITY VEIN, UNSPECIFIED CHRONICITY: ICD-10-CM

## 2025-01-31 DIAGNOSIS — S91.309S OPEN WOUND OF FOOT, UNSPECIFIED LATERALITY, SEQUELA: ICD-10-CM

## 2025-01-31 DIAGNOSIS — Z79.4 TYPE 2 DIABETES MELLITUS WITH OTHER CIRCULATORY COMPLICATION, WITH LONG-TERM CURRENT USE OF INSULIN (HCC): ICD-10-CM

## 2025-01-31 DIAGNOSIS — I10 HYPERTENSION, UNSPECIFIED TYPE: ICD-10-CM

## 2025-01-31 DIAGNOSIS — I26.99 PULMONARY EMBOLISM, OTHER, UNSPECIFIED CHRONICITY, UNSPECIFIED WHETHER ACUTE COR PULMONALE PRESENT (HCC): Primary | ICD-10-CM

## 2025-02-01 NOTE — PROGRESS NOTES
SNF PROGRESS NOTE      Cc- PE       Patient is a Lillian Oliveira 64 y.o. female who is being seen at [facility] after a hospital stay for PE/ DVT in the RLE. She also noted to have b/l LE wounds.     Patient is eating well. She is more awake today. Legs are still wrapped.         Past Medical History:   Diagnosis Date    Asthma     CAD (coronary artery disease)     previous MI    Cerebral artery occlusion with cerebral infarction (HCC)     Cerebrovascular disease     Stroke 2/2017 and 4/2017    Chronic pain     Depression     Diabetes (HCC)     Headache     Hyperlipidemia     Hypertension     Neuropathy     Osteoarthritis     Type 2 diabetes mellitus without complication (HCC)      Meperidine; 2,4-d dimethylamine; Atorvastatin; Benazepril; Clonidine; Dapagliflozin; Egg white; Ezetimibe; Meperidine hcl; Pravastatin; Propoxyphene; Statins; Tirzepatide; Canagliflozin; and Morphine    VS reviewed    Gen- Alert and oriented x 2   Heart- RRR no murmur no LE edema   Lungs- CTA b/l no resp distress RA oxygen   Abd- bs x 4           Assessment and Plan    Pulmonary Embolism/ RLE DVT   Eliquis   B/l Feet wounds  Lyrica   Wound care  DM   Insulin   Ozempic-- family bringing from home.   HTN  Olmesartan-norvasc-hctz  Lasix   Hx meningioma  On chronic steroids  2/19/25 Infusion       ABDIRASHID Mendoza DO     Electronically signed by: Rochelle Cortez DO on 1/28/2025

## 2025-02-03 ENCOUNTER — OFFICE VISIT (OUTPATIENT)
Dept: GERIATRIC MEDICINE | Age: 65
End: 2025-02-03

## 2025-02-03 DIAGNOSIS — I82.90 DEEP VEIN THROMBOSIS (DVT) OF NON-EXTREMITY VEIN, UNSPECIFIED CHRONICITY: ICD-10-CM

## 2025-02-03 DIAGNOSIS — S91.309S OPEN WOUND OF FOOT, UNSPECIFIED LATERALITY, SEQUELA: ICD-10-CM

## 2025-02-03 DIAGNOSIS — E11.59 TYPE 2 DIABETES MELLITUS WITH OTHER CIRCULATORY COMPLICATION, WITH LONG-TERM CURRENT USE OF INSULIN (HCC): ICD-10-CM

## 2025-02-03 DIAGNOSIS — Z79.4 TYPE 2 DIABETES MELLITUS WITH OTHER CIRCULATORY COMPLICATION, WITH LONG-TERM CURRENT USE OF INSULIN (HCC): ICD-10-CM

## 2025-02-03 DIAGNOSIS — I26.99 PULMONARY EMBOLISM, OTHER, UNSPECIFIED CHRONICITY, UNSPECIFIED WHETHER ACUTE COR PULMONALE PRESENT (HCC): Primary | ICD-10-CM

## 2025-02-03 DIAGNOSIS — I10 HYPERTENSION, UNSPECIFIED TYPE: ICD-10-CM

## 2025-02-04 ENCOUNTER — OFFICE VISIT (OUTPATIENT)
Dept: GERIATRIC MEDICINE | Age: 65
End: 2025-02-04

## 2025-02-04 DIAGNOSIS — E11.59 TYPE 2 DIABETES MELLITUS WITH OTHER CIRCULATORY COMPLICATION, WITH LONG-TERM CURRENT USE OF INSULIN (HCC): ICD-10-CM

## 2025-02-04 DIAGNOSIS — I10 HYPERTENSION, UNSPECIFIED TYPE: ICD-10-CM

## 2025-02-04 DIAGNOSIS — S91.309S OPEN WOUND OF FOOT, UNSPECIFIED LATERALITY, SEQUELA: ICD-10-CM

## 2025-02-04 DIAGNOSIS — I26.99 PULMONARY EMBOLISM, OTHER, UNSPECIFIED CHRONICITY, UNSPECIFIED WHETHER ACUTE COR PULMONALE PRESENT (HCC): Primary | ICD-10-CM

## 2025-02-04 DIAGNOSIS — Z79.4 TYPE 2 DIABETES MELLITUS WITH OTHER CIRCULATORY COMPLICATION, WITH LONG-TERM CURRENT USE OF INSULIN (HCC): ICD-10-CM

## 2025-02-04 DIAGNOSIS — I82.90 DEEP VEIN THROMBOSIS (DVT) OF NON-EXTREMITY VEIN, UNSPECIFIED CHRONICITY: ICD-10-CM

## 2025-02-05 ENCOUNTER — APPOINTMENT (OUTPATIENT)
Dept: CARDIOLOGY | Facility: HOSPITAL | Age: 65
End: 2025-02-05
Payer: COMMERCIAL

## 2025-02-05 ENCOUNTER — HOSPITAL ENCOUNTER (EMERGENCY)
Facility: HOSPITAL | Age: 65
Discharge: HOME | End: 2025-02-06
Attending: STUDENT IN AN ORGANIZED HEALTH CARE EDUCATION/TRAINING PROGRAM
Payer: COMMERCIAL

## 2025-02-05 ENCOUNTER — APPOINTMENT (OUTPATIENT)
Dept: RADIOLOGY | Facility: HOSPITAL | Age: 65
End: 2025-02-05
Payer: COMMERCIAL

## 2025-02-05 DIAGNOSIS — R07.9 CHEST PAIN, UNSPECIFIED TYPE: Primary | ICD-10-CM

## 2025-02-05 LAB
ALBUMIN SERPL BCP-MCNC: 2.9 G/DL (ref 3.4–5)
ALP SERPL-CCNC: 35 U/L (ref 33–136)
ALT SERPL W P-5'-P-CCNC: 38 U/L (ref 7–45)
ANION GAP SERPL CALC-SCNC: 15 MMOL/L (ref 10–20)
AST SERPL W P-5'-P-CCNC: 14 U/L (ref 9–39)
ATRIAL RATE: 102 BPM
ATRIAL RATE: 121 BPM
BASOPHILS # BLD MANUAL: 0 X10*3/UL (ref 0–0.1)
BASOPHILS NFR BLD MANUAL: 0 %
BILIRUB SERPL-MCNC: 0.4 MG/DL (ref 0–1.2)
BUN SERPL-MCNC: 30 MG/DL (ref 6–23)
BURR CELLS BLD QL SMEAR: ABNORMAL
CALCIUM SERPL-MCNC: 7.6 MG/DL (ref 8.6–10.3)
CARDIAC TROPONIN I PNL SERPL HS: 10 NG/L (ref 0–13)
CARDIAC TROPONIN I PNL SERPL HS: 8 NG/L (ref 0–13)
CHLORIDE SERPL-SCNC: 103 MMOL/L (ref 98–107)
CO2 SERPL-SCNC: 25 MMOL/L (ref 21–32)
CREAT SERPL-MCNC: 0.42 MG/DL (ref 0.5–1.05)
EGFRCR SERPLBLD CKD-EPI 2021: >90 ML/MIN/1.73M*2
EOSINOPHIL # BLD MANUAL: 0 X10*3/UL (ref 0–0.7)
EOSINOPHIL NFR BLD MANUAL: 0 %
ERYTHROCYTE [DISTWIDTH] IN BLOOD BY AUTOMATED COUNT: 15.9 % (ref 11.5–14.5)
FLUAV RNA RESP QL NAA+PROBE: NOT DETECTED
FLUBV RNA RESP QL NAA+PROBE: NOT DETECTED
GIANT PLATELETS BLD QL SMEAR: ABNORMAL
GLUCOSE SERPL-MCNC: 277 MG/DL (ref 74–99)
HCT VFR BLD AUTO: 43.7 % (ref 36–46)
HGB BLD-MCNC: 14.7 G/DL (ref 12–16)
HOLD SPECIMEN: NORMAL
IMM GRANULOCYTES # BLD AUTO: 1.03 X10*3/UL (ref 0–0.7)
IMM GRANULOCYTES NFR BLD AUTO: 6.5 % (ref 0–0.9)
LIPASE SERPL-CCNC: 85 U/L (ref 9–82)
LYMPHOCYTES # BLD MANUAL: 0.71 X10*3/UL (ref 1.2–4.8)
LYMPHOCYTES NFR BLD MANUAL: 4.5 %
MCH RBC QN AUTO: 30.2 PG (ref 26–34)
MCHC RBC AUTO-ENTMCNC: 33.6 G/DL (ref 32–36)
MCV RBC AUTO: 90 FL (ref 80–100)
METAMYELOCYTES # BLD MANUAL: 0.16 X10*3/UL
METAMYELOCYTES NFR BLD MANUAL: 1 %
MONOCYTES # BLD MANUAL: 0.16 X10*3/UL (ref 0.1–1)
MONOCYTES NFR BLD MANUAL: 1 %
MYELOCYTES # BLD MANUAL: 0.24 X10*3/UL
MYELOCYTES NFR BLD MANUAL: 1.5 %
NEUTROPHILS # BLD MANUAL: 14.46 X10*3/UL (ref 1.2–7.7)
NEUTS BAND # BLD MANUAL: 0.4 X10*3/UL (ref 0–0.7)
NEUTS BAND NFR BLD MANUAL: 2.5 %
NEUTS SEG # BLD MANUAL: 14.06 X10*3/UL (ref 1.2–7)
NEUTS SEG NFR BLD MANUAL: 89 %
NEUTS VAC BLD QL SMEAR: PRESENT
NRBC BLD-RTO: 0.5 /100 WBCS (ref 0–0)
P AXIS: 38 DEGREES
P AXIS: 42 DEGREES
P OFFSET: 189 MS
P OFFSET: 193 MS
P ONSET: 141 MS
P ONSET: 146 MS
PAPPENHEIMER BOD BLD QL SMEAR: PRESENT
PLATELET # BLD AUTO: 448 X10*3/UL (ref 150–450)
PLATELET CLUMP BLD QL SMEAR: PRESENT
POLYCHROMASIA BLD QL SMEAR: ABNORMAL
POTASSIUM SERPL-SCNC: 3.2 MMOL/L (ref 3.5–5.3)
PR INTERVAL: 134 MS
PR INTERVAL: 136 MS
PROT SERPL-MCNC: 4.9 G/DL (ref 6.4–8.2)
Q ONSET: 209 MS
Q ONSET: 213 MS
QRS COUNT: 17 BEATS
QRS COUNT: 20 BEATS
QRS DURATION: 106 MS
QRS DURATION: 92 MS
QT INTERVAL: 344 MS
QT INTERVAL: 370 MS
QTC CALCULATION(BAZETT): 482 MS
QTC CALCULATION(BAZETT): 488 MS
QTC FREDERICIA: 434 MS
QTC FREDERICIA: 441 MS
R AXIS: -19 DEGREES
R AXIS: -40 DEGREES
RBC # BLD AUTO: 4.87 X10*6/UL (ref 4–5.2)
RBC MORPH BLD: ABNORMAL
RSV RNA RESP QL NAA+PROBE: NOT DETECTED
SARS-COV-2 RNA RESP QL NAA+PROBE: NOT DETECTED
SODIUM SERPL-SCNC: 140 MMOL/L (ref 136–145)
T AXIS: 60 DEGREES
T AXIS: 69 DEGREES
T OFFSET: 385 MS
T OFFSET: 394 MS
TOTAL CELLS COUNTED BLD: 100
TOXIC GRANULES BLD QL SMEAR: PRESENT
VARIANT LYMPHS # BLD MANUAL: 0.08 X10*3/UL (ref 0–0.5)
VARIANT LYMPHS NFR BLD: 0.5 %
VENTRICULAR RATE: 102 BPM
VENTRICULAR RATE: 121 BPM
WBC # BLD AUTO: 15.8 X10*3/UL (ref 4.4–11.3)

## 2025-02-05 PROCEDURE — 84484 ASSAY OF TROPONIN QUANT: CPT | Performed by: STUDENT IN AN ORGANIZED HEALTH CARE EDUCATION/TRAINING PROGRAM

## 2025-02-05 PROCEDURE — 71045 X-RAY EXAM CHEST 1 VIEW: CPT

## 2025-02-05 PROCEDURE — 96361 HYDRATE IV INFUSION ADD-ON: CPT

## 2025-02-05 PROCEDURE — 36415 COLL VENOUS BLD VENIPUNCTURE: CPT | Performed by: STUDENT IN AN ORGANIZED HEALTH CARE EDUCATION/TRAINING PROGRAM

## 2025-02-05 PROCEDURE — 71275 CT ANGIOGRAPHY CHEST: CPT

## 2025-02-05 PROCEDURE — 2500000004 HC RX 250 GENERAL PHARMACY W/ HCPCS (ALT 636 FOR OP/ED): Performed by: STUDENT IN AN ORGANIZED HEALTH CARE EDUCATION/TRAINING PROGRAM

## 2025-02-05 PROCEDURE — 93005 ELECTROCARDIOGRAM TRACING: CPT

## 2025-02-05 PROCEDURE — 83690 ASSAY OF LIPASE: CPT | Performed by: STUDENT IN AN ORGANIZED HEALTH CARE EDUCATION/TRAINING PROGRAM

## 2025-02-05 PROCEDURE — 85007 BL SMEAR W/DIFF WBC COUNT: CPT | Performed by: STUDENT IN AN ORGANIZED HEALTH CARE EDUCATION/TRAINING PROGRAM

## 2025-02-05 PROCEDURE — 85027 COMPLETE CBC AUTOMATED: CPT | Performed by: STUDENT IN AN ORGANIZED HEALTH CARE EDUCATION/TRAINING PROGRAM

## 2025-02-05 PROCEDURE — 2500000001 HC RX 250 WO HCPCS SELF ADMINISTERED DRUGS (ALT 637 FOR MEDICARE OP): Performed by: STUDENT IN AN ORGANIZED HEALTH CARE EDUCATION/TRAINING PROGRAM

## 2025-02-05 PROCEDURE — 96360 HYDRATION IV INFUSION INIT: CPT | Mod: 59

## 2025-02-05 PROCEDURE — 84075 ASSAY ALKALINE PHOSPHATASE: CPT | Performed by: STUDENT IN AN ORGANIZED HEALTH CARE EDUCATION/TRAINING PROGRAM

## 2025-02-05 PROCEDURE — 71275 CT ANGIOGRAPHY CHEST: CPT | Performed by: RADIOLOGY

## 2025-02-05 PROCEDURE — 71045 X-RAY EXAM CHEST 1 VIEW: CPT | Performed by: RADIOLOGY

## 2025-02-05 PROCEDURE — 2550000001 HC RX 255 CONTRASTS: Performed by: STUDENT IN AN ORGANIZED HEALTH CARE EDUCATION/TRAINING PROGRAM

## 2025-02-05 PROCEDURE — 87637 SARSCOV2&INF A&B&RSV AMP PRB: CPT | Performed by: STUDENT IN AN ORGANIZED HEALTH CARE EDUCATION/TRAINING PROGRAM

## 2025-02-05 PROCEDURE — 99285 EMERGENCY DEPT VISIT HI MDM: CPT | Mod: 25 | Performed by: STUDENT IN AN ORGANIZED HEALTH CARE EDUCATION/TRAINING PROGRAM

## 2025-02-05 RX ORDER — POTASSIUM CHLORIDE 1.5 G/1.58G
40 POWDER, FOR SOLUTION ORAL ONCE
Status: COMPLETED | OUTPATIENT
Start: 2025-02-05 | End: 2025-02-05

## 2025-02-05 RX ADMIN — SODIUM CHLORIDE, POTASSIUM CHLORIDE, SODIUM LACTATE AND CALCIUM CHLORIDE 1000 ML: 600; 310; 30; 20 INJECTION, SOLUTION INTRAVENOUS at 18:09

## 2025-02-05 RX ADMIN — POTASSIUM CHLORIDE 40 MEQ: 1.5 POWDER, FOR SOLUTION ORAL at 19:04

## 2025-02-05 RX ADMIN — IOHEXOL 75 ML: 350 INJECTION, SOLUTION INTRAVENOUS at 16:14

## 2025-02-05 ASSESSMENT — PAIN DESCRIPTION - PAIN TYPE: TYPE: ACUTE PAIN

## 2025-02-05 ASSESSMENT — LIFESTYLE VARIABLES
TOTAL SCORE: 0
EVER HAD A DRINK FIRST THING IN THE MORNING TO STEADY YOUR NERVES TO GET RID OF A HANGOVER: NO
HAVE PEOPLE ANNOYED YOU BY CRITICIZING YOUR DRINKING: NO
HAVE YOU EVER FELT YOU SHOULD CUT DOWN ON YOUR DRINKING: NO
EVER FELT BAD OR GUILTY ABOUT YOUR DRINKING: NO

## 2025-02-05 ASSESSMENT — PAIN DESCRIPTION - ORIENTATION: ORIENTATION: MID

## 2025-02-05 ASSESSMENT — PAIN DESCRIPTION - LOCATION: LOCATION: CHEST

## 2025-02-05 ASSESSMENT — PAIN SCALES - GENERAL: PAINLEVEL_OUTOF10: 2

## 2025-02-05 ASSESSMENT — PAIN - FUNCTIONAL ASSESSMENT: PAIN_FUNCTIONAL_ASSESSMENT: 0-10

## 2025-02-05 NOTE — ED PROVIDER NOTES
HPI   Chief Complaint   Patient presents with    Chest Pain     2 nitroglycerin given, ands 324mg ASA given        Patient is a 64-year-old female with history of type 2 diabetes, hyperlipidemia, hypertension, PE, DVT on eliquis, MI, recent noncancerous brain tumor removed with gamma knife, CVA with residual left-sided weakness who presents for chest pain.  Patient states she has been having pain in the left side of her chest, no exacerbating relieving factors.  No fevers, chills, shortness of breath, cough, runny nose, sore throat, nausea, vomiting, diarrhea, abdominal pain.  No cardiac stents.  Does not have history of similar pain episodes.              Patient History   Past Medical History:   Diagnosis Date    Albuminuria     Allergic     Allergic rhinitis     Anemia     Anticoagulated     Plavix    Anxiety     Arthritis     Arthritis of right glenohumeral joint     Plan: Right Shoulder Anatomic Total Arthroplasty; Biceps Tenodesis 10/8/24    Asthma     Balance problem     due to underlying peripheral neuropathy    Cardiology follow-up encounter 02/15/2024    Sienna Kamara MD    Cardiology follow-up encounter 02/15/2024    Sienna Kamara MD    CHF (congestive heart failure)     Coronary artery disease     Degenerative disc disease, lumbar     Depression     Disease of thyroid gland     DM (diabetes mellitus), type 2 (Multi)     9/7/24 A1C 7.3%    Fibromyalgia     GERD (gastroesophageal reflux disease)     Headache     Heart disease     Heart murmur     Hyperlipidemia     on repatha    Hypertension     Hypothyroidism     Iliotibial band syndrome, left leg     Lumbosacral spondylosis without myelopathy     s/p SPINAL C ORD STIMULATOR TRIAL 2/14/23    Meningioma (Multi)     repeat brain MRI showing increase in size of left frontal lobe meningioma since 4/2018. This was causing mass effect on left frontal horn however no edema noted. She is now undergoing gamma knife treatment    Migraines     treated w/ Indocin     Multiple thyroid nodules     MVP (mitral valve prolapse)     Myasthenia gravis     PT DENIES, NO ACHR ABX TEST, NO EMG , NO THYMUS SCAN    Myocardial infarction (Multi)     Neuropathy     Obesity, unspecified     Other spondylosis with myelopathy, cervical region     Peripheral neuropathy     Preoperative clearance     Neurology Paz Min, NP LOV 10/1/24: “/110.  NP rechecked & BP not as elevated at 150/90 & 168/84. I cannot clear her w/o BP more stable around 130/80.  Pt reports home BP runs 140s. She was taken off BP med d/t hypotension/hypovolemia. I'm going to start low dose Norvasc. F/u w/ PCP for further treatment. She can be cleared as long as stable BP& hold plavix days prior to surgery.”    Primary osteoarthritis of right shoulder     Radiculopathy, lumbar region     Restless legs syndrome     Spinal stenosis, lumbar region without neurogenic claudication     Statin intolerance     Status post gamma knife treatment     Status post insertion of spinal cord stimulator 2023    Stroke (Multi) 2017    old lacunar thalamic infarcts-  2/6/2017(given tpa), 4/2017, 5/2017 still has left sided weakness    TIA (transient ischemic attack) 03/2014    Trochanteric bursitis, left hip     Unilateral primary osteoarthritis, left hip     Urinary incontinence     Urinary tract infection     Venous stasis ulcer with edema of lower leg     Vitamin D deficiency     Walker as ambulation aid     ambulates with wheeled walker     Past Surgical History:   Procedure Laterality Date    APPENDECTOMY      BACK SURGERY      BICEPS TENDON REPAIR Right 2011    CARDIAC CATHETERIZATION  2006    CARDIAC CATHETERIZATION  04/04/2016    CERVICAL FUSION  12/19/2014    COLONOSCOPY  2016    ENDOMETRIAL BIOPSY      Endometrial Biopsy and Cervical Dilation    NASAL SEPTUM SURGERY      OTHER SURGICAL HISTORY      radiation TX stereotactic radiosurgery for Meningioma    OVARIAN CYST SURGERY      POLYPECTOMY  08/21/2019    HYSTEROSCOPY  SURGICAL W/SAMPLING BIOPSY OF ENDOMETRIUM &/OR POLYPECTOMY W/ OR W/O D&C    ROTATOR CUFF REPAIR Right 2009    SALPINGECTOMY  2010    SINGLE PORT LAPAROSCOPY WITH OOPHORECTOMY AND SALPINGECTOMY    SEPTOPLASTY  2011    SINUS SURGERY      SPINAL CORD STIMULATOR IMPLANT  2023    SCS TRIAL    SPINAL CORD STIMULATOR IMPLANT  2023    PERMANENT SPINAL CORD STIMULATOR INSERTION    SPINE SURGERY      TOTAL ABDOMINAL HYSTERECTOMY W/ BILATERAL SALPINGOOPHORECTOMY  2010    UPPER GASTROINTESTINAL ENDOSCOPY  2016    WISDOM TOOTH EXTRACTION       Family History   Problem Relation Name Age of Onset    Diabetes Mother Nirmala Stone     Hypertension Mother Nirmala Stone     Alcohol abuse Father Bandar Togher     Arthritis Father Bandar Togher     Diabetes Father Bandar Togher     Hypertension Father Bandar Togher     Asthma Sister Ashley DeClemente     Cancer Sister Ashley DeClemente     Diabetes Sister Ashley DeClemente     Cancer Sister Afia Joseph     Ovarian cancer Sister Afia Joseph     Cancer Brother Bandar Tog     Colon cancer Brother Bandar Togher     Hypertension Brother Bandar Togher     Diabetes type II Brother Bandar Togher     Alcohol abuse Son Dipak Jon     Asthma Son Dipak Jon     Learning disabilities Son Dipak Jon     Asthma Son Ton Dontrell Jr.     Learning disabilities Son Ton Dontrell Perkins.     Heart disease Maternal Grandfather Imtiaz Jewell      Social History     Tobacco Use    Smoking status: Former     Current packs/day: 0.00     Average packs/day: 0.5 packs/day for 17.0 years (8.5 ttl pk-yrs)     Types: Cigarettes     Start date:      Quit date:      Years since quittin.1    Smokeless tobacco: Never   Vaping Use    Vaping status: Never Used   Substance Use Topics    Alcohol use: Yes     Alcohol/week: 1.0 standard drink of alcohol     Types: 1 Standard drinks or equivalent per week     Comment: social drinker    Drug use: Yes      Comment: cbd and thc       Physical Exam   ED Triage Vitals [02/05/25 1434]   Temperature Heart Rate Respirations BP   36.5 °C (97.7 °F) (!) 121 16 105/51      Pulse Ox Temp Source Heart Rate Source Patient Position   98 % Temporal Monitor Lying      BP Location FiO2 (%)     Right arm --       Physical Exam  Constitutional:       General: She is not in acute distress.  HENT:      Head: Normocephalic.   Eyes:      Extraocular Movements: Extraocular movements intact.      Conjunctiva/sclera: Conjunctivae normal.      Pupils: Pupils are equal, round, and reactive to light.   Cardiovascular:      Rate and Rhythm: Regular rhythm. Tachycardia present.      Pulses: Normal pulses.           Radial pulses are 2+ on the right side and 2+ on the left side.      Heart sounds: Normal heart sounds.   Pulmonary:      Effort: Pulmonary effort is normal.      Breath sounds: Normal breath sounds.   Abdominal:      General: There is no distension.      Palpations: Abdomen is soft. There is no mass.      Tenderness: There is no abdominal tenderness. There is no guarding.   Musculoskeletal:         General: No deformity.      Cervical back: Normal range of motion and neck supple.      Right lower leg: No edema.      Left lower leg: No edema.   Skin:     General: Skin is warm and dry.      Findings: No lesion or rash.   Neurological:      General: No focal deficit present.      Mental Status: She is alert and oriented to person, place, and time. Mental status is at baseline.      Cranial Nerves: No cranial nerve deficit.      Sensory: No sensory deficit.      Motor: No weakness.   Psychiatric:         Mood and Affect: Mood normal.           ED Course & MDM   Diagnoses as of 02/06/25 1011   Chest pain, unspecified type                 No data recorded     Gladys Coma Scale Score: 15 (02/05/25 1436 : Emily Hayward RN)                           Medical Decision Making  EKG on my interpretation: Rate 121, rhythm regular, axis normal,  , QRS 92, QTc 488, T waves: Unremarkable, ST segments: No elevations or depressions, interpretation: Sinus tachycardia, no STEMI    EKG on my interpretation: Rate 102, rhythm regular, axis leftward, , , QTc 42, T waves: Unremarkable, ST segments: No elevations or depressions, interpretation: Sinus tachycardia, no STEMI    Patient is a 64-year-old female with the above-stated past medical history who presents for chest pain.  Patient's EKGs are nonischemic, troponins are negative x 2 which points away from ACS.  Lipase is not consistent with pancreatitis.  CMP shows a mild hypokalemia, this was repleted in the emergency department.  CBC shows a mild leukocytosis, though patient is afebrile which point away from sepsis.  Influenza, COVID, RSV are negative.  Chest x-ray on my review did not show signs of pneumonia or pneumothorax, this is confirmed radiology.  Given patient's recent pulmonary embolism and her chest pain, a CT angio was ordered which showed a similar-appearing pulmonary embolus, no acute changes or new pulmonary emboli.  No sign of cardiac tamponade, Boerhaave's, and I have low suspicion for dissection.  Patient's heart rate improved.  I did recommend admission for her given that her heart score is a 4, though she declines this.  She states she will follow-up with her cardiologist tomorrow.  I discussed the risks and benefits of this with her and reiterated my concern, however she would like to be discharged back to her care facility.  Patient has capacity.  Patient's  is amenable to the plan.  Patient was informed that she was welcome to return to the emergency department at any time if she changed her mind and would like to be admitted.  She stated understanding agreement.  Patient was discharged back to her care facility.    Disclaimer: This note was dictated using speech recognition software. Minor errors in transcription may be present. Please call if questions.      Sergey Pascal MD  [facility]  Contact on [employer]        Problems Addressed:  Chest pain, unspecified type: acute illness or injury    Amount and/or Complexity of Data Reviewed  Labs: ordered.  Radiology: ordered and independent interpretation performed.  ECG/medicine tests: ordered and independent interpretation performed.        Procedure  Procedures     Sergey Pascal MD  02/06/25 1014

## 2025-02-06 ENCOUNTER — OFFICE VISIT (OUTPATIENT)
Dept: GERIATRIC MEDICINE | Age: 65
End: 2025-02-06

## 2025-02-06 VITALS
WEIGHT: 197 LBS | HEART RATE: 90 BPM | HEIGHT: 62 IN | SYSTOLIC BLOOD PRESSURE: 114 MMHG | RESPIRATION RATE: 15 BRPM | TEMPERATURE: 97.3 F | OXYGEN SATURATION: 97 % | DIASTOLIC BLOOD PRESSURE: 56 MMHG | BODY MASS INDEX: 36.25 KG/M2

## 2025-02-06 DIAGNOSIS — E11.59 TYPE 2 DIABETES MELLITUS WITH OTHER CIRCULATORY COMPLICATION, WITH LONG-TERM CURRENT USE OF INSULIN (HCC): ICD-10-CM

## 2025-02-06 DIAGNOSIS — I10 HYPERTENSION, UNSPECIFIED TYPE: ICD-10-CM

## 2025-02-06 DIAGNOSIS — I82.90 DEEP VEIN THROMBOSIS (DVT) OF NON-EXTREMITY VEIN, UNSPECIFIED CHRONICITY: ICD-10-CM

## 2025-02-06 DIAGNOSIS — Z79.4 TYPE 2 DIABETES MELLITUS WITH OTHER CIRCULATORY COMPLICATION, WITH LONG-TERM CURRENT USE OF INSULIN (HCC): ICD-10-CM

## 2025-02-06 DIAGNOSIS — S91.309S OPEN WOUND OF FOOT, UNSPECIFIED LATERALITY, SEQUELA: ICD-10-CM

## 2025-02-06 DIAGNOSIS — I26.99 PULMONARY EMBOLISM, OTHER, UNSPECIFIED CHRONICITY, UNSPECIFIED WHETHER ACUTE COR PULMONALE PRESENT (HCC): Primary | ICD-10-CM

## 2025-02-06 ASSESSMENT — PAIN SCALES - GENERAL
PAINLEVEL_OUTOF10: 0 - NO PAIN
PAINLEVEL_OUTOF10: 0 - NO PAIN

## 2025-02-07 ENCOUNTER — OFFICE VISIT (OUTPATIENT)
Dept: GERIATRIC MEDICINE | Age: 65
End: 2025-02-07

## 2025-02-07 DIAGNOSIS — I82.90 DEEP VEIN THROMBOSIS (DVT) OF NON-EXTREMITY VEIN, UNSPECIFIED CHRONICITY: ICD-10-CM

## 2025-02-07 DIAGNOSIS — S91.309S OPEN WOUND OF FOOT, UNSPECIFIED LATERALITY, SEQUELA: ICD-10-CM

## 2025-02-07 DIAGNOSIS — I10 HYPERTENSION, UNSPECIFIED TYPE: ICD-10-CM

## 2025-02-07 DIAGNOSIS — I26.99 PULMONARY EMBOLISM, OTHER, UNSPECIFIED CHRONICITY, UNSPECIFIED WHETHER ACUTE COR PULMONALE PRESENT (HCC): Primary | ICD-10-CM

## 2025-02-07 DIAGNOSIS — E11.59 TYPE 2 DIABETES MELLITUS WITH OTHER CIRCULATORY COMPLICATION, WITH LONG-TERM CURRENT USE OF INSULIN (HCC): ICD-10-CM

## 2025-02-07 DIAGNOSIS — Z79.4 TYPE 2 DIABETES MELLITUS WITH OTHER CIRCULATORY COMPLICATION, WITH LONG-TERM CURRENT USE OF INSULIN (HCC): ICD-10-CM

## 2025-02-08 NOTE — PROGRESS NOTES
SNF PROGRESS NOTE      Cc- PE       Patient is a Lillian Oliveira 64 y.o. female who is being seen at [facility] after a hospital stay for PE/ DVT in the RLE. She also noted to have b/l LE wounds.     Patient is sitting in the chair. She states that she wants to go back to bed. She is eating well. Her pain is controlled.         Past Medical History:   Diagnosis Date    Asthma     CAD (coronary artery disease)     previous MI    Cerebral artery occlusion with cerebral infarction (HCC)     Cerebrovascular disease     Stroke 2/2017 and 4/2017    Chronic pain     Depression     Diabetes (HCC)     Headache     Hyperlipidemia     Hypertension     Neuropathy     Osteoarthritis     Type 2 diabetes mellitus without complication ([facility])      Meperidine; 2,4-d dimethylamine; Atorvastatin; Benazepril; Clonidine; Dapagliflozin; Egg white; Ezetimibe; Meperidine hcl; Pravastatin; Propoxyphene; Statins; Tirzepatide; Canagliflozin; and Morphine    VS reviewed      Gen- Alert and oriented x 2   Heart- RRR no murmur no LE edema   Lungs- CTA b/l no resp distress RA oxygen   Abd- bs x 4       Assessment and Plan    Pulmonary Embolism/ RLE DVT   Eliquis   B/l Feet wounds  Lyrica   Wound care  DM   Insulin   Ozempic-- family bringing from home.   HTN  Olmesartan-norvasc-hctz  Lasix   Hx meningioma  On chronic steroids  2/19/25 Infusion       ABDIRASHID Mendoza DO     Electronically signed by: Rochelle Cortez DO on 2/7/2025

## 2025-02-08 NOTE — PROGRESS NOTES
SNF PROGRESS NOTE      Cc- PE       Patient is a Lillian Oliveira 64 y.o. female who is being seen at [facility] after a hospital stay for PE/ DVT in the RLE. She also noted to have b/l LE wounds.     Patient is sitting up in the chair. She is eating well. Her pain is much better controlled.         Past Medical History:   Diagnosis Date    Asthma     CAD (coronary artery disease)     previous MI    Cerebral artery occlusion with cerebral infarction (HCC)     Cerebrovascular disease     Stroke 2/2017 and 4/2017    Chronic pain     Depression     Diabetes (HCC)     Headache     Hyperlipidemia     Hypertension     Neuropathy     Osteoarthritis     Type 2 diabetes mellitus without complication (HCC)      Meperidine; 2,4-d dimethylamine; Atorvastatin; Benazepril; Clonidine; Dapagliflozin; Egg white; Ezetimibe; Meperidine hcl; Pravastatin; Propoxyphene; Statins; Tirzepatide; Canagliflozin; and Morphine    VS reviewed    Gen- Alert and oriented x 2   Heart- RRR no murmur no LE edema   Lungs- CTA b/l no resp distress RA oxygen   Abd- bs x 4           Assessment and Plan    Pulmonary Embolism/ RLE DVT   Eliquis   B/l Feet wounds  Lyrica   Wound care  DM   Insulin   Ozempic-- family bringing from home.   HTN  Olmesartan-norvasc-hctz  Lasix   Hx meningioma  On chronic steroids  2/19/25 Infusion       ABDIRASHID Mendoza DO     Electronically signed by: Rochelle Cortez DO on 2/6/2025

## 2025-02-10 ENCOUNTER — OFFICE VISIT (OUTPATIENT)
Dept: GERIATRIC MEDICINE | Age: 65
End: 2025-02-10
Payer: COMMERCIAL

## 2025-02-10 DIAGNOSIS — I82.90 DEEP VEIN THROMBOSIS (DVT) OF NON-EXTREMITY VEIN, UNSPECIFIED CHRONICITY: ICD-10-CM

## 2025-02-10 DIAGNOSIS — E11.59 TYPE 2 DIABETES MELLITUS WITH OTHER CIRCULATORY COMPLICATION, WITH LONG-TERM CURRENT USE OF INSULIN (HCC): ICD-10-CM

## 2025-02-10 DIAGNOSIS — I10 HYPERTENSION, UNSPECIFIED TYPE: ICD-10-CM

## 2025-02-10 DIAGNOSIS — Z79.4 TYPE 2 DIABETES MELLITUS WITH OTHER CIRCULATORY COMPLICATION, WITH LONG-TERM CURRENT USE OF INSULIN (HCC): ICD-10-CM

## 2025-02-10 DIAGNOSIS — I26.99 PULMONARY EMBOLISM, OTHER, UNSPECIFIED CHRONICITY, UNSPECIFIED WHETHER ACUTE COR PULMONALE PRESENT (HCC): Primary | ICD-10-CM

## 2025-02-10 DIAGNOSIS — S91.309S OPEN WOUND OF FOOT, UNSPECIFIED LATERALITY, SEQUELA: ICD-10-CM

## 2025-02-10 PROCEDURE — 99308 SBSQ NF CARE LOW MDM 20: CPT | Performed by: INTERNAL MEDICINE

## 2025-02-11 ENCOUNTER — OFFICE VISIT (OUTPATIENT)
Dept: GERIATRIC MEDICINE | Age: 65
End: 2025-02-11
Payer: COMMERCIAL

## 2025-02-11 DIAGNOSIS — I10 HYPERTENSION, UNSPECIFIED TYPE: ICD-10-CM

## 2025-02-11 DIAGNOSIS — I26.99 PULMONARY EMBOLISM, OTHER, UNSPECIFIED CHRONICITY, UNSPECIFIED WHETHER ACUTE COR PULMONALE PRESENT (HCC): Primary | ICD-10-CM

## 2025-02-11 DIAGNOSIS — I82.90 DEEP VEIN THROMBOSIS (DVT) OF NON-EXTREMITY VEIN, UNSPECIFIED CHRONICITY: ICD-10-CM

## 2025-02-11 DIAGNOSIS — Z79.4 TYPE 2 DIABETES MELLITUS WITH OTHER CIRCULATORY COMPLICATION, WITH LONG-TERM CURRENT USE OF INSULIN (HCC): ICD-10-CM

## 2025-02-11 DIAGNOSIS — S91.309S OPEN WOUND OF FOOT, UNSPECIFIED LATERALITY, SEQUELA: ICD-10-CM

## 2025-02-11 DIAGNOSIS — E11.59 TYPE 2 DIABETES MELLITUS WITH OTHER CIRCULATORY COMPLICATION, WITH LONG-TERM CURRENT USE OF INSULIN (HCC): ICD-10-CM

## 2025-02-11 PROCEDURE — 99308 SBSQ NF CARE LOW MDM 20: CPT | Performed by: INTERNAL MEDICINE

## 2025-02-11 NOTE — PROGRESS NOTES
SNF PROGRESS NOTE      Cc-  PE       Patient is a Lillian Oliveira 64 y.o. female who is being seen at [facility] after a hospital stay for PE/ DVT in the RLE. She also noted to have b/l LE wounds.     Patient is sitting up in bed. She denies any complaints of pain. She continues to get wound care.         Past Medical History:   Diagnosis Date    Asthma     CAD (coronary artery disease)     previous MI    Cerebral artery occlusion with cerebral infarction (HCC)     Cerebrovascular disease     Stroke 2/2017 and 4/2017    Chronic pain     Depression     Diabetes (HCC)     Headache     Hyperlipidemia     Hypertension     Neuropathy     Osteoarthritis     Type 2 diabetes mellitus without complication (HCC)      Meperidine; 2,4-d dimethylamine; Atorvastatin; Benazepril; Clonidine; Dapagliflozin; Egg white; Ezetimibe; Meperidine hcl; Pravastatin; Propoxyphene; Statins; Tirzepatide; Canagliflozin; and Morphine    VS reviewed    Gen- Alert and oriented x 2   Heart- RRR no murmur no LE edema   Lungs- CTA b/l no resp distress RA oxygen   Abd- bs x 4           Assessment and Plan    Pulmonary Embolism/ RLE DVT   Eliquis   B/l Feet wounds  Lyrica   Wound care  DM   Insulin   Ozempic  HTN  Olmesartan-norvasc-hctz  Lasix   Hx meningioma  On chronic steroids  2/19/25 Infusion       ABDIRASHID Menodza DO     Electronically signed by: Rochelle Cortez DO on 2/11/2025

## 2025-02-11 NOTE — PROGRESS NOTES
SNF PROGRESS NOTE      Cc- PE       Patient is a Lillian Oliveira 64 y.o. female who is being seen at [facility] after a hospital stay for PE/ DVT in the RLE. She also noted to have b/l LE wounds.     Patient is sitting up in her chair. She is eating well. She denies any complaints of pain.         Past Medical History:   Diagnosis Date    Asthma     CAD (coronary artery disease)     previous MI    Cerebral artery occlusion with cerebral infarction (HCC)     Cerebrovascular disease     Stroke 2/2017 and 4/2017    Chronic pain     Depression     Diabetes (HCC)     Headache     Hyperlipidemia     Hypertension     Neuropathy     Osteoarthritis     Type 2 diabetes mellitus without complication (HCC)      Meperidine; 2,4-d dimethylamine; Atorvastatin; Benazepril; Clonidine; Dapagliflozin; Egg white; Ezetimibe; Meperidine hcl; Pravastatin; Propoxyphene; Statins; Tirzepatide; Canagliflozin; and Morphine    VS reviewed    Gen- Alert and oriented x 2   Heart- RRR no murmur no LE edema   Lungs- CTA b/l no resp distress RA oxygen   Abd- bs x 4           Assessment and Plan    Pulmonary Embolism/ RLE DVT   Eliquis   B/l Feet wounds  Lyrica   Wound care  DM   Insulin   Ozempic-- family bringing from home.   HTN  Olmesartan-norvasc-hctz  Lasix   Hx meningioma  On chronic steroids  2/19/25 Infusion       ABDIRASHID Mendoza DO     Electronically signed by: Rochelle Cortez DO on 2/10/2025

## 2025-02-13 NOTE — PROGRESS NOTES
SNF PROGRESS NOTE      Cc- PE       Patient is a Lillian Oliveira 64 y.o. female  who is being seen at [facility] after a hospital stay for PE/ DVT in the RLE. She also noted to have b/l LE wounds.        Patient is eating well. She is sitting up in the chair in her room and is sleeping. She wakes up when I enter the room.         Past Medical History:   Diagnosis Date    Asthma     CAD (coronary artery disease)     previous MI    Cerebral artery occlusion with cerebral infarction (HCC)     Cerebrovascular disease     Stroke 2/2017 and 4/2017    Chronic pain     Depression     Diabetes (HCC)     Headache     Hyperlipidemia     Hypertension     Neuropathy     Osteoarthritis     Type 2 diabetes mellitus without complication (HCC)      Meperidine; 2,4-d dimethylamine; Atorvastatin; Benazepril; Clonidine; Dapagliflozin; Egg white; Ezetimibe; Meperidine hcl; Pravastatin; Propoxyphene; Statins; Tirzepatide; Canagliflozin; and Morphine    VS reviewed      Gen- Alert and oriented x 2   Heart- RRR no murmur no LE edema   Lungs- CTA b/l no resp distress RA oxygen   Abd- bs x 4            Assessment and Plan    Pulmonary Embolism/ RLE DVT   Eliquis   B/l Feet wounds  Lyrica   Wound care  DM   Insulin   Ozempic-- family bringing from home.   HTN  Olmesartan-norvasc-hctz  Lasix   Hx meningioma  On chronic steroids  2/19/25 Infusion       ABDIRASHID Mendoza DO     Electronically signed by: Rochelle Cortez DO on 2/4/2025

## 2025-02-14 ENCOUNTER — OFFICE VISIT (OUTPATIENT)
Dept: GERIATRIC MEDICINE | Age: 65
End: 2025-02-14

## 2025-02-14 DIAGNOSIS — E11.59 TYPE 2 DIABETES MELLITUS WITH OTHER CIRCULATORY COMPLICATION, WITH LONG-TERM CURRENT USE OF INSULIN (HCC): ICD-10-CM

## 2025-02-14 DIAGNOSIS — S91.309S OPEN WOUND OF FOOT, UNSPECIFIED LATERALITY, SEQUELA: ICD-10-CM

## 2025-02-14 DIAGNOSIS — I26.99 PULMONARY EMBOLISM, OTHER, UNSPECIFIED CHRONICITY, UNSPECIFIED WHETHER ACUTE COR PULMONALE PRESENT (HCC): Primary | ICD-10-CM

## 2025-02-14 DIAGNOSIS — I10 HYPERTENSION, UNSPECIFIED TYPE: ICD-10-CM

## 2025-02-14 DIAGNOSIS — Z79.4 TYPE 2 DIABETES MELLITUS WITH OTHER CIRCULATORY COMPLICATION, WITH LONG-TERM CURRENT USE OF INSULIN (HCC): ICD-10-CM

## 2025-02-14 DIAGNOSIS — I82.90 DEEP VEIN THROMBOSIS (DVT) OF NON-EXTREMITY VEIN, UNSPECIFIED CHRONICITY: ICD-10-CM

## 2025-02-14 LAB
ATRIAL RATE: 117 BPM
ATRIAL RATE: 124 BPM
P AXIS: 44 DEGREES
P AXIS: 49 DEGREES
P OFFSET: 189 MS
P OFFSET: 192 MS
P ONSET: 144 MS
P ONSET: 146 MS
PR INTERVAL: 140 MS
PR INTERVAL: 142 MS
Q ONSET: 215 MS
Q ONSET: 216 MS
QRS COUNT: 19 BEATS
QRS COUNT: 20 BEATS
QRS DURATION: 86 MS
QRS DURATION: 86 MS
QT INTERVAL: 308 MS
QT INTERVAL: 330 MS
QTC CALCULATION(BAZETT): 442 MS
QTC CALCULATION(BAZETT): 460 MS
QTC FREDERICIA: 392 MS
QTC FREDERICIA: 412 MS
R AXIS: -43 DEGREES
R AXIS: -48 DEGREES
T AXIS: 61 DEGREES
T AXIS: 64 DEGREES
T OFFSET: 370 MS
T OFFSET: 380 MS
VENTRICULAR RATE: 117 BPM
VENTRICULAR RATE: 124 BPM

## 2025-02-19 NOTE — PROGRESS NOTES
SNF PROGRESS NOTE      Cc- PE       Patient is a Lillian Oliveira 64 y.o. female who is being seen at [facility] after a hospital stay for PE/ DVT in the RLE. She also noted to have b/l LE wounds.     Patient is eating well. She is sitting up in the chair. The patient denies any pain.         Past Medical History:   Diagnosis Date    Asthma     CAD (coronary artery disease)     previous MI    Cerebral artery occlusion with cerebral infarction (HCC)     Cerebrovascular disease     Stroke 2/2017 and 4/2017    Chronic pain     Depression     Diabetes (HCC)     Headache     Hyperlipidemia     Hypertension     Neuropathy     Osteoarthritis     Type 2 diabetes mellitus without complication (HCC)      Meperidine; 2,4-d dimethylamine; Atorvastatin; Benazepril; Clonidine; Dapagliflozin; Egg white; Ezetimibe; Meperidine hcl; Pravastatin; Propoxyphene; Statins; Tirzepatide; Canagliflozin; and Morphine    VS reviewed      Gen- Alert and oriented x 2   Heart- RRR no murmur no LE edema   Lungs- CTA b/l no resp distress RA oxygen   Abd- bs x 4         Assessment and Plan    Pulmonary Embolism/ RLE DVT   Eliquis   B/l Feet wounds  Lyrica   Wound care  DM   Insulin   Ozempic-- family bringing from home.   HTN  Olmesartan-norvasc-hctz  Lasix   Hx meningioma  On chronic steroids  2/19/25 Infusion          ABDIRASHID Mendoza DO     Electronically signed by: Rochelle Cortez DO on 2/3/2025

## 2025-02-20 NOTE — PROGRESS NOTES
SNF PROGRESS NOTE      Cc- PE       Patient is a Lillian Oliveira 64 y.o. female who is being seen at [facility] after a hospital stay for PE/ DVT in the RLE. She also noted to have b/l LE wounds.       Patient is sitting up in room. She continues to get wound care. The patient isn't really ambitious with getting up.       Past Medical History:   Diagnosis Date    Asthma     CAD (coronary artery disease)     previous MI    Cerebral artery occlusion with cerebral infarction (HCC)     Cerebrovascular disease     Stroke 2/2017 and 4/2017    Chronic pain     Depression     Diabetes (HCC)     Headache     Hyperlipidemia     Hypertension     Neuropathy     Osteoarthritis     Type 2 diabetes mellitus without complication (HCC)      Meperidine; 2,4-d dimethylamine; Atorvastatin; Benazepril; Clonidine; Dapagliflozin; Egg white; Ezetimibe; Meperidine hcl; Pravastatin; Propoxyphene; Statins; Tirzepatide; Canagliflozin; and Morphine    VS reviewed      Gen- Alert and oriented x 2   Heart- RRR no murmur no LE edema   Lungs- CTA b/l no resp distress RA oxygen   Abd- bs x 4         Assessment and Plan    Pulmonary Embolism/ RLE DVT   Eliquis   B/l Feet wounds  Lyrica   Wound care  DM   Insulin   Ozempic  HTN  Olmesartan-norvasc-hctz  Lasix   Hx meningioma  On chronic steroids  2/19/25 Infusion       ABDIRASHID Mendoza DO     Electronically signed by: Rochelle Cortez DO on 2/14/2025

## 2025-02-26 LAB
ATRIAL RATE: 102 BPM
ATRIAL RATE: 121 BPM
P AXIS: 38 DEGREES
P AXIS: 42 DEGREES
P OFFSET: 189 MS
P OFFSET: 193 MS
P ONSET: 141 MS
P ONSET: 146 MS
PR INTERVAL: 134 MS
PR INTERVAL: 136 MS
Q ONSET: 209 MS
Q ONSET: 213 MS
QRS COUNT: 17 BEATS
QRS COUNT: 20 BEATS
QRS DURATION: 106 MS
QRS DURATION: 92 MS
QT INTERVAL: 344 MS
QT INTERVAL: 370 MS
QTC CALCULATION(BAZETT): 482 MS
QTC CALCULATION(BAZETT): 488 MS
QTC FREDERICIA: 434 MS
QTC FREDERICIA: 441 MS
R AXIS: -19 DEGREES
R AXIS: -40 DEGREES
T AXIS: 60 DEGREES
T AXIS: 69 DEGREES
T OFFSET: 385 MS
T OFFSET: 394 MS
VENTRICULAR RATE: 102 BPM
VENTRICULAR RATE: 121 BPM

## 2025-02-26 NOTE — PROGRESS NOTES
SNF PROGRESS NOTE      Cc- PE       Patient is a Lillian Oliveira 64 y.o. female who is being seen at [facility] after a hospital stay for PE/ DVT in the RLE. She also noted to have b/l LE wounds.     Patient doesn't want to get up. She is sitting up in the bed. She continues to get wound care. Pain is controlled. Lyrica dose adjusted.         Past Medical History:   Diagnosis Date    Asthma     CAD (coronary artery disease)     previous MI    Cerebral artery occlusion with cerebral infarction (HCC)     Cerebrovascular disease     Stroke 2/2017 and 4/2017    Chronic pain     Depression     Diabetes (HCC)     Headache     Hyperlipidemia     Hypertension     Neuropathy     Osteoarthritis     Type 2 diabetes mellitus without complication ([facility])      Meperidine; 2,4-d dimethylamine; Atorvastatin; Benazepril; Clonidine; Dapagliflozin; Egg white; Ezetimibe; Meperidine hcl; Pravastatin; Propoxyphene; Statins; Tirzepatide; Canagliflozin; and Morphine    VS reviewed      Gen- Alert and oriented x 2   Heart- RRR no murmur no LE edema   Lungs- CTA b/l no resp distress RA oxygen   Abd- bs x 4         Assessment and Plan    Pulmonary Embolism/ RLE DVT   Eliquis   B/l Feet wounds  Lyrica   Wound care  DM   Insulin   Ozempic  HTN  Olmesartan-norvasc-hctz  Lasix   Hx meningioma  On chronic steroids  2/19/25 Infusion          ABDIRASHID Mendoza DO     Electronically signed by: Rochelle Cortez DO on 2/17/2025

## 2025-02-27 NOTE — PROGRESS NOTES
SNF PROGRESS NOTE      Cc- PE       Patient is a Lillian Oliveira 64 y.o. female who is being seen at [facility] after a hospital stay for PE/ DVT in the RLE. She also noted to have b/l LE wounds.     Patient is eating well. She is sitting up in the chair. She continues to get wound care.         Past Medical History:   Diagnosis Date    Asthma     CAD (coronary artery disease)     previous MI    Cerebral artery occlusion with cerebral infarction (HCC)     Cerebrovascular disease     Stroke 2/2017 and 4/2017    Chronic pain     Depression     Diabetes (HCC)     Headache     Hyperlipidemia     Hypertension     Neuropathy     Osteoarthritis     Type 2 diabetes mellitus without complication (HCC)      Meperidine; 2,4-d dimethylamine; Atorvastatin; Benazepril; Clonidine; Dapagliflozin; Egg white; Ezetimibe; Meperidine hcl; Pravastatin; Propoxyphene; Statins; Tirzepatide; Canagliflozin; and Morphine    VS reviewed    Gen- Alert and oriented x 2   Heart- RRR no murmur no LE edema   Lungs- CTA b/l no resp distress RA oxygen   Abd- bs x 4           Assessment and Plan    Pulmonary Embolism/ RLE DVT   Eliquis   B/l Feet wounds  Lyrica   Wound care  DM   Insulin   Ozempic  HTN  Olmesartan-norvasc-hctz  Lasix   Hx meningioma  On chronic steroids  2/19/25 Infusion       ABDIRASHID Mendoza DO     Electronically signed by: Rochelle Cortez DO on 2/18/2025

## 2025-02-28 NOTE — PROGRESS NOTES
Discharge Summary       Discharge Disposition       Discharge Condition N/A       Discharge time 32 min       Diet/ Activity/ Meds- N/A         Brief SNF Course--     This is an 64 y.o. female who is being seen for PE/DVT. The patient continued to get wound care. The patient contracted influenza A and declined. The patient was made comfort care and was placed on comfort meds. The patient .           Discharge Diagnosis :    Pulmonary Embolism/ RLE DVT   B/l Feet wounds  DM   HTN  Hx meningioma        Follow up --     N/A         ABDIRASHID Mendoza DO     Electronically signed by: Rochelle Cortez DO on 2025

## 2025-02-28 NOTE — PROGRESS NOTES
SNF PROGRESS NOTE      Cc- PE       Patient is a Lillian Oliveira 64 y.o. female who is being seen at [facility] after a hospital stay for PE/ DVT in the RLE. She also noted to have b/l LE wounds.     Patient saw neuro and recommend decadron. She is sitting up in her room and really doesn't want to work with therapy. She continues to get wound care.         Past Medical History:   Diagnosis Date    Asthma     CAD (coronary artery disease)     previous MI    Cerebral artery occlusion with cerebral infarction (HCC)     Cerebrovascular disease     Stroke 2/2017 and 4/2017    Chronic pain     Depression     Diabetes (HCC)     Headache     Hyperlipidemia     Hypertension     Neuropathy     Osteoarthritis     Type 2 diabetes mellitus without complication ([facility])      Meperidine; 2,4-d dimethylamine; Atorvastatin; Benazepril; Clonidine; Dapagliflozin; Egg white; Ezetimibe; Meperidine hcl; Pravastatin; Propoxyphene; Statins; Tirzepatide; Canagliflozin; and Morphine    VS reviewed    Gen- Alert and oriented x 2   Heart- RRR no murmur no LE edema   Lungs- CTA b/l no resp distress RA oxygen   Abd- bs x 4           Assessment and Plan    Pulmonary Embolism/ RLE DVT   Eliquis   B/l Feet wounds  Lyrica   Wound care  DM   Insulin   Ozempic  HTN  Olmesartan-norvasc-hctz  Lasix   Hx meningioma  On chronic steroids  2/19/25 Infusion       ABDIRASHID Mendoza DO     Electronically signed by: Rochelle Cortez DO on 2/20/2025

## 2025-03-01 NOTE — PROGRESS NOTES
SNF PROGRESS NOTE      Cc-  PE       Patient is a Lillian Oliveira 64 y.o. female who is being seen at [facility] after a hospital stay for PE/ DVT in the RLE. She also noted to have b/l LE wounds.     Patient is eating ok. She is on RA. She is getting wound care done at the time. She doesn't want to get out of bed.         Past Medical History:   Diagnosis Date    Asthma     CAD (coronary artery disease)     previous MI    Cerebral artery occlusion with cerebral infarction (HCC)     Cerebrovascular disease     Stroke 2/2017 and 4/2017    Chronic pain     Depression     Diabetes (HCC)     Headache     Hyperlipidemia     Hypertension     Neuropathy     Osteoarthritis     Type 2 diabetes mellitus without complication (HCC)      Meperidine; 2,4-d dimethylamine; Atorvastatin; Benazepril; Clonidine; Dapagliflozin; Egg white; Ezetimibe; Meperidine hcl; Pravastatin; Propoxyphene; Statins; Tirzepatide; Canagliflozin; and Morphine    VS reviewed    Gen- Alert and oriented x 2   Heart- RRR no murmur no LE edema   Lungs- CTA b/l no resp distress RA oxygen   Abd- bs x 4           Assessment and Plan    Pulmonary Embolism/ RLE DVT   Eliquis   B/l Feet wounds  Lyrica   Wound care  DM   Insulin   Ozempic  HTN  Olmesartan-norvasc-hctz  Lasix   Hx meningioma  On chronic steroids  S/p 2/19/25 Infusion       ABDIRASHID Mendoza DO     Electronically signed by: Rochelle Cortez DO on 2/24/2025

## 2025-03-03 NOTE — PROGRESS NOTES
SNF PROGRESS NOTE      Cc- PE       Patient is a Lillian Oliveira 64 y.o. female  who is being seen at [facility] after a hospital stay for PE/ DVT in the RLE. She also noted to have b/l LE wounds.     Patient tested positive for Influenza. Her oral intake is poor. She is not really wanting to get up.         Past Medical History:   Diagnosis Date    Asthma     CAD (coronary artery disease)     previous MI    Cerebral artery occlusion with cerebral infarction (HCC)     Cerebrovascular disease     Stroke 2/2017 and 4/2017    Chronic pain     Depression     Diabetes (HCC)     Headache     Hyperlipidemia     Hypertension     Neuropathy     Osteoarthritis     Type 2 diabetes mellitus without complication (HCC)      Meperidine; 2,4-d dimethylamine; Atorvastatin; Benazepril; Clonidine; Dapagliflozin; Egg white; Ezetimibe; Meperidine hcl; Pravastatin; Propoxyphene; Statins; Tirzepatide; Canagliflozin; and Morphine    VS reviewed    Gen- Alert and oriented x 1   Heart- RRR no murmur no LE edema   Lungs- CTA b/l no resp distress RA oxygen   Abd- bs x 4         Assessment and Plan    Pulmonary Embolism/ RLE DVT   Eliquis   B/l Feet wounds  Lyrica   Wound care  DM   Insulin   Ozempic  HTN  Olmesartan-norvasc-hctz  Lasix   Hx meningioma  On chronic steroids  S/p 2/19/25 Infusion   Flu +       Referral made for hospice services.       ABDIRASHID Mendoza DO     Electronically signed by: Rochelle Cortez DO on 2/27/2025

## 2025-03-05 ENCOUNTER — APPOINTMENT (OUTPATIENT)
Dept: ORTHOPEDIC SURGERY | Facility: HOSPITAL | Age: 65
End: 2025-03-05
Payer: COMMERCIAL

## 2025-04-22 ENCOUNTER — APPOINTMENT (OUTPATIENT)
Dept: PRIMARY CARE | Facility: CLINIC | Age: 65
End: 2025-04-22
Payer: COMMERCIAL

## (undated) DEVICE — PACK,LAPAROTOMY,NO GOWNS: Brand: MEDLINE

## (undated) DEVICE — COUNTER NDL 40 COUNT HLD 70 FOAM BLK ADH W/ MAG

## (undated) DEVICE — LABEL MED MINI W/ MARKER

## (undated) DEVICE — KIT NDL 15GA L15.2CM EPI

## (undated) DEVICE — GAUZE,SPONGE,4"X4",16PLY,XRAY,STRL,LF: Brand: MEDLINE

## (undated) DEVICE — SYRINGE MED 10ML LUERLOCK TIP W/O SFTY DISP

## (undated) DEVICE — 3M™ STERI-STRIP™ REINFORCED ADHESIVE SKIN CLOSURES, R1547, 1/2 IN X 4 IN (12 MM X 100 MM), 6 STRIPS/ENVELOPE: Brand: 3M™ STERI-STRIP™

## (undated) DEVICE — APPLICATOR MEDICATED 26 CC SOLUTION HI LT ORNG CHLORAPREP

## (undated) DEVICE — GOWN,AURORA,NONREINFORCED,LARGE: Brand: MEDLINE

## (undated) DEVICE — C-ARM: Brand: UNBRANDED

## (undated) DEVICE — MARKER SURG SKIN GENTIAN VLT REG TIP W/ 6IN RUL

## (undated) DEVICE — 3M™ TEGADERM™ TRANSPARENT FILM DRESSING FRAME STYLE, 1626W, 4 IN X 4-3/4 IN (10 CM X 12 CM), 50/CT 4CT/CASE: Brand: 3M™ TEGADERM™

## (undated) DEVICE — NEEDLE HYPO 25GA L1.5IN BLU POLYPR HUB S STL REG BVL STR

## (undated) DEVICE — GLOVE ORANGE PI 7 1/2   MSG9075

## (undated) DEVICE — SHEET,DRAPE,53X77,STERILE: Brand: MEDLINE

## (undated) DEVICE — DEVICE NEUROSTIMULATOR W2.9XL3.1IN THK0.8IN 71GM

## (undated) DEVICE — TOWEL,OR,DSP,ST,BLUE,STD,4/PK,20PK/CS: Brand: MEDLINE